# Patient Record
Sex: FEMALE | Race: BLACK OR AFRICAN AMERICAN | Employment: OTHER | RURAL
[De-identification: names, ages, dates, MRNs, and addresses within clinical notes are randomized per-mention and may not be internally consistent; named-entity substitution may affect disease eponyms.]

---

## 2017-02-13 ENCOUNTER — OFFICE VISIT (OUTPATIENT)
Dept: FAMILY MEDICINE CLINIC | Age: 82
End: 2017-02-13

## 2017-02-13 VITALS
DIASTOLIC BLOOD PRESSURE: 60 MMHG | TEMPERATURE: 97.7 F | HEIGHT: 62 IN | WEIGHT: 249.2 LBS | OXYGEN SATURATION: 97 % | HEART RATE: 73 BPM | SYSTOLIC BLOOD PRESSURE: 143 MMHG | BODY MASS INDEX: 45.86 KG/M2 | RESPIRATION RATE: 20 BRPM

## 2017-02-13 DIAGNOSIS — K27.9 PUD (PEPTIC ULCER DISEASE): ICD-10-CM

## 2017-02-13 DIAGNOSIS — M25.551 RIGHT HIP PAIN: ICD-10-CM

## 2017-02-13 DIAGNOSIS — K92.1 BLOOD IN STOOL: Primary | ICD-10-CM

## 2017-02-13 LAB
HEMOCCULT STL QL: POSITIVE
VALID INTERNAL CONTROL?: YES

## 2017-02-13 RX ORDER — ESOMEPRAZOLE MAGNESIUM 40 MG/1
40 CAPSULE, DELAYED RELEASE ORAL DAILY
Qty: 30 CAP | Refills: 1 | Status: SHIPPED | OUTPATIENT
Start: 2017-02-13 | End: 2017-02-24 | Stop reason: SDUPTHER

## 2017-02-13 NOTE — PROGRESS NOTES
Sammy Whatley is a 81 y.o. female who presents to the office today with the following:  Chief Complaint   Patient presents with   Jaqueline Connelly     started friday med day and saturday much more and sunday had some, no blood in stool today       Allergies   Allergen Reactions    Niacin Itching     Felt hot also    Ultram [Tramadol] Nausea Only       Current Outpatient Prescriptions   Medication Sig    UBIDECARENONE (CO Q-10 PO) Take  by mouth.  esomeprazole (NEXIUM) 40 mg capsule Take 1 Cap by mouth daily.  lisinopril (PRINIVIL, ZESTRIL) 10 mg tablet Take 1 Tab by mouth daily.  fexofenadine (ALLEGRA ALLERGY) 180 mg tablet Take 1 Tab by mouth daily.  phenylephrine 0.25 % suppository Insert 1 Suppository into rectum two (2) times a day.  clotrimazole (LOTRIMIN) 1 % topical cream Apply  to affected area two (2) times a day.  atorvastatin (LIPITOR) 20 mg tablet Take 1 Tab by mouth daily.  hydrochlorothiazide (HYDRODIURIL) 25 mg tablet Take 1 Tab by mouth daily.  meloxicam (MOBIC) 15 mg tablet Take 1 Tab by mouth daily.  diltiazem (TIAZAC) 120 mg SR capsule Take 1 Cap by mouth daily.  potassium chloride SR (KLOR-CON 10) 10 mEq tablet Take 2 Tabs by mouth daily.  TROLAMINE SALICYLATE (ASPERCREME EX) by Apply Externally route.  aspirin delayed-release 81 mg tablet Take  by mouth daily.  magnesium oxide 500 mg tab Take  by mouth.  multivitamin (ONE A DAY) tablet Take 1 Tab by mouth daily.  acetaminophen (TYLENOL) 325 mg tablet Take  by mouth every four (4) hours as needed for Pain. No current facility-administered medications for this visit. Past Medical History   Diagnosis Date    Allergic rhinitis     Cervical disc disease     Cervical disc disease     Eczema     Gastritis     GERD (gastroesophageal reflux disease)      mild    GI bleed      hx.  of recurrent    Hernia      hiatal lt side    Hiatal hernia     Hypercholesterolemia     Hypertension     Osteoarthritis (arthritis due to wear and tear of joints)        Past Surgical History   Procedure Laterality Date    Hx gyn       hysterectomy    Hx orthopaedic       rt knee replacement    Hx cyst removal       left breast    Hx colonoscopy  3/05    Hx endoscopy  1/09    Hx endoscopy  2015       History   Smoking Status    Never Smoker   Smokeless Tobacco    Never Used       Family History   Problem Relation Age of Onset    No Known Problems Mother     No Known Problems Father     Arthritis-osteo Sister     Alzheimer Sister     Parkinson's Disease Brother     Cancer Brother     No Known Problems Brother          History of Present Illness:  Patient of Dr. Sheralyn Lanes here for evaluation blood in stool    Patient states on Friday and Saturday she noted some dark blood in her stool. It was not bright red blood. Just a scant amount on Sunday and none today. Patient has had a history of blood in her stool before. She does have a history of hemorrhoids. She states this blood is darker. She does have a history of colon polyps. It appears she had a colonoscopy about 5 years ago. She was also seen in the spring with dark blood in her stool and found to have bleeding ulcer. She had EGD which confirmed this. She was treated with Nexium. Follow-up EGD showed healing gastric ulcer. Since that time she was switched back to Pepcid. She states she stopped that several months ago. She also restarted her Mobic that she takes for arthritis in her hip. She is also on daily aspirin for cardiovascular prophylaxis    Patient states her stomach was gurgling over the weekend but better now. She has had no abdominal pain. No dizziness faintness chest pain or other physical complaints.   She is otherwise feeling well       Review of Systems: Review of systems negative except as noted      Physical Exam:  Visit Vitals    /60 (BP 1 Location: Left arm, BP Patient Position: Sitting)    Pulse 73    Temp 97.7 °F (36.5 °C) (Temporal)    Resp 20    Ht 5' 2\" (1.575 m)    Wt 249 lb 3.2 oz (113 kg)    SpO2 97%    BMI 45.58 kg/m2     Vitals:    02/13/17 0845   BP: 143/60   BP 1 Location: Left arm   BP Patient Position: Sitting   Pulse: 73   Resp: 20   Temp: 97.7 °F (36.5 °C)   TempSrc: Temporal   SpO2: 97%   Weight: 249 lb 3.2 oz (113 kg)   Height: 5' 2\" (1.575 m)     Patient was in no acute distress vital signs stable. She had no orthostatic blood pressure  changes  Chest was clear no wheezes rhonchi or rales  Cor regular rate and rhythm  Abdomen obese no masses soft and was nontender  External rectal exam normal.  No obvious hemorrhoids. Digital exam revealed no masses. Brown stool that was heme positive    Assessment/Plan:  1. Blood in stool  Patient with heme positive stools. No evidence of hemorrhoids. Possibilities include bleeding polyp or developing colon lesion versus recurrent peptic ulcer disease. Patient currently clinically stable with no evidence of orthostasis. At this point I am restarting her Nexium, holding her Mobic and aspirin. Checking a CBC. Getting her back into surgery for further evaluation consideration for repeat scope. She will follow-up here in 1 week for recheck sooner if she has any worsening symptoms  - CBC WITH AUTOMATED DIFF  - REFERRAL TO GENERAL SURGERY  - AMB POC FECAL BLOOD, OCCULT, QL 1 CARD    2. PUD (peptic ulcer disease)    - REFERRAL TO GENERAL SURGERY    3. Right hip pain  Recommended Tylenol as needed for pain          Continue current therapy plan except for indicated above. Verbal and written instructions (see AVS) provided.  Patient expresses understanding of diagnosis and treatment plan. Follow-up Disposition:  Return in about 1 week (around 2/20/2017), or with Dr Emelina Escoto, for GI bleed. Dannie Hall.  Ry Boyd MD

## 2017-02-13 NOTE — PROGRESS NOTES
Chief Complaint   Patient presents with    Melena     started friday med day and saturday much more and sunday had some, no blood in stool today

## 2017-02-13 NOTE — MR AVS SNAPSHOT
Visit Information Date & Time Provider Department Dept. Phone Encounter #  
 2/13/2017  8:30 AM Lynn Mcmillan MD [address] [place] [address] [phone] [id number] Follow-up Instructions Return in about 1 week (around 2/20/2017), or with Dr Mattie Crouch, for GI bleed. Upcoming Health Maintenance Date Due DTaP/Tdap/Td series (1 - Tdap) 2/10/1956 ZOSTER VACCINE AGE 60> 2/10/1996 MEDICARE YEARLY EXAM 3/1/2017 GLAUCOMA SCREENING Q2Y 10/22/2017 Allergies as of 2/13/2017  Review Complete On: 2/13/2017 By: Lynn Mcmillan MD  
  
 Severity Noted Reaction Type Reactions Niacin  10/22/2013    Itching Felt hot also Ultram [Tramadol]  10/22/2013    Nausea Only Current Immunizations  Reviewed on 11/5/2015 Name Date Influenza High Dose Vaccine PF 10/4/2016 Influenza Vaccine 10/22/2014 Influenza Vaccine (Quad) PF 11/5/2015 Pneumococcal Conjugate (PCV-13) 5/1/2015 Not reviewed this visit You Were Diagnosed With   
  
 Codes Comments Blood in stool    -  Primary ICD-10-CM: K92.1 ICD-9-CM: 578.1 PUD (peptic ulcer disease)     ICD-10-CM: K27.9 ICD-9-CM: 533.90 Right hip pain     ICD-10-CM: M25.551 ICD-9-CM: 719.45 Vitals BP Pulse Temp Resp Height(growth percentile) 143/60 (BP 1 Location: Left arm, BP Patient Position: Sitting) 73 97.7 °F (36.5 °C) (Temporal) 20 5' 2\" (1.575 m) Weight(growth percentile) SpO2 BMI OB Status Smoking Status 249 lb 3.2 oz (113 kg) 97% 45.58 kg/m2 Hysterectomy Never Smoker Vitals History BMI and BSA Data Body Mass Index Body Surface Area 45.58 kg/m 2 2.22 m 2 Preferred Pharmacy Pharmacy Name Phone [facility],[address], [address] [place] [address]  [phone] Your Updated Medication List  
  
   
This list is accurate as of: 2/13/17  9:36 AM.  Always use your most recent med list.  
  
  
  
  
 acetaminophen 325 mg tablet Commonly known as:  TYLENOL Take  by mouth every four (4) hours as needed for Pain. ASPERCREME EX  
by Apply Externally route. aspirin delayed-release 81 mg tablet Take  by mouth daily. atorvastatin 20 mg tablet Commonly known as:  LIPITOR Take 1 Tab by mouth daily. clotrimazole 1 % topical cream  
Commonly known as:  Munith Rudder Apply  to affected area two (2) times a day. CO Q-10 PO Take  by mouth. dilTIAZem 120 mg SR capsule Commonly known as:  [facility] Take 1 Cap by mouth daily. esomeprazole 40 mg capsule Commonly known as:  NexIUM Take 1 Cap by mouth daily. fexofenadine 180 mg tablet Commonly known as:  ALLEGRA ALLERGY Take 1 Tab by mouth daily. hydroCHLOROthiazide 25 mg tablet Commonly known as:  HYDRODIURIL Take 1 Tab by mouth daily. lisinopril 10 mg tablet Commonly known as:  [place] Take 1 Tab by mouth daily. magnesium oxide 500 mg Tab Take  by mouth.  
  
 meloxicam 15 mg tablet Commonly known as:  MOBIC Take 1 Tab by mouth daily. multivitamin tablet Commonly known as:  ONE A DAY Take 1 Tab by mouth daily. phenylephrine 0.25 % suppository Insert 1 Suppository into rectum two (2) times a day. potassium chloride SR 10 mEq tablet Commonly known as:  KLOR-CON 10 Take 2 Tabs by mouth daily. Prescriptions Sent to Pharmacy Refills  
 esomeprazole (NEXIUM) 40 mg capsule 1 Sig: Take 1 Cap by mouth daily. Class: Normal  
 Pharmacy: [address] [place] [address],[address], [address] [place] [address]  Ph #: [phone] Route: Oral  
  
We Performed the Following AMB POC FECAL BLOOD, OCCULT, QL 1 CARD [20837 CPT(R)] CBC WITH AUTOMATED DIFF [39853 CPT(R)] REFERRAL TO GENERAL SURGERY [REF27 Custom] Comments:  
 Recurrent gI bleed Follow-up Instructions Return in about 1 week (around 2/20/2017), or with Dr Chance Patel, for GI bleed. Referral Information Referral ID Referred By Referred To  
  
 [id number] Mason Shanks MD   
   [facility] [address] Phone: [phone] Fax: [phone] Visits Status Start Date End Date 1 New Request 2/13/17 2/13/18 If your referral has a status of pending review or denied, additional information will be sent to support the outcome of this decision. Patient Instructions Stop mobic(meloxicam) Hold aspirin Restart nexium Lab work today Follow up Dr Randall Camarena 1 week, sooner if rectal bleeding recurrs Follow up surgery Introducing [facility]! Dear Cy Webster: 
Thank you for requesting a [employer] account. Our records indicate that you already have an active [employer] account. You can access your account anytime at [email or web address] Did you know that you can access your hospital and ER discharge instructions at any time in [employer]? You can also review all of your test results from your hospital stay or ER visit. Additional Information If you have questions, please visit the Frequently Asked Questions section of the [employer] website at [email or web address] Remember, [employer] is NOT to be used for urgent needs. For medical emergencies, dial 911. Now available from your iPhone and Android! Please provide this summary of care documentation to your next provider. Your primary care clinician is listed as Dimple Greco. If you have any questions after today's visit, please call [phone].

## 2017-02-13 NOTE — PATIENT INSTRUCTIONS
Stop mobic(meloxicam)  Hold aspirin  Restart nexium    Lab work today  Follow up Dr mccollum 1 week, sooner if rectal bleeding recurrs  Follow up surgery

## 2017-02-14 LAB
BASOPHILS # BLD AUTO: 0 X10E3/UL (ref 0–0.2)
BASOPHILS NFR BLD AUTO: 0 %
EOSINOPHIL # BLD AUTO: 0.1 X10E3/UL (ref 0–0.4)
EOSINOPHIL NFR BLD AUTO: 2 %
ERYTHROCYTE [DISTWIDTH] IN BLOOD BY AUTOMATED COUNT: 14.1 % (ref 12.3–15.4)
HCT VFR BLD AUTO: 41.5 % (ref 34–46.6)
HGB BLD-MCNC: 13.4 G/DL (ref 11.1–15.9)
IMM GRANULOCYTES # BLD: 0 X10E3/UL (ref 0–0.1)
IMM GRANULOCYTES NFR BLD: 0 %
LYMPHOCYTES # BLD AUTO: 1.6 X10E3/UL (ref 0.7–3.1)
LYMPHOCYTES NFR BLD AUTO: 22 %
MCH RBC QN AUTO: 27 PG (ref 26.6–33)
MCHC RBC AUTO-ENTMCNC: 32.3 G/DL (ref 31.5–35.7)
MCV RBC AUTO: 84 FL (ref 79–97)
MONOCYTES # BLD AUTO: 1 X10E3/UL (ref 0.1–0.9)
MONOCYTES NFR BLD AUTO: 13 %
NEUTROPHILS # BLD AUTO: 4.4 X10E3/UL (ref 1.4–7)
NEUTROPHILS NFR BLD AUTO: 63 %
PLATELET # BLD AUTO: 187 X10E3/UL (ref 150–379)
RBC # BLD AUTO: 4.97 X10E6/UL (ref 3.77–5.28)
WBC # BLD AUTO: 7.2 X10E3/UL (ref 3.4–10.8)

## 2017-02-14 NOTE — PROGRESS NOTES
Blood counts normal  Patient should continue current medications  Keep planned follow-up with Dr. Michelle Latham and Dr. Miller Speaks if bleeding recurs

## 2017-02-20 ENCOUNTER — OFFICE VISIT (OUTPATIENT)
Dept: SURGERY | Age: 82
End: 2017-02-20

## 2017-02-20 VITALS
HEIGHT: 62 IN | HEART RATE: 71 BPM | TEMPERATURE: 98 F | OXYGEN SATURATION: 94 % | RESPIRATION RATE: 18 BRPM | DIASTOLIC BLOOD PRESSURE: 58 MMHG | BODY MASS INDEX: 45.62 KG/M2 | WEIGHT: 247.9 LBS | SYSTOLIC BLOOD PRESSURE: 140 MMHG

## 2017-02-20 DIAGNOSIS — K92.1 FRANK BLOOD IN STOOL: Primary | ICD-10-CM

## 2017-02-20 DIAGNOSIS — Z87.11 PERSONAL HISTORY OF PEPTIC ULCER DISEASE: ICD-10-CM

## 2017-02-20 NOTE — PROGRESS NOTES
HISTORY OF PRESENT ILLNESS  Roya Bailon is a 81 y.o. female. Patient was referred by Mikaela Lopez MD for evaluation for endoscopy to evaluate blood in stool. Mikaela Richter HPI she is known to me from encounter 2016 peptic ulcer disease was identified and treated. She has begun to have some blood in her stools again. She had a colonoscopy about 5 years ago no malignancy was identified at that time. She has not had any change in bowel habit or unexplained abdominal pain or weight loss. Past Medical History   Diagnosis Date    Allergic rhinitis     Cervical disc disease     Cervical disc disease     Eczema     Gastritis     GERD (gastroesophageal reflux disease)      mild    GI bleed      hx. of recurrent    Hernia      hiatal lt side    Hiatal hernia     Hypercholesterolemia     Hypertension     Osteoarthritis (arthritis due to wear and tear of joints)        Past Surgical History   Procedure Laterality Date    Hx gyn       hysterectomy    Hx orthopaedic       rt knee replacement    Hx cyst removal       left breast    Hx colonoscopy  3/05    Hx endoscopy  1/09    Hx endoscopy  2015         Current Outpatient Prescriptions:     UBIDECARENONE (CO Q-10 PO), Take  by mouth., Disp: , Rfl:     esomeprazole (NEXIUM) 40 mg capsule, Take 1 Cap by mouth daily. , Disp: 30 Cap, Rfl: 1    lisinopril (PRINIVIL, ZESTRIL) 10 mg tablet, Take 1 Tab by mouth daily. , Disp: 30 Tab, Rfl: 0    fexofenadine (ALLEGRA ALLERGY) 180 mg tablet, Take 1 Tab by mouth daily. , Disp: 30 Tab, Rfl: 5    clotrimazole (LOTRIMIN) 1 % topical cream, Apply  to affected area two (2) times a day., Disp: 45 g, Rfl: 1    acetaminophen (TYLENOL) 325 mg tablet, Take  by mouth every four (4) hours as needed for Pain., Disp: , Rfl:     atorvastatin (LIPITOR) 20 mg tablet, Take 1 Tab by mouth daily. , Disp: 30 Tab, Rfl: 11    hydrochlorothiazide (HYDRODIURIL) 25 mg tablet, Take 1 Tab by mouth daily. , Disp: 30 Tab, Rfl: 11    diltiazem [facility]) 120 mg SR capsule, Take 1 Cap by mouth daily. , Disp: 30 Cap, Rfl: 11    potassium chloride SR (KLOR-CON 10) 10 mEq tablet, Take 2 Tabs by mouth daily. , Disp: 60 Tab, Rfl: 11    TROLAMINE SALICYLATE (ASPERCREME EX), by Apply Externally route., Disp: , Rfl:     magnesium oxide 500 mg tab, Take  by mouth., Disp: , Rfl:     multivitamin (ONE A DAY) tablet, Take 1 Tab by mouth daily. , Disp: , Rfl:     phenylephrine 0.25 % suppository, Insert 1 Suppository into rectum two (2) times a day., Disp: 20 Suppository, Rfl: 0    meloxicam (MOBIC) 15 mg tablet, Take 1 Tab by mouth daily. , Disp: 30 Tab, Rfl: 11    aspirin delayed-release 81 mg tablet, Take  by mouth daily. , Disp: , Rfl:     Niacin and Ultram [tramadol]    family history includes Alzheimer in her sister; Arthritis-osteo in her sister; Cancer in her brother; No Known Problems in her brother, father, and mother; Parkinson's Disease in her brother. Social History     Social History    Marital status:      Spouse name: N/A    Number of children: N/A    Years of education: N/A     Occupational History    Not on file. Social History Main Topics    Smoking status: Never Smoker    Smokeless tobacco: Never Used    Alcohol use No    Drug use: No    Sexual activity: No     Other Topics Concern    Not on file     Social History Narrative         Review of Systems   Constitutional: Negative. HENT: Negative. Eyes: Negative. Respiratory: Negative. Cardiovascular: Negative. Gastrointestinal: Positive for blood in stool and melena. Negative for abdominal pain, constipation, diarrhea, heartburn, nausea and vomiting. Genitourinary: Negative. Musculoskeletal: Positive for back pain and joint pain. Negative for myalgias and neck pain. Skin: Negative. Neurological: Negative. Endo/Heme/Allergies: Negative. Psychiatric/Behavioral: Negative.         Physical Exam   Constitutional: She is oriented to person, place, and time. She appears well-developed and well-nourished. No distress. HENT:   Head: Normocephalic and atraumatic. Right Ear: External ear normal.   Left Ear: External ear normal.   Nose: Nose normal.   Mouth/Throat: Oropharynx is clear and moist. No oropharyngeal exudate. Eyes: Conjunctivae and EOM are normal. Pupils are equal, round, and reactive to light. Right eye exhibits no discharge. Left eye exhibits no discharge. No scleral icterus. Neck: Normal range of motion. Neck supple. No JVD present. No tracheal deviation present. No thyromegaly present. Cardiovascular: Normal rate, regular rhythm, normal heart sounds and intact distal pulses. Exam reveals no gallop and no friction rub. No murmur heard. Pulmonary/Chest: Effort normal and breath sounds normal. No respiratory distress. She has no wheezes. She has no rales. She exhibits no tenderness. Abdominal: Soft. Bowel sounds are normal. She exhibits no distension and no mass. There is no tenderness. There is no rebound and no guarding. Musculoskeletal: Normal range of motion. She exhibits no edema, tenderness or deformity. Lymphadenopathy:     She has no cervical adenopathy. Neurological: She is alert and oriented to person, place, and time. She displays normal reflexes. No cranial nerve deficit. She exhibits normal muscle tone. Coordination normal.   Skin: Skin is warm and dry. No rash noted. She is not diaphoretic. No erythema. No pallor. Psychiatric: She has a normal mood and affect. Her behavior is normal. Judgment and thought content normal.       ASSESSMENT and PLAN  Melanotic stools and dark blood with bowel movement. Personal history of peptic ulcer disease. We will schedule for EGD and flex sig as an outpatient. Schedule patient for upper GI endoscopy at \[facility]\"". Patient will be scheduled at \[facility]\"" for flexible sigmoidoscopy  MiraLAX prep with fleets enema.    The expected benefits and potential risks and alternatives are discussed with the patient who demonstrates understanding and expresses her wish to have the procedure. The patient arrived in our encounter began half hour before the scheduled time of the appointment. More than half of the time spent with the patient was in face to face counseling. I spent 45 minutes in this encounter with the patient.

## 2017-02-20 NOTE — PATIENT INSTRUCTIONS
Upper GI Endoscopy: Before Your Procedure  What is an upper GI endoscopy? An upper gastrointestinal (or GI) endoscopy is a test that allows your doctor to look at the inside of your esophagus, stomach, and the first part of your small intestine, called the duodenum. The esophagus is the tube that carries food to your stomach. The doctor uses a thin, lighted tube that bends. It is called an endoscope, or scope. The doctor puts the tip of the scope in your mouth and gently moves it down your throat. The scope is a flexible video camera. The doctor looks at a monitor (like a TV set or a computer screen) as he or she moves the scope. A doctor may do this test, which is also called a procedure, to look for ulcers, tumors, infection, or bleeding. It also can be used to look for signs of acid backing up into your esophagus. This is called gastroesophageal reflux disease, or GERD. The doctor can use the scope to take a sample of tissue for study (a biopsy). The doctor also can use the scope to take out growths or stop bleeding. Follow-up care is a key part of your treatment and safety. Be sure to make and go to all appointments, and call your doctor if you are having problems. It's also a good idea to know your test results and keep a list of the medicines you take. What happens before the procedure? Procedures can be stressful. This information will help you understand what you can expect. And it will help you safely prepare for your procedure. Preparing for the procedure  · Understand exactly what procedure is planned, along with the risks, benefits, and other options. · Tell your doctors ALL the medicines, vitamins, supplements, and herbal remedies you take. Some of these can increase the risk of bleeding or interact with anesthesia. · If you take blood thinners, such as warfarin (Coumadin), clopidogrel (Plavix), or aspirin, be sure to talk to your doctor.  He or she will tell you if you should stop taking these medicines before your procedure. Make sure that you understand exactly what your doctor wants you to do. · Your doctor will tell you which medicines to take or stop before your procedure. You may need to stop taking certain medicines a week or more before the procedure. So talk to your doctor as soon as you can. · If you have an advance directive, let your doctor know. It may include a living will and a durable power of  for health care. Bring a copy to the hospital. If you don't have one, you may want to prepare one. It lets your doctor and loved ones know your health care wishes. Doctors advise that everyone prepare these papers before any type of surgery or procedure. What happens on the day of the procedure? · Follow the instructions exactly about when to stop eating and drinking. If you don't, your procedure may be canceled. If your doctor told you to take your medicines on the day of the procedure, take them with only a sip of water. · Take a bath or shower before you come in for your procedure. Do not apply lotions, perfumes, deodorants, or nail polish. · Take off all jewelry and piercings. And take out contact lenses, if you wear them. At the hospital or surgery center  · Bring a picture ID. · The test may take 15 to 30 minutes. · The doctor may spray medicine on the back of your throat to numb it. You also will get medicine to prevent pain and to relax you. · You will lie on your left side. The doctor will put the scope in your mouth and toward the back of your throat. The doctor will tell you when to swallow. This helps the scope move down your throat. You will be able to breathe normally. The doctor will move the scope down your esophagus into your stomach. The doctor also may look at the duodenum. · If your doctor wants to take a sample of tissue for a biopsy, he or she may use small surgical tools, which are put into the scope, to cut off some tissue.  You will not feel a biopsy, if one is taken. The doctor also can use the tools to stop bleeding or to do other treatments, if needed. · You will stay at the hospital or surgery center for 1 to 2 hours until the medicine you were given wears off. Going home  · Be sure you have someone to drive you home. Anesthesia and pain medicine make it unsafe for you to drive. · You will be given more specific instructions about recovering from your procedure. They will cover things like diet, wound care, follow-up care, driving, and getting back to your normal routine. When should you call your doctor? · You have questions or concerns. · You don't understand how to prepare for your procedure. · You become ill before the procedure (such as fever, flu, or a cold). · You need to reschedule or have changed your mind about having the procedure. Where can you learn more? Go to http://suadLivestationbrenda.info/. Enter P790 in the search box to learn more about \"Upper GI Endoscopy: Before Your Procedure. \"  Current as of: August 9, 2016  Content Version: 11.1  © 9020-0328 [employer]. Care instructions adapted under license by [email or web address] (which disclaims liability or warranty for this information). If you have questions about a medical condition or this instruction, always ask your healthcare professional. [address] any warranty or liability for your use of this information. Sigmoidoscopy: Before Your Procedure  What is a sigmoidoscopy? A sigmoidoscopy is a test that lets your doctor look inside the lower part of your colon. The doctor uses a lighted tube called a sigmoidoscope to look for any problems. These include small growths called polyps. They also include cancer, bleeding, and hemorrhoids. During the test, the doctor can take samples of tissue that can be checked for cancer or other problems. This is called a biopsy. The doctor can also take out polyps.   Before the test, you will need to stop eating solid foods. You also will have an enema or take laxatives to empty your lower colon. This helps your doctor to see inside your colon during the test.  Follow-up care is a key part of your treatment and safety. Be sure to make and go to all appointments, and call your doctor if you are having problems. It's also a good idea to know your test results and keep a list of the medicines you take. What happens before the procedure? Procedures can be stressful. This information will help you understand what you can expect. And it will help you safely prepare for your procedure. Preparing for the procedure  · Understand exactly what procedure is planned, along with the risks, benefits, and other options. · Tell your doctors ALL the medicines, vitamins, supplements, and herbal remedies you take. Some of these can increase the risk of bleeding or interact with anesthesia. · If you take blood thinners, such as warfarin (Coumadin), clopidogrel (Plavix), or aspirin, be sure to talk to your doctor. He or she will tell you if you should stop taking these medicines before your procedure. Make sure that you understand exactly what your doctor wants you to do. · Your doctor will tell you which medicines to take or stop before your procedure. You may need to stop taking certain medicines a week or more before the procedure. So talk to your doctor as soon as you can. · If you have an advance directive, let your doctor know. It may include a living will and a durable power of  for health care. Bring a copy to the hospital. If you don't have one, you may want to prepare one. It lets your doctor and loved ones know your health care wishes. Doctors advise that everyone prepare these papers before any type of surgery or procedure. What happens on the day of the procedure? · Follow the instructions exactly about when to stop eating and drinking. If you don't, your procedure may be canceled.  If your doctor told you to take your medicines on the day of the procedure, take them with only a sip of water. · Take a bath or shower before you come in for your procedure. Do not apply lotions, perfumes, deodorants, or nail polish. · Take off all jewelry and piercings. And take out contact lenses, if you wear them. At the [address] [place] [address] or hospital  · Bring a picture ID. · If you feel nervous about the test and want medicine to relax you, talk to your doctor or nurse. Many people don't want this kind of medicine. This is because they want to go back to their usual activities after the test.  · You will lie on your side. The doctor will gently put a gloved finger into your anus. Then the doctor puts the scope in and moves it into your colon. It goes in easily because it is lubricated. · You may have cramps when air is put into the colon to help the doctor see. Try to breathe deeply and slowly through your mouth to relax your belly muscles. You may feel and hear air escape around the scope. There is no need to be embarrassed about it. The passing of air is expected. · The doctor may also use small tools to take tissue samples for a biopsy or to remove polyps. This does not hurt. · The test usually takes 20 minutes or less. Going home  · Be sure you have someone to drive you home. Anesthesia and pain medicine make it unsafe for you to drive. · You will be given more specific instructions about recovering from your procedure. When should you call your doctor? · You have questions or concerns. · You don't understand how to prepare for your procedure. · You become ill before the procedure (such as fever, flu, or a cold). · You need to reschedule or have changed your mind about having the procedure. When should you call for help after the procedure? Call your doctor now or seek immediate medical care if:  · You have severe, ongoing belly pain. · You pass maroon or bloody stools.   Watch closely for any changes in your health, and be sure to contact your doctor if:  · You vomit. · You have a fever. Where can you learn more? Go to http://suad-brenda.info/. Enter [phone] in the search box to learn more about \"Sigmoidoscopy: Before Your Procedure. \"  Current as of: August 9, 2016  Content Version: 11.1  © 3285-6887 [employer]. Care instructions adapted under license by [employer] (which disclaims liability or warranty for this information). If you have questions about a medical condition or this instruction, always ask your healthcare professional. [address] any warranty or liability for your use of this information.

## 2017-02-20 NOTE — MR AVS SNAPSHOT
Visit Information Date & Time Provider Department Dept. Phone Encounter #  
 2/20/2017 11:30 AM Marciano Brennan  [employer] [facility] [phone] [id number] Follow-up Instructions Return for postop follow up. Follow-up and Disposition History Your Appointments 2/21/2017 10:00 AM  
ESTABLISHED PATIENT with Jocelyn Prasad MD  
[address] [facility] ([facility]) Appt Note: 1 wk f/u poss gi bleed,$ 0 cp mrm; 1 wk f/u poss gi bleed,$ 0 cp mrm [address] [phone]  
[address] [address]  
  
    
 3/20/2017 11:00 AM  
POST OP 10 MIN with Marciano Brennan MD  
[facility] [address] [facility] ([facility]) Appt Note: F/U EGD & FLEX SIG  
 [facility],  Med Center  [address] [address] [facility],5[address] Floor, [place] Upcoming Health Maintenance Date Due DTaP/Tdap/Td series (1 - Tdap) 2/10/1957 ZOSTER VACCINE AGE 60> 2/10/1996 MEDICARE YEARLY EXAM 3/1/2017 GLAUCOMA SCREENING Q2Y 10/22/2017 Allergies as of 2/20/2017  Review Complete On: 2/13/2017 By: Dwight Bowen MD  
  
 Severity Noted Reaction Type Reactions Niacin  10/22/2013    Itching Felt hot also Ultram [Tramadol]  10/22/2013    Nausea Only Current Immunizations  Reviewed on 11/5/2015 Name Date Influenza High Dose Vaccine PF 10/4/2016 Influenza Vaccine 10/22/2014 Influenza Vaccine (Quad) PF 11/5/2015 Pneumococcal Conjugate (PCV-13) 5/1/2015 Not reviewed this visit You Were Diagnosed With   
  
 Codes Comments [facility] blood in stool    -  Primary ICD-10-CM: K92.1 ICD-9-CM: 578.1 Personal history of peptic ulcer disease     ICD-10-CM: Z87.11 ICD-9-CM: V12.71 Vitals BP Pulse Temp Resp Height(growth percentile) Weight(growth percentile) 140/58 (BP 1 Location: Left arm, BP Patient Position: Sitting) 71 98 °F (36.7 °C) (Oral) 18 5' 2\" (1.575 m) 247 lb 14.4 oz (112.4 kg) SpO2 BMI OB Status Smoking Status 94% 45.34 kg/m2 Hysterectomy Never Smoker Vitals History BMI and BSA Data Body Mass Index Body Surface Area  
 45.34 kg/m 2 2.22 m 2 Preferred Pharmacy Pharmacy Name Phone [address] [facility],7Th Floor, [address] [place] [address]  [phone] Your Updated Medication List  
  
   
This list is accurate as of: 2/20/17  2:24 PM.  Always use your most recent med list.  
  
  
  
  
 acetaminophen 325 mg tablet Commonly known as:  TYLENOL Take  by mouth every four (4) hours as needed for Pain. ASPERCREME EX  
by Apply Externally route. aspirin delayed-release 81 mg tablet Take  by mouth daily. atorvastatin 20 mg tablet Commonly known as:  LIPITOR Take 1 Tab by mouth daily. clotrimazole 1 % topical cream  
Commonly known as:  Joellen Schumacher Apply  to affected area two (2) times a day. CO Q-10 PO Take  by mouth. dilTIAZem 120 mg SR capsule Commonly known as:  [facility] Take 1 Cap by mouth daily. esomeprazole 40 mg capsule Commonly known as:  NexIUM Take 1 Cap by mouth daily. fexofenadine 180 mg tablet Commonly known as:  ALLEGRA ALLERGY Take 1 Tab by mouth daily. hydroCHLOROthiazide 25 mg tablet Commonly known as:  HYDRODIURIL Take 1 Tab by mouth daily. lisinopril 10 mg tablet Commonly known as:  Jo Needy Take 1 Tab by mouth daily. magnesium oxide 500 mg Tab Take  by mouth.  
  
 meloxicam 15 mg tablet Commonly known as:  MOBIC Take 1 Tab by mouth daily. multivitamin tablet Commonly known as:  ONE A DAY Take 1 Tab by mouth daily. phenylephrine 0.25 % suppository Insert 1 Suppository into rectum two (2) times a day. potassium chloride SR 10 mEq tablet Commonly known as:  KLOR-CON 10  
 Take 2 Tabs by mouth daily. Follow-up Instructions Return for postop follow up. Patient Instructions Upper GI Endoscopy: Before Your Procedure What is an upper GI endoscopy? An upper gastrointestinal (or GI) endoscopy is a test that allows your doctor to look at the inside of your esophagus, stomach, and the first part of your small intestine, called the duodenum. The esophagus is the tube that carries food to your stomach. The doctor uses a thin, lighted tube that bends. It is called an endoscope, or scope. The doctor puts the tip of the scope in your mouth and gently moves it down your throat. The scope is a flexible video camera. The doctor looks at a monitor (like a TV set or a computer screen) as he or she moves the scope. A doctor may do this test, which is also called a procedure, to look for ulcers, tumors, infection, or bleeding. It also can be used to look for signs of acid backing up into your esophagus. This is called gastroesophageal reflux disease, or GERD. The doctor can use the scope to take a sample of tissue for study (a biopsy). The doctor also can use the scope to take out growths or stop bleeding. Follow-up care is a key part of your treatment and safety. Be sure to make and go to all appointments, and call your doctor if you are having problems. It's also a good idea to know your test results and keep a list of the medicines you take. What happens before the procedure? Procedures can be stressful. This information will help you understand what you can expect. And it will help you safely prepare for your procedure. Preparing for the procedure · Understand exactly what procedure is planned, along with the risks, benefits, and other options. · Tell your doctors ALL the medicines, vitamins, supplements, and herbal remedies you take. Some of these can increase the risk of bleeding or interact with anesthesia. · If you take blood thinners, such as warfarin (Coumadin), clopidogrel (Plavix), or aspirin, be sure to talk to your doctor. He or she will tell you if you should stop taking these medicines before your procedure. Make sure that you understand exactly what your doctor wants you to do. · Your doctor will tell you which medicines to take or stop before your procedure. You may need to stop taking certain medicines a week or more before the procedure. So talk to your doctor as soon as you can. · If you have an advance directive, let your doctor know. It may include a living will and a durable power of  for health care. Bring a copy to the hospital. If you don't have one, you may want to prepare one. It lets your doctor and loved ones know your health care wishes. Doctors advise that everyone prepare these papers before any type of surgery or procedure. What happens on the day of the procedure? · Follow the instructions exactly about when to stop eating and drinking. If you don't, your procedure may be canceled. If your doctor told you to take your medicines on the day of the procedure, take them with only a sip of water. · Take a bath or shower before you come in for your procedure. Do not apply lotions, perfumes, deodorants, or nail polish. · Take off all jewelry and piercings. And take out contact lenses, if you wear them. At the hospital or surgery center · Bring a picture ID. · The test may take 15 to 30 minutes. · The doctor may spray medicine on the back of your throat to numb it. You also will get medicine to prevent pain and to relax you. · You will lie on your left side. The doctor will put the scope in your mouth and toward the back of your throat. The doctor will tell you when to swallow. This helps the scope move down your throat. You will be able to breathe normally. The doctor will move the scope down your esophagus into your stomach. The doctor also may look at the duodenum. · If your doctor wants to take a sample of tissue for a biopsy, he or she may use small surgical tools, which are put into the scope, to cut off some tissue. You will not feel a biopsy, if one is taken. The doctor also can use the tools to stop bleeding or to do other treatments, if needed. · You will stay at the hospital or surgery center for 1 to 2 hours until the medicine you were given wears off. Going home · Be sure you have someone to drive you home. Anesthesia and pain medicine make it unsafe for you to drive. · You will be given more specific instructions about recovering from your procedure. They will cover things like diet, wound care, follow-up care, driving, and getting back to your normal routine. When should you call your doctor? · You have questions or concerns. · You don't understand how to prepare for your procedure. · You become ill before the procedure (such as fever, flu, or a cold). · You need to reschedule or have changed your mind about having the procedure. Where can you learn more? Go to http://suad-brenda.info/. Enter P790 in the search box to learn more about \"Upper GI Endoscopy: Before Your Procedure. \" Current as of: August 9, 2016 Content Version: 11.1 © 7426-0934 [employer], [employer]. Care instructions adapted under license by [employer] (which disclaims liability or warranty for this information). If you have questions about a medical condition or this instruction, always ask your healthcare professional. [place] [address] any warranty or liability for your use of this information. Sigmoidoscopy: Before Your Procedure What is a sigmoidoscopy? A sigmoidoscopy is a test that lets your doctor look inside the lower part of your colon. The doctor uses a lighted tube called a sigmoidoscope to look for any problems. These include small growths called polyps. They also include cancer, bleeding, and hemorrhoids. During the test, the doctor can take samples of tissue that can be checked for cancer or other problems. This is called a biopsy. The doctor can also take out polyps. Before the test, you will need to stop eating solid foods. You also will have an enema or take laxatives to empty your lower colon. This helps your doctor to see inside your colon during the test. 
Follow-up care is a key part of your treatment and safety. Be sure to make and go to all appointments, and call your doctor if you are having problems. It's also a good idea to know your test results and keep a list of the medicines you take. What happens before the procedure? Procedures can be stressful. This information will help you understand what you can expect. And it will help you safely prepare for your procedure. Preparing for the procedure · Understand exactly what procedure is planned, along with the risks, benefits, and other options. · Tell your doctors ALL the medicines, vitamins, supplements, and herbal remedies you take. Some of these can increase the risk of bleeding or interact with anesthesia. · If you take blood thinners, such as warfarin (Coumadin), clopidogrel (Plavix), or aspirin, be sure to talk to your doctor. He or she will tell you if you should stop taking these medicines before your procedure. Make sure that you understand exactly what your doctor wants you to do. · Your doctor will tell you which medicines to take or stop before your procedure. You may need to stop taking certain medicines a week or more before the procedure. So talk to your doctor as soon as you can. · If you have an advance directive, let your doctor know. It may include a living will and a durable power of  for health care. Bring a copy to the hospital. If you don't have one, you may want to prepare one. It lets your doctor and loved ones know your health care wishes.  Doctors advise that everyone prepare these papers before any type of surgery or procedure. What happens on the day of the procedure? · Follow the instructions exactly about when to stop eating and drinking. If you don't, your procedure may be canceled. If your doctor told you to take your medicines on the day of the procedure, take them with only a sip of water. · Take a bath or shower before you come in for your procedure. Do not apply lotions, perfumes, deodorants, or nail polish. · Take off all jewelry and piercings. And take out contact lenses, if you wear them. At the [address] [place] [address] or [facility] 
· Bring a picture ID. · If you feel nervous about the test and want medicine to relax you, talk to your doctor or nurse. Many people don't want this kind of medicine. This is because they want to go back to their usual activities after the test. 
· You will lie on your side. The doctor will gently put a gloved finger into your anus. Then the doctor puts the scope in and moves it into your colon. It goes in easily because it is lubricated. · You may have cramps when air is put into the colon to help the doctor see. Try to breathe deeply and slowly through your mouth to relax your belly muscles. You may feel and hear air escape around the scope. There is no need to be embarrassed about it. The passing of air is expected. · The doctor may also use small tools to take tissue samples for a biopsy or to remove polyps. This does not hurt. · The test usually takes 20 minutes or less. Going home · Be sure you have someone to drive you home. Anesthesia and pain medicine make it unsafe for you to drive. · You will be given more specific instructions about recovering from your procedure. When should you call your doctor? · You have questions or concerns. · You don't understand how to prepare for your procedure. · You become ill before the procedure (such as fever, flu, or a cold). · You need to reschedule or have changed your mind about having the procedure. When should you call for help after the procedure? Call your doctor now or seek immediate medical care if: 
· You have severe, ongoing belly pain. · You pass maroon or bloody stools. Watch closely for any changes in your health, and be sure to contact your doctor if: 
· You vomit. · You have a fever. Where can you learn more? Go to http://suad-brenda.info/. Enter [phone] in the search box to learn more about \"Sigmoidoscopy: Before Your Procedure. \" Current as of: August 9, 2016 Content Version: 11.1 © 4297-4182 [employer]. Care instructions adapted under license by [employer] (which disclaims liability or warranty for this information). If you have questions about a medical condition or this instruction, always ask your healthcare professional. [address] any warranty or liability for your use of this information. Patient Instructions History Introducing [facility] & [facility]! Dear Cassidy Barker: 
Thank you for requesting a Theorem account. Our records indicate that you already have an active Theorem account. You can access your account anytime at [email or web address] Did you know that you can access your hospital and ER discharge instructions at any time in Theorem? You can also review all of your test results from your hospital stay or ER visit. Additional Information If you have questions, please visit the Frequently Asked Questions section of the Theorem website at [email or web address] Remember, Theorem is NOT to be used for urgent needs. For medical emergencies, dial 911. Now available from your iPhone and Android! Please provide this summary of care documentation to your next provider. Your primary care clinician is listed as Dimple Greco.  If you have any questions after today's visit, please call [phone].

## 2017-02-21 ENCOUNTER — OFFICE VISIT (OUTPATIENT)
Dept: FAMILY MEDICINE CLINIC | Age: 82
End: 2017-02-21

## 2017-02-21 VITALS
DIASTOLIC BLOOD PRESSURE: 65 MMHG | TEMPERATURE: 96.8 F | WEIGHT: 248 LBS | HEART RATE: 63 BPM | BODY MASS INDEX: 45.64 KG/M2 | OXYGEN SATURATION: 98 % | HEIGHT: 62 IN | SYSTOLIC BLOOD PRESSURE: 132 MMHG | RESPIRATION RATE: 12 BRPM

## 2017-02-21 DIAGNOSIS — I10 ESSENTIAL HYPERTENSION: ICD-10-CM

## 2017-02-21 DIAGNOSIS — K92.2 GASTROINTESTINAL HEMORRHAGE, UNSPECIFIED GASTROINTESTINAL HEMORRHAGE TYPE: Primary | ICD-10-CM

## 2017-02-21 RX ORDER — LISINOPRIL 10 MG/1
10 TABLET ORAL DAILY
Qty: 30 TAB | Refills: 5 | Status: SHIPPED | OUTPATIENT
Start: 2017-02-21 | End: 2017-02-24 | Stop reason: SDUPTHER

## 2017-02-21 NOTE — PROGRESS NOTES
HISTORY OF PRESENT ILLNESS  Ingrid Nielsen is a 81 y.o. female. Chief Complaint   Patient presents with    Follow-up     on possible GI bleed (last week). Colonscopy scheduled for #/(/17       HPI   Has seen my partner a week ago for GI bleed  And referred to surgery  Pt seen yesterday by Dr Zee Thibodeaux up for Endoscopy and Sigmoidoscopy    No more bleeding   Had dark stool and hemoccult was pos    Saw blood about 3x    HTN  Pt stopped Lisinopril after she ran out  No SE  BP has been up last few visit  Was helping  BP checks at home 110's  No dizziness with it    On Co Q 10 200 now  No aches  Last BW good 11/16  Due after 6 mo    Review of Systems   Constitutional: Positive for malaise/fatigue (sometimes). HENT: Positive for congestion (sometimes). Respiratory: Negative for cough. Cardiovascular: Negative for chest pain. Gastrointestinal: Positive for blood in stool. Negative for abdominal pain, constipation, diarrhea, melena, nausea and vomiting. Musculoskeletal: Positive for joint pain (left hip). Neurological: Negative for weakness. Past Medical History   Diagnosis Date    Allergic rhinitis     Cervical disc disease     Cervical disc disease     Eczema     Gastritis     GERD (gastroesophageal reflux disease)      mild    GI bleed      hx. of recurrent    Hernia      hiatal lt side    Hiatal hernia     Hypercholesterolemia     Hypertension     Osteoarthritis (arthritis due to wear and tear of joints)      Current Outpatient Prescriptions   Medication Sig Dispense Refill    lisinopril (PRINIVIL, ZESTRIL) 10 mg tablet Take 1 Tab by mouth daily. 30 Tab 5    UBIDECARENONE (CO Q-10 PO) Take  by mouth.  esomeprazole (NEXIUM) 40 mg capsule Take 1 Cap by mouth daily. 30 Cap 1    fexofenadine (ALLEGRA ALLERGY) 180 mg tablet Take 1 Tab by mouth daily. 30 Tab 5    acetaminophen (TYLENOL) 325 mg tablet Take  by mouth every four (4) hours as needed for Pain.       atorvastatin (LIPITOR) 20 mg tablet Take 1 Tab by mouth daily. 30 Tab 11    hydrochlorothiazide (HYDRODIURIL) 25 mg tablet Take 1 Tab by mouth daily. 30 Tab 11    diltiazem (TIAZAC) 120 mg SR capsule Take 1 Cap by mouth daily. 30 Cap 11    potassium chloride SR (KLOR-CON 10) 10 mEq tablet Take 2 Tabs by mouth daily. 60 Tab 11    TROLAMINE SALICYLATE (ASPERCREME EX) by Apply Externally route.  aspirin delayed-release 81 mg tablet Take  by mouth daily.  magnesium oxide 500 mg tab Take  by mouth.  multivitamin (ONE A DAY) tablet Take 1 Tab by mouth daily.  phenylephrine 0.25 % suppository Insert 1 Suppository into rectum two (2) times a day. 20 Suppository 0    clotrimazole (LOTRIMIN) 1 % topical cream Apply  to affected area two (2) times a day. 45 g 1    meloxicam (MOBIC) 15 mg tablet Take 1 Tab by mouth daily. 30 Tab 11     Allergies   Allergen Reactions    Niacin Itching     Felt hot also    Ultram [Tramadol] Nausea Only     Visit Vitals    /65 (BP 1 Location: Right arm, BP Patient Position: Sitting)    Pulse 63    Temp 96.8 °F (36 °C) (Temporal)    Resp 12    Ht 5' 2\" (1.575 m)    Wt 248 lb (112.5 kg)    SpO2 98%    BMI 45.36 kg/m2       Physical Exam   Constitutional: She is oriented to person, place, and time. She appears well-developed and well-nourished. No distress. HENT:   Head: Normocephalic and atraumatic. Eyes: Conjunctivae and EOM are normal.   Cardiovascular: Normal rate and regular rhythm. Pulmonary/Chest: Effort normal and breath sounds normal.   Musculoskeletal: She exhibits edema (trace bilat). Neurological: She is alert and oriented to person, place, and time. Skin: Skin is warm and dry. Psychiatric: She has a normal mood and affect. Nursing note and vitals reviewed. ASSESSMENT and PLAN    ICD-10-CM ICD-9-CM    1.  Gastrointestinal hemorrhage, unspecified gastrointestinal hemorrhage type K92.2 578.9 CBC WITH AUTOMATED DIFF  Cont Nexium  F/U with  Durga  RTC if seeing more bleeding in the meantime   2.  Essential hypertension I10 401.9 lisinopril (PRINIVIL, ZESTRIL) 10 mg tablet   Recheck BW in 3 mo

## 2017-02-21 NOTE — MR AVS SNAPSHOT
Visit Information Date & Time Provider Department Dept. Phone Encounter #  
 2/21/2017 10:00 AM Shanta Butts MD [address] [facility] [id number] Follow-up Instructions Return in about 3 months (around 5/21/2017). Follow-up and Disposition History Your Appointments 3/20/2017 11:00 AM  
POST OP 10 MIN with Yudi De La Rosa MD  
[facility] [address] [facility] ([address]) Appt Note: F/U EGD & FLEX SIG  
 [facility],  Med Center  [address] [address],5[facility], [place] Upcoming Health Maintenance Date Due DTaP/Tdap/Td series (1 - Tdap) 2/10/1956 ZOSTER VACCINE AGE 60> 2/10/1996 MEDICARE YEARLY EXAM 3/1/2017 GLAUCOMA SCREENING Q2Y 10/22/2017 Allergies as of 2/21/2017  Review Complete On: 2/21/2017 By: Kunal Boss LPN Severity Noted Reaction Type Reactions Niacin  10/22/2013    Itching Felt hot also Ultram [Tramadol]  10/22/2013    Nausea Only Current Immunizations  Reviewed on 11/5/2015 Name Date Influenza High Dose Vaccine PF 10/4/2016 Influenza Vaccine 10/22/2014 Influenza Vaccine (Quad) PF 11/5/2015 Pneumococcal Conjugate (PCV-13) 5/1/2015 Not reviewed this visit You Were Diagnosed With   
  
 Codes Comments Gastrointestinal hemorrhage, unspecified gastrointestinal hemorrhage type    -  Primary ICD-10-CM: K92.2 ICD-9-CM: 578.9 Essential hypertension     ICD-10-CM: I10 
ICD-9-CM: 401.9 Vitals BP Pulse Temp Resp Height(growth percentile) Weight(growth percentile) 132/65 (BP 1 Location: Right arm, BP Patient Position: Sitting) 63 96.8 °F (36 °C) (Temporal) 12 5' 2\" (1.575 m) 248 lb (112.5 kg) SpO2 BMI OB Status Smoking Status 98% 45.36 kg/m2 Hysterectomy Never Smoker BMI and BSA Data  Body Mass Index Body Surface Area  
 45.36 kg/m 2 2.22 m 2  
  
  
 Preferred Pharmacy Pharmacy Name Phone [address] [facility],7Th Floor, [address] [place] [address]  [phone] Your Updated Medication List  
  
   
This list is accurate as of: 2/21/17 10:53 AM.  Always use your most recent med list.  
  
  
  
  
 acetaminophen 325 mg tablet Commonly known as:  TYLENOL Take  by mouth every four (4) hours as needed for Pain. ASPERCREME EX  
by Apply Externally route. aspirin delayed-release 81 mg tablet Take  by mouth daily. atorvastatin 20 mg tablet Commonly known as:  LIPITOR Take 1 Tab by mouth daily. clotrimazole 1 % topical cream  
Commonly known as:  Charleen Mcburney Apply  to affected area two (2) times a day. CO Q-10 PO Take 200 mg by mouth. dilTIAZem 120 mg SR capsule Commonly known as:  [facility] Take 1 Cap by mouth daily. esomeprazole 40 mg capsule Commonly known as:  NexIUM Take 1 Cap by mouth daily. fexofenadine 180 mg tablet Commonly known as:  ALLEGRA ALLERGY Take 1 Tab by mouth daily. hydroCHLOROthiazide 25 mg tablet Commonly known as:  HYDRODIURIL Take 1 Tab by mouth daily. lisinopril 10 mg tablet Commonly known as:  Kevin Shutters Take 1 Tab by mouth daily. magnesium oxide 500 mg Tab Take  by mouth.  
  
 meloxicam 15 mg tablet Commonly known as:  MOBIC Take 1 Tab by mouth daily. multivitamin tablet Commonly known as:  ONE A DAY Take 1 Tab by mouth daily. phenylephrine 0.25 % suppository Insert 1 Suppository into rectum two (2) times a day. potassium chloride SR 10 mEq tablet Commonly known as:  KLOR-CON 10 Take 2 Tabs by mouth daily. Prescriptions Sent to Pharmacy Refills  
 lisinopril (PRINIVIL, ZESTRIL) 10 mg tablet 5 Sig: Take 1 Tab by mouth daily. Class: Normal  
 Pharmacy: [address] [place] [address],[address] [facility], [address] [place] [address]  Ph #: [phone] Route: Oral  
  
We Performed the Following CBC WITH AUTOMATED DIFF [87972 CPT(R)] Follow-up Instructions Return in about 3 months (around 5/21/2017). Introducing [facility] & HEALTH SERVICES! Dear Kelli Acosta: 
Thank you for requesting a TextDigger account. Our records indicate that you already have an active TextDigger account. You can access your account anytime at [email or web address] for Medicine and Science Did you know that you can access your hospital and ER discharge instructions at any time in TextDigger? You can also review all of your test results from your hospital stay or ER visit. Additional Information If you have questions, please visit the Frequently Asked Questions section of the TextDigger website at [email or web address]/. Remember, TextDigger is NOT to be used for urgent needs. For medical emergencies, dial 911. Now available from your iPhone and Android! Please provide this summary of care documentation to your next provider. Your primary care clinician is listed as Dimple Greco. If you have any questions after today's visit, please call [phone].

## 2017-02-22 LAB
BASOPHILS # BLD AUTO: 0 X10E3/UL (ref 0–0.2)
BASOPHILS NFR BLD AUTO: 0 %
EOSINOPHIL # BLD AUTO: 0.1 X10E3/UL (ref 0–0.4)
EOSINOPHIL NFR BLD AUTO: 2 %
ERYTHROCYTE [DISTWIDTH] IN BLOOD BY AUTOMATED COUNT: 13.6 % (ref 12.3–15.4)
HCT VFR BLD AUTO: 41.5 % (ref 34–46.6)
HGB BLD-MCNC: 13.4 G/DL (ref 11.1–15.9)
IMM GRANULOCYTES # BLD: 0 X10E3/UL (ref 0–0.1)
IMM GRANULOCYTES NFR BLD: 0 %
LYMPHOCYTES # BLD AUTO: 1.7 X10E3/UL (ref 0.7–3.1)
LYMPHOCYTES NFR BLD AUTO: 23 %
MCH RBC QN AUTO: 27.6 PG (ref 26.6–33)
MCHC RBC AUTO-ENTMCNC: 32.3 G/DL (ref 31.5–35.7)
MCV RBC AUTO: 86 FL (ref 79–97)
MONOCYTES # BLD AUTO: 1.1 X10E3/UL (ref 0.1–0.9)
MONOCYTES NFR BLD AUTO: 14 %
NEUTROPHILS # BLD AUTO: 4.6 X10E3/UL (ref 1.4–7)
NEUTROPHILS NFR BLD AUTO: 61 %
PLATELET # BLD AUTO: 209 X10E3/UL (ref 150–379)
RBC # BLD AUTO: 4.85 X10E6/UL (ref 3.77–5.28)
WBC # BLD AUTO: 7.5 X10E3/UL (ref 3.4–10.8)

## 2017-02-24 DIAGNOSIS — I10 ESSENTIAL HYPERTENSION WITH GOAL BLOOD PRESSURE LESS THAN 140/90: ICD-10-CM

## 2017-02-24 DIAGNOSIS — M16.0 PRIMARY OSTEOARTHRITIS OF BOTH HIPS: ICD-10-CM

## 2017-02-24 DIAGNOSIS — J30.9 ALLERGIC RHINITIS, UNSPECIFIED ALLERGIC RHINITIS TRIGGER, UNSPECIFIED RHINITIS SEASONALITY: ICD-10-CM

## 2017-02-24 DIAGNOSIS — E78.00 HYPERCHOLESTEROLEMIA: ICD-10-CM

## 2017-02-24 DIAGNOSIS — I10 ESSENTIAL HYPERTENSION: ICD-10-CM

## 2017-02-24 RX ORDER — DILTIAZEM HYDROCHLORIDE 120 MG/1
120 CAPSULE, EXTENDED RELEASE ORAL DAILY
Qty: 90 CAP | Refills: 1 | Status: SHIPPED | OUTPATIENT
Start: 2017-02-24 | End: 2017-08-24 | Stop reason: SDUPTHER

## 2017-02-24 RX ORDER — MINERAL OIL
180 ENEMA (ML) RECTAL DAILY
Qty: 90 TAB | Refills: 1 | Status: SHIPPED | OUTPATIENT
Start: 2017-02-24 | End: 2017-04-06

## 2017-02-24 RX ORDER — ATORVASTATIN CALCIUM 20 MG/1
20 TABLET, FILM COATED ORAL DAILY
Qty: 90 TAB | Refills: 1 | Status: SHIPPED | OUTPATIENT
Start: 2017-02-24 | End: 2017-04-25 | Stop reason: ALTCHOICE

## 2017-02-24 RX ORDER — LISINOPRIL 10 MG/1
10 TABLET ORAL DAILY
Qty: 90 TAB | Refills: 1 | Status: SHIPPED | OUTPATIENT
Start: 2017-02-24 | End: 2017-05-01

## 2017-02-24 RX ORDER — ESOMEPRAZOLE MAGNESIUM 40 MG/1
40 CAPSULE, DELAYED RELEASE ORAL DAILY
Qty: 90 CAP | Refills: 1 | Status: SHIPPED | OUTPATIENT
Start: 2017-02-24 | End: 2017-08-24 | Stop reason: SDUPTHER

## 2017-02-24 RX ORDER — HYDROCHLOROTHIAZIDE 25 MG/1
25 TABLET ORAL DAILY
Qty: 90 TAB | Refills: 1 | Status: SHIPPED | OUTPATIENT
Start: 2017-02-24 | End: 2017-08-24 | Stop reason: SDUPTHER

## 2017-02-24 RX ORDER — POTASSIUM CHLORIDE 750 MG/1
20 TABLET, FILM COATED, EXTENDED RELEASE ORAL 2 TIMES DAILY
Qty: 180 TAB | Refills: 1 | Status: SHIPPED | OUTPATIENT
Start: 2017-02-24 | End: 2017-05-17

## 2017-02-24 RX ORDER — MELOXICAM 15 MG/1
15 TABLET ORAL DAILY
Qty: 90 TAB | Refills: 1 | Status: SHIPPED | OUTPATIENT
Start: 2017-02-24 | End: 2017-04-19

## 2017-03-15 ENCOUNTER — DOCUMENTATION ONLY (OUTPATIENT)
Dept: SURGERY | Age: 82
End: 2017-03-15

## 2017-03-15 ENCOUNTER — OFFICE VISIT (OUTPATIENT)
Dept: SURGERY | Age: 82
End: 2017-03-15

## 2017-03-15 VITALS
DIASTOLIC BLOOD PRESSURE: 48 MMHG | HEIGHT: 62 IN | HEART RATE: 80 BPM | SYSTOLIC BLOOD PRESSURE: 122 MMHG | BODY MASS INDEX: 45.64 KG/M2 | WEIGHT: 248 LBS

## 2017-03-15 DIAGNOSIS — Z98.890 S/P COLONOSCOPIC POLYPECTOMY: Primary | ICD-10-CM

## 2017-03-15 NOTE — PROGRESS NOTES
HISTORY OF PRESENT ILLNESS  Deepti Plata is a 81 y.o. female. Status post EGD and colonoscopy to evaluate for rectal bleeding. Large right colon polyp was identified and partially removed. The area was marked with submucosal infiltration of dye. Pathology from the sections of polyp removed is still pending. No source of bleeding was identified in the upper GI tract. Follow-up CT scan confirms the right colon mass. Does not suggest metastatic involvement. I have consulted with Dr. Caty Lobo who is willing to see her in consultation. HPI she reports having tolerated the preparation and the procedure well. ROS   She is tolerating diet medication and activities well. Rectal bleeding seems to have diminished. Physical Exam her examination is benign. ASSESSMENT and PLAN  Right: Tumor highly suspicious for malignancy. CT without evidence of metastatic spread at this point. Pathology pending. Given her advanced age I think she would benefit from referral to tertiary center for definitive surgical therapy. Dr. Leiva Virgil is willing to evaluate her in that respect. We will remain available here for follow-up as needed. More than half of the time spent with the patient was in face to face counseling. I spent 25 minutes in this encounter with the patient.

## 2017-03-20 ENCOUNTER — OFFICE VISIT (OUTPATIENT)
Dept: SURGERY | Age: 82
End: 2017-03-20

## 2017-03-20 VITALS
DIASTOLIC BLOOD PRESSURE: 78 MMHG | SYSTOLIC BLOOD PRESSURE: 120 MMHG | HEIGHT: 62 IN | BODY MASS INDEX: 45.64 KG/M2 | RESPIRATION RATE: 16 BRPM | WEIGHT: 248 LBS | HEART RATE: 76 BPM | OXYGEN SATURATION: 95 % | TEMPERATURE: 98.3 F

## 2017-03-20 DIAGNOSIS — K63.89 COLONIC MASS: Primary | ICD-10-CM

## 2017-03-20 NOTE — PROGRESS NOTES
1. Have you been to the ER, urgent care clinic since your last visit? Hospitalized since your last visit? No    2. Have you seen or consulted any other health care providers outside of the [address] [place] [address] since your last visit? Include any pap smears or colon screening.  No

## 2017-03-20 NOTE — PROGRESS NOTES
Surgery Consult    Subjective:      Shoshana Byers is a 81 y.o.  female who was referred by Dr. Dianne Rollins for evaluation of colon mass and ventral hernia. She underwent an EGD and colonoscopy to evaluate rectal bleeding during which a large right colon polyp was identified and partially removed. She had an abd/pelvis CT scan on 3/13/17 that revealed a colon mass and ventral hernia, but did not show evidence of metastatic spread. Pathology results are still pending. She states she has been doing well since her EGD and colonoscopy. Chief Complaint   Patient presents with    Colon Mass     see CT dated 3-13-17    Ventral Hernia     see CT dated 3-13-17   . Symptoms were first noted 1 month(s) ago. Improved by: nothing  Worsened by: nothing  Associated signs and symptoms include: n/a    Patient Active Problem List    Diagnosis Date Noted    Colonic mass 03/20/2017    Allergic rhinitis 11/18/2016    Advance directive discussed with patient 02/29/2016    Occult blood in stools 12/04/2015    Prediabetes 11/05/2015    Hypercholesterolemia     Cervical disc disease     Osteoarthritis (arthritis due to wear and tear of joints)     GERD (gastroesophageal reflux disease)     Hypertension 10/22/2013    Hyperlipidemia 10/22/2013     Past Medical History:   Diagnosis Date    Allergic rhinitis     Cervical disc disease     Cervical disc disease     Colonic mass 3/20/2017    Eczema     Gastritis     GERD (gastroesophageal reflux disease)     mild    GI bleed     hx.  of recurrent    Hernia     hiatal lt side    Hiatal hernia     Hypercholesterolemia     Hypertension     Osteoarthritis (arthritis due to wear and tear of joints)       Past Surgical History:   Procedure Laterality Date    HX COLONOSCOPY  3/05    HX COLONOSCOPY  2017    HX CYST REMOVAL      left breast    HX ENDOSCOPY  1/09    HX ENDOSCOPY  2015    HX GYN      hysterectomy    HX ORTHOPAEDIC      rt knee replacement Social History   Substance Use Topics    Smoking status: Never Smoker    Smokeless tobacco: Never Used    Alcohol use No      Family History   Problem Relation Age of Onset    No Known Problems Mother     No Known Problems Father     Arthritis-osteo Sister     Alzheimer Sister     Parkinson's Disease Brother     Cancer Brother     No Known Problems Brother       Current Outpatient Prescriptions   Medication Sig    lisinopril (PRINIVIL, ZESTRIL) 10 mg tablet Take 1 Tab by mouth daily.  esomeprazole (NEXIUM) 40 mg capsule Take 1 Cap by mouth daily.  fexofenadine (ALLEGRA ALLERGY) 180 mg tablet Take 1 Tab by mouth daily.  atorvastatin (LIPITOR) 20 mg tablet Take 1 Tab by mouth daily.  hydroCHLOROthiazide (HYDRODIURIL) 25 mg tablet Take 1 Tab by mouth daily.  dilTIAZem (TIAZAC) 120 mg SR capsule Take 1 Cap by mouth daily.  potassium chloride SR (KLOR-CON 10) 10 mEq tablet Take 2 Tabs by mouth two (2) times a day.  UBIDECARENONE (CO Q-10 PO) Take 200 mg by mouth.  magnesium oxide 500 mg tab Take  by mouth.  multivitamin (ONE A DAY) tablet Take 1 Tab by mouth daily.  meloxicam (MOBIC) 15 mg tablet Take 1 Tab by mouth daily.  phenylephrine 0.25 % suppository Insert 1 Suppository into rectum two (2) times a day.  clotrimazole (LOTRIMIN) 1 % topical cream Apply  to affected area two (2) times a day.  acetaminophen (TYLENOL) 325 mg tablet Take  by mouth every four (4) hours as needed for Pain.  TROLAMINE SALICYLATE (ASPERCREME EX) by Apply Externally route.  aspirin delayed-release 81 mg tablet Take  by mouth daily. No current facility-administered medications for this visit. Allergies   Allergen Reactions    Niacin Itching     Felt hot also    Ultram [Tramadol] Nausea Only        Review of Systems:    A comprehensive review of systems was negative except for that written in the History of Present Illness.     Objective:     Physical Exam:  Visit Vitals  /78 (BP 1 Location: Right arm, BP Patient Position: Sitting)    Pulse 76    Temp 98.3 °F (36.8 °C) (Oral)    Resp 16    Ht 5' 2\" (1.575 m)    Wt 248 lb (112.5 kg)    SpO2 95%    BMI 45.36 kg/m2     General appearance: alert, cooperative, no distress, appears stated age  Abdomen: soft, non-tender. Bowel sounds normal. No masses,  no organomegaly  Neurologic: Grossly normal  Heart: regular rate and rhythm  Lungs: clear to Percussion and auscultation    Labs: No results found for this or any previous visit (from the past 24 hour(s)). Assessment:       ICD-10-CM ICD-9-CM    1. Colonic mass K63.9 569.89          Plan: It is my recommendation to remove the colon polyp laparoscopically. She understands and agrees to this plan. She will schedule her procedure at her earliest convenience. Written by wilmer Alvares, as dictated by Ivonne Dove MD.    Signed By: Ivonne Dove MD     March 20, 2017

## 2017-03-20 NOTE — LETTER
3/20/2017 5:07 PM 
 
Ms. Cedric Menendez [address] [place] [address] Dear Mrs. Que Reed, 
 
Surgery is scheduled as follows: 
 
Surgery date: Tuesday, 4-11-17 Location: [place] [address] 
 
Arrival time: 6:00 a.m. Start time: 8:00 a.m. 
 
DO NOT EAT OR DRINK ANYTHING PAST MIDNIGHT THE NIGHT BEFORE SURGERY Pre-Registration: Date: Tuesday, 4-4-17  Time: 2:00 p.m. Location: [address] [facility]) [address] The nurses will review your medications with you at this time and also obtain the pre-testing that the doctor has ordered. Please allow approximately 1.5 hours for this appointment. Please call for your 1st Post - Operative appointment when discharged [address]: [address] [facility] 
Dr. Romeo Mcdonnell Phone: [phone] Nurse or physician For questions regarding this information please contact [place] at [phone]

## 2017-04-04 ENCOUNTER — HOSPITAL ENCOUNTER (OUTPATIENT)
Dept: GENERAL RADIOLOGY | Age: 82
Discharge: HOME | End: 2017-04-04
Payer: MEDICARE

## 2017-04-04 ENCOUNTER — HOSPITAL ENCOUNTER (OUTPATIENT)
Dept: PREADMISSION TESTING | Age: 82
Discharge: HOME | End: 2017-04-04
Payer: MEDICARE

## 2017-04-04 VITALS
WEIGHT: 233.47 LBS | SYSTOLIC BLOOD PRESSURE: 127 MMHG | DIASTOLIC BLOOD PRESSURE: 75 MMHG | BODY MASS INDEX: 44.08 KG/M2 | TEMPERATURE: 98.2 F | RESPIRATION RATE: 19 BRPM | HEART RATE: 89 BPM | HEIGHT: 61 IN

## 2017-04-04 LAB
ALBUMIN SERPL BCP-MCNC: 3.2 G/DL (ref 3.5–5)
ALBUMIN/GLOB SERPL: 1.1 {RATIO} (ref 1.1–2.2)
ALP SERPL-CCNC: 60 U/L (ref 45–117)
ALT SERPL-CCNC: 29 U/L (ref 12–78)
ANION GAP BLD CALC-SCNC: 8 MMOL/L (ref 5–15)
AST SERPL W P-5'-P-CCNC: 35 U/L (ref 15–37)
BASOPHILS # BLD AUTO: 0 K/UL (ref 0–0.1)
BASOPHILS # BLD: 0 % (ref 0–1)
BILIRUB SERPL-MCNC: 0.6 MG/DL (ref 0.2–1)
BUN SERPL-MCNC: 15 MG/DL (ref 6–20)
BUN/CREAT SERPL: 20 (ref 12–20)
CALCIUM SERPL-MCNC: 9.2 MG/DL (ref 8.5–10.1)
CHLORIDE SERPL-SCNC: 99 MMOL/L (ref 97–108)
CO2 SERPL-SCNC: 32 MMOL/L (ref 21–32)
CREAT SERPL-MCNC: 0.76 MG/DL (ref 0.55–1.02)
EOSINOPHIL # BLD: 0.1 K/UL (ref 0–0.4)
EOSINOPHIL NFR BLD: 1 % (ref 0–7)
ERYTHROCYTE [DISTWIDTH] IN BLOOD BY AUTOMATED COUNT: 14.9 % (ref 11.5–14.5)
GLOBULIN SER CALC-MCNC: 2.9 G/DL (ref 2–4)
GLUCOSE SERPL-MCNC: 90 MG/DL (ref 65–100)
HCT VFR BLD AUTO: 42.2 % (ref 35–47)
HGB BLD-MCNC: 13.1 G/DL (ref 11.5–16)
LYMPHOCYTES # BLD AUTO: 19 % (ref 12–49)
LYMPHOCYTES # BLD: 1.8 K/UL (ref 0.8–3.5)
MCH RBC QN AUTO: 26.7 PG (ref 26–34)
MCHC RBC AUTO-ENTMCNC: 31 G/DL (ref 30–36.5)
MCV RBC AUTO: 86.1 FL (ref 80–99)
MONOCYTES # BLD: 1.2 K/UL (ref 0–1)
MONOCYTES NFR BLD AUTO: 13 % (ref 5–13)
NEUTS SEG # BLD: 6.5 K/UL (ref 1.8–8)
NEUTS SEG NFR BLD AUTO: 67 % (ref 32–75)
PLATELET # BLD AUTO: 169 K/UL (ref 150–400)
POTASSIUM SERPL-SCNC: 3.4 MMOL/L (ref 3.5–5.1)
PROT SERPL-MCNC: 6.1 G/DL (ref 6.4–8.2)
RBC # BLD AUTO: 4.9 M/UL (ref 3.8–5.2)
SODIUM SERPL-SCNC: 139 MMOL/L (ref 136–145)
WBC # BLD AUTO: 9.6 K/UL (ref 3.6–11)

## 2017-04-04 PROCEDURE — 80053 COMPREHEN METABOLIC PANEL: CPT | Performed by: SURGERY

## 2017-04-04 PROCEDURE — 85025 COMPLETE CBC W/AUTO DIFF WBC: CPT | Performed by: SURGERY

## 2017-04-04 PROCEDURE — 93005 ELECTROCARDIOGRAM TRACING: CPT

## 2017-04-04 PROCEDURE — 71020 XR CHEST PA LAT: CPT

## 2017-04-04 PROCEDURE — 36415 COLL VENOUS BLD VENIPUNCTURE: CPT | Performed by: SURGERY

## 2017-04-04 RX ORDER — GLUCOSAMINE SULFATE 1500 MG
1000 POWDER IN PACKET (EA) ORAL DAILY
COMMUNITY
End: 2018-01-01 | Stop reason: SDUPTHER

## 2017-04-04 NOTE — PERIOP NOTES
Pt presented to [facility] with redenned area under both breasts which she is using baby powder for. Erika Angelucci, Dr. Xu Villegas nurse a note via cc notifying her of redenned area and asked her to call pt if Dr. Natalie Allred would like her seen by PCP prior to surgery.

## 2017-04-04 NOTE — PERIOP NOTES
Patient given surgical site infection FAQs handout and hand hygiene tips sheet. Pre-operative instructions reviewed and patient verbalizes understanding of instructions. Patient has been given the opportunity to ask additional questions. 'Patient given six packs of CHG wipes. Instructions reviewed on use of CHG wipes. PATIENT GIVEN WRITTEN INSTRUCTIONS TO GO TO THE IMAGING CENTER/CANCER CENTER [address] [place] [address] [address] TO HAVE CHEST XRAY COMPLETED.

## 2017-04-05 ENCOUNTER — TELEPHONE (OUTPATIENT)
Dept: SURGERY | Age: 82
End: 2017-04-05

## 2017-04-05 LAB
ATRIAL RATE: 72 BPM
CALCULATED P AXIS, ECG09: 54 DEGREES
CALCULATED R AXIS, ECG10: -35 DEGREES
CALCULATED T AXIS, ECG11: -11 DEGREES
DIAGNOSIS, 93000: NORMAL
P-R INTERVAL, ECG05: 172 MS
Q-T INTERVAL, ECG07: 408 MS
QRS DURATION, ECG06: 94 MS
QTC CALCULATION (BEZET), ECG08: 446 MS
VENTRICULAR RATE, ECG03: 72 BPM

## 2017-04-05 NOTE — TELEPHONE ENCOUNTER
I called patient and she said she has an appointment with her PCP tomorrow to see about the rash under her breasts.

## 2017-04-05 NOTE — TELEPHONE ENCOUNTER
----- Message from Selvin Bautista RN sent at 4/4/2017  2:45 PM EDT -----  Bernadette Kristine,    I saw Radha Renner in PAT today and she has a redenned area under both breasts which she  is applying baby powder. I wanted Dr. Bonnie Eastman to be aware since it is close to the area of surgery. Pt has not seen a doctor about it. Pt stated is started last week. Please call pt if Dr. Bonnie Eastman would like her to see PCP about this prior to surgery.

## 2017-04-06 ENCOUNTER — OFFICE VISIT (OUTPATIENT)
Dept: FAMILY MEDICINE CLINIC | Age: 82
End: 2017-04-06

## 2017-04-06 VITALS
RESPIRATION RATE: 20 BRPM | TEMPERATURE: 96.3 F | SYSTOLIC BLOOD PRESSURE: 132 MMHG | OXYGEN SATURATION: 96 % | DIASTOLIC BLOOD PRESSURE: 80 MMHG | HEART RATE: 69 BPM | HEIGHT: 61 IN | BODY MASS INDEX: 45.99 KG/M2 | WEIGHT: 243.6 LBS

## 2017-04-06 DIAGNOSIS — J30.9 ALLERGIC RHINITIS, UNSPECIFIED ALLERGIC RHINITIS TRIGGER, UNSPECIFIED RHINITIS SEASONALITY: ICD-10-CM

## 2017-04-06 DIAGNOSIS — B37.2 MONILIASIS, CUTANEOUS: ICD-10-CM

## 2017-04-06 DIAGNOSIS — Z00.00 MEDICARE ANNUAL WELLNESS VISIT, SUBSEQUENT: Primary | ICD-10-CM

## 2017-04-06 RX ORDER — CLOTRIMAZOLE AND BETAMETHASONE DIPROPIONATE 10; .64 MG/G; MG/G
CREAM TOPICAL
Qty: 30 G | Refills: 0 | Status: SHIPPED | OUTPATIENT
Start: 2017-04-06 | End: 2017-05-15

## 2017-04-06 RX ORDER — CETIRIZINE HCL 10 MG
10 TABLET ORAL
Qty: 30 TAB | Refills: 3 | Status: SHIPPED | OUTPATIENT
Start: 2017-04-06 | End: 2018-01-01

## 2017-04-06 NOTE — PATIENT INSTRUCTIONS
Schedule of Personalized Health Plan  (Provide Copy to Patient)  The best way to stay healthy is to live a healthy lifestyle. A healthy lifestyle includes regular exercise, eating a well-balanced diet, keeping a healthy weight and not smoking. Regular physical exams and screening tests are another important way to take care of yourself. Preventive exams provided by health care providers can find health problems early when treatment works best and can keep you from getting certain diseases or illnesses. Preventive services include exams, lab tests, screenings, shots, monitoring and information to help you take care of your own health. All people over 65 should have a pneumonia shot. Pneumonia shots are usually only needed once in a lifetime unless your doctor decides differently. All people over 65 should have a yearly flu shot. People over 65 are at medium to high risk for Hepatitis B. Three shots are needed for complete protection. In addition to your physical exam, some screening tests are recommended:    Bone mass measurement (dexa scan) is recommended every two years  Diabetes Mellitus screening is recommended every year. Glaucoma is an eye disease caused by high pressure in the eye. An eye exam is recommended every year. Cardiovascular screening tests that check your cholesterol and other blood fat (lipid) levels are recommended every five years. Colorectal Cancer screening tests help to find pre-cancerous polyps (growths in the colon) so they can be removed before they turn into cancer. Tests ordered for screening depend on your personal and family history risk factors.     Screening for Breast Cancer is recommended yearly with a mammogram.    Screening for Cervical Cancer is recommended every two years (annually for certain risk factors, such as previous history of STD or abnormal PAP in past 7 years), with a Pelvic Exam with PAP    Here is a list of your current Health Maintenance items with a due date:  Health Maintenance   Topic Date Due    DTaP/Tdap/Td series (1 - Tdap) 02/10/1957    ZOSTER VACCINE AGE 60>  02/10/1996    MEDICARE YEARLY EXAM  03/01/2017    GLAUCOMA SCREENING Q2Y  10/22/2017    OSTEOPOROSIS SCREENING (DEXA)  Completed    Pneumococcal 65+ Low/Medium Risk  Addressed    INFLUENZA AGE 9 TO ADULT  Completed

## 2017-04-06 NOTE — ACP (ADVANCE CARE PLANNING)
Advance Care Planning (ACP) Provider Conversation Snapshot    Date of ACP Conversation: 04/06/17  Persons included in Conversation:  patient  Length of ACP Conversation in minutes:  <16 minutes (Non-Billable)      Pt has ACP and will bring in a copy

## 2017-04-06 NOTE — PROGRESS NOTES
Chief Complaint   Patient presents with   [facility] Annual Wellness Visit         Rash     under breast, in groin area     Patient took blood pressure medication. Lynn Lucero LPN     Patient has an ACP and will bring it in for us to make a copy of it. Lynn MI Lucero        Adriana Fernandez is a 81 y.o. female and presents for annual Medicare Wellness Visit. Problem List: Reviewed with patient and discussed risk factors.     Patient Active Problem List   Diagnosis Code    Hypertension I10    Hyperlipidemia E78.5    Hypercholesterolemia E78.00    Cervical disc disease M50.90    Osteoarthritis (arthritis due to wear and tear of joints) M19.90    GERD (gastroesophageal reflux disease) K21.9    Prediabetes R73.03    Occult blood in stools R19.5    Advance directive discussed with patient Z71.89    Allergic rhinitis J30.9    Colonic mass K63.9       Current medical providers:  Patient Care Team:  Nelda Koch MD as PCP - General (Family Practice)  Pascual Cogan, MD (General Surgery)  Hang Paniagua PA-C (Physician Assistant)  Harley Orr MD (General Surgery)    [facility]: Reviewed with patient  Past Surgical History:   Procedure Laterality Date    HX COLONOSCOPY  3/05    HX COLONOSCOPY  2017    HX CYST REMOVAL      left side    HX ENDOSCOPY  1/09    HX ENDOSCOPY  2015    HX GYN      hysterectomy    HX ORTHOPAEDIC      rt knee replacement        SH: Reviewed with patient  Social History   Substance Use Topics    Smoking status: Never Smoker    Smokeless tobacco: Never Used    Alcohol use No       FH: Reviewed with patient  Family History   Problem Relation Age of Onset    No Known Problems Mother     No Known Problems Father     Arthritis-osteo Sister     Alzheimer Sister     Parkinson's Disease Brother     Cancer Brother     No Known Problems Brother     Anesth Problems Neg Hx        Medications/Allergies: Reviewed with patient  Current Outpatient Prescriptions on File Prior to Visit   Medication Sig Dispense Refill    cholecalciferol (VITAMIN D3) 1,000 unit cap Take 1,000 Units by mouth daily.  lisinopril (PRINIVIL, ZESTRIL) 10 mg tablet Take 1 Tab by mouth daily. 90 Tab 1    esomeprazole (NEXIUM) 40 mg capsule Take 1 Cap by mouth daily. 90 Cap 1    fexofenadine (ALLEGRA ALLERGY) 180 mg tablet Take 1 Tab by mouth daily. 90 Tab 1    atorvastatin (LIPITOR) 20 mg tablet Take 1 Tab by mouth daily. (Patient taking differently: Take 20 mg by mouth nightly.) 90 Tab 1    hydroCHLOROthiazide (HYDRODIURIL) 25 mg tablet Take 1 Tab by mouth daily. 90 Tab 1    meloxicam (MOBIC) 15 mg tablet Take 1 Tab by mouth daily. 90 Tab 1    dilTIAZem (TIAZAC) 120 mg SR capsule Take 1 Cap by mouth daily. 90 Cap 1    potassium chloride SR (KLOR-CON 10) 10 mEq tablet Take 2 Tabs by mouth two (2) times a day. 180 Tab 1    UBIDECARENONE (CO Q-10 PO) Take 200 mg by mouth daily.  clotrimazole (LOTRIMIN) 1 % topical cream Apply  to affected area two (2) times a day. (Patient taking differently: Apply  to affected area two (2) times daily as needed.) 45 g 1    aspirin delayed-release 81 mg tablet Take  by mouth daily.  magnesium oxide 500 mg tab Take 1 Tab by mouth daily.  multivitamin (ONE A DAY) tablet Take 1 Tab by mouth daily.  acetaminophen (TYLENOL) 325 mg tablet Take  by mouth every four (4) hours as needed for Pain.  TROLAMINE SALICYLATE (ASPERCREME EX) by Apply Externally route. Applies to left knee 3-4 x daily       No current facility-administered medications on file prior to visit. Allergies   Allergen Reactions    Niacin Itching     Felt hot also    Ultram [Tramadol] Nausea Only       Objective:  Visit Vitals    Ht 5' 1\" (1.549 m)    Wt 243 lb 9.6 oz (110.5 kg)    BMI 46.03 kg/m2    Body mass index is 46.03 kg/(m^2).     Assessment of cognitive impairment: Alert and oriented x 3    Depression Screen:   PHQ 2 / 9, over the last two weeks 2/13/2017 Little interest or pleasure in doing things Not at all   Feeling down, depressed or hopeless Several days   Total Score PHQ 2 1       Fall Risk Assessment:    Fall Risk Assessment, last 12 mths 2/13/2017   Able to walk? Yes   Fall in past 12 months? No       Functional Ability:   Does the patient exhibit a steady gait? yes   How long did it take the patient to get up and walk from a sitting position? 2 seconds   Is the patient self reliant?  (ie can do own laundry, meals, household chores)  yes     Does the patient handle his/her own medications? yes     Does the patient handle his/her own money? yes     Is the patients home safe (ie good lighting, handrails on stairs and bath, etc.)? yes     Did you notice or did patient express any hearing difficulties? no     Did you notice or did patient express any vision difficulties?   no     Were distance and reading eye charts used? no       Advance Care Planning:   Patient was offered the opportunity to discuss advance care planning:  yes     Does patient have an Advance Directive:  yes   If no, did you provide information on Caring Connections? yes       Plan:      No orders of the defined types were placed in this encounter. Health Maintenance   Topic Date Due    DTaP/Tdap/Td series (1 - Tdap) 02/10/1957    ZOSTER VACCINE AGE 60>  02/10/1996    MEDICARE YEARLY EXAM  03/01/2017    GLAUCOMA SCREENING Q2Y  10/22/2017    OSTEOPOROSIS SCREENING (DEXA)  Completed    Pneumococcal 65+ Low/Medium Risk  Addressed    INFLUENZA AGE 9 TO ADULT  Completed       *Patient verbalized understanding and agreement with the plan. A copy of the After Visit Summary with personalized health plan was given to the patient today.

## 2017-04-06 NOTE — PROGRESS NOTES
[facility] OF PRESENT ILLNESS  Gina Hernandez is a 81 y.o. female. Chief Complaint   Patient presents with   [facility] Annual Wellness Visit         Rash     under breast, in groin area       HPI  Rash under breast and in the groins  Had it in the past  Started again a week ago  Tried powder but not helping    Hx of allergies  Allegra not helping much  Claritin not helping much in the past  Never tried Zyrtec    Has stomach surgery soon on 4/11 for Colon mass    Hip pain   Seen Dr Ivett Miles and has OA  Had steroid shots but lately not helping    HM reviewed  Review of Systems   Constitutional: Negative for weight loss. HENT: Positive for congestion. Respiratory: Positive for cough (from allergies). Negative for shortness of breath. Cardiovascular: Negative for chest pain. Musculoskeletal: Positive for joint pain. Skin: Positive for rash. Past Medical History:   Diagnosis Date    Allergic rhinitis     Cervical disc disease     Cervical disc disease     Colonic mass 3/20/2017    Eczema     Gastritis     GERD (gastroesophageal reflux disease)     mild    GI bleed     hx. of recurrent    Hernia     hiatal lt side    Hiatal hernia     Hypercholesterolemia     Hypertension     Osteoarthritis (arthritis due to wear and tear of joints)      Current Outpatient Prescriptions   Medication Sig Dispense Refill    clotrimazole-betamethasone (LOTRISONE) topical cream Apply bid for 2 w 30 g 0    cetirizine (ZYRTEC) 10 mg tablet Take 1 Tab by mouth daily as needed for Allergies. 30 Tab 3    cholecalciferol (VITAMIN D3) 1,000 unit cap Take 1,000 Units by mouth daily.  lisinopril (PRINIVIL, ZESTRIL) 10 mg tablet Take 1 Tab by mouth daily. 90 Tab 1    esomeprazole (NEXIUM) 40 mg capsule Take 1 Cap by mouth daily. 90 Cap 1    atorvastatin (LIPITOR) 20 mg tablet Take 1 Tab by mouth daily.  (Patient taking differently: Take 20 mg by mouth nightly.) 90 Tab 1    hydroCHLOROthiazide (HYDRODIURIL) 25 mg tablet Take 1 Tab by mouth daily. 90 Tab 1    meloxicam (MOBIC) 15 mg tablet Take 1 Tab by mouth daily. 90 Tab 1    dilTIAZem (TIAZAC) 120 mg SR capsule Take 1 Cap by mouth daily. 90 Cap 1    potassium chloride SR (KLOR-CON 10) 10 mEq tablet Take 2 Tabs by mouth two (2) times a day. 180 Tab 1    UBIDECARENONE (CO Q-10 PO) Take 200 mg by mouth daily.  clotrimazole (LOTRIMIN) 1 % topical cream Apply  to affected area two (2) times a day. (Patient taking differently: Apply  to affected area two (2) times daily as needed.) 45 g 1    aspirin delayed-release 81 mg tablet Take  by mouth daily.  magnesium oxide 500 mg tab Take 1 Tab by mouth daily.  multivitamin (ONE A DAY) tablet Take 1 Tab by mouth daily.  acetaminophen (TYLENOL) 325 mg tablet Take  by mouth every four (4) hours as needed for Pain.  TROLAMINE SALICYLATE (ASPERCREME EX) by Apply Externally route. Applies to left knee 3-4 x daily       Allergies   Allergen Reactions    Niacin Itching     Felt hot also    Ultram [Tramadol] Nausea Only     Visit Vitals    /80 (BP 1 Location: Left arm, BP Patient Position: Sitting)    Pulse 69    Temp 96.3 °F (35.7 °C) (Oral)    Resp 20    Ht 5' 1\" (1.549 m)    Wt 243 lb 9.6 oz (110.5 kg)    SpO2 96%    BMI 46.03 kg/m2       Physical Exam   Constitutional: She is oriented to person, place, and time. She appears well-developed and well-nourished. No distress. HENT:   Head: Normocephalic and atraumatic. Eyes: Conjunctivae and EOM are normal.   Cardiovascular: Normal rate and regular rhythm. Pulmonary/Chest: Effort normal and breath sounds normal.   Musculoskeletal: She exhibits edema. Neurological: She is alert and oriented to person, place, and time. Skin: Skin is warm. Rash (red rash with satelite lesions under breast and in groins) noted. Psychiatric: She has a normal mood and affect. Nursing note and vitals reviewed.       ASSESSMENT and PLAN    ICD-10-CM ICD-9-CM    1. Medicare annual wellness visit, subsequent Z00.00 V70.0    2. Allergic rhinitis, unspecified allergic rhinitis trigger, unspecified rhinitis seasonality J30.9 477.9 cetirizine (ZYRTEC) 10 mg tablet   3.  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

## 2017-04-06 NOTE — MR AVS SNAPSHOT
Visit Information Date & Time Provider Department Dept. Phone Encounter #  
 4/6/2017  7:20 AM Felicia Porter  [facility] [phone] [id number] Your Appointments 5/23/2017  9:00 AM  
ESTABLISHED PATIENT with Felicia Porter MD  
[facility] ([facility]) Appt Note: 3 mo f/u w/ fbw $ 0 cp [address] [phone]  
[phone]  
  
   
 [address] [address] Upcoming Health Maintenance Date Due DTaP/Tdap/Td series (1 - Tdap) 2/10/1957 ZOSTER VACCINE AGE 60> 2/10/1996 MEDICARE YEARLY EXAM 3/1/2017 GLAUCOMA SCREENING Q2Y 10/22/2017 Allergies as of 4/6/2017  Review Complete On: 4/6/2017 By: Jay Dickey LPN Severity Noted Reaction Type Reactions Niacin  10/22/2013    Itching Felt hot also Ultram [Tramadol]  10/22/2013    Nausea Only Current Immunizations  Reviewed on 11/5/2015 Name Date Influenza High Dose Vaccine PF 10/4/2016 Influenza Vaccine 10/22/2014 Influenza Vaccine (Quad) PF 11/5/2015 Pneumococcal Conjugate (PCV-13) 5/1/2015 Not reviewed this visit You Were Diagnosed With   
  
 Codes Comments Medicare annual wellness visit, subsequent    -  Primary ICD-10-CM: Z00.00 ICD-9-CM: V70.0 Allergic rhinitis, unspecified allergic rhinitis trigger, unspecified rhinitis seasonality     ICD-10-CM: J30.9 ICD-9-CM: 477.9 Moniliasis, cutaneous     ICD-10-CM: B37.2 ICD-9-CM: 112.3 Vitals BP Pulse Temp Resp Height(growth percentile) Weight(growth percentile) 132/80 (BP 1 Location: Left arm, BP Patient Position: Sitting) 69 96.3 °F (35.7 °C) (Oral) 20 5' 1\" (1.549 m) 243 lb 9.6 oz (110.5 kg) SpO2 BMI OB Status Smoking Status 96% 46.03 kg/m2 Hysterectomy Never Smoker Vitals History BMI and BSA Data Body Mass Index Body Surface Area 46.03 kg/m 2 2.18 m 2 Preferred Pharmacy Pharmacy Name Phone [facility],[address], [address] [place] [address]  [phone] Your Updated Medication List  
  
   
This list is accurate as of: 4/6/17  7:58 AM.  Always use your most recent med list.  
  
  
  
  
 acetaminophen 325 mg tablet Commonly known as:  TYLENOL Take  by mouth every four (4) hours as needed for Pain. ASPERCREME EX  
by Apply Externally route. Applies to left knee 3-4 x daily  
  
 aspirin delayed-release 81 mg tablet Take  by mouth daily. atorvastatin 20 mg tablet Commonly known as:  LIPITOR Take 1 Tab by mouth daily. cetirizine 10 mg tablet Commonly known as:  ZyrTEC Take 1 Tab by mouth daily as needed for Allergies. clotrimazole 1 % topical cream  
Commonly known as:  [place] Pica Apply  to affected area two (2) times a day. clotrimazole-betamethasone topical cream  
Commonly known as:  Nova Padmini Apply bid for 2 w  
  
 CO Q-10 PO Take 200 mg by mouth daily. dilTIAZem 120 mg SR capsule Commonly known as:  [facility] Take 1 Cap by mouth daily. esomeprazole 40 mg capsule Commonly known as:  NexIUM Take 1 Cap by mouth daily. hydroCHLOROthiazide 25 mg tablet Commonly known as:  HYDRODIURIL Take 1 Tab by mouth daily. lisinopril 10 mg tablet Commonly known as:  Dorean Peeks Take 1 Tab by mouth daily. magnesium oxide 500 mg Tab Take 1 Tab by mouth daily. meloxicam 15 mg tablet Commonly known as:  MOBIC Take 1 Tab by mouth daily. multivitamin tablet Commonly known as:  ONE A DAY Take 1 Tab by mouth daily. potassium chloride SR 10 mEq tablet Commonly known as:  KLOR-CON 10 Take 2 Tabs by mouth two (2) times a day. VITAMIN D3 1,000 unit Cap Generic drug:  cholecalciferol Take 1,000 Units by mouth daily. Prescriptions Sent to Pharmacy Refills clotrimazole-betamethasone (LOTRISONE) topical cream 0 Sig: Apply bid for 2 w Class: Normal  
 Pharmacy: [address] [place] [address],[address] [place] [address] Dr Ph #: [phone] cetirizine (ZYRTEC) 10 mg tablet 3 Sig: Take 1 Tab by mouth daily as needed for Allergies. Class: Normal  
 Pharmacy: [address] [place] [address],[address] [place] [address] Dr Ph #: [phone] Route: Oral  
  
Patient Instructions Schedule of Personalized Health Plan (Provide Copy to Patient) The best way to stay healthy is to live a healthy lifestyle. A healthy lifestyle includes regular exercise, eating a well-balanced diet, keeping a healthy weight and not smoking. Regular physical exams and screening tests are another important way to take care of yourself. Preventive exams provided by health care providers can find health problems early when treatment works best and can keep you from getting certain diseases or illnesses. Preventive services include exams, lab tests, screenings, shots, monitoring and information to help you take care of your own health. All people over 65 should have a pneumonia shot. Pneumonia shots are usually only needed once in a lifetime unless your doctor decides differently. All people over 65 should have a yearly flu shot. People over 65 are at medium to high risk for Hepatitis B. Three shots are needed for complete protection. In addition to your physical exam, some screening tests are recommended: 
 
Bone mass measurement (dexa scan) is recommended every two years Diabetes Mellitus screening is recommended every year. Glaucoma is an eye disease caused by high pressure in the eye. An eye exam is recommended every year. Cardiovascular screening tests that check your cholesterol and other blood fat (lipid) levels are recommended every five years.   
 
Colorectal Cancer screening tests help to find pre-cancerous polyps (growths in the colon) so they can be removed before they turn into cancer. Tests ordered for screening depend on your personal and family history risk factors. Screening for Breast Cancer is recommended yearly with a mammogram. 
 
Screening for Cervical Cancer is recommended every two years (annually for certain risk factors, such as previous history of STD or abnormal PAP in past 7 years), with a Pelvic Exam with PAP Here is a list of your current Health Maintenance items with a due date: 
Health Maintenance Topic Date Due  
 DTaP/Tdap/Td series (1 - Tdap) 02/10/1957  ZOSTER VACCINE AGE 60>  02/10/1996  MEDICARE YEARLY EXAM  03/01/2017  GLAUCOMA SCREENING Q2Y  10/22/2017  
 OSTEOPOROSIS SCREENING (DEXA)  Completed  Pneumococcal 65+ Low/Medium Risk  Addressed  INFLUENZA AGE 9 TO ADULT  Completed Introducing [facility] SERVICES! Dear Deborah Plummer: 
Thank you for requesting a [employer] account. Our records indicate that you already have an active [employer] account. You can access your account anytime at [email or web address] Did you know that you can access your hospital and ER discharge instructions at any time in [employer]? You can also review all of your test results from your hospital stay or ER visit. Additional Information If you have questions, please visit the Frequently Asked Questions section of the [employer] website at [email or web address] Remember, [employer] is NOT to be used for urgent needs. For medical emergencies, dial 911. Now available from your iPhone and Android! Please provide this summary of care documentation to your next provider. Your primary care clinician is listed as Dimple Greco. If you have any questions after today's visit, please call [phone].

## 2017-04-11 ENCOUNTER — ANESTHESIA EVENT (OUTPATIENT)
Dept: SURGERY | Age: 82
DRG: 330 | End: 2017-04-11
Payer: MEDICARE

## 2017-04-11 ENCOUNTER — ANESTHESIA (OUTPATIENT)
Dept: SURGERY | Age: 82
DRG: 330 | End: 2017-04-11
Payer: MEDICARE

## 2017-04-11 ENCOUNTER — HOSPITAL ENCOUNTER (INPATIENT)
Age: 82
LOS: 8 days | Discharge: HOME HEALTH | DRG: 330 | End: 2017-04-19
Attending: SURGERY | Admitting: SURGERY
Payer: MEDICARE

## 2017-04-11 LAB
ATRIAL RATE: 241 BPM
CALCULATED R AXIS, ECG10: -30 DEGREES
CALCULATED T AXIS, ECG11: 6 DEGREES
DIAGNOSIS, 93000: NORMAL
HCT VFR BLD AUTO: 40.5 % (ref 35–47)
HGB BLD-MCNC: 12.4 G/DL (ref 11.5–16)
Q-T INTERVAL, ECG07: 462 MS
QRS DURATION, ECG06: 100 MS
QTC CALCULATION (BEZET), ECG08: 480 MS
VENTRICULAR RATE, ECG03: 65 BPM

## 2017-04-11 PROCEDURE — 77030026438 HC STYL ET INTUB CARD -A: Performed by: ANESTHESIOLOGY

## 2017-04-11 PROCEDURE — 76060000037 HC ANESTHESIA 3 TO 3.5 HR: Performed by: SURGERY

## 2017-04-11 PROCEDURE — 77030008756 HC TU IRR SUC STRY -B: Performed by: SURGERY

## 2017-04-11 PROCEDURE — 77030009972 HC RELD STPLR INT COVD -B: Performed by: SURGERY

## 2017-04-11 PROCEDURE — 93005 ELECTROCARDIOGRAM TRACING: CPT

## 2017-04-11 PROCEDURE — 74011000250 HC RX REV CODE- 250

## 2017-04-11 PROCEDURE — 77030008684 HC TU ET CUF COVD -B: Performed by: ANESTHESIOLOGY

## 2017-04-11 PROCEDURE — 77030011640 HC PAD GRND REM COVD -A: Performed by: SURGERY

## 2017-04-11 PROCEDURE — 74011250636 HC RX REV CODE- 250/636

## 2017-04-11 PROCEDURE — 77030010507 HC ADH SKN DERMBND J&J -B: Performed by: SURGERY

## 2017-04-11 PROCEDURE — 74011250636 HC RX REV CODE- 250/636: Performed by: ANESTHESIOLOGY

## 2017-04-11 PROCEDURE — 74011250636 HC RX REV CODE- 250/636: Performed by: SURGERY

## 2017-04-11 PROCEDURE — 36415 COLL VENOUS BLD VENIPUNCTURE: CPT | Performed by: SURGERY

## 2017-04-11 PROCEDURE — 77030020061 HC IV BLD WRMR ADMIN SET 3M -B: Performed by: ANESTHESIOLOGY

## 2017-04-11 PROCEDURE — 77030008771 HC TU NG SALEM SUMP -A: Performed by: ANESTHESIOLOGY

## 2017-04-11 PROCEDURE — 77030019702 HC WRP THER MENM -C: Performed by: SURGERY

## 2017-04-11 PROCEDURE — 77030035045 HC TRCR ENDOSC VRSPRT BLDLSS COVD -B: Performed by: SURGERY

## 2017-04-11 PROCEDURE — 65210000002 HC RM PRIVATE GYN

## 2017-04-11 PROCEDURE — 77030032490 HC SLV COMPR SCD KNE COVD -B: Performed by: SURGERY

## 2017-04-11 PROCEDURE — 85018 HEMOGLOBIN: CPT | Performed by: SURGERY

## 2017-04-11 PROCEDURE — 77030035051: Performed by: SURGERY

## 2017-04-11 PROCEDURE — 74011000250 HC RX REV CODE- 250: Performed by: SURGERY

## 2017-04-11 PROCEDURE — 77030035048 HC TRCR ENDOSC OPTCL COVD -B: Performed by: SURGERY

## 2017-04-11 PROCEDURE — 88309 TISSUE EXAM BY PATHOLOGIST: CPT | Performed by: SURGERY

## 2017-04-11 PROCEDURE — 77030020747 HC TU INSUF ENDOSC TELE -A: Performed by: SURGERY

## 2017-04-11 PROCEDURE — 77030002996 HC SUT SLK J&J -A: Performed by: SURGERY

## 2017-04-11 PROCEDURE — 77030015424 HC RELD STPLR TA COVD -B: Performed by: SURGERY

## 2017-04-11 PROCEDURE — 76210000002 HC OR PH I REC 3 TO 3.5 HR: Performed by: SURGERY

## 2017-04-11 PROCEDURE — 77030035029 HC NDL INSUF VERES DISP COVD -B: Performed by: SURGERY

## 2017-04-11 PROCEDURE — P9045 ALBUMIN (HUMAN), 5%, 250 ML: HCPCS

## 2017-04-11 PROCEDURE — 77030020263 HC SOL INJ SOD CL0.9% LFCR 1000ML: Performed by: SURGERY

## 2017-04-11 PROCEDURE — 77030034850: Performed by: SURGERY

## 2017-04-11 PROCEDURE — 0DTF0ZZ RESECTION OF RIGHT LARGE INTESTINE, OPEN APPROACH: ICD-10-PCS | Performed by: SURGERY

## 2017-04-11 PROCEDURE — 77030013079 HC BLNKT BAIR HGGR 3M -A: Performed by: ANESTHESIOLOGY

## 2017-04-11 PROCEDURE — 77030019908 HC STETH ESOPH SIMS -A: Performed by: ANESTHESIOLOGY

## 2017-04-11 PROCEDURE — 74011000258 HC RX REV CODE- 258: Performed by: SURGERY

## 2017-04-11 PROCEDURE — 77030020782 HC GWN BAIR PAWS FLX 3M -B

## 2017-04-11 PROCEDURE — 74011000250 HC RX REV CODE- 250: Performed by: ANESTHESIOLOGY

## 2017-04-11 PROCEDURE — 77030002933 HC SUT MCRYL J&J -A: Performed by: SURGERY

## 2017-04-11 PROCEDURE — 76010000133 HC OR TIME 3 TO 3.5 HR: Performed by: SURGERY

## 2017-04-11 RX ORDER — ALBUMIN HUMAN 50 G/1000ML
SOLUTION INTRAVENOUS AS NEEDED
Status: DISCONTINUED | OUTPATIENT
Start: 2017-04-11 | End: 2017-04-11 | Stop reason: HOSPADM

## 2017-04-11 RX ORDER — SODIUM CHLORIDE 0.9 % (FLUSH) 0.9 %
5-10 SYRINGE (ML) INJECTION AS NEEDED
Status: DISCONTINUED | OUTPATIENT
Start: 2017-04-11 | End: 2017-04-19 | Stop reason: HOSPADM

## 2017-04-11 RX ORDER — SODIUM CHLORIDE 9 MG/ML
25 INJECTION, SOLUTION INTRAVENOUS CONTINUOUS
Status: DISCONTINUED | OUTPATIENT
Start: 2017-04-11 | End: 2017-04-11 | Stop reason: HOSPADM

## 2017-04-11 RX ORDER — HYDROMORPHONE HCL IN 0.9% NACL 15 MG/30ML
PATIENT CONTROLLED ANALGESIA VIAL INTRAVENOUS
Status: DISCONTINUED | OUTPATIENT
Start: 2017-04-11 | End: 2017-04-13

## 2017-04-11 RX ORDER — LIDOCAINE HYDROCHLORIDE 20 MG/ML
INJECTION, SOLUTION EPIDURAL; INFILTRATION; INTRACAUDAL; PERINEURAL AS NEEDED
Status: DISCONTINUED | OUTPATIENT
Start: 2017-04-11 | End: 2017-04-11 | Stop reason: HOSPADM

## 2017-04-11 RX ORDER — ONDANSETRON 2 MG/ML
INJECTION INTRAMUSCULAR; INTRAVENOUS AS NEEDED
Status: DISCONTINUED | OUTPATIENT
Start: 2017-04-11 | End: 2017-04-11 | Stop reason: HOSPADM

## 2017-04-11 RX ORDER — DEXTROSE, SODIUM CHLORIDE, AND POTASSIUM CHLORIDE 5; .45; .15 G/100ML; G/100ML; G/100ML
75 INJECTION INTRAVENOUS CONTINUOUS
Status: DISPENSED | OUTPATIENT
Start: 2017-04-11 | End: 2017-04-12

## 2017-04-11 RX ORDER — DEXTROSE, SODIUM CHLORIDE, AND POTASSIUM CHLORIDE 5; .45; .15 G/100ML; G/100ML; G/100ML
INJECTION INTRAVENOUS
Status: COMPLETED
Start: 2017-04-11 | End: 2017-04-11

## 2017-04-11 RX ORDER — SUCCINYLCHOLINE CHLORIDE 20 MG/ML
INJECTION INTRAMUSCULAR; INTRAVENOUS AS NEEDED
Status: DISCONTINUED | OUTPATIENT
Start: 2017-04-11 | End: 2017-04-11 | Stop reason: HOSPADM

## 2017-04-11 RX ORDER — DIPHENHYDRAMINE HYDROCHLORIDE 50 MG/ML
12.5 INJECTION, SOLUTION INTRAMUSCULAR; INTRAVENOUS
Status: DISCONTINUED | OUTPATIENT
Start: 2017-04-11 | End: 2017-04-15

## 2017-04-11 RX ORDER — DIPHENHYDRAMINE HYDROCHLORIDE 50 MG/ML
12.5 INJECTION, SOLUTION INTRAMUSCULAR; INTRAVENOUS AS NEEDED
Status: DISCONTINUED | OUTPATIENT
Start: 2017-04-11 | End: 2017-04-11 | Stop reason: HOSPADM

## 2017-04-11 RX ORDER — HYDROMORPHONE HYDROCHLORIDE 1 MG/ML
INJECTION, SOLUTION INTRAMUSCULAR; INTRAVENOUS; SUBCUTANEOUS AS NEEDED
Status: DISCONTINUED | OUTPATIENT
Start: 2017-04-11 | End: 2017-04-11 | Stop reason: HOSPADM

## 2017-04-11 RX ORDER — ALBUMIN HUMAN 50 G/1000ML
12.5 SOLUTION INTRAVENOUS ONCE
Status: COMPLETED | OUTPATIENT
Start: 2017-04-11 | End: 2017-04-12

## 2017-04-11 RX ORDER — FENTANYL CITRATE 50 UG/ML
INJECTION, SOLUTION INTRAMUSCULAR; INTRAVENOUS AS NEEDED
Status: DISCONTINUED | OUTPATIENT
Start: 2017-04-11 | End: 2017-04-11 | Stop reason: HOSPADM

## 2017-04-11 RX ORDER — SODIUM CHLORIDE 0.9 % (FLUSH) 0.9 %
5-10 SYRINGE (ML) INJECTION AS NEEDED
Status: DISCONTINUED | OUTPATIENT
Start: 2017-04-11 | End: 2017-04-11 | Stop reason: HOSPADM

## 2017-04-11 RX ORDER — SODIUM CHLORIDE, SODIUM LACTATE, POTASSIUM CHLORIDE, CALCIUM CHLORIDE 600; 310; 30; 20 MG/100ML; MG/100ML; MG/100ML; MG/100ML
INJECTION, SOLUTION INTRAVENOUS
Status: DISCONTINUED | OUTPATIENT
Start: 2017-04-11 | End: 2017-04-11 | Stop reason: HOSPADM

## 2017-04-11 RX ORDER — DEXAMETHASONE SODIUM PHOSPHATE 4 MG/ML
INJECTION, SOLUTION INTRA-ARTICULAR; INTRALESIONAL; INTRAMUSCULAR; INTRAVENOUS; SOFT TISSUE AS NEEDED
Status: DISCONTINUED | OUTPATIENT
Start: 2017-04-11 | End: 2017-04-11 | Stop reason: HOSPADM

## 2017-04-11 RX ORDER — BUPIVACAINE HYDROCHLORIDE AND EPINEPHRINE 5; 5 MG/ML; UG/ML
30 INJECTION, SOLUTION EPIDURAL; INTRACAUDAL; PERINEURAL ONCE
Status: DISCONTINUED | OUTPATIENT
Start: 2017-04-11 | End: 2017-04-11 | Stop reason: HOSPADM

## 2017-04-11 RX ORDER — FENTANYL CITRATE 50 UG/ML
50 INJECTION, SOLUTION INTRAMUSCULAR; INTRAVENOUS AS NEEDED
Status: DISCONTINUED | OUTPATIENT
Start: 2017-04-11 | End: 2017-04-11 | Stop reason: HOSPADM

## 2017-04-11 RX ORDER — PROPOFOL 10 MG/ML
INJECTION, EMULSION INTRAVENOUS AS NEEDED
Status: DISCONTINUED | OUTPATIENT
Start: 2017-04-11 | End: 2017-04-11 | Stop reason: HOSPADM

## 2017-04-11 RX ORDER — LIDOCAINE HYDROCHLORIDE 10 MG/ML
0.1 INJECTION, SOLUTION EPIDURAL; INFILTRATION; INTRACAUDAL; PERINEURAL AS NEEDED
Status: DISCONTINUED | OUTPATIENT
Start: 2017-04-11 | End: 2017-04-11 | Stop reason: HOSPADM

## 2017-04-11 RX ORDER — HYDROCHLOROTHIAZIDE 25 MG/1
25 TABLET ORAL DAILY
Status: DISCONTINUED | OUTPATIENT
Start: 2017-04-12 | End: 2017-04-19 | Stop reason: HOSPADM

## 2017-04-11 RX ORDER — MIDAZOLAM HYDROCHLORIDE 1 MG/ML
0.5 INJECTION, SOLUTION INTRAMUSCULAR; INTRAVENOUS
Status: DISCONTINUED | OUTPATIENT
Start: 2017-04-11 | End: 2017-04-11 | Stop reason: HOSPADM

## 2017-04-11 RX ORDER — LISINOPRIL 10 MG/1
10 TABLET ORAL DAILY
Status: DISCONTINUED | OUTPATIENT
Start: 2017-04-12 | End: 2017-04-19 | Stop reason: HOSPADM

## 2017-04-11 RX ORDER — FENTANYL CITRATE 50 UG/ML
25 INJECTION, SOLUTION INTRAMUSCULAR; INTRAVENOUS
Status: DISCONTINUED | OUTPATIENT
Start: 2017-04-11 | End: 2017-04-11 | Stop reason: HOSPADM

## 2017-04-11 RX ORDER — OXYCODONE HYDROCHLORIDE 5 MG/1
5 TABLET ORAL AS NEEDED
Status: DISCONTINUED | OUTPATIENT
Start: 2017-04-11 | End: 2017-04-11 | Stop reason: HOSPADM

## 2017-04-11 RX ORDER — SODIUM CHLORIDE, SODIUM LACTATE, POTASSIUM CHLORIDE, CALCIUM CHLORIDE 600; 310; 30; 20 MG/100ML; MG/100ML; MG/100ML; MG/100ML
1000 INJECTION, SOLUTION INTRAVENOUS CONTINUOUS
Status: DISCONTINUED | OUTPATIENT
Start: 2017-04-11 | End: 2017-04-11 | Stop reason: HOSPADM

## 2017-04-11 RX ORDER — BUPIVACAINE HYDROCHLORIDE AND EPINEPHRINE 5; 5 MG/ML; UG/ML
INJECTION, SOLUTION EPIDURAL; INTRACAUDAL; PERINEURAL AS NEEDED
Status: DISCONTINUED | OUTPATIENT
Start: 2017-04-11 | End: 2017-04-11 | Stop reason: HOSPADM

## 2017-04-11 RX ORDER — SODIUM CHLORIDE 0.9 % (FLUSH) 0.9 %
5-10 SYRINGE (ML) INJECTION EVERY 8 HOURS
Status: DISCONTINUED | OUTPATIENT
Start: 2017-04-11 | End: 2017-04-11 | Stop reason: HOSPADM

## 2017-04-11 RX ORDER — SODIUM CHLORIDE, SODIUM LACTATE, POTASSIUM CHLORIDE, CALCIUM CHLORIDE 600; 310; 30; 20 MG/100ML; MG/100ML; MG/100ML; MG/100ML
100 INJECTION, SOLUTION INTRAVENOUS CONTINUOUS
Status: DISCONTINUED | OUTPATIENT
Start: 2017-04-11 | End: 2017-04-11 | Stop reason: HOSPADM

## 2017-04-11 RX ORDER — SODIUM CHLORIDE 0.9 % (FLUSH) 0.9 %
5-10 SYRINGE (ML) INJECTION EVERY 8 HOURS
Status: DISCONTINUED | OUTPATIENT
Start: 2017-04-11 | End: 2017-04-19 | Stop reason: HOSPADM

## 2017-04-11 RX ORDER — NEOSTIGMINE METHYLSULFATE 1 MG/ML
INJECTION INTRAVENOUS AS NEEDED
Status: DISCONTINUED | OUTPATIENT
Start: 2017-04-11 | End: 2017-04-11 | Stop reason: HOSPADM

## 2017-04-11 RX ORDER — ALBUMIN HUMAN 50 G/1000ML
SOLUTION INTRAVENOUS
Status: COMPLETED
Start: 2017-04-11 | End: 2017-04-11

## 2017-04-11 RX ORDER — ONDANSETRON 2 MG/ML
4 INJECTION INTRAMUSCULAR; INTRAVENOUS
Status: DISCONTINUED | OUTPATIENT
Start: 2017-04-11 | End: 2017-04-19 | Stop reason: HOSPADM

## 2017-04-11 RX ORDER — MIDAZOLAM HYDROCHLORIDE 1 MG/ML
1 INJECTION, SOLUTION INTRAMUSCULAR; INTRAVENOUS AS NEEDED
Status: DISCONTINUED | OUTPATIENT
Start: 2017-04-11 | End: 2017-04-11 | Stop reason: HOSPADM

## 2017-04-11 RX ORDER — DILTIAZEM HYDROCHLORIDE 120 MG/1
120 CAPSULE, EXTENDED RELEASE ORAL DAILY
Status: DISCONTINUED | OUTPATIENT
Start: 2017-04-12 | End: 2017-04-12 | Stop reason: CLARIF

## 2017-04-11 RX ORDER — GLYCOPYRROLATE 0.2 MG/ML
INJECTION INTRAMUSCULAR; INTRAVENOUS AS NEEDED
Status: DISCONTINUED | OUTPATIENT
Start: 2017-04-11 | End: 2017-04-11 | Stop reason: HOSPADM

## 2017-04-11 RX ORDER — HYDROMORPHONE HYDROCHLORIDE 1 MG/ML
0.2 INJECTION, SOLUTION INTRAMUSCULAR; INTRAVENOUS; SUBCUTANEOUS
Status: ACTIVE | OUTPATIENT
Start: 2017-04-11 | End: 2017-04-11

## 2017-04-11 RX ORDER — ENOXAPARIN SODIUM 100 MG/ML
40 INJECTION SUBCUTANEOUS EVERY 24 HOURS
Status: DISCONTINUED | OUTPATIENT
Start: 2017-04-11 | End: 2017-04-19 | Stop reason: HOSPADM

## 2017-04-11 RX ORDER — ROCURONIUM BROMIDE 10 MG/ML
INJECTION, SOLUTION INTRAVENOUS AS NEEDED
Status: DISCONTINUED | OUTPATIENT
Start: 2017-04-11 | End: 2017-04-11 | Stop reason: HOSPADM

## 2017-04-11 RX ORDER — MORPHINE SULFATE 10 MG/ML
2 INJECTION, SOLUTION INTRAMUSCULAR; INTRAVENOUS
Status: DISCONTINUED | OUTPATIENT
Start: 2017-04-11 | End: 2017-04-11 | Stop reason: HOSPADM

## 2017-04-11 RX ORDER — ROPIVACAINE HYDROCHLORIDE 5 MG/ML
150 INJECTION, SOLUTION EPIDURAL; INFILTRATION; PERINEURAL AS NEEDED
Status: DISCONTINUED | OUTPATIENT
Start: 2017-04-11 | End: 2017-04-11 | Stop reason: HOSPADM

## 2017-04-11 RX ORDER — ONDANSETRON 2 MG/ML
4 INJECTION INTRAMUSCULAR; INTRAVENOUS AS NEEDED
Status: DISCONTINUED | OUTPATIENT
Start: 2017-04-11 | End: 2017-04-11 | Stop reason: HOSPADM

## 2017-04-11 RX ADMIN — SODIUM CHLORIDE, SODIUM LACTATE, POTASSIUM CHLORIDE, CALCIUM CHLORIDE: 600; 310; 30; 20 INJECTION, SOLUTION INTRAVENOUS at 08:20

## 2017-04-11 RX ADMIN — SODIUM CHLORIDE, SODIUM LACTATE, POTASSIUM CHLORIDE, CALCIUM CHLORIDE: 600; 310; 30; 20 INJECTION, SOLUTION INTRAVENOUS at 08:34

## 2017-04-11 RX ADMIN — FENTANYL CITRATE 50 MCG: 50 INJECTION, SOLUTION INTRAMUSCULAR; INTRAVENOUS at 08:48

## 2017-04-11 RX ADMIN — NEOSTIGMINE METHYLSULFATE 3 MG: 1 INJECTION INTRAVENOUS at 10:33

## 2017-04-11 RX ADMIN — SUCCINYLCHOLINE CHLORIDE 140 MG: 20 INJECTION INTRAMUSCULAR; INTRAVENOUS at 08:25

## 2017-04-11 RX ADMIN — SODIUM CHLORIDE, SODIUM LACTATE, POTASSIUM CHLORIDE, AND CALCIUM CHLORIDE 1000 ML: 600; 310; 30; 20 INJECTION, SOLUTION INTRAVENOUS at 07:22

## 2017-04-11 RX ADMIN — ROCURONIUM BROMIDE 20 MG: 10 INJECTION, SOLUTION INTRAVENOUS at 09:20

## 2017-04-11 RX ADMIN — HYDROMORPHONE HYDROCHLORIDE 0.5 MG: 1 INJECTION, SOLUTION INTRAMUSCULAR; INTRAVENOUS; SUBCUTANEOUS at 11:00

## 2017-04-11 RX ADMIN — DEXTROSE MONOHYDRATE, SODIUM CHLORIDE, AND POTASSIUM CHLORIDE 100 ML/HR: 50; 4.5; 1.49 INJECTION, SOLUTION INTRAVENOUS at 11:42

## 2017-04-11 RX ADMIN — ALBUMIN HUMAN 250 ML: 50 SOLUTION INTRAVENOUS at 10:00

## 2017-04-11 RX ADMIN — ALBUMIN HUMAN 250 ML: 50 SOLUTION INTRAVENOUS at 09:52

## 2017-04-11 RX ADMIN — EPHEDRINE SULFATE 5 MG: 50 INJECTION INTRAMUSCULAR; INTRAVENOUS; SUBCUTANEOUS at 13:22

## 2017-04-11 RX ADMIN — GLYCOPYRROLATE 0.4 MG: 0.2 INJECTION INTRAMUSCULAR; INTRAVENOUS at 10:33

## 2017-04-11 RX ADMIN — DEXAMETHASONE SODIUM PHOSPHATE 4 MG: 4 INJECTION, SOLUTION INTRA-ARTICULAR; INTRALESIONAL; INTRAMUSCULAR; INTRAVENOUS; SOFT TISSUE at 09:14

## 2017-04-11 RX ADMIN — LIDOCAINE HYDROCHLORIDE 100 MG: 20 INJECTION, SOLUTION EPIDURAL; INFILTRATION; INTRACAUDAL; PERINEURAL at 08:25

## 2017-04-11 RX ADMIN — ONDANSETRON 4 MG: 2 INJECTION INTRAMUSCULAR; INTRAVENOUS at 08:17

## 2017-04-11 RX ADMIN — FENTANYL CITRATE 50 MCG: 50 INJECTION, SOLUTION INTRAMUSCULAR; INTRAVENOUS at 08:25

## 2017-04-11 RX ADMIN — PROPOFOL 100 MG: 10 INJECTION, EMULSION INTRAVENOUS at 08:25

## 2017-04-11 RX ADMIN — SODIUM CHLORIDE, SODIUM LACTATE, POTASSIUM CHLORIDE, CALCIUM CHLORIDE: 600; 310; 30; 20 INJECTION, SOLUTION INTRAVENOUS at 09:03

## 2017-04-11 RX ADMIN — FENTANYL CITRATE 50 MCG: 50 INJECTION, SOLUTION INTRAMUSCULAR; INTRAVENOUS at 08:17

## 2017-04-11 RX ADMIN — EPHEDRINE SULFATE 5 MG: 50 INJECTION INTRAMUSCULAR; INTRAVENOUS; SUBCUTANEOUS at 13:11

## 2017-04-11 RX ADMIN — ENOXAPARIN SODIUM 40 MG: 100 INJECTION SUBCUTANEOUS at 19:16

## 2017-04-11 RX ADMIN — Medication: at 11:48

## 2017-04-11 RX ADMIN — ROCURONIUM BROMIDE 20 MG: 10 INJECTION, SOLUTION INTRAVENOUS at 09:45

## 2017-04-11 RX ADMIN — ALBUMIN HUMAN 12.5 G: 50 SOLUTION INTRAVENOUS at 13:46

## 2017-04-11 RX ADMIN — ALBUMIN (HUMAN) 12.5 G: 12.5 INJECTION, SOLUTION INTRAVENOUS at 13:46

## 2017-04-11 RX ADMIN — GLYCOPYRROLATE 0.2 MG: 0.2 INJECTION INTRAMUSCULAR; INTRAVENOUS at 08:17

## 2017-04-11 RX ADMIN — ROCURONIUM BROMIDE 5 MG: 10 INJECTION, SOLUTION INTRAVENOUS at 08:25

## 2017-04-11 RX ADMIN — DEXTROSE MONOHYDRATE, SODIUM CHLORIDE, AND POTASSIUM CHLORIDE 100 ML/HR: 50; 4.5; 1.49 INJECTION, SOLUTION INTRAVENOUS at 21:45

## 2017-04-11 RX ADMIN — CEFOTETAN DISODIUM 2 G: 2 INJECTION, POWDER, FOR SOLUTION INTRAMUSCULAR; INTRAVENOUS at 08:41

## 2017-04-11 NOTE — ROUTINE PROCESS
Patient: Polina Luna MRN: [id number]  SSN: [id number]   YOB: 1935  Age: 81 y.o. Sex: female     Patient is status post Procedure(s):  LAPAROSCOPIC HAND ASSISTED RIGHT COLECTOMY. Surgeon(s) and Role:     * Ria Witt MD - Primary    Local/Dose/Irrigation:                    Peripheral IV 04/11/17 Right Wrist (Active)   Dressing Status Clean, dry, & intact; New 4/11/2017  7:00 AM   Dressing Type Tape;Transparent 4/11/2017  7:00 AM   Hub Color/Line Status Green; Infusing 4/11/2017  7:00 AM       Peripheral IV 04/11/17 Right Hand (Active)            Airway - Endotracheal Tube 04/11/17 Oral (Active)                   Dressing/Packing:  Wound Abdomen Anterior-DRESSING TYPE: Topical skin adhesive/glue (04/11/17 1030)  Splint/Cast:  ]    Other:

## 2017-04-11 NOTE — IP AVS SNAPSHOT
[address] 
[phone] Patient: El Allen MRN: [id number] You are allergic to the following Allergen Reactions Niacin Itching Felt hot also Ultram (Tramadol) Nausea Only Recent Documentation Height Weight Breastfeeding? BMI OB Status Smoking Status 1.549 m 105.7 kg No 44.02 kg/m2 Hysterectomy Never Smoker Unresulted Labs Order Current Status SAMPLE TO BLOOD BANK In process Emergency Contacts Name Discharge Info Relation Home Work Mobile Evette Hernadez DISCHARGE CAREGIVER [3] Child [2] [phone] About your hospitalization You were admitted on:  April 11, 2017 You last received care in the:  [address] You were discharged on:  April 19, 2017 Unit phone number:  [phone] Why you were hospitalized Your primary diagnosis was:  Colonic Mass Your diagnoses also included: Morbid Obesity (Hcc) Providers Seen During Your Hospitalizations Provider Role Specialty Primary office phone Alondra Zavala MD Attending Provider General Surgery [phone] Your Primary Care Physician (PCP) Primary Care Physician Office Phone Office Fax [facility] [phone] Follow-up Information Follow up With Details Comments Contact Info Maryellen Serrato MD   [address] [facility] [address] 
[phone] [address]  Nurse and PT [address] [address] [phone] Your Appointments Tuesday May 23, 2017  9:00 AM EDT  
ESTABLISHED PATIENT with Maryellen Serrato MD  
[address] [facility] ([facility]) [address] [place]  
[phone] Current Discharge Medication List  
  
 START taking these medications Dose & Instructions Dispensing Information Comments Morning Noon Evening Bedtime  
 amoxicillin-clavulanate 875-125 mg per tablet Commonly known as:  AUGMENTIN Your last dose was: Your next dose is:    
   
   
 Dose:  1 Tab Take 1 Tab by mouth two (2) times a day for 5 days. Indications: Skin and Skin Structure Infection Quantity:  10 Tab Refills:  0 HYDROcodone-acetaminophen 5-325 mg per tablet Commonly known as:  Lynnda Levo Your last dose was: Your next dose is:    
   
   
 Dose:  1-2 Tab Take 1-2 Tabs by mouth every four (4) hours as needed. Max Daily Amount: 12 Tabs. Quantity:  30 Tab Refills:  0  
     
   
   
   
  
 nystatin powder Commonly known as:  MYCOSTATIN Your last dose was: Your next dose is:    
   
   
 Apply  to affected area two (2) times a day. Indications: CUTANEOUS CANDIDIASIS Quantity:  1 Bottle Refills:  0  
     
   
   
   
  
 polyethylene glycol 17 gram packet Commonly known as:  Hayley Condon Your last dose was: Your next dose is:    
   
   
 Dose:  17 g Take 1 Packet by mouth daily as needed. Quantity:  30 Packet Refills:  0 CONTINUE these medications which have CHANGED Dose & Instructions Dispensing Information Comments Morning Noon Evening Bedtime  
 atorvastatin 20 mg tablet Commonly known as:  LIPITOR What changed:  when to take this Your last dose was: Your next dose is:    
   
   
 Dose:  20 mg Take 1 Tab by mouth daily. Quantity:  90 Tab Refills:  1  
     
   
   
   
  
 clotrimazole 1 % topical cream  
Commonly known as:  Taina Farr What changed:   
- when to take this 
- reasons to take this Your last dose was: Your next dose is:    
   
   
 Apply  to affected area two (2) times a day. Quantity:  45 g Refills:  1 CONTINUE these medications which have NOT CHANGED Dose & Instructions Dispensing Information Comments Morning Noon Evening Bedtime  
 acetaminophen 325 mg tablet Commonly known as:  TYLENOL Your last dose was: Your next dose is: Take  by mouth every four (4) hours as needed for Pain. Refills:  0  
     
   
   
   
  
 ASPERCREME EX Your last dose was: Your next dose is:    
   
   
 by Apply Externally route. Applies to left knee 3-4 x daily Refills:  0  
     
   
   
   
  
 aspirin delayed-release 81 mg tablet Your last dose was: Your next dose is: Take  by mouth daily. Refills:  0  
     
   
   
   
  
 cetirizine 10 mg tablet Commonly known as:  ZyrTEC Your last dose was: Your next dose is:    
   
   
 Dose:  10 mg Take 1 Tab by mouth daily as needed for Allergies. Quantity:  30 Tab Refills:  3  
     
   
   
   
  
 clotrimazole-betamethasone topical cream  
Commonly known as:  Dorothey Sydney Your last dose was: Your next dose is:    
   
   
 Apply bid for 2 w Quantity:  30 g Refills:  0  
     
   
   
   
  
 CO Q-10 PO Your last dose was: Your next dose is:    
   
   
 Dose:  200 mg Take 200 mg by mouth daily. Refills:  0  
     
   
   
   
  
 dilTIAZem 120 mg SR capsule Commonly known as:  [facility] Your last dose was: Your next dose is:    
   
   
 Dose:  120 mg Take 1 Cap by mouth daily. Quantity:  90 Cap Refills:  1  
     
   
   
   
  
 esomeprazole 40 mg capsule Commonly known as:  NexIUM Your last dose was: Your next dose is:    
   
   
 Dose:  40 mg Take 1 Cap by mouth daily. Quantity:  90 Cap Refills:  1  
     
   
   
   
  
 hydroCHLOROthiazide 25 mg tablet Commonly known as:  HYDRODIURIL Your last dose was: Your next dose is:    
   
   
 Dose:  25 mg Take 1 Tab by mouth daily. Quantity:  90 Tab Refills:  1  
     
   
   
   
  
 lisinopril 10 mg tablet Commonly known as:  Nico Olivera Your last dose was: Your next dose is:    
   
   
 Dose:  10 mg Take 1 Tab by mouth daily. Quantity:  90 Tab Refills:  1  
     
   
   
   
  
 magnesium oxide 500 mg Tab Your last dose was: Your next dose is:    
   
   
 Dose:  1 Tab Take 1 Tab by mouth daily. Refills:  0  
     
   
   
   
  
 multivitamin tablet Commonly known as:  ONE A DAY Your last dose was: Your next dose is:    
   
   
 Dose:  1 Tab Take 1 Tab by mouth daily. Refills:  0  
     
   
   
   
  
 potassium chloride SR 10 mEq tablet Commonly known as:  KLOR-CON 10 Your last dose was: Your next dose is:    
   
   
 Dose:  20 mEq Take 2 Tabs by mouth two (2) times a day. Quantity:  180 Tab Refills:  1 VITAMIN D3 1,000 unit Cap Generic drug:  cholecalciferol Your last dose was: Your next dose is:    
   
   
 Dose:  1000 Units Take 1,000 Units by mouth daily. Refills:  0 STOP taking these medications   
 meloxicam 15 mg tablet Commonly known as:  MOBIC Where to Get Your Medications These medications were sent to [address] [place] [address],[address], [address] [place] [address]   [address] Phone:  [phone]  
  clotrimazole 1 % topical cream  
  
  
Information on where to get these meds will be given to you by the nurse or doctor. ! Ask your nurse or doctor about these medications  
  amoxicillin-clavulanate 875-125 mg per tablet HYDROcodone-acetaminophen 5-325 mg per tablet  
 nystatin powder  
 polyethylene glycol 17 gram packet Discharge Instructions FOLLOW-UP with [employer] [facility], Dr. Randal Gomez or Nicola Dozier :  Call office at [phone] to make this appointment. Please arrive 20 minutes early to complete paperwork. We are located at [address] in the [facility], [address] [facility]. Call your physician immediately [phone] if you have any fevers greater than 101.5, drainage from your wound that is not clear or looks infected, persistent bleeding, increasing abdominal pain, problems urinating, or persistent nausea/vomiting. You should be aware that you may have shoulder pain after surgery and that this will progressively go away. This is called 'referred pain' and is from the area of the diaphragm caused by gas that may be trapped under the diaphragm from laparoscopic surgery. This gas will progressively get reabsorbed by your body. WOUND CARE INSTRUCTIONS:   You may shower at home. If clothing rubs against the wound or causes irritation and the wound is not draining you may cover it with a dry dressing during the daytime. Try to keep the wound dry and avoid ointments on the wound unless directed to do so. If the wound becomes bright red and painful or starts to drain infected material that is not clear, please contact your physician immediately. You should also call if you begin to drain fluid that is thin and greenish-brown from the wound and appears to look like bile; or if output is whitish, pus-like in appearance. If the wound though is mildly pink and has a thick firm ridge underneath it, this is normal, and is referred to as a healing ridge. This will resolve over the next 4-6 weeks. Pain around your incision can be reduced by using an ice pack for 20 minute intervals over the incision site during the next 48 hours. After that, you may use a heating pad if you feel muscle tightening or pulling. DIET:  You may eat any foods that you can tolerate. It is a good idea to eat a high fiber diet and take in plenty of fluids to prevent constipation.   If you do become constipated you may want to take a mild laxative or softener (such as colace, senna, dulcolax, or miralax) on a daily basis until your bowel habits are regular. Constipation can be very uncomfortable, along with straining, after recent abdominal surgery. ACTIVITY:  You are encouraged to cough and deep breath or use your incentive spirometer if you were given one, every 15-30 minutes when awake. This will help prevent respiratory complications and low grade fevers post-operatively. You may want to hug a pillow when coughing and sneezing to add additional support to the surgical area(s) which will decrease pain during these times. You are encouraged to walk and engage in light activity for the next two weeks. You should not lift more than 15 pounds during this time frame as it could put you at increased risk for a post-operative hernia. Twenty pounds is roughly equivalent to a plastic bag of groceries. · You may shower 24 hours after surgery. Pat the cut (incision) dry. Do not take a bath for the first week. · Your doctor will tell you when you can have sex again. MEDICATIONS:  Try to take narcotic medications and anti-inflammatory medications, such as tylenol, ibuprofen, naprosyn, etc., with food. This will minimize stomach upset from the medication. Should you develop nausea and vomiting from the pain medication, or develop a rash, please discontinue the medication and contact your physician. You should not drive, make important decisions, or operate machinery when taking narcotic pain medication. · It is important that you take the medication exactly as they are prescribed. · Keep your medication in the bottles provided by the pharmacist and keep a list of the medication names, dosages, and times to be taken in your wallet. · Do not take other medications without consulting your doctor. · Do not take acetaminophen at the same time as Norco. Maximum amount of acetaminophen per day is 4g and Norco has acetaminophen in it. QUESTIONS:  Please feel free to call Dr. Sherita Wilburn office [phone]) if you have any questions, and they will be glad to assist you. Information obtained by : 
 
I understand that if any problems occur once I am at home I am to contact my physician. I understand and acknowledge receipt of the instructions indicated above. Physician's or R.N.'s Signature                                                                  Date/Time Patient or Representative Signature                                                          Date/Time Discharge Instructions Attachments/References BOWEL RESECTION: OPEN: POST-OP (ENGLISH) Discharge Orders None ACO Transitions of Care Introducing [employer] [address] offers a voluntary care coordination program to provide high quality service and care to [facility] fee-for-service beneficiaries. Guillermo Fletcher was designed to help you enhance your health and well-being through the following services: ? Transitions of Care  support for individuals who are transitioning from one care setting to another (example: Hospital to home). ? Chronic and Complex Care Coordination  support for individuals and caregivers of those with serious or chronic illnesses or with more than one chronic (ongoing) condition and those who take a number of different medications. If you meet specific medical criteria, a [address] [place] [address] Rd may call you directly to coordinate your care with your primary care physician and your other care providers.  
 
For questions about the [facility] programs, please, contact your physicians office. For general questions or additional information about Accountable Care Organizations: 
Please visit www.medicare.gov/acos. html or call 1-800-MEDICARE (0-868.276.7573) TTY users should call 6-960.855.8259. Introducing [facility] SERVICES! Dear Deborah Plummer: 
Thank you for requesting a Snipd account. Our records indicate that you already have an active Snipd account. You can access your account anytime at [email or web address] Did you know that you can access your hospital and ER discharge instructions at any time in Snipd? You can also review all of your test results from your hospital stay or ER visit. Additional Information If you have questions, please visit the Frequently Asked Questions section of the Snipd website at [email or web address]-D Display [email or web address] Remember, Snipd is NOT to be used for urgent needs. For medical emergencies, dial 911. Now available from your iPhone and Android! General Information Please provide this summary of care documentation to your next provider. Patient Signature:  ____________________________________________________________ Date:  ____________________________________________________________  
  
Donavan Princess Provider Signature:  ____________________________________________________________ Date:  ____________________________________________________________ More Information Open Bowel Resection: What to Expect at Home Your Recovery You are likely to have pain that comes and goes for the next few days after bowel surgery. You may have bowel cramps, and your cut (incision) may hurt. You may also feel like you have the flu. You may have a low fever and feel tired and nauseated. This is common. You should feel better after a week and will probably be back to normal in 2 to 3 weeks.  
This care sheet gives you a general idea about how long it will take for you to recover. But each person recovers at a different pace. Follow the steps below to get better as quickly as possible. How can you care for yourself at home? Activity · Rest when you feel tired. Getting enough sleep will help you recover. · Try to walk each day. Start by walking a little more than you did the day before. Bit by bit, increase the amount you walk. Walking boosts blood flow and helps prevent pneumonia and constipation. · Avoid strenuous activities, such as biking, jogging, weight lifting, or aerobic exercise, until your doctor says it is okay. · Ask your doctor when you can drive again. · You will probably need to take 3 to 4 weeks off from work. It depends on the type of work you do and how you feel. You may need to take off 4 to 6 weeks if you lift heavy objects in your job. · You may shower 24 to 48 hours after surgery, if your doctor says it is okay. Pat the cut (incision) dry. Do not take a bath for the first 2 weeks, or until your doctor tells you it is okay. · Ask your doctor when it is okay for you to have sex. Diet · You may not have much appetite after the surgery. But try to eat a healthy diet. Your doctor will tell you about any foods you should not eat. · Eat a low-fiber diet for several weeks after surgery. Eat many small meals throughout the day. Add high-fiber foods a little at a time. · Eat yogurt. It puts good bacteria into your colon and helps prevent diarrhea. · Try to avoid nuts, seeds, and corn for a while. They may be hard to digest. 
· You may need to take vitamins that contain sodium and potassium. Ask your doctor. · Drink plenty of fluids to avoid becoming dehydrated. Medicines · Your doctor will tell you if and when you can restart your medicines. He or she will also give you instructions about taking any new medicines.  
· If you take blood thinners, such as warfarin (Coumadin), clopidogrel (Plavix), or aspirin, be sure to talk to your doctor. He or she will tell you if and when to start taking those medicines again. Make sure that you understand exactly what your doctor wants you to do. · Take pain medicines exactly as directed. ¨ If the doctor gave you a prescription medicine for pain, take it as prescribed. ¨ If you are not taking a prescription pain medicine, ask your doctor if you can take an over-the-counter medicine. ¨ Do not take two or more pain medicines at the same time unless the doctor told you to. Many pain medicines have acetaminophen, which is Tylenol. Too much acetaminophen (Tylenol) can be harmful. · If you think your pain medicine is making you sick to your stomach: 
¨ Take your medicine after meals (unless your doctor tells you not to). ¨ Ask your doctor for a different pain medicine. · If your doctor prescribed antibiotics, take them as directed. Do not stop taking them just because you feel better. You need to take the full course of antibiotics. · You may need to take some medicines in a different form. You will be told whether to crush pills or take a liquid form of the medicine. · If your doctor gives you a stool softener, take it as directed. Incision care · If you have strips of tape on the incision, leave the tape on for a week or until it falls off. · Wash the area daily with warm, soapy water, and pat it dry. Follow-up care is a key part of your treatment and safety. Be sure to make and go to all appointments, and call your doctor if you are having problems. It's also a good idea to know your test results and keep a list of the medicines you take. When should you call for help? Call 911 anytime you think you may need emergency care. For example, call if: 
· You passed out (lost consciousness). · You have sudden chest pain and shortness of breath, or you cough up blood. · You have severe pain in your belly. Call your doctor now or seek immediate medical care if: 
· You are sick to your stomach and cannot drink fluids or keep them down. · You have signs of a blood clot, such as: 
¨ Pain in your calf, back of the knee, thigh, or groin. ¨ Redness and swelling in your leg or groin. · You have a lot of diarrhea that smells very bad. · You have trouble passing urine or stool, especially if you have mild pain or swelling in your lower belly. · You have signs of infection, such as: 
¨ Increased pain, swelling, warmth, or redness. ¨ Red streaks leading from the incision. ¨ Pus draining from the incision. ¨ A fever. · You have pain that does not get better after you take pain medicine. · You have loose stitches, or your incision comes open. · You are bleeding or have new drainage from the incision. Watch closely for any changes in your health, and be sure to contact your doctor if: 
· You do not have a bowel movement after taking a laxative. · You do not get better as expected. Where can you learn more? Go to http://suad-brenda.info/. Enter 077 [id number] in the search box to learn more about \"Open Bowel Resection: What to Expect at Home. \" Current as of: August 9, 2016 Content Version: 11.2 © 6623-5959 [employer]. Care instructions adapted under license by [employer] (which disclaims liability or warranty for this information). If you have questions about a medical condition or this instruction, always ask your healthcare professional. [place] [address] any warranty or liability for your use of this information.

## 2017-04-11 NOTE — H&P (VIEW-ONLY)
Surgery Consult    Subjective:      Teja Day is a 81 y.o.  female who was referred by Dr. Angus Perez for evaluation of colon mass and ventral hernia. She underwent an EGD and colonoscopy to evaluate rectal bleeding during which a large right colon polyp was identified and partially removed. She had an abd/pelvis CT scan on 3/13/17 that revealed a colon mass and ventral hernia, but did not show evidence of metastatic spread. Pathology results are still pending. She states she has been doing well since her EGD and colonoscopy. Chief Complaint   Patient presents with    Colon Mass     see CT dated 3-13-17    Ventral Hernia     see CT dated 3-13-17   . Symptoms were first noted 1 month(s) ago. Improved by: nothing  Worsened by: nothing  Associated signs and symptoms include: n/a    Patient Active Problem List    Diagnosis Date Noted    Colonic mass 03/20/2017    Allergic rhinitis 11/18/2016    Advance directive discussed with patient 02/29/2016    Occult blood in stools 12/04/2015    Prediabetes 11/05/2015    Hypercholesterolemia     Cervical disc disease     Osteoarthritis (arthritis due to wear and tear of joints)     GERD (gastroesophageal reflux disease)     Hypertension 10/22/2013    Hyperlipidemia 10/22/2013     Past Medical History:   Diagnosis Date    Allergic rhinitis     Cervical disc disease     Cervical disc disease     Colonic mass 3/20/2017    Eczema     Gastritis     GERD (gastroesophageal reflux disease)     mild    GI bleed     hx.  of recurrent    Hernia     hiatal lt side    Hiatal hernia     Hypercholesterolemia     Hypertension     Osteoarthritis (arthritis due to wear and tear of joints)       Past Surgical History:   Procedure Laterality Date    HX COLONOSCOPY  3/05    HX COLONOSCOPY  2017    HX CYST REMOVAL      left breast    HX ENDOSCOPY  1/09    HX ENDOSCOPY  2015    HX GYN      hysterectomy    HX ORTHOPAEDIC      rt knee replacement Social History   Substance Use Topics    Smoking status: Never Smoker    Smokeless tobacco: Never Used    Alcohol use No      Family History   Problem Relation Age of Onset    No Known Problems Mother     No Known Problems Father     Arthritis-osteo Sister     Alzheimer Sister     Parkinson's Disease Brother     Cancer Brother     No Known Problems Brother       Current Outpatient Prescriptions   Medication Sig    lisinopril (PRINIVIL, ZESTRIL) 10 mg tablet Take 1 Tab by mouth daily.  esomeprazole (NEXIUM) 40 mg capsule Take 1 Cap by mouth daily.  fexofenadine (ALLEGRA ALLERGY) 180 mg tablet Take 1 Tab by mouth daily.  atorvastatin (LIPITOR) 20 mg tablet Take 1 Tab by mouth daily.  hydroCHLOROthiazide (HYDRODIURIL) 25 mg tablet Take 1 Tab by mouth daily.  dilTIAZem (TIAZAC) 120 mg SR capsule Take 1 Cap by mouth daily.  potassium chloride SR (KLOR-CON 10) 10 mEq tablet Take 2 Tabs by mouth two (2) times a day.  UBIDECARENONE (CO Q-10 PO) Take 200 mg by mouth.  magnesium oxide 500 mg tab Take  by mouth.  multivitamin (ONE A DAY) tablet Take 1 Tab by mouth daily.  meloxicam (MOBIC) 15 mg tablet Take 1 Tab by mouth daily.  phenylephrine 0.25 % suppository Insert 1 Suppository into rectum two (2) times a day.  clotrimazole (LOTRIMIN) 1 % topical cream Apply  to affected area two (2) times a day.  acetaminophen (TYLENOL) 325 mg tablet Take  by mouth every four (4) hours as needed for Pain.  TROLAMINE SALICYLATE (ASPERCREME EX) by Apply Externally route.  aspirin delayed-release 81 mg tablet Take  by mouth daily. No current facility-administered medications for this visit. Allergies   Allergen Reactions    Niacin Itching     Felt hot also    Ultram [Tramadol] Nausea Only        Review of Systems:    A comprehensive review of systems was negative except for that written in the History of Present Illness.     Objective:     Physical Exam:  Visit Vitals  /78 (BP 1 Location: Right arm, BP Patient Position: Sitting)    Pulse 76    Temp 98.3 °F (36.8 °C) (Oral)    Resp 16    Ht 5' 2\" (1.575 m)    Wt 248 lb (112.5 kg)    SpO2 95%    BMI 45.36 kg/m2     General appearance: alert, cooperative, no distress, appears stated age  Abdomen: soft, non-tender. Bowel sounds normal. No masses,  no organomegaly  Neurologic: Grossly normal  Heart: regular rate and rhythm  Lungs: clear to Percussion and auscultation    Labs: No results found for this or any previous visit (from the past 24 hour(s)). Assessment:       ICD-10-CM ICD-9-CM    1. Colonic mass K63.9 569.89          Plan: It is my recommendation to remove the colon polyp laparoscopically. She understands and agrees to this plan. She will schedule her procedure at her earliest convenience. Written by wilmer Corado, as dictated by Lorrie Solares MD.    Signed By: Lorrie Solares MD     March 20, 2017

## 2017-04-11 NOTE — ANESTHESIA PREPROCEDURE EVALUATION
Anesthetic History   No history of anesthetic complications            Review of Systems / Medical History  Patient summary reviewed, nursing notes reviewed and pertinent labs reviewed    Pulmonary  Within defined limits                 Neuro/Psych   Within defined limits           Cardiovascular    Hypertension                   GI/Hepatic/Renal     GERD           Endo/Other        Morbid obesity and arthritis     Other Findings            Physical Exam    Airway  Mallampati: II  TM Distance: > 6 cm  Neck ROM: normal range of motion   Mouth opening: Normal     Cardiovascular  Regular rate and rhythm,  S1 and S2 normal,  no murmur, click, rub, or gallop             Dental    Dentition: Full upper dentures and Full lower dentures     Pulmonary  Breath sounds clear to auscultation               Abdominal  GI exam deferred       Other Findings            Anesthetic Plan    ASA: 3  Anesthesia type: general          Induction: Intravenous  Anesthetic plan and risks discussed with: Patient

## 2017-04-11 NOTE — PROGRESS NOTES
TRANSFER - IN REPORT:    Verbal report received from BolaRN(name) on Radha Andrade  being received from [facility]) for routine post - op      Report consisted of patients Situation, Background, Assessment and   Recommendations(SBAR). Information from the following report(s) SBAR, Kardex, Procedure Summary, Intake/Output, MAR, Accordion and Recent Results was reviewed with the receiving nurse. Opportunity for questions and clarification was provided. Assessment completed upon patients arrival to unit and care assumed.

## 2017-04-11 NOTE — PERIOP NOTES
TRANSFER - OUT REPORT:    Verbal report given to [facility] RN on Héctor Weems  being transferred to room 350 for routine post - op       Report consisted of patients Situation, Background, Assessment and   Recommendations(SBAR). Time Pre op antibiotic given:2 gram cefotetan  Anesthesia Stop time: 1756  Souza Present on Transfer to floor:yes  Order for Souza on Chart:yes    Information from the following report(s) SBAR, OR Summary, Intake/Output and MAR was reviewed with the receiving nurse. Opportunity for questions and clarification was provided. Is the patient on 02? YES       L/Min 2       Other     Is the patient on a monitor? NO    Is the nurse transporting with the patient? NO    Surgical Waiting Area notified of patient's transfer from PACU? YES      The following personal items collected during your admission accompanied patient upon transfer:   Dental Appliance: Dental Appliances:  (signed in)  Vision: Visual Aid: Glasses  Hearing Aid:    Jewelry: Jewelry: None  Clothing: Clothing:  (bag of clothing signed in)  Other Valuables:  Other Valuables: Brianne Perez (signed in)  Valuables sent to safe:

## 2017-04-11 NOTE — PERIOP NOTES
Family updated in waiting area. Dr. Breezy Mendoza called and made aware that patient converted to sinus rythym, rate in the 70's. Also made aware of continued low BP after 4 liters of fluid.  Order received for ephedrine, 10 mg. IV

## 2017-04-11 NOTE — PERIOP NOTES
Dr. Femi De La Fuente [facility] (PCP) office called and states that patient has no history of Afib. Patient still now SR. Dr. Jada Castillo notified of patient's H&H of 12.4/40. Patient maintaining BP of 90's/40's after receiving another albumin. Urine output is adequate. Patient otherwise feeling okay and resting well. Dr. Jada Castillo okay to transfer.

## 2017-04-11 NOTE — INTERVAL H&P NOTE
H&P Update:  Marva Haider was seen and examined. History and physical has been reviewed. The patient has been examined.  There have been no significant clinical changes since the completion of the originally dated History and Physical.    Signed By: Brit Mccoy MD     April 11, 2017 6:48 AM

## 2017-04-11 NOTE — ANESTHESIA POSTPROCEDURE EVALUATION
Post-Anesthesia Evaluation and Assessment    Patient: Keisha Watkins MRN: [id number]  SSN: [id number]    YOB: 1935  Age: 81 y.o. Sex: female       Cardiovascular Function/Vital Signs  Visit Vitals    BP 95/44    Pulse 70    Temp 36.7 °C (98 °F)    Resp 20    Ht 5' 1\" (1.549 m)    Wt 105.7 kg (233 lb)    SpO2 96%    BMI 44.02 kg/m2       Patient is status post general anesthesia for Procedure(s):  LAPAROSCOPIC HAND ASSISTED RIGHT COLECTOMY. Nausea/Vomiting: None    Postoperative hydration reviewed and adequate. Pain:  Pain Scale 1: Numeric (0 - 10) (04/11/17 1113)  Pain Intensity 1: 0 (04/11/17 1113)   Managed    Neurological Status:   Neuro (WDL): Exceptions to WDL (drowsy) (04/11/17 1113)  Neuro  LUE Motor Response: Purposeful (04/11/17 1113)  LLE Motor Response: Purposeful (04/11/17 1113)  RUE Motor Response: Purposeful (04/11/17 1113)  RLE Motor Response: Purposeful (04/11/17 1113)   At baseline    Mental Status and Level of Consciousness: Arousable    Pulmonary Status:   O2 Device: Nasal cannula (04/11/17 1113)   Adequate oxygenation and airway patent    Complications related to anesthesia: None    Post-anesthesia assessment completed.  No concerns    Signed By: Miladis Obrien MD     April 11, 2017

## 2017-04-11 NOTE — IP AVS SNAPSHOT
Current Discharge Medication List  
  
START taking these medications Dose & Instructions Dispensing Information Comments Morning Noon Evening Bedtime  
 amoxicillin-clavulanate 875-125 mg per tablet Commonly known as:  AUGMENTIN Your last dose was: Your next dose is:    
   
   
 Dose:  1 Tab Take 1 Tab by mouth two (2) times a day for 5 days. Indications: Skin and Skin Structure Infection Quantity:  10 Tab Refills:  0 HYDROcodone-acetaminophen 5-325 mg per tablet Commonly known as:  Wall Hurt Your last dose was: Your next dose is:    
   
   
 Dose:  1-2 Tab Take 1-2 Tabs by mouth every four (4) hours as needed. Max Daily Amount: 12 Tabs. Quantity:  30 Tab Refills:  0  
     
   
   
   
  
 nystatin powder Commonly known as:  MYCOSTATIN Your last dose was: Your next dose is:    
   
   
 Apply  to affected area two (2) times a day. Indications: CUTANEOUS CANDIDIASIS Quantity:  1 Bottle Refills:  0  
     
   
   
   
  
 polyethylene glycol 17 gram packet Commonly known as:  Dianne Pile Your last dose was: Your next dose is:    
   
   
 Dose:  17 g Take 1 Packet by mouth daily as needed. Quantity:  30 Packet Refills:  0 CONTINUE these medications which have CHANGED Dose & Instructions Dispensing Information Comments Morning Noon Evening Bedtime  
 atorvastatin 20 mg tablet Commonly known as:  LIPITOR What changed:  when to take this Your last dose was: Your next dose is:    
   
   
 Dose:  20 mg Take 1 Tab by mouth daily. Quantity:  90 Tab Refills:  1  
     
   
   
   
  
 clotrimazole 1 % topical cream  
Commonly known as:  [place] What changed:   
- when to take this 
- reasons to take this Your last dose was: Your next dose is:    
   
   
 Apply  to affected area two (2) times a day. Quantity:  45 g Refills:  1 CONTINUE these medications which have NOT CHANGED Dose & Instructions Dispensing Information Comments Morning Noon Evening Bedtime  
 acetaminophen 325 mg tablet Commonly known as:  TYLENOL Your last dose was: Your next dose is: Take  by mouth every four (4) hours as needed for Pain. Refills:  0  
     
   
   
   
  
 ASPERCREME EX Your last dose was: Your next dose is:    
   
   
 by Apply Externally route. Applies to left knee 3-4 x daily Refills:  0  
     
   
   
   
  
 aspirin delayed-release 81 mg tablet Your last dose was: Your next dose is: Take  by mouth daily. Refills:  0  
     
   
   
   
  
 cetirizine 10 mg tablet Commonly known as:  ZyrTEC Your last dose was: Your next dose is:    
   
   
 Dose:  10 mg Take 1 Tab by mouth daily as needed for Allergies. Quantity:  30 Tab Refills:  3  
     
   
   
   
  
 clotrimazole-betamethasone topical cream  
Commonly known as:  Elveria Cristian Your last dose was: Your next dose is:    
   
   
 Apply bid for 2 w Quantity:  30 g Refills:  0  
     
   
   
   
  
 CO Q-10 PO Your last dose was: Your next dose is:    
   
   
 Dose:  200 mg Take 200 mg by mouth daily. Refills:  0  
     
   
   
   
  
 dilTIAZem 120 mg SR capsule Commonly known as:  [facility] Your last dose was: Your next dose is:    
   
   
 Dose:  120 mg Take 1 Cap by mouth daily. Quantity:  90 Cap Refills:  1  
     
   
   
   
  
 esomeprazole 40 mg capsule Commonly known as:  NexIUM Your last dose was: Your next dose is:    
   
   
 Dose:  40 mg Take 1 Cap by mouth daily. Quantity:  90 Cap Refills:  1  
     
   
   
   
  
 hydroCHLOROthiazide 25 mg tablet Commonly known as:  HYDRODIURIL Your last dose was: Your next dose is: Dose:  25 mg Take 1 Tab by mouth daily. Quantity:  90 Tab Refills:  1  
     
   
   
   
  
 lisinopril 10 mg tablet Commonly known as:  Areli Barrier Your last dose was: Your next dose is:    
   
   
 Dose:  10 mg Take 1 Tab by mouth daily. Quantity:  90 Tab Refills:  1  
     
   
   
   
  
 magnesium oxide 500 mg Tab Your last dose was: Your next dose is:    
   
   
 Dose:  1 Tab Take 1 Tab by mouth daily. Refills:  0  
     
   
   
   
  
 multivitamin tablet Commonly known as:  ONE A DAY Your last dose was: Your next dose is:    
   
   
 Dose:  1 Tab Take 1 Tab by mouth daily. Refills:  0  
     
   
   
   
  
 potassium chloride SR 10 mEq tablet Commonly known as:  KLOR-CON 10 Your last dose was: Your next dose is:    
   
   
 Dose:  20 mEq Take 2 Tabs by mouth two (2) times a day. Quantity:  180 Tab Refills:  1 VITAMIN D3 1,000 unit Cap Generic drug:  cholecalciferol Your last dose was: Your next dose is:    
   
   
 Dose:  1000 Units Take 1,000 Units by mouth daily. Refills:  0 STOP taking these medications   
 meloxicam 15 mg tablet Commonly known as:  MOBIC Where to Get Your Medications These medications were sent to [address] [place] [address],[address], [address] [place] [address]   [address] Phone:  [phone]  
  clotrimazole 1 % topical cream  
  
  
Information on where to get these meds will be given to you by the nurse or doctor. ! Ask your nurse or doctor about these medications  
  amoxicillin-clavulanate 875-125 mg per tablet HYDROcodone-acetaminophen 5-325 mg per tablet  
 nystatin powder  
 polyethylene glycol 17 gram packet

## 2017-04-11 NOTE — OP NOTES
[address] [facility], [address]   OP NOTE       Name:  Yumiko Tamez   MR#:  [id number]   :  1935   Account #:  [de-identified]    Surgery Date:  2017   Date of Adm:  2017       PREOPERATIVE DIAGNOSIS: Right colon cancer. POSTOPERATIVE DIAGNOSIS: Right colon cancer. PROCEDURES PERFORMED: Laparoscopic-assisted right   colectomy. SURGEON: Tammie Hicks. Chan Solares MD    ANESTHESIA: General.    FINDINGS: A mass in the ascending colon. No evidence of metastatic   disease. SPECIMENS REMOVED: The right colon. ESTIMATED BLOOD LOSS: Minimal.    COMPLICATIONS: None. DRAINS: None. CONDITION: Good to the PACU. DESCRIPTION OF PROCEDURE: With the patient supine and   suitably anesthetized, the abdomen was prepared with Betadine and   draped as a field. Marcaine 0.5% with epinephrine was infiltrated in the   left upper abdomen, and a 5 mm trocar was then placed through a   small incision there under direct vision, and pneumoperitoneum was   established. A camera was inserted, and there was a huge   nonincarcerated ventral incisional hernia, beginning at the level of the   umbilicus and continuing inferiorly. It was elected to leave this alone at   this time. Another 5 mm was placed in the left lower abdomen, and a   second was placed between the 2, and the patient was placed in the   reverse Trendelenburg position and turned to the left. The area of   marking with the katina ink could be seen. The colon was mobilized, as   was the small bowel, using the Harmonic scalpel. Once the colon was   almost fully mobilized, I elected to proceed with a small transverse   incision in the upper abdomen on the right side, which was made, and   then a wound protector was inserted. The colon was brought up into   the wound, and the adhesions between the appendix and the lateral   abdominal wall were taken down bluntly and with the Harmonic   scalpel.  Then, the entire right colon mobilized up into the wound. The   mesentery was divided with the Harmonic scalpel, except for the right   colic vessels, which were clamped, divided, ligated, and suture ligated   with silk. The distal ileum was brought up to the side of the mid   transverse colon and held in place with several stay stitches. A   colotomy and enterotomy were made and the BRADEN stapler was placed   and fired between the 2. The enterotomy and colotomy in the entire   colon and bowel were then stapled with a TL90 stapler. The specimen   was then amputated off that stapler. Several little bleeders in the staple   line were coagulated, and 1 in the mesentery of the transverse colon   was suture ligated with 3-0 silk stitch. The anastomosis was then   placed back into the abdominal cavity and covered with the omentum. The entire abdomen was inspected carefully, hemostasis was   excellent. At this point, then a separate table was utilized for closure,   and all the instruments used in the original procedure were taken off   the field. The gloves and gowns of the participating staff were   changed. The incision area of the abdomen was re-draped with towels,   and a large 3/4 sheet was then placed inferiorly just to provide a good   operating field. At this point, the wound retractor was then removed. The fascia was closed with a running #1 Vicryl suture in just 1 layer. The muscular contact was minimal in the abdominal wall. The   subcutaneous tissues were irrigated with about 1/2 a L of saline, then   the subcutaneous tissues were reapproximated with Vicryl, and the   skin was closed with subcuticular Monocryl. The 3 trocar sites were   closed with subcuticular Monocryl as well. All incisions were covered   with Dermabond. At the termination of the procedure, sponge, needle   and instrument counts were correct. The patient tolerated this well, and   was brought to the PACU in satisfactory condition. DENISE MENDEZ MD Steve Lugo / Machelle Rob   D:  04/11/2017   10:50   T:  04/11/2017   11:17   Job #:  [id number]

## 2017-04-11 NOTE — PERIOP NOTES
Dr. Jack Section called and made aware of patient being in atrial fibrillation, rate in the 70's. Patient had no history of Afib. EKG ordered.

## 2017-04-11 NOTE — BRIEF OP NOTE
BRIEF OPERATIVE NOTE    Date of Procedure: 4/11/2017   Preoperative Diagnosis: RIGHT COLON MASS  Postoperative Diagnosis: RIGHT COLON MASS    Procedure(s):  LAPAROSCOPIC  ASSISTED RIGHT COLECTOMY  Surgeon(s) and Role:     * Marlon Moore MD - Primary            Surgical Staff:  Circ-1: Dk Keys  Circ-Relief: Mayela Benson RN  Scrub Tech-1: Shen Brown  Surg Asst-1: Carlos Rodarte  Event Time In   Incision Start 0964   Incision Close      Anesthesia: General   Estimated Blood Loss: 50 ml  Specimens:   ID Type Source Tests Collected by Time Destination   1 : Right colon Fresh Colon  Marlon Moore MD 4/11/2017 1026 Pathology      Findings: mass in ascending colon   Complications: none  Implants: * No implants in log *  [id number]

## 2017-04-12 LAB
ANION GAP BLD CALC-SCNC: 9 MMOL/L (ref 5–15)
BASOPHILS # BLD AUTO: 0 K/UL (ref 0–0.1)
BASOPHILS # BLD: 0 % (ref 0–1)
BUN SERPL-MCNC: 12 MG/DL (ref 6–20)
BUN/CREAT SERPL: 14 (ref 12–20)
CALCIUM SERPL-MCNC: 8.6 MG/DL (ref 8.5–10.1)
CHLORIDE SERPL-SCNC: 99 MMOL/L (ref 97–108)
CO2 SERPL-SCNC: 29 MMOL/L (ref 21–32)
CREAT SERPL-MCNC: 0.85 MG/DL (ref 0.55–1.02)
DIFFERENTIAL METHOD BLD: ABNORMAL
EOSINOPHIL # BLD: 0 K/UL (ref 0–0.4)
EOSINOPHIL NFR BLD: 0 % (ref 0–7)
ERYTHROCYTE [DISTWIDTH] IN BLOOD BY AUTOMATED COUNT: 15.2 % (ref 11.5–14.5)
GLUCOSE SERPL-MCNC: 176 MG/DL (ref 65–100)
HCT VFR BLD AUTO: 36.2 % (ref 35–47)
HGB BLD-MCNC: 11.1 G/DL (ref 11.5–16)
LYMPHOCYTES # BLD AUTO: 4 % (ref 12–49)
LYMPHOCYTES # BLD: 0.6 K/UL (ref 0.8–3.5)
MCH RBC QN AUTO: 26.9 PG (ref 26–34)
MCHC RBC AUTO-ENTMCNC: 30.7 G/DL (ref 30–36.5)
MCV RBC AUTO: 87.7 FL (ref 80–99)
MONOCYTES # BLD: 1.1 K/UL (ref 0–1)
MONOCYTES NFR BLD AUTO: 7 % (ref 5–13)
NEUTS BAND NFR BLD MANUAL: 15 % (ref 0–6)
NEUTS SEG # BLD: 14.5 K/UL (ref 1.8–8)
NEUTS SEG NFR BLD AUTO: 74 % (ref 32–75)
PLATELET # BLD AUTO: 177 K/UL (ref 150–400)
POTASSIUM SERPL-SCNC: 3.3 MMOL/L (ref 3.5–5.1)
RBC # BLD AUTO: 4.13 M/UL (ref 3.8–5.2)
RBC MORPH BLD: ABNORMAL
RBC MORPH BLD: ABNORMAL
SODIUM SERPL-SCNC: 137 MMOL/L (ref 136–145)
WBC # BLD AUTO: 16.2 K/UL (ref 3.6–11)

## 2017-04-12 PROCEDURE — 74011250637 HC RX REV CODE- 250/637: Performed by: SURGERY

## 2017-04-12 PROCEDURE — 74011250636 HC RX REV CODE- 250/636: Performed by: SURGERY

## 2017-04-12 PROCEDURE — 65210000002 HC RM PRIVATE GYN

## 2017-04-12 PROCEDURE — 80048 BASIC METABOLIC PNL TOTAL CA: CPT | Performed by: SURGERY

## 2017-04-12 PROCEDURE — 85025 COMPLETE CBC W/AUTO DIFF WBC: CPT | Performed by: SURGERY

## 2017-04-12 PROCEDURE — 74011250637 HC RX REV CODE- 250/637: Performed by: NURSE PRACTITIONER

## 2017-04-12 PROCEDURE — 36415 COLL VENOUS BLD VENIPUNCTURE: CPT | Performed by: SURGERY

## 2017-04-12 PROCEDURE — 74011250636 HC RX REV CODE- 250/636: Performed by: NURSE PRACTITIONER

## 2017-04-12 RX ORDER — ACETAMINOPHEN 325 MG/1
650 TABLET ORAL
Status: DISCONTINUED | OUTPATIENT
Start: 2017-04-13 | End: 2017-04-13

## 2017-04-12 RX ORDER — ACETAMINOPHEN 10 MG/ML
1000 INJECTION, SOLUTION INTRAVENOUS EVERY 12 HOURS
Status: COMPLETED | OUTPATIENT
Start: 2017-04-12 | End: 2017-04-12

## 2017-04-12 RX ORDER — DILTIAZEM HYDROCHLORIDE 120 MG/1
120 CAPSULE, COATED, EXTENDED RELEASE ORAL DAILY
Status: DISCONTINUED | OUTPATIENT
Start: 2017-04-12 | End: 2017-04-19 | Stop reason: HOSPADM

## 2017-04-12 RX ORDER — POTASSIUM CHLORIDE 750 MG/1
40 TABLET, FILM COATED, EXTENDED RELEASE ORAL
Status: COMPLETED | OUTPATIENT
Start: 2017-04-12 | End: 2017-04-12

## 2017-04-12 RX ADMIN — ACETAMINOPHEN 1000 MG: 10 INJECTION, SOLUTION INTRAVENOUS at 18:53

## 2017-04-12 RX ADMIN — ENOXAPARIN SODIUM 40 MG: 100 INJECTION SUBCUTANEOUS at 18:14

## 2017-04-12 RX ADMIN — HYDROCHLOROTHIAZIDE 25 MG: 25 TABLET ORAL at 08:20

## 2017-04-12 RX ADMIN — ACETAMINOPHEN 1000 MG: 10 INJECTION, SOLUTION INTRAVENOUS at 08:26

## 2017-04-12 RX ADMIN — DILTIAZEM HYDROCHLORIDE 120 MG: 120 CAPSULE, COATED, EXTENDED RELEASE ORAL at 08:20

## 2017-04-12 RX ADMIN — LISINOPRIL 10 MG: 10 TABLET ORAL at 08:19

## 2017-04-12 RX ADMIN — POTASSIUM CHLORIDE 40 MEQ: 750 TABLET, FILM COATED, EXTENDED RELEASE ORAL at 08:19

## 2017-04-12 NOTE — CDMP QUERY
Please clarify if this patient is being treated/managed for:    =>Atrial fibrillation in the setting of s/p right colectomy treated with EKG and monitoring  =>Other Explanation of clinical findings  =>Unable to Determine (no explanation of clinical findings)    The medical record reflects the following clinical findings, treatment, and risk factors:    Risk Factors: 83yo s/p surg    Clinical Indicators: 4/11 RN PN:   Dr. Dylan Sanchez called and made aware of patient being in atrial fibrillation, rate in the 70's. Patient had no history of Afib. EKG ordered    Treatment: EKG, monitoring     Please clarify and document your clinical opinion in the progress notes and discharge summary including the definitive and/or presumptive diagnosis, (suspected or probable), related to the above clinical findings. Please include clinical findings supporting your diagnosis.     Thank Fanny Meraz [facility]  [phone]

## 2017-04-12 NOTE — PROGRESS NOTES
Problem: Falls - Risk of  Goal: *Absence of falls  Outcome: Progressing Towards Goal  Patient remains free of falls at this time. Fall precautions in place. Call bell within reach and bed in lowest position. Will continue to monitor.

## 2017-04-12 NOTE — ROUTINE PROCESS
Bedside shift change report given to SALMA Villar (oncoming nurse) by Leopold Furbish (offgoing nurse). Report included the following information SBAR.

## 2017-04-12 NOTE — PROGRESS NOTES
Physical Therapy Screening:  Services are not indicated at this time. An InBasket screening referral was triggered for physical therapy based on results obtained during the nursing admission assessment. The patients chart was reviewed and the patient is not appropriate for a skilled therapy evaluation at this time. Please consult physical therapy if any therapy needs arise. Thank you.     Valeriy Sun, PT

## 2017-04-12 NOTE — CDMP QUERY
Patient is noted to have a BMI of 44.02.   Please clarify if this patient is:     =>Morbidly obese  =>Obese   =>Overweight  =>Other explanation of clinical findings  =>Unable to determine (no explanation for clinical findings)    Presentation: 5'1\" 231  lbs = BMI 44.02    REFERENCE:  The [address] [place] [address] has issued a statement indicating that, \"Individuals who are overweight, obese, or morbidly obese are at an increased risk for certain medical conditions when compared to persons of normal weight. Therefore, these conditions are always clinically significant and reportable when documented by the provider. Please clarify and document your clinical opinion in the progress notes and discharge summary, including the definitive and or presumptive diagnosis, (suspected or probable), related to the above clinical findings.   Please include clinical findings supporting your diagnosis    Thank Candy Guido [facility]  [phone]

## 2017-04-13 PROBLEM — E66.01 MORBID OBESITY (HCC): Status: ACTIVE | Noted: 2017-04-13

## 2017-04-13 LAB
ANION GAP BLD CALC-SCNC: 7 MMOL/L (ref 5–15)
BUN SERPL-MCNC: 10 MG/DL (ref 6–20)
BUN/CREAT SERPL: 14 (ref 12–20)
CALCIUM SERPL-MCNC: 8.9 MG/DL (ref 8.5–10.1)
CHLORIDE SERPL-SCNC: 100 MMOL/L (ref 97–108)
CO2 SERPL-SCNC: 30 MMOL/L (ref 21–32)
CREAT SERPL-MCNC: 0.7 MG/DL (ref 0.55–1.02)
ERYTHROCYTE [DISTWIDTH] IN BLOOD BY AUTOMATED COUNT: 15.3 % (ref 11.5–14.5)
GLUCOSE SERPL-MCNC: 119 MG/DL (ref 65–100)
HCT VFR BLD AUTO: 36.2 % (ref 35–47)
HGB BLD-MCNC: 11.1 G/DL (ref 11.5–16)
MAGNESIUM SERPL-MCNC: 1.7 MG/DL (ref 1.6–2.4)
MCH RBC QN AUTO: 26.8 PG (ref 26–34)
MCHC RBC AUTO-ENTMCNC: 30.7 G/DL (ref 30–36.5)
MCV RBC AUTO: 87.4 FL (ref 80–99)
PHOSPHATE SERPL-MCNC: 2.4 MG/DL (ref 2.6–4.7)
PLATELET # BLD AUTO: 174 K/UL (ref 150–400)
POTASSIUM SERPL-SCNC: 3.5 MMOL/L (ref 3.5–5.1)
RBC # BLD AUTO: 4.14 M/UL (ref 3.8–5.2)
SODIUM SERPL-SCNC: 137 MMOL/L (ref 136–145)
WBC # BLD AUTO: 14.5 K/UL (ref 3.6–11)

## 2017-04-13 PROCEDURE — 36415 COLL VENOUS BLD VENIPUNCTURE: CPT | Performed by: NURSE PRACTITIONER

## 2017-04-13 PROCEDURE — 80048 BASIC METABOLIC PNL TOTAL CA: CPT | Performed by: NURSE PRACTITIONER

## 2017-04-13 PROCEDURE — 65210000002 HC RM PRIVATE GYN

## 2017-04-13 PROCEDURE — 74011250637 HC RX REV CODE- 250/637: Performed by: NURSE PRACTITIONER

## 2017-04-13 PROCEDURE — 74011250636 HC RX REV CODE- 250/636: Performed by: NURSE PRACTITIONER

## 2017-04-13 PROCEDURE — 74011250636 HC RX REV CODE- 250/636: Performed by: SURGERY

## 2017-04-13 PROCEDURE — G8978 MOBILITY CURRENT STATUS: HCPCS

## 2017-04-13 PROCEDURE — G8979 MOBILITY GOAL STATUS: HCPCS

## 2017-04-13 PROCEDURE — 77030027138 HC INCENT SPIROMETER -A

## 2017-04-13 PROCEDURE — 97161 PT EVAL LOW COMPLEX 20 MIN: CPT

## 2017-04-13 PROCEDURE — 85027 COMPLETE CBC AUTOMATED: CPT | Performed by: NURSE PRACTITIONER

## 2017-04-13 PROCEDURE — 84100 ASSAY OF PHOSPHORUS: CPT | Performed by: NURSE PRACTITIONER

## 2017-04-13 PROCEDURE — 97116 GAIT TRAINING THERAPY: CPT

## 2017-04-13 PROCEDURE — 83735 ASSAY OF MAGNESIUM: CPT | Performed by: NURSE PRACTITIONER

## 2017-04-13 PROCEDURE — 74011250637 HC RX REV CODE- 250/637: Performed by: SURGERY

## 2017-04-13 RX ORDER — ATORVASTATIN CALCIUM 20 MG/1
20 TABLET, FILM COATED ORAL
Status: DISCONTINUED | OUTPATIENT
Start: 2017-04-13 | End: 2017-04-14

## 2017-04-13 RX ORDER — LANOLIN ALCOHOL/MO/W.PET/CERES
400 CREAM (GRAM) TOPICAL DAILY
Status: DISCONTINUED | OUTPATIENT
Start: 2017-04-13 | End: 2017-04-14

## 2017-04-13 RX ORDER — POTASSIUM CHLORIDE 750 MG/1
20 TABLET, FILM COATED, EXTENDED RELEASE ORAL 2 TIMES DAILY
Status: DISCONTINUED | OUTPATIENT
Start: 2017-04-13 | End: 2017-04-19 | Stop reason: HOSPADM

## 2017-04-13 RX ORDER — ASPIRIN 81 MG/1
81 TABLET ORAL DAILY
Status: DISCONTINUED | OUTPATIENT
Start: 2017-04-13 | End: 2017-04-19 | Stop reason: HOSPADM

## 2017-04-13 RX ORDER — PANTOPRAZOLE SODIUM 40 MG/1
40 TABLET, DELAYED RELEASE ORAL
Status: DISCONTINUED | OUTPATIENT
Start: 2017-04-13 | End: 2017-04-14

## 2017-04-13 RX ORDER — SODIUM,POTASSIUM PHOSPHATES 280-250MG
1 POWDER IN PACKET (EA) ORAL 2 TIMES DAILY
Status: COMPLETED | OUTPATIENT
Start: 2017-04-13 | End: 2017-04-13

## 2017-04-13 RX ORDER — DEXTROSE, SODIUM CHLORIDE, AND POTASSIUM CHLORIDE 5; .45; .15 G/100ML; G/100ML; G/100ML
25 INJECTION INTRAVENOUS CONTINUOUS
Status: DISCONTINUED | OUTPATIENT
Start: 2017-04-13 | End: 2017-04-13

## 2017-04-13 RX ORDER — ESOMEPRAZOLE MAGNESIUM 40 MG/1
40 CAPSULE, DELAYED RELEASE ORAL DAILY
Status: DISCONTINUED | OUTPATIENT
Start: 2017-04-13 | End: 2017-04-13 | Stop reason: CLARIF

## 2017-04-13 RX ORDER — KETOROLAC TROMETHAMINE 30 MG/ML
15 INJECTION, SOLUTION INTRAMUSCULAR; INTRAVENOUS
Status: DISCONTINUED | OUTPATIENT
Start: 2017-04-13 | End: 2017-04-15

## 2017-04-13 RX ORDER — KETOROLAC TROMETHAMINE 30 MG/ML
15 INJECTION, SOLUTION INTRAMUSCULAR; INTRAVENOUS
Status: COMPLETED | OUTPATIENT
Start: 2017-04-13 | End: 2017-04-13

## 2017-04-13 RX ORDER — HYDROCODONE BITARTRATE AND ACETAMINOPHEN 5; 325 MG/1; MG/1
1-2 TABLET ORAL
Status: DISCONTINUED | OUTPATIENT
Start: 2017-04-13 | End: 2017-04-19 | Stop reason: HOSPADM

## 2017-04-13 RX ADMIN — Medication 81 MG: at 13:48

## 2017-04-13 RX ADMIN — HYDROCHLOROTHIAZIDE 25 MG: 25 TABLET ORAL at 09:39

## 2017-04-13 RX ADMIN — POTASSIUM & SODIUM PHOSPHATES POWDER PACK 280-160-250 MG 1 PACKET: 280-160-250 PACK at 21:03

## 2017-04-13 RX ADMIN — KETOROLAC TROMETHAMINE 15 MG: 30 INJECTION, SOLUTION INTRAMUSCULAR at 10:04

## 2017-04-13 RX ADMIN — Medication 10 ML: at 21:03

## 2017-04-13 RX ADMIN — POTASSIUM CHLORIDE 20 MEQ: 750 TABLET, FILM COATED, EXTENDED RELEASE ORAL at 13:48

## 2017-04-13 RX ADMIN — LISINOPRIL 10 MG: 10 TABLET ORAL at 09:39

## 2017-04-13 RX ADMIN — HYDROCODONE BITARTRATE AND ACETAMINOPHEN 1 TABLET: 5; 325 TABLET ORAL at 18:36

## 2017-04-13 RX ADMIN — ENOXAPARIN SODIUM 40 MG: 100 INJECTION SUBCUTANEOUS at 18:03

## 2017-04-13 RX ADMIN — HYDROCODONE BITARTRATE AND ACETAMINOPHEN 1 TABLET: 5; 325 TABLET ORAL at 18:01

## 2017-04-13 RX ADMIN — POTASSIUM CHLORIDE 20 MEQ: 750 TABLET, FILM COATED, EXTENDED RELEASE ORAL at 18:02

## 2017-04-13 RX ADMIN — POTASSIUM & SODIUM PHOSPHATES POWDER PACK 280-160-250 MG 1 PACKET: 280-160-250 PACK at 13:50

## 2017-04-13 RX ADMIN — ATORVASTATIN CALCIUM 20 MG: 20 TABLET, FILM COATED ORAL at 21:03

## 2017-04-13 RX ADMIN — DILTIAZEM HYDROCHLORIDE 120 MG: 120 CAPSULE, COATED, EXTENDED RELEASE ORAL at 09:39

## 2017-04-13 RX ADMIN — PANTOPRAZOLE SODIUM 40 MG: 40 TABLET, DELAYED RELEASE ORAL at 13:49

## 2017-04-13 RX ADMIN — Medication 400 MG: at 13:49

## 2017-04-13 NOTE — PROGRESS NOTES
Problem: Mobility Impaired (Adult and Pediatric)  Goal: *Acute Goals and Plan of Care (Insert Text)  Physical Therapy Goals  Initiated 4/13/2017  1. Patient will move from supine to sit and sit to supine in bed with modified independence within 7 day(s). 2. Patient will transfer from bed to chair and chair to bed with modified independence using the least restrictive device within 7 day(s). 3. Patient will perform sit to stand with modified independence within 7 day(s). 4. Patient will ambulate with modified independence for 300 feet with the least restrictive device within 7 day(s). 5. Patient will ascend/descend 1 stairs with 0 handrail(s) with modified independence within 7 day(s). PHYSICAL THERAPY EVALUATION  Patient: Radha Andrade (82 y.o. female)  Date: 4/13/2017  Primary Diagnosis: COLON MASS  Colonic mass  Procedure(s) (LRB):  LAPAROSCOPIC HAND ASSISTED RIGHT COLECTOMY (Right) 2 Days Post-Op   Precautions: FALL         ASSESSMENT :  Based on the objective data described below, the patient presents with decreased mobility post R hemicolectomy POD2. She has attempted walking with nursing with her cane (which is what she used prior to admission) but without much success due to pain in the R hip. Patient has a history of R hip pain and arthritis and R TKA. Although she ordinarily needs a cane for ambulation, she needs a RW at this time for safe ambulation. She was able to walk with the cane but needed mod assist with hand held support. With the walker, she was able to walk with supervision only. Her gait is very slow and still antalgic and she tends to lean forward over the walker. Recommend follow up session to ensure she is able to walk safely with the walker without any assistance. In addition, her bed mobility required a fair amount of assistance at this time.   She does have a walker at home already and lives in a one level home and do not anticipate she will have any home therapy needs at this time. Patient will benefit from skilled intervention to address the above impairments. Patients rehabilitation potential is considered to be Good  Factors which may influence rehabilitation potential include:   [ ]         None noted  [ ]         Mental ability/status  [ ]         Medical condition  [ ]         Home/family situation and support systems  [ ]         Safety awareness  [X]         Pain tolerance/management  [ ]         Other:        PLAN :  Recommendations and Planned Interventions:  [X]           Bed Mobility Training             [ ]    Neuromuscular Re-Education  [X]           Transfer Training                   [ ]    Orthotic/Prosthetic Training  [X]           Gait Training                         [ ]    Modalities  [X]           Therapeutic Exercises           [ ]    Edema Management/Control  [X]           Therapeutic Activities            [X]    Patient and Family Training/Education  [ ]           Other (comment):     Frequency/Duration: Patient will be followed by physical therapy  5 times a week to address goals. Discharge Recommendations: None, possibly HHPT  Further Equipment Recommendations for Discharge: none, patient owns       SUBJECTIVE:   Patient stated I just feel still.       OBJECTIVE DATA SUMMARY:   HISTORY:    Past Medical History:   Diagnosis Date    Allergic rhinitis      Cervical disc disease      Cervical disc disease      Colonic mass 3/20/2017    Eczema      Gastritis      GERD (gastroesophageal reflux disease)       mild    GI bleed       hx.  of recurrent    Hernia       hiatal lt side    Hiatal hernia      Hypercholesterolemia      Hypertension      Morbid obesity ([address] 4/13/2017    Osteoarthritis (arthritis due to wear and tear of joints)       Past Surgical History:   Procedure Laterality Date    HX COLONOSCOPY   3/05    HX COLONOSCOPY   2017    HX CYST REMOVAL         left side    HX ENDOSCOPY   1/09    HX ENDOSCOPY   2015    HX GYN hysterectomy    HX ORTHOPAEDIC         rt knee replacement     Prior Level of Function/Home Situation: independent with cane, lives alone with family support in one level home. She does have a walker from previous surgery  Personal factors and/or comorbidities impacting plan of care: morbid obesity, colectomy, R hip pain     Home Situation  Home Environment: Trailer/mobile home  # Steps to Enter: 0  One/Two Story Residence: One story  Living Alone: No  Support Systems: Family member(s)  Patient Expects to be Discharged to[de-identified] Private residence  Current DME Used/Available at Home: Cane, straight     EXAMINATION/PRESENTATION/DECISION MAKING:   Critical Behavior:              Hearing: Auditory  Auditory Impairment: None  Skin:  Intact grossly  Edema: none  Range Of Motion:  AROM: Within functional limits (some limitation R shoulder compared with L, longstanding)                       Strength:    Strength: Within functional limits                    Tone & Sensation:   Tone: Normal              Sensation: Intact               Coordination:  Coordination: Within functional limits  Vision:      Functional Mobility:  Bed Mobility:     Supine to Sit: Moderate assistance (due to softness of the mattress, difficulty moving hips)        Transfers:  Sit to Stand: Contact guard assistance; Adaptive equipment; Additional time  Stand to Sit: Contact guard assistance; Adaptive equipment; Additional time        Bed to Chair: Contact guard assistance; Adaptive equipment; Additional time              Balance:   Sitting: Intact; Without support  Standing: Impaired; Without support  Standing - Static: Good  Standing - Dynamic : Fair  Ambulation/Gait Training:  Distance (ft): 200 Feet (ft)  Assistive Device: Gait belt;Walker, rolling;Cane, straight  Ambulation - Level of Assistance: Supervision; Adaptive equipment; Additional time     Gait Description (WDL): Exceptions to WDL  Gait Abnormalities: Antalgic;Decreased step clearance Stance: Right decreased  Speed/Betty: Slow;Pace decreased (<100 feet/min)  Step Length: Right shortened;Left shortened  Swing Pattern: Right symmetrical;Left symmetrical     Interventions: Safety awareness training; Tactile cues; Verbal cues; Visual/Demos                    Gait is very slow and she tends to lean forward over the walker excessively              Stairs:                             Functional Measure:  Tinetti test:      Sitting Balance: 1  Arises: 1  Attempts to Rise: 1  Immediate Standing Balance: 1  Standing Balance: 1  Nudged: 2  Eyes Closed: 1  Turn 360 Degrees - Continuous/Discontinuous: 0  Turn 360 Degrees - Steady/Unsteady: 1  Sitting Down: 1  Balance Score: 10  Indication of Gait: 0  R Step Length/Height: 1  L Step Length/Height: 1  R Foot Clearance: 1  L Foot Clearance: 1  Step Symmetry: 1  Step Continuity: 0  Path: 1  Trunk: 0  Walking Time: 0  Gait Score: 6  Total Score: 16         Tinetti Test and G-code impairment scale:  Percentage of Impairment CH     0%    CI     1-19% CJ     20-39% CK     40-59% CL     60-79% CM     80-99% CN      100%   Tinetti  Score 0-28 28 23-27 17-22 12-16 6-11 1-5 0          Tinetti Tool Score Risk of Falls  <19 = High Fall Risk  19-24 = Moderate Fall Risk  25-28 = Low Fall Risk  Tinetti ME. Performance-Oriented Assessment of Mobility Problems in Elderly Patients. [address]; [id number]. (Scoring Description: PT Bulletin Feb. 10, 1993)     Older adults: Sterling Hawk et al, 2009; n = 1000 [facility] elderly evaluated with ABC, KRISTA, ADL, and IADL)  · Mean KRISTA score for males aged 69-68 years = 26.21(3.40)  · Mean KRISTA score for females age 69-68 years = 25.16(4.30)  · Mean KRISTA score for males over 80 years = 23.29(6.02)  · Mean KRISTA score for females over 80 years = 17.20(8.32)         G codes: In compliance with CMSs Claims Based Outcome Reporting, the following G-code set was chosen for this patient based on their primary functional limitation being treated:      The outcome measure chosen to determine the severity of the functional limitation was the Tinetti with a score of 16/28 which was correlated with the impairment scale. · Mobility - Walking and Moving Around:               - CURRENT STATUS:    CK - 40%-59% impaired, limited or restricted               - GOAL STATUS:           CI - 1%-19% impaired, limited or restricted               - D/C STATUS:                       ---------------To be determined---------------      Physical Therapy Evaluation Charge Determination   History Examination Presentation Decision-Making   HIGH Complexity :3+ comorbidities / personal factors will impact the outcome/ POC  LOW Complexity : 1-2 Standardized tests and measures addressing body structure, function, activity limitation and / or participation in recreation  LOW Complexity : Stable, uncomplicated  LOW Complexity : FOTO score of       Based on the above components, the patient evaluation is determined to be of the following complexity level: LOW      Pain:  Pain Scale 1: Numeric (0 - 10)  Pain Intensity 1: 0              Activity Tolerance:   Good overall, but she does fatigue fairly quickly. No WERNER or loss of balance with ambulation  Please refer to the flowsheet for vital signs taken during this treatment. After treatment:   [X]         Patient left in no apparent distress sitting up in chair  [ ]         Patient left in no apparent distress in bed  [X]         Call bell left within reach  [X]         Nursing notified  [ ]         Caregiver present  [ ]         Bed alarm activated      COMMUNICATION/EDUCATION:   The patients plan of care was discussed with: Registered Nurse.  [X]         Fall prevention education was provided and the patient/caregiver indicated understanding. [X]         Patient/family have participated as able in goal setting and plan of care. [X]         Patient/family agree to work toward stated goals and plan of care.   [ ] Patient understands intent and goals of therapy, but is neutral about his/her participation. [ ]         Patient is unable to participate in goal setting and plan of care.      Thank you for this referral.  Shayan Tovar, PT   Time Calculation: 28 mins

## 2017-04-13 NOTE — PROGRESS NOTES
Daily Progress Note  [facility] at [address] Date: 2017  Post-Operative Day: 2 from Procedure(s):  LAPAROSCOPIC HAND ASSISTED RIGHT COLECTOMY     Subjective:     Last 24 hrs: c/o pain in hip, abdomen is OK. Getting up to chair and bedside commode, has not ambulated hallway yet. Tolerating clears. Had some flatus last night, none yet today. Objective:     Blood pressure 119/70, pulse 81, temperature 98.7 °F (37.1 °C), resp. rate 17, height 5' 1\" (1.549 m), weight 105.7 kg (233 lb), SpO2 95 %, not currently breastfeeding. Temp (24hrs), Av.3 °F (36.8 °C), Min:97.7 °F (36.5 °C), Max:98.7 °F (37.1 °C)      _____________________  Physical Exam:     Alert and Oriented, sitting up in bed, no acute distress. Cardiovascular: RRR, no peripheral edema  Lungs:CTAB   Abdomen: + BS, soft, distended with tympany, NT. Umbilical hernia which is soft and NT. Incisions healing well. Assessment:   Principal Problem:    Colonic mass (3/20/2017)    Active Problems: Morbid obesity ([address] (2017)    s/p right colectomy    Post-op ileus - improving    hypophosphatemia      Plan:     Full liquids  D/c PCA, switch to toradol and PO norco  PT consult for ambulation  Replete phos  DVT prophylaxis        Karson Gould, ACNP - [address] [facility] [place] [address],  [facility], [address], [place]  [phone]    Data Review:    Recent Labs      17   0501  17   0436  17   1351   WBC  14.5*  16.2*   --    HGB  11.1*  11.1*  12.4   HCT  36.2  36.2  40.5   PLT  174  177   --      Recent Labs      17   0501  17   0436   NA  137  137   K  3.5  3.3*   CL  100  99   CO2  30  29   GLU  119*  176*   BUN  10  12   CREA  0.70  0.85   CA  8.9  8.6   MG  1.7   --    PHOS  2.4*   --      No results for input(s): AML, LPSE in the last 72 hours.         ______________________  Medications:    Current Facility-Administered Medications   Medication Dose Route Frequency    dextrose 5% - 0.45% NaCl with KCl 20 mEq/L infusion  25 mL/hr IntraVENous CONTINUOUS    ketorolac (TORADOL) injection 15 mg  15 mg IntraVENous NOW    ketorolac (TORADOL) injection 15 mg  15 mg IntraVENous Q6H PRN    HYDROcodone-acetaminophen (NORCO) 5-325 mg per tablet 1-2 Tab  1-2 Tab Oral Q4H PRN    dilTIAZem CD (CARDIZEM CD) capsule 120 mg  120 mg Oral DAILY    hydroCHLOROthiazide (HYDRODIURIL) tablet 25 mg  25 mg Oral DAILY    lisinopril (PRINIVIL, ZESTRIL) tablet 10 mg  10 mg Oral DAILY    sodium chloride (NS) flush 5-10 mL  5-10 mL IntraVENous Q8H    sodium chloride (NS) flush 5-10 mL  5-10 mL IntraVENous PRN    ondansetron (ZOFRAN) injection 4 mg  4 mg IntraVENous Q4H PRN    diphenhydrAMINE (BENADRYL) injection 12.5 mg  12.5 mg IntraVENous Q4H PRN    enoxaparin (LOVENOX) injection 40 mg  40 mg SubCUTAneous Q24H    HYDROmorphone (PF) 15 mg/30 ml (DILAUDID) PCA   IntraVENous TITRATE                                                                                               ATTENDING ADDENDUM  I supervised the APC and reviewed the note. We discussed the plan of care  Passing flatus. Incisions look OK. Path showed T2N0M0 - Stage 1.

## 2017-04-14 LAB
ALBUMIN SERPL BCP-MCNC: 2.6 G/DL (ref 3.5–5)
ALBUMIN/GLOB SERPL: 0.7 {RATIO} (ref 1.1–2.2)
ALP SERPL-CCNC: 68 U/L (ref 45–117)
ALT SERPL-CCNC: 40 U/L (ref 12–78)
ANION GAP BLD CALC-SCNC: 6 MMOL/L (ref 5–15)
AST SERPL W P-5'-P-CCNC: 70 U/L (ref 15–37)
BILIRUB SERPL-MCNC: 0.9 MG/DL (ref 0.2–1)
BUN SERPL-MCNC: 17 MG/DL (ref 6–20)
BUN/CREAT SERPL: 22 (ref 12–20)
CALCIUM SERPL-MCNC: 9.5 MG/DL (ref 8.5–10.1)
CHLORIDE SERPL-SCNC: 95 MMOL/L (ref 97–108)
CO2 SERPL-SCNC: 32 MMOL/L (ref 21–32)
CREAT SERPL-MCNC: 0.79 MG/DL (ref 0.55–1.02)
ERYTHROCYTE [DISTWIDTH] IN BLOOD BY AUTOMATED COUNT: 14.9 % (ref 11.5–14.5)
GLOBULIN SER CALC-MCNC: 3.6 G/DL (ref 2–4)
GLUCOSE SERPL-MCNC: 163 MG/DL (ref 65–100)
HCT VFR BLD AUTO: 39.8 % (ref 35–47)
HGB BLD-MCNC: 12.7 G/DL (ref 11.5–16)
MAGNESIUM SERPL-MCNC: 2 MG/DL (ref 1.6–2.4)
MCH RBC QN AUTO: 27.5 PG (ref 26–34)
MCHC RBC AUTO-ENTMCNC: 31.9 G/DL (ref 30–36.5)
MCV RBC AUTO: 86.1 FL (ref 80–99)
PHOSPHATE SERPL-MCNC: 3.9 MG/DL (ref 2.6–4.7)
PLATELET # BLD AUTO: 211 K/UL (ref 150–400)
POTASSIUM SERPL-SCNC: 4.1 MMOL/L (ref 3.5–5.1)
PROT SERPL-MCNC: 6.2 G/DL (ref 6.4–8.2)
RBC # BLD AUTO: 4.62 M/UL (ref 3.8–5.2)
SODIUM SERPL-SCNC: 133 MMOL/L (ref 136–145)
WBC # BLD AUTO: 14 K/UL (ref 3.6–11)

## 2017-04-14 PROCEDURE — 84100 ASSAY OF PHOSPHORUS: CPT | Performed by: NURSE PRACTITIONER

## 2017-04-14 PROCEDURE — 36415 COLL VENOUS BLD VENIPUNCTURE: CPT | Performed by: NURSE PRACTITIONER

## 2017-04-14 PROCEDURE — 74011250637 HC RX REV CODE- 250/637: Performed by: SURGERY

## 2017-04-14 PROCEDURE — 65210000002 HC RM PRIVATE GYN

## 2017-04-14 PROCEDURE — 97530 THERAPEUTIC ACTIVITIES: CPT

## 2017-04-14 PROCEDURE — 83735 ASSAY OF MAGNESIUM: CPT | Performed by: NURSE PRACTITIONER

## 2017-04-14 PROCEDURE — 85027 COMPLETE CBC AUTOMATED: CPT | Performed by: NURSE PRACTITIONER

## 2017-04-14 PROCEDURE — 97116 GAIT TRAINING THERAPY: CPT

## 2017-04-14 PROCEDURE — 74011250636 HC RX REV CODE- 250/636: Performed by: SURGERY

## 2017-04-14 PROCEDURE — 74011250637 HC RX REV CODE- 250/637: Performed by: NURSE PRACTITIONER

## 2017-04-14 PROCEDURE — 74011250636 HC RX REV CODE- 250/636: Performed by: NURSE PRACTITIONER

## 2017-04-14 PROCEDURE — 80053 COMPREHEN METABOLIC PANEL: CPT | Performed by: NURSE PRACTITIONER

## 2017-04-14 RX ORDER — DEXTROSE, SODIUM CHLORIDE, AND POTASSIUM CHLORIDE 5; .45; .15 G/100ML; G/100ML; G/100ML
100 INJECTION INTRAVENOUS CONTINUOUS
Status: DISCONTINUED | OUTPATIENT
Start: 2017-04-14 | End: 2017-04-17

## 2017-04-14 RX ADMIN — LISINOPRIL 10 MG: 10 TABLET ORAL at 10:53

## 2017-04-14 RX ADMIN — ONDANSETRON 4 MG: 2 INJECTION INTRAMUSCULAR; INTRAVENOUS at 02:10

## 2017-04-14 RX ADMIN — HYDROCHLOROTHIAZIDE 25 MG: 25 TABLET ORAL at 10:53

## 2017-04-14 RX ADMIN — DEXTROSE MONOHYDRATE, SODIUM CHLORIDE, AND POTASSIUM CHLORIDE 100 ML/HR: 50; 4.5; 1.49 INJECTION, SOLUTION INTRAVENOUS at 20:50

## 2017-04-14 RX ADMIN — ONDANSETRON 4 MG: 2 INJECTION INTRAMUSCULAR; INTRAVENOUS at 21:00

## 2017-04-14 RX ADMIN — POTASSIUM CHLORIDE 20 MEQ: 750 TABLET, FILM COATED, EXTENDED RELEASE ORAL at 17:05

## 2017-04-14 RX ADMIN — DILTIAZEM HYDROCHLORIDE 120 MG: 120 CAPSULE, COATED, EXTENDED RELEASE ORAL at 10:52

## 2017-04-14 RX ADMIN — ONDANSETRON 4 MG: 2 INJECTION INTRAMUSCULAR; INTRAVENOUS at 10:51

## 2017-04-14 RX ADMIN — POTASSIUM CHLORIDE 20 MEQ: 750 TABLET, FILM COATED, EXTENDED RELEASE ORAL at 10:53

## 2017-04-14 RX ADMIN — ENOXAPARIN SODIUM 40 MG: 100 INJECTION SUBCUTANEOUS at 17:05

## 2017-04-14 RX ADMIN — ONDANSETRON 4 MG: 2 INJECTION INTRAMUSCULAR; INTRAVENOUS at 06:47

## 2017-04-14 RX ADMIN — Medication 81 MG: at 10:53

## 2017-04-14 RX ADMIN — PANTOPRAZOLE SODIUM 40 MG: 40 TABLET, DELAYED RELEASE ORAL at 06:44

## 2017-04-14 RX ADMIN — DEXTROSE MONOHYDRATE, SODIUM CHLORIDE, AND POTASSIUM CHLORIDE 100 ML/HR: 50; 4.5; 1.49 INJECTION, SOLUTION INTRAVENOUS at 11:36

## 2017-04-14 RX ADMIN — Medication 10 ML: at 06:44

## 2017-04-14 NOTE — PROGRESS NOTES
Primary Nurse Vikash Frances and Chantal Coy RN performed a dual skin assessment on this patient No impairment noted  Michael score is 20. Patient complained of a sore spot on right buttocks, assessed the skin and saw no signs of impairment.  Skin tear noted on left buttocks, has been there a few days-applied skin barrier cream.

## 2017-04-14 NOTE — PROGRESS NOTES
Daily Progress Note  [facility] at [address] Date: 2017  Post-Operative Day: 3 from Procedure(s):  LAPAROSCOPIC HAND ASSISTED RIGHT COLECTOMY     Subjective:     Last 24 hrs: Vomited early this morning, approximately 2 cups of green/brown fluid. Didn't eat breakfast due to nausea, asking for zofran.  + flatus x 2 this morning. Hip pain is improved. Getting OOB with PT.         Objective:     Blood pressure 113/71, pulse 68, temperature 98.5 °F (36.9 °C), resp. rate 17, height 5' 1\" (1.549 m), weight 105.7 kg (233 lb), SpO2 98 %, not currently breastfeeding. Temp (24hrs), Av.6 °F (37 °C), Min:98.3 °F (36.8 °C), Max:98.9 °F (37.2 °C)      _____________________  Physical Exam:     Alert and Oriented, sitting up in bed, no acute distress. Cardiovascular: RRR, no peripheral edema  Lungs:CTAB  Abdomen: + BS, moderate distention with tympany, LUQ hernia soft with mild tenderness. Incisions healing well. Assessment:   Principal Problem:    Colonic mass (3/20/2017)    Active Problems: Morbid obesity ([address] (2017)    post-op ileus    S/p right colectomy for ascending colonic mass    Ventral hernia, PTA        Plan:     NPO for now  Re-start IVF  Labs  OOB as tolerated  DVT prophylaxis  NG if continued vomiting      Vic Lal, [address] [facility],   [facility], [address] [place] [address]  [phone]    Data Review:    Recent Labs      17   0501  17   0436  17   1351   WBC  14.5*  16.2*   --    HGB  11.1*  11.1*  12.4   HCT  36.2  36.2  40.5   PLT  174  177   --      Recent Labs      17   0501  17   0436   NA  137  137   K  3.5  3.3*   CL  100  99   CO2  30  29   GLU  119*  176*   BUN  10  12   CREA  0.70  0.85   CA  8.9  8.6   MG  1.7   --    PHOS  2.4*   --      No results for input(s): AML, LPSE in the last 72 hours.         ______________________  Medications:    Current Facility-Administered Medications   Medication Dose Route Frequency    potassium phosphate 15 mmol in 0.9% sodium chloride 250 mL infusion   IntraVENous ONCE    dextrose 5% - 0.45% NaCl with KCl 20 mEq/L infusion  100 mL/hr IntraVENous CONTINUOUS    ketorolac (TORADOL) injection 15 mg  15 mg IntraVENous Q6H PRN    HYDROcodone-acetaminophen (NORCO) 5-325 mg per tablet 1-2 Tab  1-2 Tab Oral Q4H PRN    potassium chloride SR (KLOR-CON 10) tablet 20 mEq  20 mEq Oral BID    aspirin delayed-release tablet 81 mg  81 mg Oral DAILY    pantoprazole (PROTONIX) tablet 40 mg  40 mg Oral ACB    dilTIAZem CD (CARDIZEM CD) capsule 120 mg  120 mg Oral DAILY    hydroCHLOROthiazide (HYDRODIURIL) tablet 25 mg  25 mg Oral DAILY    lisinopril (PRINIVIL, ZESTRIL) tablet 10 mg  10 mg Oral DAILY    sodium chloride (NS) flush 5-10 mL  5-10 mL IntraVENous Q8H    sodium chloride (NS) flush 5-10 mL  5-10 mL IntraVENous PRN    ondansetron (ZOFRAN) injection 4 mg  4 mg IntraVENous Q4H PRN    diphenhydrAMINE (BENADRYL) injection 12.5 mg  12.5 mg IntraVENous Q4H PRN    enoxaparin (LOVENOX) injection 40 mg  40 mg SubCUTAneous Q24H                                                                                               ATTENDING ADDENDUM  I supervised the APC and reviewed the note. We discussed the plan of care  No diego vopmiting, and passed a litle gas.   Will hold on diet til sure she is improving

## 2017-04-14 NOTE — PROGRESS NOTES
Problem: Mobility Impaired (Adult and Pediatric)  Goal: *Acute Goals and Plan of Care (Insert Text)  Physical Therapy Goals  Initiated 4/13/2017  1. Patient will move from supine to sit and sit to supine in bed with modified independence within 7 day(s). 2. Patient will transfer from bed to chair and chair to bed with modified independence using the least restrictive device within 7 day(s). 3. Patient will perform sit to stand with modified independence within 7 day(s). 4. Patient will ambulate with modified independence for 300 feet with the least restrictive device within 7 day(s). 5. Patient will ascend/descend 1 stairs with 0 handrail(s) with modified independence within 7 day(s). PHYSICAL THERAPY TREATMENT  Patient: Baudilio Rowan (89 y.o. female)  Date: 4/14/2017  Diagnosis: COLON MASS  Colonic mass Colonic mass  Procedure(s) (LRB):  LAPAROSCOPIC HAND ASSISTED RIGHT COLECTOMY (Right) 3 Days Post-Op  Precautions:        ASSESSMENT:  Pt cleared by nsg. Pt reluctant to get OOB and was complaining of being left up too long. Pt moves very slowly, needing min to mod assist for bed mobility due to abd distention/body habitus. Pt with belching once moving. Pt needed min/mod assist of 1 sit to stand off bed and two attempts but only min assist off toilet. Once up moving, she does very well with RW requ only CGA. She has RW at home. Pt encouraged to remain sitting up in chair an hour, nurse informed so she can assist pt back to bed. Pt encouraged to sit up several times a day, at least for meals, in order to make a quicker recovery. Pt statin her son lives with her and can assist her at home if needed, but unclear if he is home all day and nsg states she has not seen him as yet.     Progression toward goals:  [ ]    Improving appropriately and progressing toward goals  [X]    Improving slowly and progressing toward goals  [ ]    Not making progress toward goals and plan of care will be adjusted PLAN:  Patient continues to benefit from skilled intervention to address the above impairments. Continue treatment per established plan of care. Discharge Recommendations:  Home Health and None  Further Equipment Recommendations for Discharge:  Has cane and JIGNA       SUBJECTIVE:   Patient stated I just can't sit up three or four hours in a row.   nsg states pt not up for this amount of time and pt needs encourageemt to remain up, citing \"im uncomfortable\"      OBJECTIVE DATA SUMMARY:   Critical Behavior:   pt tends to self limit           Functional Mobility Training:  Bed Mobility:  Rolling: Additional time;Minimum assistance  Supine to Sit: Assist x1;Additional time; Moderate assistance (pt moves very slowly, difficulty even w HOB elevated)     Scooting: Additional time;Minimum assistance;Assist x1        Transfers:  Sit to Stand: Assist x1;Minimum assistance (two attempts)         Balance:  Sitting: Intact  Standing: Intact; With support  Standing - Static: Good  Standing - Dynamic : Good  Ambulation/Gait Training:  Distance (ft): 175 Feet (ft)  Assistive Device: Gait belt;Walker, rolling  Ambulation - Level of Assistance: Contact guard assistance        Gait Abnormalities: Antalgic;Decreased step clearance (lateral trunk flexion)              Speed/Betty: Pace decreased (<100 feet/min)  Step Length: Right shortened;Left shortened      Pain:      pt complaining of pain in right post hip, not her typical spot she states. Palpated mod swelling in area. Activity Tolerance:   Fair, pt self limits  Please refer to the flowsheet for vital signs taken during this treatment.   After treatment:   [X]    Patient left in no apparent distress sitting up in chair  [ ]    Patient left in no apparent distress in bed  [X]    Call bell left within reach  [X]    Nursing notified  [ ]    Caregiver present  [ ]    Bed alarm activated      COMMUNICATION/COLLABORATION:   The patients plan of care was discussed with: Registered Nurse     Nirali Li, PT   Time Calculation: 30 mins

## 2017-04-14 NOTE — ROUTINE PROCESS
Bedside shift change report given to [address] [place] [address] (oncoming nurse) by Robby Call (offgoing nurse). Report included the following information SBAR.

## 2017-04-15 LAB
ANION GAP BLD CALC-SCNC: 10 MMOL/L (ref 5–15)
BUN SERPL-MCNC: 13 MG/DL (ref 6–20)
BUN/CREAT SERPL: 19 (ref 12–20)
CALCIUM SERPL-MCNC: 8.9 MG/DL (ref 8.5–10.1)
CHLORIDE SERPL-SCNC: 95 MMOL/L (ref 97–108)
CO2 SERPL-SCNC: 30 MMOL/L (ref 21–32)
CREAT SERPL-MCNC: 0.67 MG/DL (ref 0.55–1.02)
ERYTHROCYTE [DISTWIDTH] IN BLOOD BY AUTOMATED COUNT: 14.9 % (ref 11.5–14.5)
GLUCOSE SERPL-MCNC: 178 MG/DL (ref 65–100)
HCT VFR BLD AUTO: 39.2 % (ref 35–47)
HGB BLD-MCNC: 12.2 G/DL (ref 11.5–16)
MCH RBC QN AUTO: 26.6 PG (ref 26–34)
MCHC RBC AUTO-ENTMCNC: 31.1 G/DL (ref 30–36.5)
MCV RBC AUTO: 85.6 FL (ref 80–99)
PLATELET # BLD AUTO: 210 K/UL (ref 150–400)
POTASSIUM SERPL-SCNC: 4.2 MMOL/L (ref 3.5–5.1)
RBC # BLD AUTO: 4.58 M/UL (ref 3.8–5.2)
SODIUM SERPL-SCNC: 135 MMOL/L (ref 136–145)
WBC # BLD AUTO: 10.8 K/UL (ref 3.6–11)

## 2017-04-15 PROCEDURE — 36415 COLL VENOUS BLD VENIPUNCTURE: CPT | Performed by: NURSE PRACTITIONER

## 2017-04-15 PROCEDURE — 85027 COMPLETE CBC AUTOMATED: CPT | Performed by: NURSE PRACTITIONER

## 2017-04-15 PROCEDURE — 80048 BASIC METABOLIC PNL TOTAL CA: CPT | Performed by: NURSE PRACTITIONER

## 2017-04-15 PROCEDURE — 74011000250 HC RX REV CODE- 250: Performed by: NURSE PRACTITIONER

## 2017-04-15 PROCEDURE — 74011250636 HC RX REV CODE- 250/636: Performed by: NURSE PRACTITIONER

## 2017-04-15 PROCEDURE — C9113 INJ PANTOPRAZOLE SODIUM, VIA: HCPCS | Performed by: NURSE PRACTITIONER

## 2017-04-15 PROCEDURE — 74011250636 HC RX REV CODE- 250/636: Performed by: SURGERY

## 2017-04-15 PROCEDURE — 74011250637 HC RX REV CODE- 250/637: Performed by: SURGERY

## 2017-04-15 PROCEDURE — 65210000002 HC RM PRIVATE GYN

## 2017-04-15 PROCEDURE — 74011250637 HC RX REV CODE- 250/637: Performed by: NURSE PRACTITIONER

## 2017-04-15 RX ORDER — DIPHENHYDRAMINE HCL 25 MG
25 CAPSULE ORAL
Status: DISCONTINUED | OUTPATIENT
Start: 2017-04-15 | End: 2017-04-19 | Stop reason: HOSPADM

## 2017-04-15 RX ORDER — PANTOPRAZOLE SODIUM 40 MG/1
40 TABLET, DELAYED RELEASE ORAL
Status: DISCONTINUED | OUTPATIENT
Start: 2017-04-16 | End: 2017-04-19 | Stop reason: HOSPADM

## 2017-04-15 RX ORDER — MORPHINE SULFATE 2 MG/ML
2 INJECTION, SOLUTION INTRAMUSCULAR; INTRAVENOUS
Status: DISCONTINUED | OUTPATIENT
Start: 2017-04-15 | End: 2017-04-19 | Stop reason: HOSPADM

## 2017-04-15 RX ADMIN — POTASSIUM CHLORIDE 20 MEQ: 750 TABLET, FILM COATED, EXTENDED RELEASE ORAL at 18:31

## 2017-04-15 RX ADMIN — HYDROCODONE BITARTRATE AND ACETAMINOPHEN 1 TABLET: 5; 325 TABLET ORAL at 12:32

## 2017-04-15 RX ADMIN — ENOXAPARIN SODIUM 40 MG: 100 INJECTION SUBCUTANEOUS at 18:30

## 2017-04-15 RX ADMIN — HYDROCHLOROTHIAZIDE 25 MG: 25 TABLET ORAL at 09:24

## 2017-04-15 RX ADMIN — DILTIAZEM HYDROCHLORIDE 120 MG: 120 CAPSULE, COATED, EXTENDED RELEASE ORAL at 09:25

## 2017-04-15 RX ADMIN — POTASSIUM CHLORIDE 20 MEQ: 750 TABLET, FILM COATED, EXTENDED RELEASE ORAL at 09:24

## 2017-04-15 RX ADMIN — LISINOPRIL 10 MG: 10 TABLET ORAL at 09:24

## 2017-04-15 RX ADMIN — SODIUM CHLORIDE 40 MG: 9 INJECTION INTRAMUSCULAR; INTRAVENOUS; SUBCUTANEOUS at 09:25

## 2017-04-15 RX ADMIN — DEXTROSE MONOHYDRATE, SODIUM CHLORIDE, AND POTASSIUM CHLORIDE 100 ML/HR: 50; 4.5; 1.49 INJECTION, SOLUTION INTRAVENOUS at 06:46

## 2017-04-15 RX ADMIN — Medication 81 MG: at 09:25

## 2017-04-15 RX ADMIN — HYDROCODONE BITARTRATE AND ACETAMINOPHEN 1 TABLET: 5; 325 TABLET ORAL at 18:30

## 2017-04-15 NOTE — PROGRESS NOTES
As per PA note. Patient passing a little flatus. Associated burping. Visit Vitals    /55 (BP 1 Location: Left arm, BP Patient Position: At rest)    Pulse 77    Temp 98.3 °F (36.8 °C)    Resp 16    Ht 5' 1\" (1.549 m)    Wt 233 lb (105.7 kg)    SpO2 94%    Breastfeeding No    BMI 44.02 kg/m2   Exam: Cor - RRR. Lungs - Bilat BS. CTA. Abdomen - Soft. Distended. Non tender. Incisions are clean. Labs - Reviewed. Sips of clear liquids as tolerated. IV hydration. OOB, Ambulate. Pain meds/anti-emetics as needed. DVT Prophylaxis. Awaiting resolution of ileus.

## 2017-04-15 NOTE — PROGRESS NOTES
Patient walks in lowry with KAUSHAL Flowers, with walker. Patient tolerated th ambulation well. She continues to sit in the chair throughtout the day and with assistx1 to bathroom/walker.

## 2017-04-15 NOTE — PROGRESS NOTES
Surgery Progress Note    Admit Date: 4/11/2017      Subjective:      Pt complains of nausea and burping, hiccups at times, bilious emesis yesterday and Terrell Jensen made her NPO,  Pts pain present - adequately treated and pain is mainly she is oob and painful to move around  . No SOB. No CP. Sidney Barthel Patient 's current diet is sips . Sidney Barthel Pt reports  nausea. Pt reports no fever or chills    Bowel Movements: no bowel movement, passed a small amount of flatus         Objective:     Patient Vitals for the past 8 hrs:   BP Temp Pulse Resp SpO2   04/15/17 0607 107/68 98.8 °F (37.1 °C) 79 16 91 %     04/15 0701 - 04/15 1900  In: 2005 [I.V.:2005]  Out: -   04/13 1901 - 04/15 0700  In: -   Out: 400 [Urine:400]ip  Physical Exam:    General: alert, cooperative, no distress  Cardiac: normal S1 and S2  Lungs: Normal chest wall and respirations. Clear to auscultation.   Abdomen: soft, nondistended, hypoactive bowel sounds, tenderness mild - in the RUQ and in the upper abdomen, without guarding, without rebound, UH noted, soft   Wounds:clean, dry, no drainage  Neuro: alert, oriented x 3, no defects noted in general exam.  Extremities: no edema      CBC: Lab Results   Component Value Date/Time    WBC 10.8 04/15/2017 03:39 AM    RBC 4.58 04/15/2017 03:39 AM    HGB 12.2 04/15/2017 03:39 AM    HCT 39.2 04/15/2017 03:39 AM    PLATELET 396 84/02/9207 03:39 AM     BMP: Lab Results   Component Value Date/Time    Glucose 178 04/15/2017 03:39 AM    Sodium 135 04/15/2017 03:39 AM    Potassium 4.2 04/15/2017 03:39 AM    Chloride 95 04/15/2017 03:39 AM    CO2 30 04/15/2017 03:39 AM    BUN 13 04/15/2017 03:39 AM    Creatinine 0.67 04/15/2017 03:39 AM    Calcium 8.9 04/15/2017 03:39 AM     CMP:  Lab Results   Component Value Date/Time    Glucose 178 04/15/2017 03:39 AM    Sodium 135 04/15/2017 03:39 AM    Potassium 4.2 04/15/2017 03:39 AM    Chloride 95 04/15/2017 03:39 AM    CO2 30 04/15/2017 03:39 AM    BUN 13 04/15/2017 03:39 AM    Creatinine 0.67 04/15/2017 03:39 AM    Calcium 8.9 04/15/2017 03:39 AM    Anion gap 10 04/15/2017 03:39 AM    BUN/Creatinine ratio 19 04/15/2017 03:39 AM    Alk.  phosphatase 68 04/14/2017 01:01 PM    Protein, total 6.2 04/14/2017 01:01 PM    Albumin 2.6 04/14/2017 01:01 PM    Globulin 3.6 04/14/2017 01:01 PM    A-G Ratio 0.7 04/14/2017 01:01 PM       Radiology review: na        Assessment:   Pt is POD #4 s/p Procedure(s):  LAPAROSCOPIC HAND ASSISTED RIGHT COLECTOMY    Post op ileus     Plan:   Diet: pt is NPO< sips only, await return of bowel function   Activity: walk in [place]  Pain management  GI and DVT prophylaxis  Meds: continue anti emetics prn   Labs: labs are reviewed, up to date and normal  Voiding well   Antibiotics: na  Further plan per Dr. Aubrey Lopez PA-C

## 2017-04-15 NOTE — PROGRESS NOTES
Bedside and Verbal shift change report given to Octaviano Blackburn RN  (oncoming nurse) by Livan Brown RN (offgoing nurse). Report included the following information SBAR, Kardex, OR Summary, Procedure Summary, Intake/Output, MAR, Accordion and Recent Results.

## 2017-04-15 NOTE — PROGRESS NOTES
Bedside and Verbal shift change report given to [facility] (oncoming nurse) by Danielle Hill (offgoing nurse). Report included the following information SBAR, Kardex, Intake/Output, MAR and Accordion.

## 2017-04-16 PROCEDURE — 74011250636 HC RX REV CODE- 250/636: Performed by: SURGERY

## 2017-04-16 PROCEDURE — 65210000002 HC RM PRIVATE GYN

## 2017-04-16 PROCEDURE — 74011250637 HC RX REV CODE- 250/637: Performed by: SURGERY

## 2017-04-16 PROCEDURE — 74011250636 HC RX REV CODE- 250/636: Performed by: NURSE PRACTITIONER

## 2017-04-16 PROCEDURE — 74011250637 HC RX REV CODE- 250/637: Performed by: NURSE PRACTITIONER

## 2017-04-16 RX ADMIN — POTASSIUM CHLORIDE 20 MEQ: 750 TABLET, FILM COATED, EXTENDED RELEASE ORAL at 10:17

## 2017-04-16 RX ADMIN — DEXTROSE MONOHYDRATE, SODIUM CHLORIDE, AND POTASSIUM CHLORIDE 100 ML/HR: 50; 4.5; 1.49 INJECTION, SOLUTION INTRAVENOUS at 02:35

## 2017-04-16 RX ADMIN — HYDROCHLOROTHIAZIDE 25 MG: 25 TABLET ORAL at 10:16

## 2017-04-16 RX ADMIN — DEXTROSE MONOHYDRATE, SODIUM CHLORIDE, AND POTASSIUM CHLORIDE 100 ML/HR: 50; 4.5; 1.49 INJECTION, SOLUTION INTRAVENOUS at 12:32

## 2017-04-16 RX ADMIN — POTASSIUM CHLORIDE 20 MEQ: 750 TABLET, FILM COATED, EXTENDED RELEASE ORAL at 17:19

## 2017-04-16 RX ADMIN — HYDROCODONE BITARTRATE AND ACETAMINOPHEN 1 TABLET: 5; 325 TABLET ORAL at 17:19

## 2017-04-16 RX ADMIN — Medication 81 MG: at 10:16

## 2017-04-16 RX ADMIN — DILTIAZEM HYDROCHLORIDE 120 MG: 120 CAPSULE, COATED, EXTENDED RELEASE ORAL at 10:16

## 2017-04-16 RX ADMIN — PANTOPRAZOLE SODIUM 40 MG: 40 TABLET, DELAYED RELEASE ORAL at 07:05

## 2017-04-16 RX ADMIN — ENOXAPARIN SODIUM 40 MG: 100 INJECTION SUBCUTANEOUS at 17:21

## 2017-04-16 RX ADMIN — LISINOPRIL 10 MG: 10 TABLET ORAL at 10:16

## 2017-04-16 NOTE — PROGRESS NOTES
Patient still concerned about abdominal distension. Passing a little flatus. Visit Vitals    /61 (BP 1 Location: Left arm, BP Patient Position: Sitting)    Pulse 79    Temp 98 °F (36.7 °C)    Resp 18    Ht 5' 1\" (1.549 m)    Wt 233 lb (105.7 kg)    SpO2 94%    Breastfeeding No    BMI 44.02 kg/m2       Intake/Output Summary (Last 24 hours) at 04/16/17 1157  Last data filed at 04/16/17 0655   Gross per 24 hour   Intake          2386.66 ml   Output             1250 ml   Net          1136.66 ml   Exam: Cor: RRR. Lungs: Bilat BS, CTA. Abd: Soft, No significant change in distension. Non tender. No rebound or guarding. Few BS. Incisions are clean. Labs: No results found for this or any previous visit (from the past 12 hour(s)). NPO except ice chips for now. Continue IVF. OOB, Ambulate. Anti-emetics as needed. Try to minimize narcotics. DVT/GI prophylaxis - Lovenox and Protonix. Awaiting resolution of ileus.

## 2017-04-16 NOTE — PROGRESS NOTES
Bedside and Verbal shift change report given to Shahrzad Stephens RN (oncoming nurse) by Kathia Lan RN (offgoing nurse). Report included the following information SBAR, Kardex, OR Summary, Procedure Summary, Intake/Output, MAR, Accordion and Recent Results.

## 2017-04-17 ENCOUNTER — APPOINTMENT (OUTPATIENT)
Dept: CT IMAGING | Age: 82
DRG: 330 | End: 2017-04-17
Attending: NURSE PRACTITIONER
Payer: MEDICARE

## 2017-04-17 ENCOUNTER — APPOINTMENT (OUTPATIENT)
Dept: GENERAL RADIOLOGY | Age: 82
DRG: 330 | End: 2017-04-17
Attending: NURSE PRACTITIONER
Payer: MEDICARE

## 2017-04-17 LAB
ANION GAP BLD CALC-SCNC: 7 MMOL/L (ref 5–15)
BUN SERPL-MCNC: 5 MG/DL (ref 6–20)
BUN/CREAT SERPL: 7 (ref 12–20)
CALCIUM SERPL-MCNC: 8.5 MG/DL (ref 8.5–10.1)
CHLORIDE SERPL-SCNC: 93 MMOL/L (ref 97–108)
CO2 SERPL-SCNC: 30 MMOL/L (ref 21–32)
CREAT SERPL-MCNC: 0.67 MG/DL (ref 0.55–1.02)
ERYTHROCYTE [DISTWIDTH] IN BLOOD BY AUTOMATED COUNT: 14.7 % (ref 11.5–14.5)
GLUCOSE SERPL-MCNC: 133 MG/DL (ref 65–100)
HCT VFR BLD AUTO: 33.5 % (ref 35–47)
HGB BLD-MCNC: 10.7 G/DL (ref 11.5–16)
MCH RBC QN AUTO: 26.8 PG (ref 26–34)
MCHC RBC AUTO-ENTMCNC: 31.9 G/DL (ref 30–36.5)
MCV RBC AUTO: 83.8 FL (ref 80–99)
PLATELET # BLD AUTO: 214 K/UL (ref 150–400)
POTASSIUM SERPL-SCNC: 3.9 MMOL/L (ref 3.5–5.1)
RBC # BLD AUTO: 4 M/UL (ref 3.8–5.2)
SODIUM SERPL-SCNC: 130 MMOL/L (ref 136–145)
WBC # BLD AUTO: 8.6 K/UL (ref 3.6–11)

## 2017-04-17 PROCEDURE — 97116 GAIT TRAINING THERAPY: CPT

## 2017-04-17 PROCEDURE — 74011636320 HC RX REV CODE- 636/320: Performed by: SURGERY

## 2017-04-17 PROCEDURE — 74011250636 HC RX REV CODE- 250/636: Performed by: NURSE PRACTITIONER

## 2017-04-17 PROCEDURE — 71020 XR CHEST PA LAT: CPT

## 2017-04-17 PROCEDURE — 85027 COMPLETE CBC AUTOMATED: CPT | Performed by: NURSE PRACTITIONER

## 2017-04-17 PROCEDURE — 74177 CT ABD & PELVIS W/CONTRAST: CPT

## 2017-04-17 PROCEDURE — 74011250637 HC RX REV CODE- 250/637: Performed by: NURSE PRACTITIONER

## 2017-04-17 PROCEDURE — 74011250636 HC RX REV CODE- 250/636: Performed by: SURGERY

## 2017-04-17 PROCEDURE — 65210000002 HC RM PRIVATE GYN

## 2017-04-17 PROCEDURE — 74011250637 HC RX REV CODE- 250/637: Performed by: SURGERY

## 2017-04-17 PROCEDURE — 74011000258 HC RX REV CODE- 258: Performed by: NURSE PRACTITIONER

## 2017-04-17 PROCEDURE — 36415 COLL VENOUS BLD VENIPUNCTURE: CPT | Performed by: NURSE PRACTITIONER

## 2017-04-17 PROCEDURE — 80048 BASIC METABOLIC PNL TOTAL CA: CPT | Performed by: NURSE PRACTITIONER

## 2017-04-17 PROCEDURE — 74011000258 HC RX REV CODE- 258: Performed by: SURGERY

## 2017-04-17 RX ORDER — SODIUM CHLORIDE 9 MG/ML
50 INJECTION, SOLUTION INTRAVENOUS CONTINUOUS
Status: DISCONTINUED | OUTPATIENT
Start: 2017-04-17 | End: 2017-04-19 | Stop reason: HOSPADM

## 2017-04-17 RX ORDER — NYSTATIN 100000 [USP'U]/G
POWDER TOPICAL 2 TIMES DAILY
Status: DISCONTINUED | OUTPATIENT
Start: 2017-04-17 | End: 2017-04-19 | Stop reason: HOSPADM

## 2017-04-17 RX ORDER — SODIUM CHLORIDE 0.9 % (FLUSH) 0.9 %
10 SYRINGE (ML) INJECTION
Status: COMPLETED | OUTPATIENT
Start: 2017-04-17 | End: 2017-04-17

## 2017-04-17 RX ADMIN — PANTOPRAZOLE SODIUM 40 MG: 40 TABLET, DELAYED RELEASE ORAL at 13:04

## 2017-04-17 RX ADMIN — IOPAMIDOL 100 ML: 755 INJECTION, SOLUTION INTRAVENOUS at 12:24

## 2017-04-17 RX ADMIN — NYSTATIN: 100000 POWDER TOPICAL at 23:29

## 2017-04-17 RX ADMIN — IOHEXOL 50 ML: 240 INJECTION, SOLUTION INTRATHECAL; INTRAVASCULAR; INTRAVENOUS; ORAL at 09:00

## 2017-04-17 RX ADMIN — HYDROCODONE BITARTRATE AND ACETAMINOPHEN 1 TABLET: 5; 325 TABLET ORAL at 17:30

## 2017-04-17 RX ADMIN — SODIUM CHLORIDE 100 ML: 900 INJECTION, SOLUTION INTRAVENOUS at 12:24

## 2017-04-17 RX ADMIN — PIPERACILLIN SODIUM,TAZOBACTAM SODIUM 3.38 G: 3; .375 INJECTION, POWDER, FOR SOLUTION INTRAVENOUS at 23:30

## 2017-04-17 RX ADMIN — Medication 10 ML: at 12:24

## 2017-04-17 RX ADMIN — SODIUM CHLORIDE 100 ML/HR: 900 INJECTION, SOLUTION INTRAVENOUS at 13:05

## 2017-04-17 RX ADMIN — Medication 10 ML: at 13:04

## 2017-04-17 RX ADMIN — Medication 81 MG: at 13:04

## 2017-04-17 RX ADMIN — HYDROCODONE BITARTRATE AND ACETAMINOPHEN 2 TABLET: 5; 325 TABLET ORAL at 22:25

## 2017-04-17 RX ADMIN — POTASSIUM CHLORIDE 20 MEQ: 750 TABLET, FILM COATED, EXTENDED RELEASE ORAL at 17:30

## 2017-04-17 RX ADMIN — POTASSIUM CHLORIDE 20 MEQ: 750 TABLET, FILM COATED, EXTENDED RELEASE ORAL at 13:04

## 2017-04-17 RX ADMIN — HYDROCODONE BITARTRATE AND ACETAMINOPHEN 1 TABLET: 5; 325 TABLET ORAL at 13:16

## 2017-04-17 RX ADMIN — ENOXAPARIN SODIUM 40 MG: 100 INJECTION SUBCUTANEOUS at 17:30

## 2017-04-17 RX ADMIN — DEXTROSE MONOHYDRATE, SODIUM CHLORIDE, AND POTASSIUM CHLORIDE 100 ML/HR: 50; 4.5; 1.49 INJECTION, SOLUTION INTRAVENOUS at 08:53

## 2017-04-17 RX ADMIN — PIPERACILLIN SODIUM,TAZOBACTAM SODIUM 3.38 G: 3; .375 INJECTION, POWDER, FOR SOLUTION INTRAVENOUS at 17:29

## 2017-04-17 NOTE — PROGRESS NOTES
NUTRITION       Day 6 NPO/CL diet order noted. Chart reviewed. Pt is POD6 lap hand assisted right colectomy secondary to colon mass. MST negative on admission, though noted weight loss per EHR trends of 6% x 1 month, which is significant. No new weight since to assess this admission since standing weight entered on 4/11 (unsure if this was re-entered from PAT on 4/4 or if this was an actual new weight on 4/11). Will continue monitor trends. If unable to advance diet to solids within the next 24 hours, may need to consider nutrition support to aid in post-op healing and prevent nutrition decline. RD to follow.     [address]

## 2017-04-17 NOTE — PROGRESS NOTES
Physical Therapy  4.17.17    Chart reviewed. Attempted to see patient for PT session, however patient stated \"I'm tired right now,\" and reluctant to work with therapy. Declined mobility despite encouragement stating \"Maybe later. \" F/u later as able/appropriate for PT session. Thank you.     Naya Mann, PT, DPT

## 2017-04-17 NOTE — PROGRESS NOTES
General Surgery Daily Progress Note    Admit Date: 2017  Post-Operative Day: 6 Days Post-Op from Procedure(s):  LAPAROSCOPIC HAND ASSISTED RIGHT COLECTOMY     Subjective:     Last 24 hrs: Pt reports that she is not nauseous today, +\"some flatus\", no BM. Was nauseous \"some\" and burping during the day yesterday. On sips of clears. Reports that her stomach is not more distended than yesterday, about the same. Pt unaware if umbilical hernia has been pink. OOB with PT and nurse. Tmax 100F, came down after walking and using IS. BP 94/52      Objective:     Blood pressure 94/52, pulse 79, temperature 98.9 °F (37.2 °C), resp. rate 20, height 5' 1\" (1.549 m), weight 233 lb (105.7 kg), SpO2 92 %, not currently breastfeeding. Temp (24hrs), Av.1 °F (37.3 °C), Min:98 °F (36.7 °C), Max:100 °F (37.8 °C)      _____________________  Physical Exam:     Alert and Oriented, in no acute distress. Cardiovascular: RRR, +trace LE peripheral edema, NC O2 2L  Lungs:CTAB clear, good effort  Abdomen: distended, but not tight, bowel sounds WNL, +swelling under RUQ incision, appropriate TTP, umbilical hernia dark pink possible warmness      Assessment:   Principal Problem:    Colonic mass (3/20/2017)    Active Problems: Morbid obesity ([address] (2017)            Plan:     Check CBC and CMP cont  CT scan this morning  If BP low, hold cardiac meds if <140.  Recheck prior to administering  NPO until after CT scan, then may have clear liquids - start slowly  OOB and ambulate hallways  Use IS hourly  Labs in the morning  Cont DVT and GI proph  Further plans as per Dr. Breanna Lopez      Data Review:    Recent Labs      04/15/17   0339  17   1301   WBC  10.8  14.0*   HGB  12.2  12.7   HCT  39.2  39.8   PLT  210  211     Recent Labs      04/15/17   0339  17   1301   NA  135*  133*   K  4.2  4.1   CL  95*  95*   CO2  30  32   GLU  178*  163*   BUN  13  17   CREA  0.67  0.79   CA  8.9  9.5   MG   --   2.0   PHOS   --   3.9   ALB   -- 2.6*   SGOT   --   70*   ALT   --   40     No results for input(s): AML, LPSE in the last 72 hours.         ______________________  Medications:    Current Facility-Administered Medications   Medication Dose Route Frequency    morphine injection 2 mg  2 mg IntraVENous Q3H PRN    pantoprazole (PROTONIX) tablet 40 mg  40 mg Oral ACB    diphenhydrAMINE (BENADRYL) capsule 25 mg  25 mg Oral Q6H PRN    dextrose 5% - 0.45% NaCl with KCl 20 mEq/L infusion  100 mL/hr IntraVENous CONTINUOUS    HYDROcodone-acetaminophen (NORCO) 5-325 mg per tablet 1-2 Tab  1-2 Tab Oral Q4H PRN    potassium chloride SR (KLOR-CON 10) tablet 20 mEq  20 mEq Oral BID    aspirin delayed-release tablet 81 mg  81 mg Oral DAILY    dilTIAZem CD (CARDIZEM CD) capsule 120 mg  120 mg Oral DAILY    hydroCHLOROthiazide (HYDRODIURIL) tablet 25 mg  25 mg Oral DAILY    lisinopril (PRINIVIL, ZESTRIL) tablet 10 mg  10 mg Oral DAILY    sodium chloride (NS) flush 5-10 mL  5-10 mL IntraVENous Q8H    sodium chloride (NS) flush 5-10 mL  5-10 mL IntraVENous PRN    ondansetron (ZOFRAN) injection 4 mg  4 mg IntraVENous Q4H PRN    enoxaparin (LOVENOX) injection 40 mg  40 mg SubCUTAneous Q24H       Arlen July, NP  4/17/2017

## 2017-04-17 NOTE — PROGRESS NOTES
During a brief period in the PACU the day of surgery, while slightly hypotensive, she had a brief run of atrial fibrillation, which cleared with volume replacement.

## 2017-04-17 NOTE — PROGRESS NOTES
Bedside shift change report given to Clarita De La Cruz (oncoming nurse) by Taisha Pineda RN (offgoing nurse). Report included the following information SBAR, Kardex, Intake/Output, MAR and Recent Results.

## 2017-04-17 NOTE — PROGRESS NOTES
Problem: Mobility Impaired (Adult and Pediatric)  Goal: *Acute Goals and Plan of Care (Insert Text)  Physical Therapy Goals  Initiated 4/13/2017  1. Patient will move from supine to sit and sit to supine in bed with modified independence within 7 day(s). 2. Patient will transfer from bed to chair and chair to bed with modified independence using the least restrictive device within 7 day(s). 3. Patient will perform sit to stand with modified independence within 7 day(s). 4. Patient will ambulate with modified independence for 300 feet with the least restrictive device within 7 day(s). 5. Patient will ascend/descend 1 stairs with 0 handrail(s) with modified independence within 7 day(s). PHYSICAL THERAPY TREATMENT  Patient: Davion Pandey (50 y.o. female)  Date: 4/17/2017  Diagnosis: COLON MASS  Colonic mass Colonic mass  Procedure(s) (LRB):  LAPAROSCOPIC HAND ASSISTED RIGHT COLECTOMY (Right) 6 Days Post-Op  Precautions: Fall      ASSESSMENT:  Patient received sitting up in chair, grimacing in pain, and rubbing L upper quadrant of abdomen. Patient holding emesis bag but stating she wasn't nauseas at this time. Patient eventually agreeable to therapy session with encouragement. Belching throughout mobility. Ambulated 100 ft in hallway with SBA and RW. Slow, steady pace, with up to 3 standing rest breaks. O2 assessed throughout gait 88-90%. Cues for pursed lip breathing throughout gait to maintain sats >90%. Patient requested to return to room due to pain and needing to use the bathroom. Returned to room and patient left seated on commode, instructed to call for assist when finished. Session cut short due to need for toileting and ambulation due to pain. No c/o SOB or dizziness. Recommend d/c home with HHPT and support from son vs. Tristin  pending progress with therapy. Will continue to assess.   Progression toward goals:  [ ]    Improving appropriately and progressing toward goals  [X]    Improving slowly and progressing toward goals  [ ]    Not making progress toward goals and plan of care will be adjusted       PLAN:  Patient continues to benefit from skilled intervention to address the above impairments. Continue treatment per established plan of care. Discharge Recommendations:  Rehab vs Home Health  Further Equipment Recommendations for Discharge:  TBD       SUBJECTIVE:   Patient stated May getting up will loosen up my stomach.       OBJECTIVE DATA SUMMARY:   Functional Mobility Training:  Transfers:  Sit to Stand: Contact guard assistance; Additional time  Stand to Sit: Contact guard assistance; Additional time  Balance:  Sitting: Intact  Standing: Intact; With support  Ambulation/Gait Training:  Distance (ft): 100 Feet (ft)  Assistive Device: Walker, rolling;Gait belt  Ambulation - Level of Assistance: Stand-by asssistance  Gait Abnormalities: Decreased step clearance;Trunk sway increased  Speed/Betty: Slow  Step Length: Right shortened;Left shortened  Pain:  Pain Scale 1: Numeric (0 - 10)  Pain Intensity 1: 5  Pain Location 1: Abdomen  Pain Orientation 1: Left  Pain Description 1: Aching  Pain Intervention(s) 1: Medication (see MAR)  Activity Tolerance:   Fair- desat to 88% with gait on room air  Please refer to the flowsheet for vital signs taken during this treatment.   After treatment:   [X]    Patient left in no apparent distress sitting up on toilet  [ ]    Patient left in no apparent distress in bed  [X]    Call bell left within reach  [X]    Nursing notified  [ ]    Caregiver present  [ ]    Bed alarm activated      COMMUNICATION/COLLABORATION:   The patients plan of care was discussed with: Registered Nurse     Alaina Mitchell, PT, DPT   Time Calculation: 11 mins

## 2017-04-17 NOTE — PROGRESS NOTES
Bedside and Verbal shift change report given to Shannen (oncoming nurse) by Micah Woodward (offgoing nurse). Report included the following information SBAR, Kardex, Intake/Output and MAR.

## 2017-04-18 ENCOUNTER — TELEPHONE (OUTPATIENT)
Dept: FAMILY MEDICINE CLINIC | Age: 82
End: 2017-04-18

## 2017-04-18 LAB
ANION GAP BLD CALC-SCNC: 9 MMOL/L (ref 5–15)
BUN SERPL-MCNC: 7 MG/DL (ref 6–20)
BUN/CREAT SERPL: 11 (ref 12–20)
CALCIUM SERPL-MCNC: 8.1 MG/DL (ref 8.5–10.1)
CHLORIDE SERPL-SCNC: 93 MMOL/L (ref 97–108)
CO2 SERPL-SCNC: 28 MMOL/L (ref 21–32)
CREAT SERPL-MCNC: 0.63 MG/DL (ref 0.55–1.02)
ERYTHROCYTE [DISTWIDTH] IN BLOOD BY AUTOMATED COUNT: 14.7 % (ref 11.5–14.5)
GLUCOSE SERPL-MCNC: 110 MG/DL (ref 65–100)
HCT VFR BLD AUTO: 32.6 % (ref 35–47)
HGB BLD-MCNC: 10.3 G/DL (ref 11.5–16)
MCH RBC QN AUTO: 26.3 PG (ref 26–34)
MCHC RBC AUTO-ENTMCNC: 31.6 G/DL (ref 30–36.5)
MCV RBC AUTO: 83.2 FL (ref 80–99)
PLATELET # BLD AUTO: 215 K/UL (ref 150–400)
POTASSIUM SERPL-SCNC: 4.1 MMOL/L (ref 3.5–5.1)
RBC # BLD AUTO: 3.92 M/UL (ref 3.8–5.2)
SODIUM SERPL-SCNC: 130 MMOL/L (ref 136–145)
WBC # BLD AUTO: 8.1 K/UL (ref 3.6–11)

## 2017-04-18 PROCEDURE — 85027 COMPLETE CBC AUTOMATED: CPT | Performed by: NURSE PRACTITIONER

## 2017-04-18 PROCEDURE — 65210000002 HC RM PRIVATE GYN

## 2017-04-18 PROCEDURE — 74011250637 HC RX REV CODE- 250/637: Performed by: SURGERY

## 2017-04-18 PROCEDURE — 74011250637 HC RX REV CODE- 250/637: Performed by: NURSE PRACTITIONER

## 2017-04-18 PROCEDURE — 74011250636 HC RX REV CODE- 250/636: Performed by: SURGERY

## 2017-04-18 PROCEDURE — 74011000258 HC RX REV CODE- 258: Performed by: SURGERY

## 2017-04-18 PROCEDURE — 80048 BASIC METABOLIC PNL TOTAL CA: CPT | Performed by: NURSE PRACTITIONER

## 2017-04-18 PROCEDURE — 74011250636 HC RX REV CODE- 250/636: Performed by: NURSE PRACTITIONER

## 2017-04-18 PROCEDURE — 36415 COLL VENOUS BLD VENIPUNCTURE: CPT | Performed by: NURSE PRACTITIONER

## 2017-04-18 RX ORDER — CHLORPHENIRAMINE MALEATE 4 MG
TABLET ORAL 2 TIMES DAILY
Qty: 45 G | Refills: 1 | Status: SHIPPED | OUTPATIENT
Start: 2017-04-18 | End: 2017-05-15

## 2017-04-18 RX ORDER — AMOXICILLIN 250 MG
1 CAPSULE ORAL DAILY
Status: DISCONTINUED | OUTPATIENT
Start: 2017-04-18 | End: 2017-04-19 | Stop reason: HOSPADM

## 2017-04-18 RX ORDER — POLYETHYLENE GLYCOL 3350 17 G/17G
17 POWDER, FOR SOLUTION ORAL DAILY
Status: DISCONTINUED | OUTPATIENT
Start: 2017-04-18 | End: 2017-04-19 | Stop reason: HOSPADM

## 2017-04-18 RX ADMIN — HYDROCODONE BITARTRATE AND ACETAMINOPHEN 1 TABLET: 5; 325 TABLET ORAL at 19:52

## 2017-04-18 RX ADMIN — PANTOPRAZOLE SODIUM 40 MG: 40 TABLET, DELAYED RELEASE ORAL at 07:16

## 2017-04-18 RX ADMIN — PIPERACILLIN SODIUM,TAZOBACTAM SODIUM 3.38 G: 3; .375 INJECTION, POWDER, FOR SOLUTION INTRAVENOUS at 22:50

## 2017-04-18 RX ADMIN — DOCUSATE SODIUM AND SENNOSIDES 1 TABLET: 8.6; 5 TABLET, FILM COATED ORAL at 10:58

## 2017-04-18 RX ADMIN — SODIUM CHLORIDE 50 ML/HR: 900 INJECTION, SOLUTION INTRAVENOUS at 18:03

## 2017-04-18 RX ADMIN — PIPERACILLIN SODIUM,TAZOBACTAM SODIUM 3.38 G: 3; .375 INJECTION, POWDER, FOR SOLUTION INTRAVENOUS at 07:17

## 2017-04-18 RX ADMIN — POLYETHYLENE GLYCOL 3350 17 G: 17 POWDER, FOR SOLUTION ORAL at 10:58

## 2017-04-18 RX ADMIN — ENOXAPARIN SODIUM 40 MG: 100 INJECTION SUBCUTANEOUS at 17:15

## 2017-04-18 RX ADMIN — POTASSIUM CHLORIDE 20 MEQ: 750 TABLET, FILM COATED, EXTENDED RELEASE ORAL at 10:27

## 2017-04-18 RX ADMIN — DILTIAZEM HYDROCHLORIDE 120 MG: 120 CAPSULE, COATED, EXTENDED RELEASE ORAL at 10:27

## 2017-04-18 RX ADMIN — POTASSIUM CHLORIDE 20 MEQ: 750 TABLET, FILM COATED, EXTENDED RELEASE ORAL at 17:15

## 2017-04-18 RX ADMIN — PIPERACILLIN SODIUM,TAZOBACTAM SODIUM 3.38 G: 3; .375 INJECTION, POWDER, FOR SOLUTION INTRAVENOUS at 15:23

## 2017-04-18 RX ADMIN — Medication 10 ML: at 15:23

## 2017-04-18 RX ADMIN — NYSTATIN: 100000 POWDER TOPICAL at 10:27

## 2017-04-18 RX ADMIN — Medication 81 MG: at 10:27

## 2017-04-18 RX ADMIN — NYSTATIN: 100000 POWDER TOPICAL at 22:53

## 2017-04-18 NOTE — PROGRESS NOTES
General Surgery Daily Progress Note    Admit Date: 2017  Post-Operative Day: 7 Days Post-Op from Procedure(s):  LAPAROSCOPIC HAND ASSISTED RIGHT COLECTOMY     Subjective:     Last 24 hrs: Pt is lying in the bed. No c/o pain. Tolerating clears and having some flatus. CXR from yesterday shows atx and asdz bilat. CT w/ expected surgical findings. Objective:     Blood pressure 100/64, pulse 76, temperature 98.2 °F (36.8 °C), resp. rate 16, height 5' 1\" (1.549 m), weight 233 lb (105.7 kg), SpO2 98 %, not currently breastfeeding. Temp (24hrs), Av °F (36.7 °C), Min:97.7 °F (36.5 °C), Max:98.2 °F (36.8 °C)      _____________________  Physical Exam:     Alert and Oriented, x3, in no acute distress. Cardiovascular: RRR, no peripheral edema  Lungs:CTAB   Abdomen: distended but soft, BS in lower quadrants, more hypoactive in upper quads, umbilicus pinkish-purple    Assessment:   Principal Problem:    Colonic mass (3/20/2017)    Active Problems: Morbid obesity ([address] (2017)            Plan:     Adv diet to fulls  OOB and IS - encouraged this to pt  Cont gi/dvt ppx  Cont abx  Add bowel regimen    Data Review:    Recent Labs      17   0556  17   1000   WBC  8.1  8.6   HGB  10.3*  10.7*   HCT  32.6*  33.5*   PLT  215  214     Recent Labs      17   0556  17   1000   NA  130*  130*   K  4.1  3.9   CL  93*  93*   CO2  28  30   GLU  110*  133*   BUN  7  5*   CREA  0.63  0.67   CA  8.1*  8.5     No results for input(s): AML, LPSE in the last 72 hours.         ______________________  Medications:    Current Facility-Administered Medications   Medication Dose Route Frequency    senna-docusate (PERICOLACE) 8.6-50 mg per tablet 1 Tab  1 Tab Oral DAILY    polyethylene glycol (MIRALAX) packet 17 g  17 g Oral DAILY    nystatin (MYCOSTATIN) 100,000 unit/gram powder   Topical BID    0.9% sodium chloride infusion  50 mL/hr IntraVENous CONTINUOUS    piperacillin-tazobactam (ZOSYN) 3.375 g in 0.9% sodium chloride (MBP/ADV) 100 mL  3.375 g IntraVENous Q8H    morphine injection 2 mg  2 mg IntraVENous Q3H PRN    pantoprazole (PROTONIX) tablet 40 mg  40 mg Oral ACB    diphenhydrAMINE (BENADRYL) capsule 25 mg  25 mg Oral Q6H PRN    HYDROcodone-acetaminophen (NORCO) 5-325 mg per tablet 1-2 Tab  1-2 Tab Oral Q4H PRN    potassium chloride SR (KLOR-CON 10) tablet 20 mEq  20 mEq Oral BID    aspirin delayed-release tablet 81 mg  81 mg Oral DAILY    dilTIAZem CD (CARDIZEM CD) capsule 120 mg  120 mg Oral DAILY    hydroCHLOROthiazide (HYDRODIURIL) tablet 25 mg  25 mg Oral DAILY    lisinopril (PRINIVIL, ZESTRIL) tablet 10 mg  10 mg Oral DAILY    sodium chloride (NS) flush 5-10 mL  5-10 mL IntraVENous Q8H    sodium chloride (NS) flush 5-10 mL  5-10 mL IntraVENous PRN    ondansetron (ZOFRAN) injection 4 mg  4 mg IntraVENous Q4H PRN    enoxaparin (LOVENOX) injection 40 mg  40 mg SubCUTAneous Q24H       Melissa Garcia NP  4/18/2017

## 2017-04-18 NOTE — PROGRESS NOTES
Bedside shift change report given to Johnathan Rudolph (oncoming nurse) by Jc Piña (offgoing nurse). Report included the following information SBAR and Kardex.

## 2017-04-18 NOTE — PROGRESS NOTES
Physical Therapy  4.18.17    Chart reviewed. Attempted to see patient for PT treatment session, however patient reporting she is fatigued from ambulating within the last hour with PCT. Recommend second ambulation trial this PM with RN/PCT stand by assist. F/u tomorrow. Thank you.     Marko Munoz, PT, DPT

## 2017-04-19 ENCOUNTER — HOME HEALTH ADMISSION (OUTPATIENT)
Dept: HOME HEALTH SERVICES | Facility: HOME HEALTH | Age: 82
End: 2017-04-19
Payer: MEDICARE

## 2017-04-19 VITALS
SYSTOLIC BLOOD PRESSURE: 108 MMHG | DIASTOLIC BLOOD PRESSURE: 69 MMHG | BODY MASS INDEX: 43.99 KG/M2 | HEART RATE: 78 BPM | OXYGEN SATURATION: 97 % | WEIGHT: 233 LBS | HEIGHT: 61 IN | TEMPERATURE: 98.2 F | RESPIRATION RATE: 17 BRPM

## 2017-04-19 PROCEDURE — 74011250637 HC RX REV CODE- 250/637: Performed by: SURGERY

## 2017-04-19 PROCEDURE — 74011250636 HC RX REV CODE- 250/636: Performed by: SURGERY

## 2017-04-19 PROCEDURE — 74011250637 HC RX REV CODE- 250/637: Performed by: NURSE PRACTITIONER

## 2017-04-19 PROCEDURE — 74011000258 HC RX REV CODE- 258: Performed by: SURGERY

## 2017-04-19 RX ORDER — AMOXICILLIN AND CLAVULANATE POTASSIUM 875; 125 MG/1; MG/1
1 TABLET, FILM COATED ORAL 2 TIMES DAILY
Qty: 10 TAB | Refills: 0 | Status: SHIPPED | OUTPATIENT
Start: 2017-04-19 | End: 2017-04-24

## 2017-04-19 RX ORDER — POLYETHYLENE GLYCOL 3350 17 G/17G
17 POWDER, FOR SOLUTION ORAL
Qty: 30 PACKET | Refills: 0 | Status: ON HOLD | OUTPATIENT
Start: 2017-04-19 | End: 2018-01-01

## 2017-04-19 RX ORDER — NYSTATIN 100000 [USP'U]/G
POWDER TOPICAL 2 TIMES DAILY
Qty: 1 BOTTLE | Refills: 0 | Status: SHIPPED | OUTPATIENT
Start: 2017-04-19 | End: 2017-05-15 | Stop reason: SDUPTHER

## 2017-04-19 RX ORDER — HYDROCODONE BITARTRATE AND ACETAMINOPHEN 5; 325 MG/1; MG/1
1-2 TABLET ORAL
Qty: 30 TAB | Refills: 0 | Status: SHIPPED | OUTPATIENT
Start: 2017-04-19 | End: 2017-05-02 | Stop reason: SDUPTHER

## 2017-04-19 RX ADMIN — LISINOPRIL 10 MG: 10 TABLET ORAL at 08:52

## 2017-04-19 RX ADMIN — Medication 10 ML: at 14:17

## 2017-04-19 RX ADMIN — POLYETHYLENE GLYCOL 3350 17 G: 17 POWDER, FOR SOLUTION ORAL at 08:52

## 2017-04-19 RX ADMIN — POTASSIUM CHLORIDE 20 MEQ: 750 TABLET, FILM COATED, EXTENDED RELEASE ORAL at 08:52

## 2017-04-19 RX ADMIN — HYDROCHLOROTHIAZIDE 25 MG: 25 TABLET ORAL at 08:52

## 2017-04-19 RX ADMIN — Medication 81 MG: at 08:52

## 2017-04-19 RX ADMIN — PANTOPRAZOLE SODIUM 40 MG: 40 TABLET, DELAYED RELEASE ORAL at 07:10

## 2017-04-19 RX ADMIN — NYSTATIN: 100000 POWDER TOPICAL at 08:52

## 2017-04-19 RX ADMIN — HYDROCODONE BITARTRATE AND ACETAMINOPHEN 2 TABLET: 5; 325 TABLET ORAL at 14:39

## 2017-04-19 RX ADMIN — DILTIAZEM HYDROCHLORIDE 120 MG: 120 CAPSULE, COATED, EXTENDED RELEASE ORAL at 08:52

## 2017-04-19 RX ADMIN — DOCUSATE SODIUM AND SENNOSIDES 1 TABLET: 8.6; 5 TABLET, FILM COATED ORAL at 08:52

## 2017-04-19 RX ADMIN — PIPERACILLIN SODIUM,TAZOBACTAM SODIUM 3.38 G: 3; .375 INJECTION, POWDER, FOR SOLUTION INTRAVENOUS at 06:09

## 2017-04-19 NOTE — PROGRESS NOTES
Bedside and Verbal shift change report given to Thierry Quintana RN (oncoming nurse) by Karan Chahal RN (offgoing nurse). Report included the following information SBAR, Kardex, OR Summary, Procedure Summary, Intake/Output, MAR, Accordion and Recent Results.

## 2017-04-19 NOTE — DISCHARGE INSTRUCTIONS
FOLLOW-UP with [id number] [facility] at [facility], Dr. Maximilian Li or Phani Abad :  Call office at [phone] to make this appointment. Please arrive 20 minutes early to complete paperwork. We are located at [facility] in the [facility], [address] [facility]. Call your physician immediately [phone] if you have any fevers greater than 101.5, drainage from your wound that is not clear or looks infected, persistent bleeding, increasing abdominal pain, problems urinating, or persistent nausea/vomiting. You should be aware that you may have shoulder pain after surgery and that this will progressively go away. This is called 'referred pain' and is from the area of the diaphragm caused by gas that may be trapped under the diaphragm from laparoscopic surgery. This gas will progressively get reabsorbed by your body. WOUND CARE INSTRUCTIONS:   You may shower at home. If clothing rubs against the wound or causes irritation and the wound is not draining you may cover it with a dry dressing during the daytime. Try to keep the wound dry and avoid ointments on the wound unless directed to do so. If the wound becomes bright red and painful or starts to drain infected material that is not clear, please contact your physician immediately. You should also call if you begin to drain fluid that is thin and greenish-brown from the wound and appears to look like bile; or if output is whitish, pus-like in appearance. If the wound though is mildly pink and has a thick firm ridge underneath it, this is normal, and is referred to as a healing ridge. This will resolve over the next 4-6 weeks. Pain around your incision can be reduced by using an ice pack for 20 minute intervals over the incision site during the next 48 hours. After that, you may use a heating pad if you feel muscle tightening or pulling. DIET:  You may eat any foods that you can tolerate.   It is a good idea to eat a high fiber diet and take in plenty of fluids to prevent constipation. If you do become constipated you may want to take a mild laxative or softener (such as colace, senna, dulcolax, or miralax) on a daily basis until your bowel habits are regular. Constipation can be very uncomfortable, along with straining, after recent abdominal surgery. ACTIVITY:  You are encouraged to cough and deep breath or use your incentive spirometer if you were given one, every 15-30 minutes when awake. This will help prevent respiratory complications and low grade fevers post-operatively. You may want to hug a pillow when coughing and sneezing to add additional support to the surgical area(s) which will decrease pain during these times. You are encouraged to walk and engage in light activity for the next two weeks. You should not lift more than 15 pounds during this time frame as it could put you at increased risk for a post-operative hernia. Twenty pounds is roughly equivalent to a plastic bag of groceries. · You may shower 24 hours after surgery. Pat the cut (incision) dry. Do not take a bath for the first week. · Your doctor will tell you when you can have sex again. MEDICATIONS:  Try to take narcotic medications and anti-inflammatory medications, such as tylenol, ibuprofen, naprosyn, etc., with food. This will minimize stomach upset from the medication. Should you develop nausea and vomiting from the pain medication, or develop a rash, please discontinue the medication and contact your physician. You should not drive, make important decisions, or operate machinery when taking narcotic pain medication. · It is important that you take the medication exactly as they are prescribed. · Keep your medication in the bottles provided by the pharmacist and keep a list of the medication names, dosages, and times to be taken in your wallet. · Do not take other medications without consulting your doctor.      · Do not take acetaminophen at the same time as Norco. Maximum amount of acetaminophen per day is 4g and Norco has acetaminophen in it. QUESTIONS:  Please feel free to call Dr. Oran Kayser office ([phone]) if you have any questions, and they will be glad to assist you. Information obtained by :    I understand that if any problems occur once I am at home I am to contact my physician. I understand and acknowledge receipt of the instructions indicated above.                                                                                                                                            Physician's or R.N.'s Signature                                                                  Date/Time                                                                                                                                              Patient or Representative Signature                                                          Date/Time

## 2017-04-19 NOTE — PROGRESS NOTES
Bedside shift change report given to [facility] S Raza Buckner (oncoming nurse) by Ayaan Perez (offgoing nurse). Report included the following information SBAR, MAR, Recent Results and Med Rec Status. 1442: Patient is being discharge home and will be accompanied by the son. IV taken out and prescriptions given to her. No distress noted.

## 2017-04-19 NOTE — PROGRESS NOTES
Care Management Interventions  PCP Verified by CM: No  Mode of Transport at Discharge: Other (see comment) (private vehicle)  Transition of Care Consult (CM Consult): [address]: Yes  Discharge Durable Medical Equipment: No (cane at home)  Physical Therapy Consult: No  Occupational Therapy Consult: No  Speech Therapy Consult: No  Current Support Network:  (Independent with ADLs)  Confirm Follow Up Transport: Family  Plan discussed with Pt/Family/Caregiver: No  Freedom of Choice Offered: Yes  Discharge Location  Discharge Placement: Home with home health    CM reviewed chart and noted home health order. Pt had no preference in home health agency. CM sent referral through [facility] to [address] Pt's family should provide transportation home.    Cynthia Michael, SARAHIW, ACM

## 2017-04-19 NOTE — HOME CARE
Per patient's son Olayinka Davis, patients physical address is: [address] [address].   Missael Farnsworth RN Liaison

## 2017-04-19 NOTE — PROGRESS NOTES
General Surgery Daily Progress Note    Admit Date: 2017  Post-Operative Day: 8 Days Post-Op from Procedure(s):  LAPAROSCOPIC HAND ASSISTED RIGHT COLECTOMY     Subjective:     Last 24 hrs: sitting up in bedside chair, reports feeling \"better. \" No nausea, +flatus. Had 1st BM since surgery yesterday; reports it soft and \"a good amount. \" On pericolace and miralax. On full liquid diet. Declining PT but reports getting up \"some;\" walked 2 laps yesterday. Uses walker to ambulate. Her son lives with her at home; will be home during the day to help. Has 2nd son who also helps but is working. Using IS. Afebrile, VSS. Objective:     Blood pressure 104/50, pulse 71, temperature 98.5 °F (36.9 °C), resp. rate 16, height 5' 1\" (1.549 m), weight 233 lb (105.7 kg), SpO2 92 %, not currently breastfeeding. Temp (24hrs), Av.4 °F (36.9 °C), Min:98.1 °F (36.7 °C), Max:98.7 °F (37.1 °C)      _____________________  Physical Exam:     Alert and Oriented, sitting at bedside chair with walker, in no acute distress. Cardiovascular: RRR, +2 LE peripheral edema  Lungs:CTAB   Abdomen: soft, NTTP, +BM, incisions without erythema or drainage. Appropriate incisional swelling. Assessment:   Principal Problem:    Colonic mass (3/20/2017)    Active Problems: Morbid obesity ([address] (2017)            Plan:      Adv to [employer]  Home later today if able to line up discharge  Home health ordered for in home assessment  Encouraged pt to ambulate  Cont IS  Cont DVT And GI proph  Rx for home: Miralax, augmentin, norco, nystatin  No NSAIDs  Follow up in 10-14 days with Kenzie Leal, SEVERIANO or Dr. Netta Perez  Reviewed discharge instructions  Further plan as per Dr. Netta Perez    Data Review:    Recent Labs      17   0556  17   1000   WBC  8.1  8.6   HGB  10.3*  10.7*   HCT  32.6*  33.5*   PLT  215  214     Recent Labs      17   0556  17   1000   NA  130*  130*   K  4.1  3.9   CL  93*  93*   CO2  28  30   GLU 110*  133*   BUN  7  5*   CREA  0.63  0.67   CA  8.1*  8.5     No results for input(s): AML, LPSE in the last 72 hours.         ______________________  Medications:    Current Facility-Administered Medications   Medication Dose Route Frequency    senna-docusate (PERICOLACE) 8.6-50 mg per tablet 1 Tab  1 Tab Oral DAILY    polyethylene glycol (MIRALAX) packet 17 g  17 g Oral DAILY    nystatin (MYCOSTATIN) 100,000 unit/gram powder   Topical BID    0.9% sodium chloride infusion  50 mL/hr IntraVENous CONTINUOUS    piperacillin-tazobactam (ZOSYN) 3.375 g in 0.9% sodium chloride (MBP/ADV) 100 mL  3.375 g IntraVENous Q8H    morphine injection 2 mg  2 mg IntraVENous Q3H PRN    pantoprazole (PROTONIX) tablet 40 mg  40 mg Oral ACB    diphenhydrAMINE (BENADRYL) capsule 25 mg  25 mg Oral Q6H PRN    HYDROcodone-acetaminophen (NORCO) 5-325 mg per tablet 1-2 Tab  1-2 Tab Oral Q4H PRN    potassium chloride SR (KLOR-CON 10) tablet 20 mEq  20 mEq Oral BID    aspirin delayed-release tablet 81 mg  81 mg Oral DAILY    dilTIAZem CD (CARDIZEM CD) capsule 120 mg  120 mg Oral DAILY    hydroCHLOROthiazide (HYDRODIURIL) tablet 25 mg  25 mg Oral DAILY    lisinopril (PRINIVIL, ZESTRIL) tablet 10 mg  10 mg Oral DAILY    sodium chloride (NS) flush 5-10 mL  5-10 mL IntraVENous Q8H    sodium chloride (NS) flush 5-10 mL  5-10 mL IntraVENous PRN    ondansetron (ZOFRAN) injection 4 mg  4 mg IntraVENous Q4H PRN    enoxaparin (LOVENOX) injection 40 mg  40 mg SubCUTAneous Q24H       Gabriela Canela NP  4/19/2017

## 2017-04-20 ENCOUNTER — TELEPHONE (OUTPATIENT)
Dept: FAMILY MEDICINE CLINIC | Age: 82
End: 2017-04-20

## 2017-04-20 ENCOUNTER — TELEPHONE (OUTPATIENT)
Dept: SURGERY | Age: 82
End: 2017-04-20

## 2017-04-20 NOTE — TELEPHONE ENCOUNTER
Jaswinder Pearson's phone call from home health letting her know Dr. Therese Bishop will sign orders on patient.

## 2017-04-21 ENCOUNTER — HOME CARE VISIT (OUTPATIENT)
Dept: SCHEDULING | Facility: HOME HEALTH | Age: 82
End: 2017-04-21
Payer: MEDICARE

## 2017-04-21 PROCEDURE — G0299 HHS/HOSPICE OF RN EA 15 MIN: HCPCS

## 2017-04-21 PROCEDURE — 3331090002 HH PPS REVENUE DEBIT

## 2017-04-21 PROCEDURE — 3331090001 HH PPS REVENUE CREDIT

## 2017-04-21 PROCEDURE — 400013 HH SOC

## 2017-04-21 NOTE — DISCHARGE SUMMARY
Physician Discharge Summary     Patient ID:  Marva Haider  [id number]  81 y.o.  1935    Admit Date: 4/11/2017    Discharge Date: 4/19/2017  * Admission Diagnoses: COLON MASS;Colonic mass    * Discharge Diagnoses:    Hospital Problems as of 4/19/2017  Date Reviewed: 4/11/2017          Codes Class Noted - Resolved POA    Morbid obesity ([address] ICD-10-CM: E66.01  ICD-9-CM: 278.01  4/13/2017 - Present Unknown        * (Principal)Colonic mass ICD-10-CM: K63.9  ICD-9-CM: 569.89  3/20/2017 - Present Yes               Admission Condition: Good    * Discharge Condition: fair    * Procedures: Procedure(s):  LAPAROSCOPIC HAND ASSISTED RIGHT COLECTOMY    * Hospital Course:   Slow recuperation of GI function kept her in the hospital for several extra days. Final pathology was T2N0M0-Stage 1    Consults: None        * Disposition: Home    Discharge Medications:   Discharge Medication List as of 4/19/2017  2:31 PM      START taking these medications    Details   HYDROcodone-acetaminophen (NORCO) 5-325 mg per tablet Take 1-2 Tabs by mouth every four (4) hours as needed. Max Daily Amount: 12 Tabs., Print, Disp-30 Tab, R-0      nystatin (MYCOSTATIN) powder Apply  to affected area two (2) times a day. Indications: CUTANEOUS CANDIDIASIS, Print, Disp-1 Bottle, R-0      polyethylene glycol (MIRALAX) 17 gram packet Take 1 Packet by mouth daily as needed. , Print, Disp-30 Packet, R-0      amoxicillin-clavulanate (AUGMENTIN) 875-125 mg per tablet Take 1 Tab by mouth two (2) times a day for 5 days. Indications: Skin and Skin Structure Infection, Print, Disp-10 Tab, R-0         CONTINUE these medications which have NOT CHANGED    Details   clotrimazole (LOTRIMIN) 1 % topical cream Apply  to affected area two (2) times a day., Normal, Disp-45 g, R-1      clotrimazole-betamethasone (LOTRISONE) topical cream Apply bid for 2 w, Normal, Disp-30 g, R-0      cetirizine (ZYRTEC) 10 mg tablet Take 1 Tab by mouth daily as needed for Allergies. , Normal, Disp-30 Tab, R-3      cholecalciferol (VITAMIN D3) 1,000 unit cap Take 1,000 Units by mouth daily. , Historical Med      lisinopril (PRINIVIL, ZESTRIL) 10 mg tablet Take 1 Tab by mouth daily. , Normal, Disp-90 Tab, R-1      esomeprazole (NEXIUM) 40 mg capsule Take 1 Cap by mouth daily. , Normal, Disp-90 Cap, R-1      atorvastatin (LIPITOR) 20 mg tablet Take 1 Tab by mouth daily. , Normal, Disp-90 Tab, R-1      hydroCHLOROthiazide (HYDRODIURIL) 25 mg tablet Take 1 Tab by mouth daily. , Normal, Disp-90 Tab, R-1      dilTIAZem (TIAZAC) 120 mg SR capsule Take 1 Cap by mouth daily. , Normal, Disp-90 Cap, R-1      potassium chloride SR (KLOR-CON 10) 10 mEq tablet Take 2 Tabs by mouth two (2) times a day., Normal, Disp-180 Tab, R-1      UBIDECARENONE (CO Q-10 PO) Take 200 mg by mouth daily. , Historical Med      acetaminophen (TYLENOL) 325 mg tablet Take  by mouth every four (4) hours as needed for Pain., Historical Med      TROLAMINE SALICYLATE (ASPERCREME EX) by Apply Externally route. Applies to left knee 3-4 x daily, Historical Med      aspirin delayed-release 81 mg tablet Take  by mouth daily. , Historical Med      magnesium oxide 500 mg tab Take 1 Tab by mouth daily. , Historical Med      multivitamin (ONE A DAY) tablet Take 1 Tab by mouth daily. , Historical Med         STOP taking these medications       meloxicam (MOBIC) 15 mg tablet Comments:   Reason for Stopping:               * Follow-up Care/Patient Instructions:   Activity: Activity as tolerated  Diet: Regular Diet  Wound Care: Keep wound clean and dry    Follow-up Information     Follow up With Details Comments Contact Info    Yadira Chase MD   [address] [address]  [phone]      [address]  [facility] [address]  [address]  [phone]            Signed:  Yashira Mark MD  4/21/2017  9:25 AM

## 2017-04-22 PROCEDURE — 3331090002 HH PPS REVENUE DEBIT

## 2017-04-22 PROCEDURE — 3331090001 HH PPS REVENUE CREDIT

## 2017-04-23 VITALS
SYSTOLIC BLOOD PRESSURE: 120 MMHG | TEMPERATURE: 97.8 F | DIASTOLIC BLOOD PRESSURE: 60 MMHG | HEART RATE: 74 BPM | RESPIRATION RATE: 20 BRPM | OXYGEN SATURATION: 94 %

## 2017-04-23 PROCEDURE — 3331090002 HH PPS REVENUE DEBIT

## 2017-04-23 PROCEDURE — 3331090001 HH PPS REVENUE CREDIT

## 2017-04-24 ENCOUNTER — HOME CARE VISIT (OUTPATIENT)
Dept: HOME HEALTH SERVICES | Facility: HOME HEALTH | Age: 82
End: 2017-04-24
Payer: MEDICARE

## 2017-04-24 ENCOUNTER — HOME CARE VISIT (OUTPATIENT)
Dept: SCHEDULING | Facility: HOME HEALTH | Age: 82
End: 2017-04-24
Payer: MEDICARE

## 2017-04-24 VITALS
OXYGEN SATURATION: 98 % | RESPIRATION RATE: 18 BRPM | HEART RATE: 80 BPM | DIASTOLIC BLOOD PRESSURE: 64 MMHG | TEMPERATURE: 96.9 F | SYSTOLIC BLOOD PRESSURE: 110 MMHG

## 2017-04-24 PROCEDURE — G0299 HHS/HOSPICE OF RN EA 15 MIN: HCPCS

## 2017-04-24 PROCEDURE — 3331090002 HH PPS REVENUE DEBIT

## 2017-04-24 PROCEDURE — 3331090001 HH PPS REVENUE CREDIT

## 2017-04-24 PROCEDURE — G0151 HHCP-SERV OF PT,EA 15 MIN: HCPCS

## 2017-04-25 ENCOUNTER — OFFICE VISIT (OUTPATIENT)
Dept: FAMILY MEDICINE CLINIC | Age: 82
End: 2017-04-25

## 2017-04-25 ENCOUNTER — TELEPHONE (OUTPATIENT)
Dept: FAMILY MEDICINE CLINIC | Age: 82
End: 2017-04-25

## 2017-04-25 VITALS
HEIGHT: 61 IN | WEIGHT: 259 LBS | TEMPERATURE: 97.2 F | HEART RATE: 69 BPM | OXYGEN SATURATION: 95 % | DIASTOLIC BLOOD PRESSURE: 60 MMHG | BODY MASS INDEX: 48.9 KG/M2 | RESPIRATION RATE: 20 BRPM | SYSTOLIC BLOOD PRESSURE: 119 MMHG

## 2017-04-25 DIAGNOSIS — R60.0 LOCALIZED EDEMA: Primary | ICD-10-CM

## 2017-04-25 DIAGNOSIS — R01.1 HEART MURMUR: ICD-10-CM

## 2017-04-25 DIAGNOSIS — T14.8XXA WOUND DISCHARGE: ICD-10-CM

## 2017-04-25 PROCEDURE — 3331090002 HH PPS REVENUE DEBIT

## 2017-04-25 PROCEDURE — 3331090001 HH PPS REVENUE CREDIT

## 2017-04-25 RX ORDER — AMOXICILLIN AND CLAVULANATE POTASSIUM 875; 125 MG/1; MG/1
1 TABLET, FILM COATED ORAL 2 TIMES DAILY
Qty: 14 TAB | Refills: 0 | Status: SHIPPED | OUTPATIENT
Start: 2017-04-25 | End: 2017-05-15

## 2017-04-25 RX ORDER — FUROSEMIDE 40 MG/1
TABLET ORAL
Qty: 33 TAB | Refills: 1 | Status: SHIPPED | OUTPATIENT
Start: 2017-04-25 | End: 2017-08-24 | Stop reason: SDUPTHER

## 2017-04-25 RX ORDER — ATORVASTATIN CALCIUM 20 MG/1
TABLET, FILM COATED ORAL DAILY
COMMUNITY
End: 2017-08-24 | Stop reason: SDUPTHER

## 2017-04-25 NOTE — TELEPHONE ENCOUNTER
Called and spoke to patient, she wants to know if she needs to take the Lasix with her HCTZ she already takes. I have consulted with Dr Sakina Macedo, and she states yes, take both. I have advised patient to take both meds, she verbalizes understanding.   Dwight Heart RN

## 2017-04-25 NOTE — PROGRESS NOTES
HISTORY OF PRESENT ILLNESS  Corbett Lennox is a 81 y.o. female. Chief Complaint   Patient presents with    Leg Swelling     both legs swollen 2 to 3 days       HPI  Had Colon surgery on 4/11  Sent home 5 d ago  Has F/U tomorrow  Has a wound that is draining  Has HH changing dressing  Doing PT to help her to walk again    Legs swelling started 3 d ago  Not staying in bed  On feet as much as she can    Finished ABX yesterday  And using powder for rash under the breast and   On pain meds  Stopped Mobic    In hospital was on Lovenox  Stopped before she came home      Review of Systems   Constitutional: Negative for fever. Respiratory: Negative for cough. Cardiovascular: Positive for leg swelling. Negative for chest pain. Gastrointestinal: Negative for abdominal pain, constipation, diarrhea, nausea and vomiting. Skin:        Wound draining     Past Medical History:   Diagnosis Date    Allergic rhinitis     Cancer ([facility]     Stage 1 cancer intestines    Cervical disc disease     Cervical disc disease     Colonic mass 3/20/2017    Eczema     Gastritis     GERD (gastroesophageal reflux disease)     mild    GI bleed     hx. of recurrent    Hernia     hiatal lt side    Hiatal hernia     Hypercholesterolemia     Hypertension     Morbid obesity ([facility] 4/13/2017    Osteoarthritis (arthritis due to wear and tear of joints)      Current Outpatient Prescriptions   Medication Sig Dispense Refill    atorvastatin (LIPITOR) 20 mg tablet Take  by mouth daily.  furosemide (LASIX) 40 mg tablet Take one bid for 3 d then qd 33 Tab 1    amoxicillin-clavulanate (AUGMENTIN) 875-125 mg per tablet Take 1 Tab by mouth two (2) times a day. 14 Tab 0    HYDROcodone-acetaminophen (NORCO) 5-325 mg per tablet Take 1-2 Tabs by mouth every four (4) hours as needed. Max Daily Amount: 12 Tabs. 30 Tab 0    cetirizine (ZYRTEC) 10 mg tablet Take 1 Tab by mouth daily as needed for Allergies.  30 Tab 3    cholecalciferol (VITAMIN D3) 1,000 unit cap Take 1,000 Units by mouth daily.  lisinopril (PRINIVIL, ZESTRIL) 10 mg tablet Take 1 Tab by mouth daily. 90 Tab 1    esomeprazole (NEXIUM) 40 mg capsule Take 1 Cap by mouth daily. 90 Cap 1    hydroCHLOROthiazide (HYDRODIURIL) 25 mg tablet Take 1 Tab by mouth daily. 90 Tab 1    dilTIAZem (TIAZAC) 120 mg SR capsule Take 1 Cap by mouth daily. 90 Cap 1    potassium chloride SR (KLOR-CON 10) 10 mEq tablet Take 2 Tabs by mouth two (2) times a day. 180 Tab 1    UBIDECARENONE (CO Q-10 PO) Take 200 mg by mouth daily.  acetaminophen (TYLENOL) 325 mg tablet Take  by mouth every four (4) hours as needed for Pain.  aspirin delayed-release 81 mg tablet Take  by mouth daily.  magnesium oxide 500 mg tab Take 1 Tab by mouth daily.  multivitamin (ONE A DAY) tablet Take 1 Tab by mouth daily.  nystatin (MYCOSTATIN) powder Apply  to affected area two (2) times a day. Indications: CUTANEOUS CANDIDIASIS 1 Bottle 0    polyethylene glycol (MIRALAX) 17 gram packet Take 1 Packet by mouth daily as needed. 30 Packet 0    clotrimazole (LOTRIMIN) 1 % topical cream Apply  to affected area two (2) times a day. 45 g 1    clotrimazole-betamethasone (LOTRISONE) topical cream Apply bid for 2 w 30 g 0    TROLAMINE SALICYLATE (ASPERCREME EX) by Apply Externally route. Applies to left knee 3-4 x daily       Allergies   Allergen Reactions    Niacin Itching     Felt hot also    Ultram [Tramadol] Nausea Only     Visit Vitals    /60 (BP 1 Location: Right arm, BP Patient Position: Sitting)    Pulse 69    Temp 97.2 °F (36.2 °C) (Temporal)    Resp 20    Ht 5' 1\" (1.549 m)    Wt 259 lb (117.5 kg)    SpO2 95%    BMI 48.94 kg/m2       Physical Exam   Constitutional: She is oriented to person, place, and time. She appears well-developed and well-nourished. No distress. HENT:   Head: Normocephalic and atraumatic.    Eyes: Conjunctivae and EOM are normal.   Cardiovascular: Normal rate and regular rhythm. Murmur heard. Pulmonary/Chest: Effort normal.   Musculoskeletal: She exhibits edema (severe edema up to knees bilat, no calf tenderness and neg Nu's). She exhibits no tenderness. Neurological: She is alert and oriented to person, place, and time. Skin: Skin is dry. Erythema: right abdominal wound with redness and drainage. Psychiatric: She has a normal mood and affect. Nursing note and vitals reviewed. ASSESSMENT and PLAN    ICD-10-CM ICD-9-CM    1. Localized edema R60.0 782.3 furosemide (LASIX) 40 mg tablet      DUPLEX LOWER EXT VENOUS BILAT      METABOLIC PANEL, COMPREHENSIVE   2. Heart murmur R01.1 785.2 ECHO COMPLETE STUDY   3.  Wound discharge T14.8 879.8 AEROBIC BACTERIAL CULTURE      CBC WITH AUTOMATED DIFF      amoxicillin-clavulanate (AUGMENTIN) 875-125 mg per tablet   recheck in 2 d

## 2017-04-25 NOTE — PROGRESS NOTES
Chief Complaint   Patient presents with    Leg Swelling     both legs swollen 2 to 3 days     Morning meds taken this Carol Sue LPN

## 2017-04-26 ENCOUNTER — OFFICE VISIT (OUTPATIENT)
Dept: SURGERY | Age: 82
End: 2017-04-26

## 2017-04-26 VITALS
WEIGHT: 260 LBS | HEART RATE: 98 BPM | DIASTOLIC BLOOD PRESSURE: 62 MMHG | SYSTOLIC BLOOD PRESSURE: 110 MMHG | OXYGEN SATURATION: 96 % | TEMPERATURE: 98 F | RESPIRATION RATE: 18 BRPM | HEIGHT: 61 IN | BODY MASS INDEX: 49.09 KG/M2

## 2017-04-26 DIAGNOSIS — Z09 POSTOPERATIVE EXAMINATION: Primary | ICD-10-CM

## 2017-04-26 LAB
ALBUMIN SERPL-MCNC: 3 G/DL (ref 3.5–4.7)
ALBUMIN/GLOB SERPL: 1.2 {RATIO} (ref 1.2–2.2)
ALP SERPL-CCNC: 59 IU/L (ref 39–117)
ALT SERPL-CCNC: 23 IU/L (ref 0–32)
AST SERPL-CCNC: 25 IU/L (ref 0–40)
BASOPHILS # BLD AUTO: 0 X10E3/UL (ref 0–0.2)
BASOPHILS NFR BLD AUTO: 0 %
BILIRUB SERPL-MCNC: 0.4 MG/DL (ref 0–1.2)
BUN SERPL-MCNC: 5 MG/DL (ref 8–27)
BUN/CREAT SERPL: 7 (ref 12–28)
CALCIUM SERPL-MCNC: 8.4 MG/DL (ref 8.7–10.3)
CHLORIDE SERPL-SCNC: 94 MMOL/L (ref 96–106)
CO2 SERPL-SCNC: 31 MMOL/L (ref 18–29)
CREAT SERPL-MCNC: 0.69 MG/DL (ref 0.57–1)
EOSINOPHIL # BLD AUTO: 0.1 X10E3/UL (ref 0–0.4)
EOSINOPHIL NFR BLD AUTO: 1 %
ERYTHROCYTE [DISTWIDTH] IN BLOOD BY AUTOMATED COUNT: 14.7 % (ref 12.3–15.4)
GLOBULIN SER CALC-MCNC: 2.6 G/DL (ref 1.5–4.5)
GLUCOSE SERPL-MCNC: 106 MG/DL (ref 65–99)
HCT VFR BLD AUTO: 36.2 % (ref 34–46.6)
HGB BLD-MCNC: 11.2 G/DL (ref 11.1–15.9)
IMM GRANULOCYTES # BLD: 0.2 X10E3/UL (ref 0–0.1)
IMM GRANULOCYTES NFR BLD: 2 %
LYMPHOCYTES # BLD AUTO: 1.4 X10E3/UL (ref 0.7–3.1)
LYMPHOCYTES NFR BLD AUTO: 17 %
MCH RBC QN AUTO: 26.5 PG (ref 26.6–33)
MCHC RBC AUTO-ENTMCNC: 30.9 G/DL (ref 31.5–35.7)
MCV RBC AUTO: 86 FL (ref 79–97)
MONOCYTES # BLD AUTO: 1.6 X10E3/UL (ref 0.1–0.9)
MONOCYTES NFR BLD AUTO: 18 %
NEUTROPHILS # BLD AUTO: 5.3 X10E3/UL (ref 1.4–7)
NEUTROPHILS NFR BLD AUTO: 62 %
PLATELET # BLD AUTO: 393 X10E3/UL (ref 150–379)
POTASSIUM SERPL-SCNC: 4 MMOL/L (ref 3.5–5.2)
PROT SERPL-MCNC: 5.6 G/DL (ref 6–8.5)
RBC # BLD AUTO: 4.22 X10E6/UL (ref 3.77–5.28)
SODIUM SERPL-SCNC: 139 MMOL/L (ref 134–144)
WBC # BLD AUTO: 8.5 X10E3/UL (ref 3.4–10.8)

## 2017-04-26 PROCEDURE — 3331090001 HH PPS REVENUE CREDIT

## 2017-04-26 PROCEDURE — 3331090002 HH PPS REVENUE DEBIT

## 2017-04-26 NOTE — PROGRESS NOTES
Call pt, the CBC does not show anemia now  The sugar is a little up  The kidney tests are normal  The liver tests are normal  The electrolytes are a little low, we will monitor

## 2017-04-26 NOTE — PROGRESS NOTES
Subjective:   Sajan Menendez is a 81 y.o. female presents for postop care 15 days following laparoscopic-assisted right  colectomy by Dr. Genie Garcia. Appetite is good. Eating a regular diet without difficulty. No nausea. Bowel movements are loose, having 5 or so loose BMs/day. Pain is controlled with current analgesics. Medication(s) being used: Norco for arthritic knees and hips. No abd pain. .  Denies fever, nausea, shortness of breath, chest pain, redness at incision site and vomiting    Home health  - still coming out. Now doing wound care on abd wound. LE swelling : saw PCP Shi Doll MD for PEGGY. Bilateral dopplers negative for DVTs, started on lasix. Will f/u with her tomorrow  Augmentin : Shi Doll MD started her on it for abd wound, pt had just finished course of abx/Augmentin that we sent her home on  ABD wound : pt reports incision opened up 3-4 days after she got home, a lot of yellow drainage. Was a \"pink\" around incision, but now that is gone. No pain. Keeping it covered, changing bandage daily or more often if needed. No fevers.     Patient has an advanced directive: YES  Education material provided about advance directives: NO    Objective:     Visit Vitals    /62 (BP 1 Location: Left arm, BP Patient Position: Sitting)    Pulse 98    Temp 98 °F (36.7 °C) (Oral)    Resp 18    Ht 5' 1\" (1.549 m)    Wt 260 lb (117.9 kg)    SpO2 96%    BMI 49.13 kg/m2       General:  alert, no distress, accompanied by son   Abdomen: soft, bowel sounds active, non-tender, obese   Incision:   no erythema, incision opened 1 cm at left lateral edge, q-tip could not fit through, +cloudy yellow drainage with occasional thicker purulent output   Heart: regular rate and rhythm, S1, S2 normal, ?faint systolic murmur, click, rub or gallop, +2 PEGGY   Lungs: clear to auscultation bilaterally     Patholology:      Right colon, terminal ileum and appendix, extended right hemicolectomy: Colonic adenocarcinoma   Unremarkable appendix   Diverticulosis   Inflammatory polyp with superficial erosion, 0.4 cm     T2N0 M (n/a)    Assessment:     S/P right colectomy. Doing well postoperatively. Plan:     1. Pt is to increase activities as tolerated. .  2. Follow-up in 2 weeks. 3. Wound care discussed - Abd wound: continue  Augmentin, drainage is therapeutic. Keep covered with 4x4 and secure with tape. May shower and wash with soap and water. No baths. Pt to call us if redness or swelling around incision, drainage becomes thicker or changes color. 4. Pathology: resection was therapeutic, per Dr. Maximilian Li. No need for further therapy  5. PEGGY: as per Jesus Christensen MD  6. Loose stools : encourage high fiber diet, high fluid intake, metamucil as fiber helps to bulk up the stool    Patient verbalized understanding and agreement. Ms. Radha Enriquez has a reminder for a \"due or due soon\" health maintenance. I have asked that she contact her primary care provider for follow-up on this health maintenance.

## 2017-04-26 NOTE — PROGRESS NOTES
1. Have you been to the ER, urgent care clinic since your last visit? Hospitalized since your last visit? No       2. Have you seen or consulted any other health care providers outside of the [address] [place] [address] since your last visit? Include any pap smears or colon screening.   No

## 2017-04-26 NOTE — MR AVS SNAPSHOT
Visit Information Date & Time Provider Department Dept. Phone Encounter #  
 4/26/2017  9:40 AM Idris Haro NP [phone] [id number] Your Appointments 4/27/2017  7:40 AM  
Any with Betty Lowery MD  
[facility] ([facility]) Appt Note: 2 day f/u, leg swelling,CP$0,km/4.25.2017  
 [address]  
[phone]  
  
   
 [address] [id number]  
  
    
 5/23/2017  9:00 AM  
ESTABLISHED PATIENT with Betty Lowery MD  
[facility] ([facility]) Appt Note: 3 mo f/u w/ fbw $ 0 cp mrm [phone]  
[phone]  
  
   
 [address] [id number] Upcoming Health Maintenance Date Due DTaP/Tdap/Td series (1 - Tdap) 2/10/1956 ZOSTER VACCINE AGE 60> 2/10/1996 GLAUCOMA SCREENING Q2Y 10/22/2017 MEDICARE YEARLY EXAM 4/7/2018 Allergies as of 4/26/2017  Review Complete On: 4/26/2017 By: Idris Haro NP Severity Noted Reaction Type Reactions Niacin  10/22/2013    Itching Felt hot also Ultram [Tramadol]  10/22/2013    Nausea Only Current Immunizations  Reviewed on 11/5/2015 Name Date Influenza High Dose Vaccine PF 10/4/2016 Influenza Vaccine 10/22/2014 Influenza Vaccine (Quad) PF 11/5/2015 Pneumococcal Conjugate (PCV-13) 5/1/2015 Not reviewed this visit You Were Diagnosed With   
  
 Codes Comments Postoperative examination    -  Primary ICD-10-CM: K32 ICD-9-CM: V67.00 Vitals BP Pulse Temp Resp Height(growth percentile) Weight(growth percentile) 110/62 (BP 1 Location: Left arm, BP Patient Position: Sitting) 98 98 °F (36.7 °C) (Oral) 18 5' 1\" (1.549 m) 260 lb (117.9 kg) SpO2 BMI OB Status Smoking Status 96% 49.13 kg/m2 Hysterectomy Never Smoker Vitals History BMI and BSA Data Body Mass Index Body Surface Area  
 49.13 kg/m 2 2.25 m 2 Preferred Pharmacy Pharmacy Name Phone [address], [address] [place] [address]  [phone] Your Updated Medication List  
  
   
This list is accurate as of: 4/26/17 10:23 AM.  Always use your most recent med list.  
  
  
  
  
 acetaminophen 325 mg tablet Commonly known as:  TYLENOL Take  by mouth every four (4) hours as needed for Pain.  
  
 amoxicillin-clavulanate 875-125 mg per tablet Commonly known as:  AUGMENTIN Take 1 Tab by mouth two (2) times a day. ASPERCREME EX  
by Apply Externally route. Applies to left knee 3-4 x daily  
  
 aspirin delayed-release 81 mg tablet Take  by mouth daily. cetirizine 10 mg tablet Commonly known as:  ZyrTEC Take 1 Tab by mouth daily as needed for Allergies. clotrimazole 1 % topical cream  
Commonly known as:  María Elena Schmidt Apply  to affected area two (2) times a day. clotrimazole-betamethasone topical cream  
Commonly known as:  Naz Jones Apply bid for 2 w  
  
 CO Q-10 PO Take 200 mg by mouth daily. dilTIAZem 120 mg SR capsule Commonly known as:  [facility] Take 1 Cap by mouth daily. esomeprazole 40 mg capsule Commonly known as:  NexIUM Take 1 Cap by mouth daily. furosemide 40 mg tablet Commonly known as:  LASIX Take one bid for 3 d then qd  
  
 hydroCHLOROthiazide 25 mg tablet Commonly known as:  HYDRODIURIL Take 1 Tab by mouth daily. HYDROcodone-acetaminophen 5-325 mg per tablet Commonly known as:  Gillie Breed Take 1-2 Tabs by mouth every four (4) hours as needed. Max Daily Amount: 12 Tabs. LIPITOR 20 mg tablet Generic drug:  atorvastatin Take  by mouth daily. lisinopril 10 mg tablet Commonly known as:  Serjio Leaver Take 1 Tab by mouth daily. magnesium oxide 500 mg Tab Take 1 Tab by mouth daily. multivitamin tablet Commonly known as:  ONE A DAY Take 1 Tab by mouth daily. nystatin powder Commonly known as:  MYCOSTATIN Apply  to affected area two (2) times a day. Indications: CUTANEOUS CANDIDIASIS  
  
 polyethylene glycol 17 gram packet Commonly known as:  Marina Gary Take 1 Packet by mouth daily as needed. potassium chloride SR 10 mEq tablet Commonly known as:  KLOR-CON 10 Take 2 Tabs by mouth two (2) times a day. VITAMIN D3 1,000 unit Cap Generic drug:  cholecalciferol Take 1,000 Units by mouth daily. To-Do List   
 04/27/2017 12:00 PM  
  Appointment with Deon Gill RN at [place] [address]  
  
 04/28/2017 To Be Determined Appointment with Jens Martinez PTA at [place] [address]  
  
 04/28/2017 8:45 AM  
  Appointment with Deon Gill RN at [place] [address]  
  
 05/01/2017 To Be Determined Appointment with Deon Gill RN at [place] [address]  
  
 05/02/2017 To Be Determined Appointment with Jens Martinez PTA at [place] [address]  
  
 05/05/2017 To Be Determined Appointment with Deon Gill RN at [place] [address]  
  
 05/05/2017 To Be Determined Appointment with Jens Martinez PTA at [place] [address]  
  
 05/08/2017 To Be Determined Appointment with Deon Gill RN at [place] [address]  
  
 05/11/2017 To Be Determined Appointment with Deon Gill RN at [place] [address]  
  
 05/11/2017 To Be Determined Appointment with Nakul Chakraborty PT at [place] [address] Patient Instructions Open Bowel Resection: What to Expect at Home Your Recovery You are likely to have pain that comes and goes for the next few days after bowel surgery.  You may have bowel cramps, and your cut (incision) may hurt. You may also feel like you have the flu. You may have a low fever and feel tired and nauseated. This is common. You should feel better after a week and will probably be back to normal in 2 to 3 weeks. This care sheet gives you a general idea about how long it will take for you to recover. But each person recovers at a different pace. Follow the steps below to get better as quickly as possible. How can you care for yourself at home? Activity · Rest when you feel tired. Getting enough sleep will help you recover. · Try to walk each day. Start by walking a little more than you did the day before. Bit by bit, increase the amount you walk. Walking boosts blood flow and helps prevent pneumonia and constipation. · Avoid strenuous activities, such as biking, jogging, weight lifting, or aerobic exercise, until your doctor says it is okay. · Ask your doctor when you can drive again. · You will probably need to take 3 to 4 weeks off from work. It depends on the type of work you do and how you feel. You may need to take off 4 to 6 weeks if you lift heavy objects in your job. · You may shower 24 to 48 hours after surgery, if your doctor says it is okay. Pat the cut (incision) dry. Do not take a bath for the first 2 weeks, or until your doctor tells you it is okay. · Ask your doctor when it is okay for you to have sex. Diet · You may not have much appetite after the surgery. But try to eat a healthy diet. Your doctor will tell you about any foods you should not eat. · Eat a low-fiber diet for several weeks after surgery. Eat many small meals throughout the day. Add high-fiber foods a little at a time. · Eat yogurt. It puts good bacteria into your colon and helps prevent diarrhea. · Try to avoid nuts, seeds, and corn for a while. They may be hard to digest. 
· You may need to take vitamins that contain sodium and potassium. Ask your doctor. · Drink plenty of fluids to avoid becoming dehydrated. Medicines · Your doctor will tell you if and when you can restart your medicines. He or she will also give you instructions about taking any new medicines. · If you take blood thinners, such as warfarin (Coumadin), clopidogrel (Plavix), or aspirin, be sure to talk to your doctor. He or she will tell you if and when to start taking those medicines again. Make sure that you understand exactly what your doctor wants you to do. · Take pain medicines exactly as directed. ¨ If the doctor gave you a prescription medicine for pain, take it as prescribed. ¨ If you are not taking a prescription pain medicine, ask your doctor if you can take an over-the-counter medicine. ¨ Do not take two or more pain medicines at the same time unless the doctor told you to. Many pain medicines have acetaminophen, which is Tylenol. Too much acetaminophen (Tylenol) can be harmful. · If you think your pain medicine is making you sick to your stomach: 
¨ Take your medicine after meals (unless your doctor tells you not to). ¨ Ask your doctor for a different pain medicine. · If your doctor prescribed antibiotics, take them as directed. Do not stop taking them just because you feel better. You need to take the full course of antibiotics. · You may need to take some medicines in a different form. You will be told whether to crush pills or take a liquid form of the medicine. · If your doctor gives you a stool softener, take it as directed. Incision care · If you have strips of tape on the incision, leave the tape on for a week or until it falls off. · Wash the area daily with warm, soapy water, and pat it dry. Follow-up care is a key part of your treatment and safety. Be sure to make and go to all appointments, and call your doctor if you are having problems. It's also a good idea to know your test results and keep a list of the medicines you take. When should you call for help? Call 911 anytime you think you may need emergency care. For example, call if: 
· You passed out (lost consciousness). · You have sudden chest pain and shortness of breath, or you cough up blood. · You have severe pain in your belly. Call your doctor now or seek immediate medical care if: 
· You are sick to your stomach and cannot drink fluids or keep them down. · You have signs of a blood clot, such as: 
¨ Pain in your calf, back of the knee, thigh, or groin. ¨ Redness and swelling in your leg or groin. · You have a lot of diarrhea that smells very bad. · You have trouble passing urine or stool, especially if you have mild pain or swelling in your lower belly. · You have signs of infection, such as: 
¨ Increased pain, swelling, warmth, or redness. ¨ Red streaks leading from the incision. ¨ Pus draining from the incision. ¨ A fever. · You have pain that does not get better after you take pain medicine. · You have loose stitches, or your incision comes open. · You are bleeding or have new drainage from the incision. Watch closely for any changes in your health, and be sure to contact your doctor if: 
· You do not have a bowel movement after taking a laxative. · You do not get better as expected. Where can you learn more? Go to http://suad-brenda.info/. Enter 074 [id number] in the search box to learn more about \"Open Bowel Resection: What to Expect at Home. \" Current as of: August 9, 2016 Content Version: 11.2 © 3774-6616 [employer]. Care instructions adapted under license by [employer] (which disclaims liability or warranty for this information). If you have questions about a medical condition or this instruction, always ask your healthcare professional. [place] [address] any warranty or liability for your use of this information. Introducing [facility]!    
 Dear Fernando Bustos: 
 Thank you for requesting a ARKeX account. Our records indicate that you already have an active ARKeX account. You can access your account anytime at [email or web address] Did you know that you can access your hospital and ER discharge instructions at any time in ARKe[employer]? You can also review all of your test results from your hospital stay or ER visit. Additional Information If you have questions, please visit the Frequently Asked Questions section of the ARKeX website at [email or web address] Remember, ARKeX is NOT to be used for urgent needs. For medical emergencies, dial 911. Now available from your iPhone and Android! Please provide this summary of care documentation to your next provider. Your primary care clinician is listed as Dimple Greco. If you have any questions after today's visit, please call [phone].

## 2017-04-26 NOTE — PATIENT INSTRUCTIONS
Open Bowel Resection: What to Expect at [address] [place] [address] are likely to have pain that comes and goes for the next few days after bowel surgery. You may have bowel cramps, and your cut (incision) may hurt. You may also feel like you have the flu. You may have a low fever and feel tired and nauseated. This is common. You should feel better after a week and will probably be back to normal in 2 to 3 weeks. This care sheet gives you a general idea about how long it will take for you to recover. But each person recovers at a different pace. Follow the steps below to get better as quickly as possible. How can you care for yourself at home? Activity  · Rest when you feel tired. Getting enough sleep will help you recover. · Try to walk each day. Start by walking a little more than you did the day before. Bit by bit, increase the amount you walk. Walking boosts blood flow and helps prevent pneumonia and constipation. · Avoid strenuous activities, such as biking, jogging, weight lifting, or aerobic exercise, until your doctor says it is okay. · Ask your doctor when you can drive again. · You will probably need to take 3 to 4 weeks off from work. It depends on the type of work you do and how you feel. You may need to take off 4 to 6 weeks if you lift heavy objects in your job. · You may shower 24 to 48 hours after surgery, if your doctor says it is okay. Pat the cut (incision) dry. Do not take a bath for the first 2 weeks, or until your doctor tells you it is okay. · Ask your doctor when it is okay for you to have sex. Diet  · You may not have much appetite after the surgery. But try to eat a healthy diet. Your doctor will tell you about any foods you should not eat. · Eat a low-fiber diet for several weeks after surgery. Eat many small meals throughout the day. Add high-fiber foods a little at a time. · Eat yogurt. It puts good bacteria into your colon and helps prevent diarrhea.   · Try to avoid nuts, seeds, and corn for a while. They may be hard to digest.  · You may need to take vitamins that contain sodium and potassium. Ask your doctor. · Drink plenty of fluids to avoid becoming dehydrated. Medicines  · Your doctor will tell you if and when you can restart your medicines. He or she will also give you instructions about taking any new medicines. · If you take blood thinners, such as warfarin (Coumadin), clopidogrel (Plavix), or aspirin, be sure to talk to your doctor. He or she will tell you if and when to start taking those medicines again. Make sure that you understand exactly what your doctor wants you to do. · Take pain medicines exactly as directed. ¨ If the doctor gave you a prescription medicine for pain, take it as prescribed. ¨ If you are not taking a prescription pain medicine, ask your doctor if you can take an over-the-counter medicine. ¨ Do not take two or more pain medicines at the same time unless the doctor told you to. Many pain medicines have acetaminophen, which is Tylenol. Too much acetaminophen (Tylenol) can be harmful. · If you think your pain medicine is making you sick to your stomach:  ¨ Take your medicine after meals (unless your doctor tells you not to). ¨ Ask your doctor for a different pain medicine. · If your doctor prescribed antibiotics, take them as directed. Do not stop taking them just because you feel better. You need to take the full course of antibiotics. · You may need to take some medicines in a different form. You will be told whether to crush pills or take a liquid form of the medicine. · If your doctor gives you a stool softener, take it as directed. Incision care  · If you have strips of tape on the incision, leave the tape on for a week or until it falls off. · Wash the area daily with warm, soapy water, and pat it dry. Follow-up care is a key part of your treatment and safety.  Be sure to make and go to all appointments, and call your doctor if you are having problems. It's also a good idea to know your test results and keep a list of the medicines you take. When should you call for help? Call 911 anytime you think you may need emergency care. For example, call if:  · You passed out (lost consciousness). · You have sudden chest pain and shortness of breath, or you cough up blood. · You have severe pain in your belly. Call your doctor now or seek immediate medical care if:  · You are sick to your stomach and cannot drink fluids or keep them down. · You have signs of a blood clot, such as:  ¨ Pain in your calf, back of the knee, thigh, or groin. ¨ Redness and swelling in your leg or groin. · You have a lot of diarrhea that smells very bad. · You have trouble passing urine or stool, especially if you have mild pain or swelling in your lower belly. · You have signs of infection, such as:  ¨ Increased pain, swelling, warmth, or redness. ¨ Red streaks leading from the incision. ¨ Pus draining from the incision. ¨ A fever. · You have pain that does not get better after you take pain medicine. · You have loose stitches, or your incision comes open. · You are bleeding or have new drainage from the incision. Watch closely for any changes in your health, and be sure to contact your doctor if:  · You do not have a bowel movement after taking a laxative. · You do not get better as expected. Where can you learn more? Go to http://suad-brenda.info/. Enter 213 [id number] in the search box to learn more about \"Open Bowel Resection: What to Expect at Home. \"  Current as of: August 9, 2016  Content Version: 11.2  © 0912-2342 [employer]. Care instructions adapted under license by [employer] (which disclaims liability or warranty for this information).  If you have questions about a medical condition or this instruction, always ask your healthcare professional. Dank Waterman disclaims any warranty or liability for your use of this information.

## 2017-04-27 ENCOUNTER — HOME CARE VISIT (OUTPATIENT)
Dept: HOME HEALTH SERVICES | Facility: HOME HEALTH | Age: 82
End: 2017-04-27
Payer: MEDICARE

## 2017-04-27 ENCOUNTER — HOME CARE VISIT (OUTPATIENT)
Dept: SCHEDULING | Facility: HOME HEALTH | Age: 82
End: 2017-04-27
Payer: MEDICARE

## 2017-04-27 ENCOUNTER — OFFICE VISIT (OUTPATIENT)
Dept: FAMILY MEDICINE CLINIC | Age: 82
End: 2017-04-27

## 2017-04-27 VITALS
HEIGHT: 61 IN | OXYGEN SATURATION: 94 % | WEIGHT: 255.4 LBS | BODY MASS INDEX: 48.22 KG/M2 | HEART RATE: 72 BPM | DIASTOLIC BLOOD PRESSURE: 50 MMHG | SYSTOLIC BLOOD PRESSURE: 100 MMHG | RESPIRATION RATE: 16 BRPM | TEMPERATURE: 96.2 F

## 2017-04-27 VITALS
RESPIRATION RATE: 18 BRPM | DIASTOLIC BLOOD PRESSURE: 58 MMHG | HEART RATE: 75 BPM | OXYGEN SATURATION: 96 % | SYSTOLIC BLOOD PRESSURE: 100 MMHG | TEMPERATURE: 97.8 F

## 2017-04-27 DIAGNOSIS — R01.1 HEART MURMUR: ICD-10-CM

## 2017-04-27 DIAGNOSIS — T14.8XXA WOUND DISCHARGE: ICD-10-CM

## 2017-04-27 DIAGNOSIS — R60.0 LOCALIZED EDEMA: Primary | ICD-10-CM

## 2017-04-27 PROCEDURE — G0299 HHS/HOSPICE OF RN EA 15 MIN: HCPCS

## 2017-04-27 PROCEDURE — 3331090001 HH PPS REVENUE CREDIT

## 2017-04-27 PROCEDURE — 3331090002 HH PPS REVENUE DEBIT

## 2017-04-27 NOTE — PROGRESS NOTES
HISTORY OF PRESENT ILLNESS  Mike Hargrove is a 81 y.o. female. Chief Complaint   Patient presents with    Leg Swelling     follow up       HPI  Abdominal wound a little less red  Still draining  Pt on Augmentin  No SE  Cx still pending    Legs are terrible  On Lasix 40 bid for 3 d now  Lost 4 lbs  Doppler was neg    Has not had heart murmur in the past    Review of Systems   Respiratory: Positive for shortness of breath (2 d ago for a short time). Negative for cough. Cardiovascular: Positive for leg swelling. Negative for orthopnea. Gastrointestinal: Positive for heartburn. Skin:        Wound still draining     Past Medical History:   Diagnosis Date    Allergic rhinitis     Cancer ([facility] [address]     Stage 1 cancer intestines    Cervical disc disease     Cervical disc disease     Colonic mass 3/20/2017    Eczema     Gastritis     GERD (gastroesophageal reflux disease)     mild    GI bleed     hx. of recurrent    Hernia     hiatal lt side    Hiatal hernia     Hypercholesterolemia     Hypertension     Morbid obesity ([facility] [address] 4/13/2017    Osteoarthritis (arthritis due to wear and tear of joints)      Current Outpatient Prescriptions   Medication Sig Dispense Refill    atorvastatin (LIPITOR) 20 mg tablet Take  by mouth daily.  furosemide (LASIX) 40 mg tablet Take one bid for 3 d then qd 33 Tab 1    amoxicillin-clavulanate (AUGMENTIN) 875-125 mg per tablet Take 1 Tab by mouth two (2) times a day. 14 Tab 0    HYDROcodone-acetaminophen (NORCO) 5-325 mg per tablet Take 1-2 Tabs by mouth every four (4) hours as needed. Max Daily Amount: 12 Tabs. 30 Tab 0    nystatin (MYCOSTATIN) powder Apply  to affected area two (2) times a day. Indications: CUTANEOUS CANDIDIASIS 1 Bottle 0    polyethylene glycol (MIRALAX) 17 gram packet Take 1 Packet by mouth daily as needed. 30 Packet 0    clotrimazole (LOTRIMIN) 1 % topical cream Apply  to affected area two (2) times a day.  45 g 1    clotrimazole-betamethasone (LOTRISONE) topical cream Apply bid for 2 w 30 g 0    cetirizine (ZYRTEC) 10 mg tablet Take 1 Tab by mouth daily as needed for Allergies. 30 Tab 3    cholecalciferol (VITAMIN D3) 1,000 unit cap Take 1,000 Units by mouth daily.  lisinopril (PRINIVIL, ZESTRIL) 10 mg tablet Take 1 Tab by mouth daily. 90 Tab 1    esomeprazole (NEXIUM) 40 mg capsule Take 1 Cap by mouth daily. 90 Cap 1    hydroCHLOROthiazide (HYDRODIURIL) 25 mg tablet Take 1 Tab by mouth daily. 90 Tab 1    dilTIAZem (TIAZAC) 120 mg SR capsule Take 1 Cap by mouth daily. 90 Cap 1    potassium chloride SR (KLOR-CON 10) 10 mEq tablet Take 2 Tabs by mouth two (2) times a day. 180 Tab 1    UBIDECARENONE (CO Q-10 PO) Take 200 mg by mouth daily.  acetaminophen (TYLENOL) 325 mg tablet Take  by mouth every four (4) hours as needed for Pain.  TROLAMINE SALICYLATE (ASPERCREME EX) by Apply Externally route. Applies to left knee 3-4 x daily      aspirin delayed-release 81 mg tablet Take  by mouth daily.  magnesium oxide 500 mg tab Take 1 Tab by mouth daily.  multivitamin (ONE A DAY) tablet Take 1 Tab by mouth daily. Allergies   Allergen Reactions    Niacin Itching     Felt hot also    Ultram [Tramadol] Nausea Only     Visit Vitals    /50 (BP 1 Location: Right arm, BP Patient Position: Sitting)    Pulse 72    Temp 96.2 °F (35.7 °C) (Oral)    Resp 16    Ht 5' 1\" (1.549 m)    Wt 255 lb 6.4 oz (115.8 kg)    BMI 48.26 kg/m2       Physical Exam   Constitutional: She is oriented to person, place, and time. She appears well-developed and well-nourished. No distress. HENT:   Head: Normocephalic and atraumatic. Eyes: Conjunctivae and EOM are normal.   Cardiovascular: Normal rate and regular rhythm. Murmur heard. Pulmonary/Chest: Effort normal and breath sounds normal.   Musculoskeletal: She exhibits edema (bilat still up to high calf). Neurological: She is alert and oriented to person, place, and time.    Skin: Right abdominal surgical wound less red than 2 d ago but still draining serous fluid a little cloudy   Psychiatric: She has a normal mood and affect. Nursing note and vitals reviewed. New dressing applied    ASSESSMENT and PLAN    ICD-10-CM ICD-9-CM    1. Localized edema R60.0 782.3 ECHO COMPLETE STUDY   2. Heart murmur R01.1 785.2 ECHO COMPLETE STUDY   3.  Wound discharge T14.8 879.8      Cont Lasix 40 bid for a total of another 3 days then qd  cont Augmentin  F/U in 4d d

## 2017-04-27 NOTE — MR AVS SNAPSHOT
Visit Information Date & Time Provider Department Dept. Phone Encounter #  
 4/27/2017  7:40 AM Daniel Champion  [facility] [phone] [id number] Follow-up Instructions Return in about 4 days (around 5/1/2017). Follow-up and Disposition History Your Appointments 5/23/2017  9:00 AM  
ESTABLISHED PATIENT with Daniel Champion MD  
[facility] ([address]) Appt Note: 3 mo f/u w/ fbw $ 0 cp mrm [address] [phone]  
[phone]  
  
   
 [address] [address] Upcoming Health Maintenance Date Due DTaP/Tdap/Td series (1 - Tdap) 2/10/1957 ZOSTER VACCINE AGE 60> 2/10/1996 GLAUCOMA SCREENING Q2Y 10/22/2017 MEDICARE YEARLY EXAM 4/7/2018 Allergies as of 4/27/2017  Review Complete On: 4/27/2017 By: [place] Monique LPN Severity Noted Reaction Type Reactions Niacin  10/22/2013    Itching Felt hot also Ultram [Tramadol]  10/22/2013    Nausea Only Current Immunizations  Reviewed on 11/5/2015 Name Date Influenza High Dose Vaccine PF 10/4/2016 Influenza Vaccine 10/22/2014 Influenza Vaccine (Quad) PF 11/5/2015 Pneumococcal Conjugate (PCV-13) 5/1/2015 Not reviewed this visit You Were Diagnosed With   
  
 Codes Comments Localized edema    -  Primary ICD-10-CM: R60.0 ICD-9-CM: 782.3 Heart murmur     ICD-10-CM: R01.1 ICD-9-CM: 224. 2 Wound discharge     ICD-10-CM: T14.8 ICD-9-CM: 879.8 Vitals BP Pulse Temp Resp Height(growth percentile) Weight(growth percentile) 100/50 (BP 1 Location: Right arm, BP Patient Position: Sitting) 72 96.2 °F (35.7 °C) (Oral) 16 5' 1\" (1.549 m) 255 lb 6.4 oz (115.8 kg) BMI OB Status Smoking Status 48.26 kg/m2 Hysterectomy Never Smoker Vitals History BMI and BSA Data Body Mass Index Body Surface Area 48.26 kg/m 2 2.23 m 2 Preferred Pharmacy Pharmacy Name Phone [facility],[address] Floor, [address] [place] [address]  [phone] Your Updated Medication List  
  
   
This list is accurate as of: 4/27/17  8:08 AM.  Always use your most recent med list.  
  
  
  
  
 acetaminophen 325 mg tablet Commonly known as:  TYLENOL Take  by mouth every four (4) hours as needed for Pain.  
  
 amoxicillin-clavulanate 875-125 mg per tablet Commonly known as:  AUGMENTIN Take 1 Tab by mouth two (2) times a day. ASPERCREME EX  
by Apply Externally route. Applies to left knee 3-4 x daily  
  
 aspirin delayed-release 81 mg tablet Take  by mouth daily. cetirizine 10 mg tablet Commonly known as:  ZyrTEC Take 1 Tab by mouth daily as needed for Allergies. clotrimazole 1 % topical cream  
Commonly known as:  Rhetta Plate Apply  to affected area two (2) times a day. clotrimazole-betamethasone topical cream  
Commonly known as:  Pixie Money Apply bid for 2 w  
  
 CO Q-10 PO Take 200 mg by mouth daily. dilTIAZem 120 mg SR capsule Commonly known as:  [facility] Take 1 Cap by mouth daily. esomeprazole 40 mg capsule Commonly known as:  NexIUM Take 1 Cap by mouth daily. furosemide 40 mg tablet Commonly known as:  LASIX Take one bid for 3 d then qd  
  
 hydroCHLOROthiazide 25 mg tablet Commonly known as:  HYDRODIURIL Take 1 Tab by mouth daily. HYDROcodone-acetaminophen 5-325 mg per tablet Commonly known as:  Karole Dakins Take 1-2 Tabs by mouth every four (4) hours as needed. Max Daily Amount: 12 Tabs. LIPITOR 20 mg tablet Generic drug:  atorvastatin Take  by mouth daily. lisinopril 10 mg tablet Commonly known as:  Luna Feil Take 1 Tab by mouth daily. magnesium oxide 500 mg Tab Take 1 Tab by mouth daily. multivitamin tablet Commonly known as:  ONE A DAY Take 1 Tab by mouth daily. nystatin powder Commonly known as:  MYCOSTATIN Apply  to affected area two (2) times a day. Indications: CUTANEOUS CANDIDIASIS  
  
 polyethylene glycol 17 gram packet Commonly known as:  Hayley Coleyi Take 1 Packet by mouth daily as needed. potassium chloride SR 10 mEq tablet Commonly known as:  KLOR-CON 10 Take 2 Tabs by mouth two (2) times a day. VITAMIN D3 1,000 unit Cap Generic drug:  cholecalciferol Take 1,000 Units by mouth daily. Follow-up Instructions Return in about 4 days (around 5/1/2017). To-Do List   
 04/27/2017 Cardiac Services:  ECHO COMPLETE STUDY   
  
 04/27/2017 12:00 PM  
  Appointment with Angel Reed RN at [place] [address]  
  
 04/28/2017 To Be Determined Appointment with Megan Decker PTA at [place] [address]  
  
 04/28/2017 8:45 AM  
  Appointment with Angel Reed RN at [place] [address]  
  
 05/01/2017 To Be Determined Appointment with Angel Reed RN at [place] [address]  
  
 05/02/2017 To Be Determined Appointment with Megan Decker PTA at [place] [address]  
  
 05/05/2017 To Be Determined Appointment with Angel Reed RN at [place] [address]  
  
 05/05/2017 To Be Determined Appointment with Megan Decker PTA at [place] [address]  
  
 05/08/2017 To Be Determined Appointment with Angel Reed RN at [place] [address]  
  
 05/11/2017 To Be Determined Appointment with Angel Reed RN at [place] [address]  
  
 05/11/2017 To Be Determined Appointment with Eufemia Parson PT at [address] [facility]! Dear Remy Camera: 
Thank you for requesting a MedRunnert account. Our records indicate that you already have an active MyChart account.   You can access your account anytime at [email or web address] Did you know that you can access your hospital and ER discharge instructions at any time in [email or web address]? You can also review all of your test results from your hospital stay or ER visit. Additional Information If you have questions, please visit the Frequently Asked Questions section of the [email or web address] website at [email or web address] Remember, [email or web address] is NOT to be used for urgent needs. For medical emergencies, dial 911. Now available from your iPhone and Android! Please provide this summary of care documentation to your next provider. Your primary care clinician is listed as Dimple Greco. If you have any questions after today's visit, please call [phone].

## 2017-04-28 ENCOUNTER — HOME CARE VISIT (OUTPATIENT)
Dept: SCHEDULING | Facility: HOME HEALTH | Age: 82
End: 2017-04-28
Payer: MEDICARE

## 2017-04-28 VITALS
OXYGEN SATURATION: 97 % | HEART RATE: 76 BPM | TEMPERATURE: 97.6 F | DIASTOLIC BLOOD PRESSURE: 70 MMHG | SYSTOLIC BLOOD PRESSURE: 124 MMHG | RESPIRATION RATE: 20 BRPM

## 2017-04-28 LAB — BACTERIA SPEC AEROBE CULT: ABNORMAL

## 2017-04-28 PROCEDURE — 3331090001 HH PPS REVENUE CREDIT

## 2017-04-28 PROCEDURE — G0157 HHC PT ASSISTANT EA 15: HCPCS

## 2017-04-28 PROCEDURE — 3331090002 HH PPS REVENUE DEBIT

## 2017-04-28 PROCEDURE — G0299 HHS/HOSPICE OF RN EA 15 MIN: HCPCS

## 2017-04-29 VITALS — HEART RATE: 80 BPM | RESPIRATION RATE: 17 BRPM | OXYGEN SATURATION: 96 %

## 2017-04-29 PROCEDURE — 3331090002 HH PPS REVENUE DEBIT

## 2017-04-29 PROCEDURE — 3331090001 HH PPS REVENUE CREDIT

## 2017-04-30 PROCEDURE — 3331090002 HH PPS REVENUE DEBIT

## 2017-04-30 PROCEDURE — 3331090001 HH PPS REVENUE CREDIT

## 2017-05-01 ENCOUNTER — HOME CARE VISIT (OUTPATIENT)
Dept: HOME HEALTH SERVICES | Facility: HOME HEALTH | Age: 82
End: 2017-05-01
Payer: MEDICARE

## 2017-05-01 PROCEDURE — 3331090001 HH PPS REVENUE CREDIT

## 2017-05-01 PROCEDURE — 3331090002 HH PPS REVENUE DEBIT

## 2017-05-01 NOTE — PROGRESS NOTES
Notify pt urine culture positive, but is susceptible to PCNs and pt was already on Augmentin so should cover. If continues to have sxs or concerns should rtc for further eval or per Dr. Mary Mg instructions.

## 2017-05-02 ENCOUNTER — HOME CARE VISIT (OUTPATIENT)
Dept: SCHEDULING | Facility: HOME HEALTH | Age: 82
End: 2017-05-02
Payer: MEDICARE

## 2017-05-02 ENCOUNTER — OFFICE VISIT (OUTPATIENT)
Dept: FAMILY MEDICINE CLINIC | Age: 82
End: 2017-05-02

## 2017-05-02 ENCOUNTER — TELEPHONE (OUTPATIENT)
Dept: FAMILY MEDICINE CLINIC | Age: 82
End: 2017-05-02

## 2017-05-02 VITALS
DIASTOLIC BLOOD PRESSURE: 49 MMHG | SYSTOLIC BLOOD PRESSURE: 108 MMHG | OXYGEN SATURATION: 96 % | WEIGHT: 242 LBS | TEMPERATURE: 97.3 F | BODY MASS INDEX: 44.53 KG/M2 | HEART RATE: 68 BPM | RESPIRATION RATE: 14 BRPM | HEIGHT: 62 IN

## 2017-05-02 DIAGNOSIS — T14.8XXA WOUND DISCHARGE: ICD-10-CM

## 2017-05-02 DIAGNOSIS — I10 ESSENTIAL HYPERTENSION: ICD-10-CM

## 2017-05-02 DIAGNOSIS — R60.0 LOCALIZED EDEMA: Primary | ICD-10-CM

## 2017-05-02 PROCEDURE — G0299 HHS/HOSPICE OF RN EA 15 MIN: HCPCS

## 2017-05-02 PROCEDURE — 3331090002 HH PPS REVENUE DEBIT

## 2017-05-02 PROCEDURE — 3331090001 HH PPS REVENUE CREDIT

## 2017-05-02 RX ORDER — HYDROCODONE BITARTRATE AND ACETAMINOPHEN 5; 325 MG/1; MG/1
1 TABLET ORAL
Qty: 12 TAB | Refills: 0 | Status: SHIPPED | OUTPATIENT
Start: 2017-05-02 | End: 2017-08-24

## 2017-05-02 NOTE — MR AVS SNAPSHOT
Visit Information Date & Time Provider Department Dept. Phone Encounter #  
 5/2/2017  2:00 PM Yesica Lewis  [facility] [phone] [id number] Follow-up Instructions Return in about 1 week (around 5/9/2017), or with Dr Ara Restrepo. Your Appointments 5/23/2017  9:00 AM  
ESTABLISHED PATIENT with Norberto Cummings MD  
[facility] ([facility] [place] [facility]) Appt Note: 3 mo f/u w/ fbw $ 0 cp mrm [address] [phone]  
[phone]  
  
   
 [address] [address] Upcoming Health Maintenance Date Due DTaP/Tdap/Td series (1 - Tdap) 2/10/1956 ZOSTER VACCINE AGE 60> 2/10/1996 INFLUENZA AGE 9 TO ADULT 8/1/2017 GLAUCOMA SCREENING Q2Y 10/22/2017 MEDICARE YEARLY EXAM 4/7/2018 Allergies as of 5/2/2017  Review Complete On: 5/2/2017 By: Yesica Lewis MD  
  
 Severity Noted Reaction Type Reactions Niacin  10/22/2013    Itching Felt hot also Ultram [Tramadol]  10/22/2013    Nausea Only Current Immunizations  Reviewed on 11/5/2015 Name Date Influenza High Dose Vaccine PF 10/4/2016 Influenza Vaccine 10/22/2014 Influenza Vaccine (Quad) PF 11/5/2015 Pneumococcal Conjugate (PCV-13) 5/1/2015 Not reviewed this visit You Were Diagnosed With   
  
 Codes Comments Localized edema    -  Primary ICD-10-CM: R60.0 ICD-9-CM: 327. 3 Wound discharge     ICD-10-CM: T14.8 ICD-9-CM: 879.8 Essential hypertension     ICD-10-CM: I10 
ICD-9-CM: 401.9 Vitals BP Pulse Temp Resp Height(growth percentile) 108/49 (BP 1 Location: Right arm, BP Patient Position: Sitting) 68 97.3 °F (36.3 °C) (Temporal) 14 5' 2\" (1.575 m) Weight(growth percentile) SpO2 BMI OB Status Smoking Status 242 lb (109.8 kg) 96% 44.26 kg/m2 Hysterectomy Never Smoker BMI and BSA Data Body Mass Index Body Surface Area 44.26 kg/m 2 2.19 m 2 Preferred Pharmacy Pharmacy Name Phone [facility],[address], [address] [place] [address]  [phone] Your Updated Medication List  
  
   
This list is accurate as of: 5/2/17  2:42 PM.  Always use your most recent med list.  
  
  
  
  
 acetaminophen 325 mg tablet Commonly known as:  TYLENOL Take  by mouth every four (4) hours as needed for Pain.  
  
 amoxicillin-clavulanate 875-125 mg per tablet Commonly known as:  AUGMENTIN Take 1 Tab by mouth two (2) times a day. ASPERCREME EX  
by Apply Externally route. Applies to left knee 3-4 x daily  
  
 aspirin delayed-release 81 mg tablet Take  by mouth daily. cetirizine 10 mg tablet Commonly known as:  ZyrTEC Take 1 Tab by mouth daily as needed for Allergies. clotrimazole 1 % topical cream  
Commonly known as:  Bonnetta Hugger Apply  to affected area two (2) times a day. clotrimazole-betamethasone topical cream  
Commonly known as:  Darra Benoit Apply bid for 2 w  
  
 CO Q-10 PO Take 200 mg by mouth daily. dilTIAZem 120 mg SR capsule Commonly known as:  [facility] Take 1 Cap by mouth daily. esomeprazole 40 mg capsule Commonly known as:  NexIUM Take 1 Cap by mouth daily. furosemide 40 mg tablet Commonly known as:  LASIX Take one bid for 3 d then qd  
  
 hydroCHLOROthiazide 25 mg tablet Commonly known as:  HYDRODIURIL Take 1 Tab by mouth daily. HYDROcodone-acetaminophen 5-325 mg per tablet Commonly known as:  Brenda Holiday Take 1 Tab by mouth every eight (8) hours as needed. Max Daily Amount: 3 Tabs. LIPITOR 20 mg tablet Generic drug:  atorvastatin Take  by mouth daily. magnesium oxide 500 mg Tab Take 1 Tab by mouth daily. multivitamin tablet Commonly known as:  ONE A DAY Take 1 Tab by mouth daily. nystatin powder Commonly known as:  MYCOSTATIN Apply  to affected area two (2) times a day.  Indications: CUTANEOUS CANDIDIASIS  
  
 polyethylene glycol 17 gram packet Commonly known as:  Ulysses Omayra Take 1 Packet by mouth daily as needed. potassium chloride SR 10 mEq tablet Commonly known as:  KLOR-CON 10 Take 2 Tabs by mouth two (2) times a day. VITAMIN D3 1,000 unit Cap Generic drug:  cholecalciferol Take 1,000 Units by mouth daily. Prescriptions Printed Refills HYDROcodone-acetaminophen (NORCO) 5-325 mg per tablet 0 Sig: Take 1 Tab by mouth every eight (8) hours as needed. Max Daily Amount: 3 Tabs. Class: Print Route: Oral  
  
Follow-up Instructions Return in about 1 week (around 5/9/2017), or with Dr Elizabeth Joshi. To-Do List   
 05/03/2017 To Be Determined Appointment with Allen Gutierrez PTA at [place] [address]  
  
 05/05/2017 To Be Determined Appointment with Caitlin Cisneros RN at [place] [address]  
  
 05/08/2017 To Be Determined Appointment with Caitlin Cisneros RN at [place] [address]  
  
 05/11/2017 To Be Determined Appointment with Caitlin Cisneros RN at [place] [address]  
  
 05/11/2017 2:00 PM  
  Appointment with Hamilton Ceballos PT at [place] [address] Patient Instructions Same meds Take furosimide one daily Continue to hold lisinopril Use support hose during day Introducing [facility]! Dear Roberts Spatz: 
Thank you for requesting a Helixis account. Our records indicate that you already have an active Helixis account. You can access your account anytime at [email or web address] Did you know that you can access your hospital and ER discharge instructions at any time in Helixis? You can also review all of your test results from your hospital stay or ER visit. Additional Information If you have questions, please visit the Frequently Asked Questions section of the #waywire website at [email or web address] Remember, #waywire is NOT to be used for urgent needs. For medical emergencies, dial 911. Now available from your iPhone and Android! Please provide this summary of care documentation to your next provider. Your primary care clinician is listed as Dimple Greco. If you have any questions after today's visit, please call [phone].

## 2017-05-02 NOTE — TELEPHONE ENCOUNTER
Home Health nurse calling,says patient is scheduled to see you today she was seen in ER yest., with Lt.face swelling,Lisinopril DC'd. She is still having some swelling in legs and if you agree to support stockings,please fax order to #[phone].

## 2017-05-02 NOTE — PROGRESS NOTES
Philippe Morgan is a 81 y.o. female who presents to the office today with the following:  Chief Complaint   Patient presents with    Follow-up     [facility] ED visit 5/1/17, facial edema, instructed to d/c lisinopril       Allergies   Allergen Reactions    Niacin Itching     Felt hot also    Ultram [Tramadol] Nausea Only       Current Outpatient Prescriptions   Medication Sig    HYDROcodone-acetaminophen (NORCO) 5-325 mg per tablet Take 1 Tab by mouth every eight (8) hours as needed. Max Daily Amount: 3 Tabs.  atorvastatin (LIPITOR) 20 mg tablet Take  by mouth daily.  furosemide (LASIX) 40 mg tablet Take one bid for 3 d then qd    amoxicillin-clavulanate (AUGMENTIN) 875-125 mg per tablet Take 1 Tab by mouth two (2) times a day.  nystatin (MYCOSTATIN) powder Apply  to affected area two (2) times a day. Indications: CUTANEOUS CANDIDIASIS    polyethylene glycol (MIRALAX) 17 gram packet Take 1 Packet by mouth daily as needed.  clotrimazole (LOTRIMIN) 1 % topical cream Apply  to affected area two (2) times a day.  clotrimazole-betamethasone (LOTRISONE) topical cream Apply bid for 2 w    cetirizine (ZYRTEC) 10 mg tablet Take 1 Tab by mouth daily as needed for Allergies.  cholecalciferol (VITAMIN D3) 1,000 unit cap Take 1,000 Units by mouth daily.  esomeprazole (NEXIUM) 40 mg capsule Take 1 Cap by mouth daily.  dilTIAZem (TIAZAC) 120 mg SR capsule Take 1 Cap by mouth daily.  potassium chloride SR (KLOR-CON 10) 10 mEq tablet Take 2 Tabs by mouth two (2) times a day.  UBIDECARENONE (CO Q-10 PO) Take 200 mg by mouth daily.  acetaminophen (TYLENOL) 325 mg tablet Take  by mouth every four (4) hours as needed for Pain.  TROLAMINE SALICYLATE (ASPERCREME EX) by Apply Externally route. Applies to left knee 3-4 x daily    aspirin delayed-release 81 mg tablet Take  by mouth daily.  magnesium oxide 500 mg tab Take 1 Tab by mouth daily.     multivitamin (ONE A DAY) tablet Take 1 Tab by mouth daily.    hydroCHLOROthiazide (HYDRODIURIL) 25 mg tablet Take 1 Tab by mouth daily. No current facility-administered medications for this visit. Past Medical History:   Diagnosis Date    Allergic rhinitis     Cancer ([facility] [address]     Stage 1 cancer intestines    Cervical disc disease     Cervical disc disease     Colonic mass 3/20/2017    Eczema     Gastritis     GERD (gastroesophageal reflux disease)     mild    GI bleed     hx. of recurrent    Hernia     hiatal lt side    Hiatal hernia     Hypercholesterolemia     Hypertension     Morbid obesity ([facility] [address] 4/13/2017    Osteoarthritis (arthritis due to wear and tear of joints)        Past Surgical History:   Procedure Laterality Date    ABDOMEN SURGERY PROC UNLISTED      Colon Resection (Cancer)    HX COLONOSCOPY  3/05    HX COLONOSCOPY  2017    HX CYST REMOVAL      left side    HX ENDOSCOPY  1/09    HX ENDOSCOPY  2015    HX GYN      hysterectomy    HX ORTHOPAEDIC      rt knee replacement       History   Smoking Status    Never Smoker   Smokeless Tobacco    Never Used       Family History   Problem Relation Age of Onset    No Known Problems Mother     No Known Problems Father     Arthritis-osteo Sister     Alzheimer Sister     Parkinson's Disease Brother     Cancer Brother     No Known Problems Brother     Anesth Problems Neg Hx          History of Present Illness:    Patient here for follow-up edema and from ER visit    Patient underwent colon resection last month. She did develop a superficial wound infection. Culture did go out enterococcus. She is currently on Augmentin. Those symptoms have improved. She was seen by Dr. Tracy Spear her primary last week with increased weight and bilateral lower extremity edema. Her hydrochlorothiazide was placed on hold and she was started on furosemide 40 mg twice daily for 3 days and then daily afterwards. Symptoms have improved. Weight is down over 10 pounds.   Legs remain swollen but much less so. They do not hurt at this point. She has no PND or orthopnea. Yesterday she developed some left-sided facial and lip swelling. She did go to the emergency room. In emergency room she had chest x-ray which just showed cardiomegaly no congestive changes. Lab work including CBC and chemistry panel were essentially normal except for slightly low albumin. Urinalysis was negative. She was diagnosed with angioedema. She was asked to hold her lisinopril which she has done. She states her lisinopril is a relatively new medication for her. Other than the antibiotic and the Lasix there have not been any other new medications. Lip swelling began to go down in the emergency room. It is much better today. She complains today of a fullness in the skin under her chin but no true swelling. No difficulty swallowing or breathing. She does have hypertension. Blood pressure remains in good control off the lisinopril and on the Lasix    ER did recommend support hose for her. I got a call from her visiting nurses earlier today who are willing to get the support hose for her if we send in a fax    Review of Systems:    Review of systems negative except as noted above      Physical Exam:  Visit Vitals    /49 (BP 1 Location: Right arm, BP Patient Position: Sitting)    Pulse 68    Temp 97.3 °F (36.3 °C) (Temporal)    Resp 14    Ht 5' 2\" (1.575 m)    Wt 242 lb (109.8 kg)  Comment: wheelchair, weak, unsteady, fall risk    SpO2 96%    BMI 44.26 kg/m2     Vitals:    05/02/17 1407   BP: 108/49   BP 1 Location: Right arm   BP Patient Position: Sitting   Pulse: 68   Resp: 14   Temp: 97.3 °F (36.3 °C)   TempSrc: Temporal   SpO2: 96%   Weight: 242 lb (109.8 kg)  Comment: wheelchair, weak, unsteady, fall risk   Height: 5' 2\" (1.575 m)    patient was in no acute distress vital signs stable. Blood pressure as noted above  Face had no localized swelling today.   OP was within normal limits  Neck today had no nodes no masses no obvious swelling. Skin was not indurated. No stridor  Chest was clear no wheezes rhonchi or rales  Cor regular rate and rhythm. No S3 no S4  Abdomen was obese. Wound site was looked at today. No erythema. Healing well  Extremities did have bilateral pitting edema to just below the knee. Patient states however this is significantly better than last week. No significant erythema. Nontender. Previous Doppler ultrasounds during this time are negative      Assessment/Plan:  1. Localized edema  Improving. Suspect largely due to postop status and poor nutrition during this time especially given the low albumin. Patient is can continue with her current regimen including Lasix daily and holding the lisinopril. I encouraged her to try and eat well especially protein rich foods. We are getting her support hose. Visiting nurses will continue to follow. She will be back next week for recheck sooner if needed    2. Wound discharge  Improved. Patient will continue her current medications. She did ask for refill of the pain medication she had been given at the time of surgery. She is on Norco 5/325. I did give her 12 more tablets. She is aware of precautions    3. Essential hypertension  Controlled      Patient Instructions   Same meds  Take furosimide one daily  Continue to hold lisinopril  Use support hose during day        Continue current therapy plan except for indicated above. Verbal and written instructions (see AVS) provided.  Patient expresses understanding of diagnosis and treatment plan. Follow-up Disposition:  Return in about 1 week (around 5/9/2017), or with Dr Pat Orozco. Merline Hastings.  Gabriel Brooks MD

## 2017-05-03 ENCOUNTER — HOME CARE VISIT (OUTPATIENT)
Dept: SCHEDULING | Facility: HOME HEALTH | Age: 82
End: 2017-05-03
Payer: MEDICARE

## 2017-05-03 VITALS
SYSTOLIC BLOOD PRESSURE: 120 MMHG | HEART RATE: 74 BPM | OXYGEN SATURATION: 98 % | TEMPERATURE: 98 F | DIASTOLIC BLOOD PRESSURE: 62 MMHG | RESPIRATION RATE: 18 BRPM

## 2017-05-03 PROCEDURE — 3331090002 HH PPS REVENUE DEBIT

## 2017-05-03 PROCEDURE — 3331090001 HH PPS REVENUE CREDIT

## 2017-05-03 PROCEDURE — G0157 HHC PT ASSISTANT EA 15: HCPCS

## 2017-05-04 PROCEDURE — 3331090001 HH PPS REVENUE CREDIT

## 2017-05-04 PROCEDURE — 3331090002 HH PPS REVENUE DEBIT

## 2017-05-05 ENCOUNTER — HOME CARE VISIT (OUTPATIENT)
Dept: SCHEDULING | Facility: HOME HEALTH | Age: 82
End: 2017-05-05
Payer: MEDICARE

## 2017-05-05 VITALS
TEMPERATURE: 98.1 F | SYSTOLIC BLOOD PRESSURE: 100 MMHG | HEART RATE: 82 BPM | DIASTOLIC BLOOD PRESSURE: 64 MMHG | OXYGEN SATURATION: 98 % | RESPIRATION RATE: 20 BRPM

## 2017-05-05 PROCEDURE — 3331090002 HH PPS REVENUE DEBIT

## 2017-05-05 PROCEDURE — G0299 HHS/HOSPICE OF RN EA 15 MIN: HCPCS

## 2017-05-05 PROCEDURE — 3331090001 HH PPS REVENUE CREDIT

## 2017-05-05 PROCEDURE — G0157 HHC PT ASSISTANT EA 15: HCPCS

## 2017-05-06 PROCEDURE — 3331090002 HH PPS REVENUE DEBIT

## 2017-05-06 PROCEDURE — 3331090001 HH PPS REVENUE CREDIT

## 2017-05-07 PROCEDURE — 3331090002 HH PPS REVENUE DEBIT

## 2017-05-07 PROCEDURE — 3331090001 HH PPS REVENUE CREDIT

## 2017-05-08 ENCOUNTER — HOME CARE VISIT (OUTPATIENT)
Dept: SCHEDULING | Facility: HOME HEALTH | Age: 82
End: 2017-05-08
Payer: MEDICARE

## 2017-05-08 VITALS
HEART RATE: 72 BPM | TEMPERATURE: 97 F | SYSTOLIC BLOOD PRESSURE: 142 MMHG | RESPIRATION RATE: 18 BRPM | OXYGEN SATURATION: 96 % | DIASTOLIC BLOOD PRESSURE: 78 MMHG

## 2017-05-08 PROCEDURE — 3331090002 HH PPS REVENUE DEBIT

## 2017-05-08 PROCEDURE — G0299 HHS/HOSPICE OF RN EA 15 MIN: HCPCS

## 2017-05-08 PROCEDURE — 3331090001 HH PPS REVENUE CREDIT

## 2017-05-09 ENCOUNTER — HOME CARE VISIT (OUTPATIENT)
Dept: SCHEDULING | Facility: HOME HEALTH | Age: 82
End: 2017-05-09
Payer: MEDICARE

## 2017-05-09 VITALS — OXYGEN SATURATION: 97 % | RESPIRATION RATE: 17 BRPM | HEART RATE: 78 BPM

## 2017-05-09 PROCEDURE — G0151 HHCP-SERV OF PT,EA 15 MIN: HCPCS

## 2017-05-09 PROCEDURE — 3331090002 HH PPS REVENUE DEBIT

## 2017-05-09 PROCEDURE — 3331090001 HH PPS REVENUE CREDIT

## 2017-05-10 PROCEDURE — 3331090001 HH PPS REVENUE CREDIT

## 2017-05-10 PROCEDURE — 3331090002 HH PPS REVENUE DEBIT

## 2017-05-11 PROCEDURE — 3331090002 HH PPS REVENUE DEBIT

## 2017-05-11 PROCEDURE — 3331090001 HH PPS REVENUE CREDIT

## 2017-05-12 ENCOUNTER — HOME CARE VISIT (OUTPATIENT)
Dept: SCHEDULING | Facility: HOME HEALTH | Age: 82
End: 2017-05-12
Payer: MEDICARE

## 2017-05-12 VITALS
DIASTOLIC BLOOD PRESSURE: 62 MMHG | RESPIRATION RATE: 18 BRPM | TEMPERATURE: 98 F | SYSTOLIC BLOOD PRESSURE: 118 MMHG | OXYGEN SATURATION: 94 % | HEART RATE: 76 BPM

## 2017-05-12 PROCEDURE — 3331090001 HH PPS REVENUE CREDIT

## 2017-05-12 PROCEDURE — 3331090002 HH PPS REVENUE DEBIT

## 2017-05-12 PROCEDURE — G0299 HHS/HOSPICE OF RN EA 15 MIN: HCPCS

## 2017-05-13 PROCEDURE — 3331090001 HH PPS REVENUE CREDIT

## 2017-05-13 PROCEDURE — 3331090002 HH PPS REVENUE DEBIT

## 2017-05-14 PROCEDURE — 3331090002 HH PPS REVENUE DEBIT

## 2017-05-14 PROCEDURE — 3331090001 HH PPS REVENUE CREDIT

## 2017-05-15 ENCOUNTER — OFFICE VISIT (OUTPATIENT)
Dept: FAMILY MEDICINE CLINIC | Age: 82
End: 2017-05-15

## 2017-05-15 ENCOUNTER — HOME CARE VISIT (OUTPATIENT)
Dept: SCHEDULING | Facility: HOME HEALTH | Age: 82
End: 2017-05-15
Payer: MEDICARE

## 2017-05-15 VITALS
HEART RATE: 65 BPM | SYSTOLIC BLOOD PRESSURE: 112 MMHG | RESPIRATION RATE: 20 BRPM | OXYGEN SATURATION: 96 % | DIASTOLIC BLOOD PRESSURE: 60 MMHG | TEMPERATURE: 98.2 F

## 2017-05-15 VITALS
BODY MASS INDEX: 43.06 KG/M2 | WEIGHT: 234 LBS | HEIGHT: 62 IN | HEART RATE: 64 BPM | SYSTOLIC BLOOD PRESSURE: 100 MMHG | DIASTOLIC BLOOD PRESSURE: 60 MMHG | TEMPERATURE: 96 F

## 2017-05-15 DIAGNOSIS — E88.81 METABOLIC SYNDROME: ICD-10-CM

## 2017-05-15 DIAGNOSIS — E78.5 HYPERLIPIDEMIA, UNSPECIFIED HYPERLIPIDEMIA TYPE: ICD-10-CM

## 2017-05-15 DIAGNOSIS — R53.83 FATIGUE, UNSPECIFIED TYPE: ICD-10-CM

## 2017-05-15 DIAGNOSIS — R73.9 ELEVATED BLOOD SUGAR: ICD-10-CM

## 2017-05-15 DIAGNOSIS — R60.0 LOCALIZED EDEMA: Primary | ICD-10-CM

## 2017-05-15 DIAGNOSIS — E87.6 HYPOKALEMIA: ICD-10-CM

## 2017-05-15 DIAGNOSIS — I10 ESSENTIAL HYPERTENSION: ICD-10-CM

## 2017-05-15 DIAGNOSIS — B37.2 MONILIASIS, INTERDIGITAL: ICD-10-CM

## 2017-05-15 PROCEDURE — G0299 HHS/HOSPICE OF RN EA 15 MIN: HCPCS

## 2017-05-15 PROCEDURE — 3331090001 HH PPS REVENUE CREDIT

## 2017-05-15 PROCEDURE — 3331090002 HH PPS REVENUE DEBIT

## 2017-05-15 RX ORDER — NYSTATIN 100000 [USP'U]/G
POWDER TOPICAL 2 TIMES DAILY
Qty: 1 BOTTLE | Refills: 0 | Status: SHIPPED | OUTPATIENT
Start: 2017-05-15 | End: 2018-02-20 | Stop reason: ALTCHOICE

## 2017-05-15 NOTE — MR AVS SNAPSHOT
Visit Information Date & Time Provider Department Dept. Phone Encounter #  
 5/15/2017  7:20 AM eNlda Koch  [address] [phone] [id number] Follow-up Instructions Return in about 6 months (around 11/15/2017). Your Appointments 5/16/2017 11:40 AM  
POST OP 10 MIN with SRINI Rehman [phone] ([facility]) Appt Note: PO/ 4 week f/u GP: 4-11-17 LAPAROSCOPIC RT COLECTOMY/$0.00cp/$0.00pb [address] [facility] [id number]  
[facility] [phone]  
  
    
 5/23/2017  9:00 AM  
ESTABLISHED PATIENT with Nelda Koch MD  
[facility] Appt Note: 3 mo f/u w/ fbw $ 0 cp [phone] [phone]  
[phone]  
  
   
 [address] [facility] [address] Upcoming Health Maintenance Date Due DTaP/Tdap/Td series (1 - Tdap) 2/10/1957 ZOSTER VACCINE AGE 60> 2/10/1996 INFLUENZA AGE 9 TO ADULT 8/1/2017 GLAUCOMA SCREENING Q2Y 10/22/2017 MEDICARE YEARLY EXAM 4/7/2018 Allergies as of 5/15/2017  Review Complete On: 5/15/2017 By: Eda Jackson LPN Severity Noted Reaction Type Reactions Lisinopril Medium 05/02/2017   Side Effect Angioedema Niacin  10/22/2013    Itching Felt hot also Ultram [Tramadol]  10/22/2013    Nausea Only Current Immunizations  Reviewed on 11/5/2015 Name Date Influenza High Dose Vaccine PF 10/4/2016 Influenza Vaccine 10/22/2014 Influenza Vaccine (Quad) PF 11/5/2015 Pneumococcal Conjugate (PCV-13) 5/1/2015 Not reviewed this visit You Were Diagnosed With   
  
 Codes Comments Localized edema    -  Primary ICD-10-CM: R60.0 ICD-9-CM: 782.3 Essential hypertension     ICD-10-CM: I10 
ICD-9-CM: 401.9 Hypokalemia     ICD-10-CM: E87.6 ICD-9-CM: 276.8 Fatigue, unspecified type     ICD-10-CM: R53.83 ICD-9-CM: 780.79 Hyperlipidemia, unspecified hyperlipidemia type     ICD-10-CM: E78.5 ICD-9-CM: 272.4 Metabolic syndrome     UNB-32-XA: Q29.63 ICD-9-CM: 277.7 Moniliasis, interdigital     ICD-10-CM: B37.2 ICD-9-CM: 112.3 Vitals BP Pulse Temp Height(growth percentile) Weight(growth percentile) BMI  
 100/60 (BP 1 Location: Right arm, BP Patient Position: Sitting) 64 96 °F (35.6 °C) (Oral) 5' 2\" (1.575 m) 234 lb (106.1 kg) 42.8 kg/m2 OB Status Smoking Status Hysterectomy Never Smoker Vitals History BMI and BSA Data Body Mass Index Body Surface Area  
 42.8 kg/m 2 2.15 m 2 Preferred Pharmacy Pharmacy Name Phone [facility],[address], [address] [place] [address]  [phone] Your Updated Medication List  
  
   
This list is accurate as of: 5/15/17  8:12 AM.  Always use your most recent med list.  
  
  
  
  
 acetaminophen 325 mg tablet Commonly known as:  TYLENOL Take  by mouth every four (4) hours as needed for Pain. ASPERCREME EX  
by Apply Externally route. Applies to left knee 3-4 x daily  
  
 aspirin delayed-release 81 mg tablet Take  by mouth daily. cetirizine 10 mg tablet Commonly known as:  ZyrTEC Take 1 Tab by mouth daily as needed for Allergies. CO Q-10 PO Take 200 mg by mouth daily. dilTIAZem 120 mg SR capsule Commonly known as:  [facility] Take 1 Cap by mouth daily. esomeprazole 40 mg capsule Commonly known as:  NexIUM Take 1 Cap by mouth daily. furosemide 40 mg tablet Commonly known as:  LASIX Take one bid for 3 d then qd  
  
 hydroCHLOROthiazide 25 mg tablet Commonly known as:  HYDRODIURIL Take 1 Tab by mouth daily. HYDROcodone-acetaminophen 5-325 mg per tablet Commonly known as:  Nelda Dus Take 1 Tab by mouth every eight (8) hours as needed. Max Daily Amount: 3 Tabs. LIPITOR 20 mg tablet Generic drug:  atorvastatin Take  by mouth daily. magnesium oxide 500 mg Tab Take 1 Tab by mouth daily. multivitamin tablet Commonly known as:  ONE A DAY Take 1 Tab by mouth daily. nystatin powder Commonly known as:  MYCOSTATIN Apply  to affected area two (2) times a day. Indications: CUTANEOUS CANDIDIASIS  
  
 polyethylene glycol 17 gram packet Commonly known as:  Elbridge Taylor Take 1 Packet by mouth daily as needed. potassium chloride SR 10 mEq tablet Commonly known as:  KLOR-CON 10 Take 2 Tabs by mouth two (2) times a day. VITAMIN D3 1,000 unit Cap Generic drug:  cholecalciferol Take 1,000 Units by mouth daily. Prescriptions Sent to Pharmacy Refills  
 nystatin (MYCOSTATIN) powder 0 Sig: Apply  to affected area two (2) times a day. Indications: CUTANEOUS CANDIDIASIS Class: Normal  
 Pharmacy: [address] [place] [address],[address], [address] [place] [address] Dr Aranda #: [phone] Route: Topical  
  
We Performed the Following CBC WITH AUTOMATED DIFF [02404 CPT(R)] HEMOGLOBIN A1C W/O EAG [01043 CPT(R)] LIPID PANEL WITH LDL/HDL RATIO [26073 CPT(R)] METABOLIC PANEL, COMPREHENSIVE [94500 CPT(R)] SC COLLECTION VENOUS BLOOD,VENIPUNCTURE B7856015 CPT(R)] SC HANDLG&/OR CONVEY OF SPEC FOR TR OFFICE TO LAB [73734 CPT(R)] TSH 3RD GENERATION [81762 CPT(R)] Follow-up Instructions Return in about 6 months (around 11/15/2017). Introducing [facility]! Dear [place]: 
Thank you for requesting a [employer] account. Our records indicate that you already have an active [employer] account. You can access your account anytime at [email or web address] Did you know that you can access your hospital and ER discharge instructions at any time in [employer]? You can also review all of your test results from your hospital stay or ER visit. Additional Information If you have questions, please visit the Frequently Asked Questions section of the [email or web address] website at [email or web address] Remember, [employer] is NOT to be used for urgent needs. For medical emergencies, dial 911. Now available from your iPhone and Android! Please provide this summary of care documentation to your next provider. Your primary care clinician is listed as Dimple Greco. If you have any questions after today's visit, please call [phone].

## 2017-05-15 NOTE — PROGRESS NOTES
Health Maintenance Due   Topic Date Due    DTaP/Tdap/Td series (1 - Tdap) 02/10/1957 Advised    ZOSTER VACCINE AGE 60>  02/10/1996 Advised to get this

## 2017-05-15 NOTE — PROGRESS NOTES
HISTORY OF PRESENT ILLNESS  El Allen is a 81 y.o. female. Chief Complaint   Patient presents with    Follow-up     edema legs and face       HPI  Had angioedema with Lisinopril  Better now  On Lasix 40 a day now for edema  Weight back down to baseline  Was high d/t fluids from surgery  On HCTZ and Lasix  And Cardizem  BP low now    On potasium 20 bid now    Hyperlipidemia  On Chol meds  Due for recheck  Fasting today    Prediabetes    Had Colon Ca  S/p surgery  No other treatment needed    Needs refill of Nystatin powder for rash under the breast  It helped and would like refill if rash returns    Review of Systems   Constitutional: Positive for weight loss. Negative for fever. Respiratory: Negative for cough and shortness of breath. Cardiovascular: Positive for leg swelling (little occ in afternoon). Negative for chest pain. Gastrointestinal: Positive for constipation. Negative for abdominal pain, blood in stool, diarrhea, melena, nausea and vomiting. Past Medical History:   Diagnosis Date    Allergic rhinitis     Cancer ([facility] [address]     Stage 1 cancer intestines    Cervical disc disease     Cervical disc disease     Colonic mass 3/20/2017    Eczema     Gastritis     GERD (gastroesophageal reflux disease)     mild    GI bleed     hx. of recurrent    Hernia     hiatal lt side    Hiatal hernia     Hypercholesterolemia     Hypertension     Morbid obesity ([facility] [address] 4/13/2017    Osteoarthritis (arthritis due to wear and tear of joints)      Current Outpatient Prescriptions   Medication Sig Dispense Refill    nystatin (MYCOSTATIN) powder Apply  to affected area two (2) times a day. Indications: CUTANEOUS CANDIDIASIS 1 Bottle 0    HYDROcodone-acetaminophen (NORCO) 5-325 mg per tablet Take 1 Tab by mouth every eight (8) hours as needed. Max Daily Amount: 3 Tabs. 12 Tab 0    atorvastatin (LIPITOR) 20 mg tablet Take  by mouth daily.       furosemide (LASIX) 40 mg tablet Take one bid for 3 d then qd 33 Tab 1    polyethylene glycol (MIRALAX) 17 gram packet Take 1 Packet by mouth daily as needed. 30 Packet 0    cetirizine (ZYRTEC) 10 mg tablet Take 1 Tab by mouth daily as needed for Allergies. 30 Tab 3    cholecalciferol (VITAMIN D3) 1,000 unit cap Take 1,000 Units by mouth daily.  esomeprazole (NEXIUM) 40 mg capsule Take 1 Cap by mouth daily. 90 Cap 1    hydroCHLOROthiazide (HYDRODIURIL) 25 mg tablet Take 1 Tab by mouth daily. 90 Tab 1    dilTIAZem (TIAZAC) 120 mg SR capsule Take 1 Cap by mouth daily. 90 Cap 1    potassium chloride SR (KLOR-CON 10) 10 mEq tablet Take 2 Tabs by mouth two (2) times a day. (Patient taking differently: Take 20 mEq by mouth two (2) times a day. Only taking 2 per day.) 180 Tab 1    UBIDECARENONE (CO Q-10 PO) Take 200 mg by mouth daily.  aspirin delayed-release 81 mg tablet Take  by mouth daily.  magnesium oxide 500 mg tab Take 1 Tab by mouth daily.  multivitamin (ONE A DAY) tablet Take 1 Tab by mouth daily.  acetaminophen (TYLENOL) 325 mg tablet Take  by mouth every four (4) hours as needed for Pain.  TROLAMINE SALICYLATE (ASPERCREME EX) by Apply Externally route. Applies to left knee 3-4 x daily       Allergies   Allergen Reactions    Lisinopril Angioedema    Niacin Itching     Felt hot also    Ultram [Tramadol] Nausea Only     Visit Vitals    /60 (BP 1 Location: Right arm, BP Patient Position: Sitting)    Pulse 64  Comment: irreg    Temp 96 °F (35.6 °C) (Oral)    Ht 5' 2\" (1.575 m)    Wt 234 lb (106.1 kg)    BMI 42.8 kg/m2       Physical Exam   Constitutional: She is oriented to person, place, and time. She appears well-developed and well-nourished. No distress. HENT:   Head: Normocephalic and atraumatic. Eyes: Conjunctivae and EOM are normal.   Cardiovascular: Normal rate and regular rhythm. Pulmonary/Chest: Effort normal and breath sounds normal.   Abdominal: Soft. She exhibits no distension. There is no tenderness. Musculoskeletal: She exhibits edema (trace bilat, no calf tenderness). Neurological: She is alert and oriented to person, place, and time. Skin: Skin is warm and dry. Psychiatric: She has a normal mood and affect. Nursing note and vitals reviewed. ASSESSMENT and PLAN    ICD-10-CM ICD-9-CM    1. Localized edema L98.2 783.7 METABOLIC PANEL, COMPREHENSIVE   2. Essential hypertension T55 171.2 METABOLIC PANEL, COMPREHENSIVE   3. Hypokalemia Q10.3 034.4 METABOLIC PANEL, COMPREHENSIVE   4. Fatigue, unspecified type R53.83 780.79 CBC WITH AUTOMATED DIFF      TSH 3RD GENERATION   5. Hyperlipidemia, unspecified hyperlipidemia type E78.5 272.4 LIPID PANEL WITH LDL/HDL RATIO   6. Metabolic syndrome A85.42 399.0 HEMOGLOBIN A1C W/O EAG   7.  Moniliasis, interdigital B37.2 112.3 nystatin (MYCOSTATIN) powder   decrease Lasix to prn and change Potasium to 2 every day and 2 extra if taking Lasix  F/U with surgery tomorrow

## 2017-05-16 ENCOUNTER — OFFICE VISIT (OUTPATIENT)
Dept: SURGERY | Age: 82
End: 2017-05-16

## 2017-05-16 VITALS
BODY MASS INDEX: 43.06 KG/M2 | WEIGHT: 234 LBS | SYSTOLIC BLOOD PRESSURE: 130 MMHG | HEART RATE: 82 BPM | RESPIRATION RATE: 18 BRPM | DIASTOLIC BLOOD PRESSURE: 68 MMHG | HEIGHT: 62 IN | TEMPERATURE: 98.2 F | OXYGEN SATURATION: 95 %

## 2017-05-16 DIAGNOSIS — Z09 POSTOPERATIVE EXAMINATION: Primary | ICD-10-CM

## 2017-05-16 LAB
ALBUMIN SERPL-MCNC: 3.9 G/DL (ref 3.5–4.7)
ALBUMIN/GLOB SERPL: 1.6 {RATIO} (ref 1.2–2.2)
ALP SERPL-CCNC: 82 IU/L (ref 39–117)
ALT SERPL-CCNC: 10 IU/L (ref 0–32)
AST SERPL-CCNC: 17 IU/L (ref 0–40)
BASOPHILS # BLD AUTO: 0 X10E3/UL (ref 0–0.2)
BASOPHILS NFR BLD AUTO: 0 %
BILIRUB SERPL-MCNC: 0.7 MG/DL (ref 0–1.2)
BUN SERPL-MCNC: 13 MG/DL (ref 8–27)
BUN/CREAT SERPL: 17 (ref 12–28)
CALCIUM SERPL-MCNC: 9.3 MG/DL (ref 8.7–10.3)
CHLORIDE SERPL-SCNC: 90 MMOL/L (ref 96–106)
CHOLEST SERPL-MCNC: 116 MG/DL (ref 100–199)
CO2 SERPL-SCNC: 29 MMOL/L (ref 18–29)
CREAT SERPL-MCNC: 0.75 MG/DL (ref 0.57–1)
EOSINOPHIL # BLD AUTO: 0.1 X10E3/UL (ref 0–0.4)
EOSINOPHIL NFR BLD AUTO: 1 %
ERYTHROCYTE [DISTWIDTH] IN BLOOD BY AUTOMATED COUNT: 15.3 % (ref 12.3–15.4)
GLOBULIN SER CALC-MCNC: 2.4 G/DL (ref 1.5–4.5)
GLUCOSE SERPL-MCNC: 123 MG/DL (ref 65–99)
HBA1C MFR BLD: 6.4 % (ref 4.8–5.6)
HCT VFR BLD AUTO: 38 % (ref 34–46.6)
HDLC SERPL-MCNC: 58 MG/DL
HGB BLD-MCNC: 11.7 G/DL (ref 11.1–15.9)
IMM GRANULOCYTES # BLD: 0 X10E3/UL (ref 0–0.1)
IMM GRANULOCYTES NFR BLD: 0 %
INTERPRETATION, 910389: NORMAL
LDLC SERPL CALC-MCNC: 49 MG/DL (ref 0–99)
LDLC/HDLC SERPL: 0.8 RATIO UNITS (ref 0–3.2)
LYMPHOCYTES # BLD AUTO: 1.3 X10E3/UL (ref 0.7–3.1)
LYMPHOCYTES NFR BLD AUTO: 18 %
MCH RBC QN AUTO: 26.8 PG (ref 26.6–33)
MCHC RBC AUTO-ENTMCNC: 30.8 G/DL (ref 31.5–35.7)
MCV RBC AUTO: 87 FL (ref 79–97)
MONOCYTES # BLD AUTO: 0.9 X10E3/UL (ref 0.1–0.9)
MONOCYTES NFR BLD AUTO: 13 %
NEUTROPHILS # BLD AUTO: 4.8 X10E3/UL (ref 1.4–7)
NEUTROPHILS NFR BLD AUTO: 68 %
PLATELET # BLD AUTO: 225 X10E3/UL (ref 150–379)
POTASSIUM SERPL-SCNC: 3.1 MMOL/L (ref 3.5–5.2)
PROT SERPL-MCNC: 6.3 G/DL (ref 6–8.5)
RBC # BLD AUTO: 4.37 X10E6/UL (ref 3.77–5.28)
SODIUM SERPL-SCNC: 140 MMOL/L (ref 134–144)
TRIGL SERPL-MCNC: 45 MG/DL (ref 0–149)
TSH SERPL DL<=0.005 MIU/L-ACNC: 1.83 UIU/ML (ref 0.45–4.5)
VLDLC SERPL CALC-MCNC: 9 MG/DL (ref 5–40)
WBC # BLD AUTO: 7.1 X10E3/UL (ref 3.4–10.8)

## 2017-05-16 PROCEDURE — 3331090002 HH PPS REVENUE DEBIT

## 2017-05-16 PROCEDURE — 3331090001 HH PPS REVENUE CREDIT

## 2017-05-16 NOTE — PROGRESS NOTES
Subjective:      Ana Kwong is a 81 y.o. female presents for postop care Following 4wk s/p laparoscopic-assisted right  colectomy by Dr. Ava Diana. Appetite is fair. Eating a regular diet without difficulty. Home health is providing wound care on Mondays and Thursdays. Currently the wound is covered with 4x4 and tape. Denies fevers or chills. She is having some difficulty with BM's and Takes miralax if needed. Patient has an advanced directive: NO      Ms. Marcos Arshad has a reminder for a \"due or due soon\" health maintenance. I have asked that she contact her primary care provider for follow-up on this health maintenance. Objective:     Visit Vitals    /68 (BP 1 Location: Left arm, BP Patient Position: Sitting)    Pulse 82    Temp 98.2 °F (36.8 °C) (Oral)    Resp 18    Ht 5' 2\" (1.575 m)    Wt 234 lb (106.1 kg)    SpO2 95%    BMI 42.8 kg/m2       General:  alert, cooperative, no distress   Abdomen: soft, bowel sounds active, non-tender   Incision:   small area with hypergranulation tissue, draining yellow. Silver nitrate applied and covered. Remainder of wound healed well. Assessment:     Doing well postoperatively. Plan:     1. Continue home health for 1 week. 2. Follow up if wound does not heal after silver nitrate. 3. Follow up with Dr. Alfie Alvarado for routine care. Pt verbalized understanding and questions were answered to the best of my knowledge and ability.

## 2017-05-17 DIAGNOSIS — E87.6 HYPOKALEMIA: Primary | ICD-10-CM

## 2017-05-17 PROCEDURE — 3331090001 HH PPS REVENUE CREDIT

## 2017-05-17 PROCEDURE — 3331090002 HH PPS REVENUE DEBIT

## 2017-05-17 RX ORDER — POTASSIUM CHLORIDE 750 MG/1
20 TABLET, FILM COATED, EXTENDED RELEASE ORAL 2 TIMES DAILY
Qty: 360 TAB | Refills: 1 | Status: SHIPPED | OUTPATIENT
Start: 2017-05-17 | End: 2017-08-24 | Stop reason: SDUPTHER

## 2017-05-17 NOTE — PROGRESS NOTES
Call pt, the CBC is normal, no anemia now  The kidney and liver tests are normal  The Potassium is still low, increase Potassium to 2 tablets twice a day  I am calling in a new Rx  The thyroid test is normal  The HbA1C is 6.4, in the diabetes range, avoid conc sweets  The Chol is great  Recheck in 3 mo

## 2017-05-18 ENCOUNTER — HOME CARE VISIT (OUTPATIENT)
Dept: SCHEDULING | Facility: HOME HEALTH | Age: 82
End: 2017-05-18
Payer: MEDICARE

## 2017-05-18 VITALS
DIASTOLIC BLOOD PRESSURE: 62 MMHG | HEART RATE: 73 BPM | RESPIRATION RATE: 20 BRPM | OXYGEN SATURATION: 96 % | SYSTOLIC BLOOD PRESSURE: 100 MMHG | TEMPERATURE: 98.2 F

## 2017-05-18 PROCEDURE — 3331090001 HH PPS REVENUE CREDIT

## 2017-05-18 PROCEDURE — 3331090002 HH PPS REVENUE DEBIT

## 2017-05-18 PROCEDURE — G0299 HHS/HOSPICE OF RN EA 15 MIN: HCPCS

## 2017-05-19 PROCEDURE — 3331090001 HH PPS REVENUE CREDIT

## 2017-05-19 PROCEDURE — 3331090002 HH PPS REVENUE DEBIT

## 2017-05-20 PROCEDURE — 3331090002 HH PPS REVENUE DEBIT

## 2017-05-20 PROCEDURE — 3331090001 HH PPS REVENUE CREDIT

## 2017-05-21 PROCEDURE — 3331090002 HH PPS REVENUE DEBIT

## 2017-05-21 PROCEDURE — 3331090001 HH PPS REVENUE CREDIT

## 2017-05-22 ENCOUNTER — HOME CARE VISIT (OUTPATIENT)
Dept: SCHEDULING | Facility: HOME HEALTH | Age: 82
End: 2017-05-22
Payer: MEDICARE

## 2017-05-22 VITALS
TEMPERATURE: 97.7 F | OXYGEN SATURATION: 97 % | HEART RATE: 81 BPM | RESPIRATION RATE: 18 BRPM | DIASTOLIC BLOOD PRESSURE: 72 MMHG | SYSTOLIC BLOOD PRESSURE: 140 MMHG

## 2017-05-22 PROCEDURE — G0299 HHS/HOSPICE OF RN EA 15 MIN: HCPCS

## 2017-05-22 PROCEDURE — 3331090001 HH PPS REVENUE CREDIT

## 2017-05-22 PROCEDURE — 3331090002 HH PPS REVENUE DEBIT

## 2017-05-23 PROCEDURE — 3331090001 HH PPS REVENUE CREDIT

## 2017-05-23 PROCEDURE — 3331090002 HH PPS REVENUE DEBIT

## 2017-05-24 PROCEDURE — 3331090002 HH PPS REVENUE DEBIT

## 2017-05-24 PROCEDURE — 3331090001 HH PPS REVENUE CREDIT

## 2017-05-25 PROCEDURE — 3331090001 HH PPS REVENUE CREDIT

## 2017-05-25 PROCEDURE — 3331090002 HH PPS REVENUE DEBIT

## 2017-05-26 ENCOUNTER — HOME CARE VISIT (OUTPATIENT)
Dept: SCHEDULING | Facility: HOME HEALTH | Age: 82
End: 2017-05-26
Payer: MEDICARE

## 2017-05-26 PROCEDURE — 3331090002 HH PPS REVENUE DEBIT

## 2017-05-26 PROCEDURE — 3331090001 HH PPS REVENUE CREDIT

## 2017-05-26 PROCEDURE — G0299 HHS/HOSPICE OF RN EA 15 MIN: HCPCS

## 2017-05-27 VITALS
DIASTOLIC BLOOD PRESSURE: 70 MMHG | RESPIRATION RATE: 18 BRPM | HEART RATE: 71 BPM | TEMPERATURE: 97.9 F | OXYGEN SATURATION: 97 % | SYSTOLIC BLOOD PRESSURE: 124 MMHG

## 2017-05-27 PROCEDURE — 3331090002 HH PPS REVENUE DEBIT

## 2017-05-27 PROCEDURE — 3331090001 HH PPS REVENUE CREDIT

## 2017-05-28 PROCEDURE — 3331090002 HH PPS REVENUE DEBIT

## 2017-05-28 PROCEDURE — 3331090001 HH PPS REVENUE CREDIT

## 2017-05-29 PROCEDURE — 3331090001 HH PPS REVENUE CREDIT

## 2017-05-29 PROCEDURE — 3331090002 HH PPS REVENUE DEBIT

## 2017-05-30 PROCEDURE — 3331090002 HH PPS REVENUE DEBIT

## 2017-05-30 PROCEDURE — 3331090001 HH PPS REVENUE CREDIT

## 2017-05-31 PROCEDURE — 3331090002 HH PPS REVENUE DEBIT

## 2017-05-31 PROCEDURE — 3331090001 HH PPS REVENUE CREDIT

## 2017-06-01 PROCEDURE — 3331090002 HH PPS REVENUE DEBIT

## 2017-06-01 PROCEDURE — 3331090001 HH PPS REVENUE CREDIT

## 2017-06-15 ENCOUNTER — OFFICE VISIT (OUTPATIENT)
Dept: FAMILY MEDICINE CLINIC | Age: 82
End: 2017-06-15

## 2017-06-15 VITALS
WEIGHT: 237 LBS | SYSTOLIC BLOOD PRESSURE: 147 MMHG | HEART RATE: 78 BPM | OXYGEN SATURATION: 96 % | DIASTOLIC BLOOD PRESSURE: 81 MMHG | HEIGHT: 62 IN | BODY MASS INDEX: 43.61 KG/M2 | TEMPERATURE: 96.7 F | RESPIRATION RATE: 16 BRPM

## 2017-06-15 DIAGNOSIS — M43.6 STIFF NECK: Primary | ICD-10-CM

## 2017-06-15 RX ORDER — CYCLOBENZAPRINE HCL 5 MG
5 TABLET ORAL
Qty: 30 TAB | Refills: 0 | Status: SHIPPED | OUTPATIENT
Start: 2017-06-15 | End: 2017-06-29 | Stop reason: SDUPTHER

## 2017-06-15 NOTE — PROGRESS NOTES
Polina Luna is a 81 y.o. female who presents to the office today with the following:  Chief Complaint   Patient presents with    Neck Pain     x 2 days, no injury, cannot turn       Allergies   Allergen Reactions    Lisinopril Angioedema    Niacin Itching     Felt hot also    Ultram [Tramadol] Nausea Only       Current Outpatient Prescriptions   Medication Sig    cyclobenzaprine (FLEXERIL) 5 mg tablet Take 1 Tab by mouth three (3) times daily as needed for Muscle Spasm(s).  potassium chloride SR (KLOR-CON 10) 10 mEq tablet Take 2 Tabs by mouth two (2) times a day.  nystatin (MYCOSTATIN) powder Apply  to affected area two (2) times a day. Indications: CUTANEOUS CANDIDIASIS    furosemide (LASIX) 20 mg tablet Take 20 mg by mouth as needed (swelling).  atorvastatin (LIPITOR) 20 mg tablet Take  by mouth daily.  furosemide (LASIX) 40 mg tablet Take one bid for 3 d then qd    polyethylene glycol (MIRALAX) 17 gram packet Take 1 Packet by mouth daily as needed.  cetirizine (ZYRTEC) 10 mg tablet Take 1 Tab by mouth daily as needed for Allergies.  cholecalciferol (VITAMIN D3) 1,000 unit cap Take 1,000 Units by mouth daily.  esomeprazole (NEXIUM) 40 mg capsule Take 1 Cap by mouth daily.  hydroCHLOROthiazide (HYDRODIURIL) 25 mg tablet Take 1 Tab by mouth daily.  dilTIAZem (TIAZAC) 120 mg SR capsule Take 1 Cap by mouth daily.  UBIDECARENONE (CO Q-10 PO) Take 200 mg by mouth daily.  acetaminophen (TYLENOL) 325 mg tablet Take  by mouth every four (4) hours as needed for Pain.  TROLAMINE SALICYLATE (ASPERCREME EX) by Apply Externally route. Applies to left knee 3-4 x daily    aspirin delayed-release 81 mg tablet Take  by mouth daily.  magnesium oxide 500 mg tab Take 1 Tab by mouth daily.  multivitamin (ONE A DAY) tablet Take 1 Tab by mouth daily.  HYDROcodone-acetaminophen (NORCO) 5-325 mg per tablet Take 1 Tab by mouth every eight (8) hours as needed.  Max Daily Amount: 3 Tabs.     No current facility-administered medications for this visit. Past Medical History:   Diagnosis Date    Allergic rhinitis     Cancer ([facility] [address]     Stage 1 cancer intestines    Cervical disc disease     Cervical disc disease     Colonic mass 3/20/2017    Eczema     Gastritis     GERD (gastroesophageal reflux disease)     mild    GI bleed     hx. of recurrent    Hernia     hiatal lt side    Hiatal hernia     Hypercholesterolemia     Hypertension     Morbid obesity ([facility] [address] 4/13/2017    Osteoarthritis (arthritis due to wear and tear of joints)        Past Surgical History:   Procedure Laterality Date    ABDOMEN SURGERY PROC UNLISTED      Colon Resection (Cancer)    HX COLONOSCOPY  3/05    HX COLONOSCOPY  2017    HX CYST REMOVAL      left side    HX ENDOSCOPY  1/09    HX ENDOSCOPY  2015    HX GYN      hysterectomy    HX ORTHOPAEDIC      rt knee replacement       History   Smoking Status    Never Smoker   Smokeless Tobacco    Never Used       Family History   Problem Relation Age of Onset    No Known Problems Mother     No Known Problems Father     Arthritis-osteo Sister     Alzheimer Sister     Parkinson's Disease Brother     Cancer Brother     No Known Problems Brother     Anesth Problems Neg Hx          History of Present Illness:  Patient here for evaluation of a stiff neck    Patient states she woke up yesterday morning and her neck was stiff. Hurts to turn it from side to side. There was no injury. No unusual activity the day before. The muscles in her neck feel tight. There is no radiation down her arms. No weakness or numbness. She does not feel sick. No fever no URI complaints. No oral complaints. No difficulty swallowing no cough respiratory complaints or difficulty breathing. She did have that problem once before but it resolved on its own in a day. She is taking Tylenol and Aspercreme.   She cannot take nonsteroidals because she had a history of a GI bleed    Patient already scheduled back with Dr. Neomia Lefort her primary later on the summer for routine medical follow-up      Review of Systems:    Review of systems negative except as noted above      Physical Exam:  Visit Vitals    /81 (BP 1 Location: Left arm, BP Patient Position: Sitting)    Pulse 78    Temp 96.7 °F (35.9 °C) (Oral)    Resp 16    Ht 5' 2\" (1.575 m)    Wt 237 lb (107.5 kg)    SpO2 96%    BMI 43.35 kg/m2     Vitals:    06/15/17 0853   BP: 147/81   BP 1 Location: Left arm   BP Patient Position: Sitting   Pulse: 78   Resp: 16   Temp: 96.7 °F (35.9 °C)   TempSrc: Oral   SpO2: 96%   Weight: 237 lb (107.5 kg)   Height: 5' 2\" (1.575 m)       Patient no acute distress vitals as above. Afebrile  Head was normocephalic  External ears were normal.  Ear canals normal.  TMs were clear  Nose external nose normal.  No lesions. congestion      OP Mucosa normal.  Pharynx normal.  No erythema or exudate. Structures midline  Neck no nodes no masses. The paraspinous muscles in the posterior cervical area were quite tight today. Mildly tender. She had discomfort on moving her neck in any direction. No stridor. No difficulty swallowing  Chest is clear no wheezes rhonchi or rales. Good air exchange  Cor regular rate and rhythm no murmurs    Assessment/Plan:  1. Stiff neck  Patient with muscular stiff neck. Will treat symptomatically with moist heat gentle range of motion- Aspercreme Tylenol. I am giving her a muscle relaxant. Risks of sedation were discussed. She will follow-up if symptoms not improving cyclobenzaprine (FLEXERIL) 5 mg tablet; Take 1 Tab by mouth three (3) times daily as needed for Muscle Spasm(s). Dispense: 30 Tab;  Refill: 0    Patient Instructions   Rest  Moist heat  Gentle range of motion  Flexeril as needed for muscle spasm  Tylenol as needed for pain  Avoid heavy lifting  Follow up if symptoms persist, sooner if worse            Continue current therapy plan except for indicated above. Verbal and written instructions (see AVS) provided.  Patient expresses understanding of diagnosis and treatment plan. Follow-up Disposition:  Return if symptoms worsen or fail to improve. Latrice Saez.  Maribel Hawthorne MD

## 2017-06-15 NOTE — PATIENT INSTRUCTIONS
Rest  Moist heat  Gentle range of motion  Flexeril as needed for muscle spasm  Tylenol as needed for pain  Avoid heavy lifting  Follow up if symptoms persist, sooner if worse

## 2017-06-15 NOTE — MR AVS SNAPSHOT
Visit Information Date & Time Provider Department Dept. Phone Encounter #  
 6/15/2017  9:00 AM Vanna Goodman  [facility] [phone] [id number] Follow-up Instructions Return if symptoms worsen or fail to improve. Your Appointments 8/17/2017  8:00 AM  
ESTABLISHED PATIENT with Giovanna Haywood MD  
[facility] ([address]) Appt Note: 3 month f.up cp$0 5/22/17 [facility]  
 [facility]. [phone]  
[phone]  
  
   
 [address] [place] [address] Upcoming Health Maintenance Date Due DTaP/Tdap/Td series (1 - Tdap) 2/10/1957 ZOSTER VACCINE AGE 60> 2/10/1996 INFLUENZA AGE 9 TO ADULT 8/1/2017 GLAUCOMA SCREENING Q2Y 10/22/2017 MEDICARE YEARLY EXAM 4/7/2018 Allergies as of 6/15/2017  Review Complete On: 6/15/2017 By: Vanna Goodman MD  
  
 Severity Noted Reaction Type Reactions Lisinopril Medium 05/02/2017   Side Effect Angioedema Niacin  10/22/2013    Itching Felt hot also Ultram [Tramadol]  10/22/2013    Nausea Only Current Immunizations  Reviewed on 11/5/2015 Name Date Influenza High Dose Vaccine PF 10/4/2016 Influenza Vaccine 10/22/2014 Influenza Vaccine (Quad) PF 11/5/2015 Pneumococcal Conjugate (PCV-13) 5/1/2015 Not reviewed this visit You Were Diagnosed With   
  
 Codes Comments Stiff neck    -  Primary ICD-10-CM: M43.6 ICD-9-CM: 723.5 Vitals BP Pulse Temp Resp Height(growth percentile) Weight(growth percentile) 147/81 (BP 1 Location: Left arm, BP Patient Position: Sitting) 78 96.7 °F (35.9 °C) (Oral) 16 5' 2\" (1.575 m) 237 lb (107.5 kg) SpO2 BMI OB Status Smoking Status 96% 43.35 kg/m2 Hysterectomy Never Smoker BMI and BSA Data Body Mass Index Body Surface Area  
 43.35 kg/m 2 2.17 m 2 Preferred Pharmacy Pharmacy Name Phone [facility],7Th Floor, [address] [place] [address]  [phone] Your Updated Medication List  
  
   
This list is accurate as of: 6/15/17  9:16 AM.  Always use your most recent med list.  
  
  
  
  
 acetaminophen 325 mg tablet Commonly known as:  TYLENOL Take  by mouth every four (4) hours as needed for Pain. ASPERCREME EX  
by Apply Externally route. Applies to left knee 3-4 x daily  
  
 aspirin delayed-release 81 mg tablet Take  by mouth daily. cetirizine 10 mg tablet Commonly known as:  ZyrTEC Take 1 Tab by mouth daily as needed for Allergies. CO Q-10 PO Take 200 mg by mouth daily. cyclobenzaprine 5 mg tablet Commonly known as:  FLEXERIL Take 1 Tab by mouth three (3) times daily as needed for Muscle Spasm(s). dilTIAZem 120 mg SR capsule Commonly known as:  [facility] Take 1 Cap by mouth daily. esomeprazole 40 mg capsule Commonly known as:  NexIUM Take 1 Cap by mouth daily. * furosemide 40 mg tablet Commonly known as:  LASIX Take one bid for 3 d then qd * furosemide 20 mg tablet Commonly known as:  LASIX Take 20 mg by mouth as needed (swelling). hydroCHLOROthiazide 25 mg tablet Commonly known as:  HYDRODIURIL Take 1 Tab by mouth daily. HYDROcodone-acetaminophen 5-325 mg per tablet Commonly known as:  Joyice Breeze Take 1 Tab by mouth every eight (8) hours as needed. Max Daily Amount: 3 Tabs. LIPITOR 20 mg tablet Generic drug:  atorvastatin Take  by mouth daily. magnesium oxide 500 mg Tab Take 1 Tab by mouth daily. multivitamin tablet Commonly known as:  ONE A DAY Take 1 Tab by mouth daily. nystatin powder Commonly known as:  MYCOSTATIN Apply  to affected area two (2) times a day. Indications: CUTANEOUS CANDIDIASIS  
  
 polyethylene glycol 17 gram packet Commonly known as:  Suzy Juancho Take 1 Packet by mouth daily as needed. potassium chloride SR 10 mEq tablet Commonly known as:  KLOR-CON 10 Take 2 Tabs by mouth two (2) times a day. VITAMIN D3 1,000 unit Cap Generic drug:  cholecalciferol Take 1,000 Units by mouth daily. * Notice: This list has 2 medication(s) that are the same as other medications prescribed for you. Read the directions carefully, and ask your doctor or other care provider to review them with you. Prescriptions Sent to Pharmacy Refills  
 cyclobenzaprine (FLEXERIL) 5 mg tablet 0 Sig: Take 1 Tab by mouth three (3) times daily as needed for Muscle Spasm(s). Class: Normal  
 Pharmacy: [address] [place] [address],[address], [address] [place] [address] Dr Aranda #: [phone] Route: Oral  
  
Follow-up Instructions Return if symptoms worsen or fail to improve. Patient Instructions Rest 
Moist heat Gentle range of motion Flexeril as needed for muscle spasm Tylenol as needed for pain Avoid heavy lifting Follow up if symptoms persist, sooner if worse Introducing [facility] SERVICES! Dear Alyce Alaniz: 
Thank you for requesting a [employer] account. Our records indicate that you already have an active [employer] account. You can access your account anytime at [email or web address] Did you know that you can access your hospital and ER discharge instructions at any time in [employer]? You can also review all of your test results from your hospital stay or ER visit. Additional Information If you have questions, please visit the Frequently Asked Questions section of the [employer] website at [email or web address] Remember, [employer] is NOT to be used for urgent needs. For medical emergencies, dial 911. Now available from your iPhone and Android! Please provide this summary of care documentation to your next provider. Your primary care clinician is listed as Dimple Greco. If you have any questions after today's visit, please call [phone].

## 2017-06-29 ENCOUNTER — OFFICE VISIT (OUTPATIENT)
Dept: FAMILY MEDICINE CLINIC | Age: 82
End: 2017-06-29

## 2017-06-29 VITALS
HEIGHT: 62 IN | DIASTOLIC BLOOD PRESSURE: 79 MMHG | WEIGHT: 244 LBS | OXYGEN SATURATION: 96 % | BODY MASS INDEX: 44.9 KG/M2 | RESPIRATION RATE: 20 BRPM | TEMPERATURE: 97.6 F | SYSTOLIC BLOOD PRESSURE: 119 MMHG | HEART RATE: 78 BPM

## 2017-06-29 DIAGNOSIS — M43.6 STIFF NECK: Primary | ICD-10-CM

## 2017-06-29 RX ORDER — CYCLOBENZAPRINE HCL 5 MG
5 TABLET ORAL
Qty: 30 TAB | Refills: 0 | Status: SHIPPED | OUTPATIENT
Start: 2017-06-29 | End: 2017-08-24 | Stop reason: SDUPTHER

## 2017-06-29 NOTE — MR AVS SNAPSHOT
Visit Information Date & Time Provider Department Dept. Phone Encounter #  
 6/29/2017  9:50 AM Justine Dodd  [facility] [phone] [id number] Your Appointments 8/17/2017  8:00 AM  
ESTABLISHED PATIENT with Trinity Pantoja MD  
175 [facility] ([facility]) Appt Note: 3 month f.up cp$0 5/22/17 e  
 [address] [facility] [address]  [address] [address]  
[phone]  
  
   
 [address] [facility] [address] Upcoming Health Maintenance Date Due DTaP/Tdap/Td series (1 - Tdap) 2/10/1956 ZOSTER VACCINE AGE 60> 2/10/1996 INFLUENZA AGE 9 TO ADULT 8/1/2017 GLAUCOMA SCREENING Q2Y 10/22/2017 MEDICARE YEARLY EXAM 4/7/2018 Allergies as of 6/29/2017  Review Complete On: 6/29/2017 By: Justine Dodd MD  
  
 Severity Noted Reaction Type Reactions Lisinopril Medium 05/02/2017   Side Effect Angioedema Niacin  10/22/2013    Itching Felt hot also Ultram [Tramadol]  10/22/2013    Nausea Only Current Immunizations  Reviewed on 11/5/2015 Name Date Influenza High Dose Vaccine PF 10/4/2016 Influenza Vaccine 10/22/2014 Influenza Vaccine (Quad) PF 11/5/2015 Pneumococcal Conjugate (PCV-13) 5/1/2015 Not reviewed this visit You Were Diagnosed With   
  
 Codes Comments Stiff neck    -  Primary ICD-10-CM: M43.6 ICD-9-CM: 723.5 Vitals BP Pulse Temp Resp Height(growth percentile) 119/79 (BP 1 Location: Left arm, BP Patient Position: Sitting) 78 97.6 °F (36.4 °C) (Temporal) 20 5' 2\" (1.575 m) Weight(growth percentile) SpO2 BMI OB Status Smoking Status 244 lb (110.7 kg) 96% 44.63 kg/m2 Hysterectomy Never Smoker Vitals History BMI and BSA Data Body Mass Index Body Surface Area  
 44.63 kg/m 2 2.2 m 2 Preferred Pharmacy Pharmacy Name Phone 575 [facility],7[address] Floor, [address] [place] [address]  [phone] Your Updated Medication List  
  
   
This list is accurate as of: 6/29/17 10:28 AM.  Always use your most recent med list.  
  
  
  
  
 acetaminophen 325 mg tablet Commonly known as:  TYLENOL Take  by mouth every four (4) hours as needed for Pain. ASPERCREME EX  
by Apply Externally route. Applies to left knee 3-4 x daily  
  
 aspirin delayed-release 81 mg tablet Take  by mouth daily. cetirizine 10 mg tablet Commonly known as:  ZyrTEC Take 1 Tab by mouth daily as needed for Allergies. CO Q-10 PO Take 200 mg by mouth daily. cyclobenzaprine 5 mg tablet Commonly known as:  FLEXERIL Take 1 Tab by mouth three (3) times daily as needed for Muscle Spasm(s). dilTIAZem 120 mg SR capsule Commonly known as:  [facility] Take 1 Cap by mouth daily. esomeprazole 40 mg capsule Commonly known as:  NexIUM Take 1 Cap by mouth daily. * furosemide 40 mg tablet Commonly known as:  LASIX Take one bid for 3 d then qd * furosemide 20 mg tablet Commonly known as:  LASIX Take 20 mg by mouth as needed (swelling). hydroCHLOROthiazide 25 mg tablet Commonly known as:  HYDRODIURIL Take 1 Tab by mouth daily. HYDROcodone-acetaminophen 5-325 mg per tablet Commonly known as:  Eleno Kilts Take 1 Tab by mouth every eight (8) hours as needed. Max Daily Amount: 3 Tabs. LIPITOR 20 mg tablet Generic drug:  atorvastatin Take  by mouth daily. magnesium oxide 500 mg Tab Take 1 Tab by mouth daily. multivitamin tablet Commonly known as:  ONE A DAY Take 1 Tab by mouth daily. nystatin powder Commonly known as:  MYCOSTATIN Apply  to affected area two (2) times a day. Indications: CUTANEOUS CANDIDIASIS  
  
 polyethylene glycol 17 gram packet Commonly known as:  [facility] Take 1 Packet by mouth daily as needed. potassium chloride SR 10 mEq tablet Commonly known as:  KLOR-CON 10 Take 2 Tabs by mouth two (2) times a day. VITAMIN D3 1,000 unit Cap Generic drug:  cholecalciferol Take 1,000 Units by mouth daily. * Notice: This list has 2 medication(s) that are the same as other medications prescribed for you. Read the directions carefully, and ask your doctor or other care provider to review them with you. Prescriptions Sent to Pharmacy Refills  
 cyclobenzaprine (FLEXERIL) 5 mg tablet 0 Sig: Take 1 Tab by mouth three (3) times daily as needed for Muscle Spasm(s). Class: Normal  
 Pharmacy: [address] [place] [address],[address], [address] [place] [address] Dr Aranda #: [phone] Route: Oral  
  
We Performed the Following REFERRAL TO PHYSICAL THERAPY [CXF57 Custom] Comments:  
 Please evaluate patient for  Neck pain  DJD c spine To-Do List   
 07/06/2017 Imaging:  XR SPINE CERV PA LAT ODONT 3 V MAX Referral Information Referral ID Referred By Referred To  
  
 [id number] Stephany Plata Not Available Visits Status Start Date End Date 1 New Request 6/29/17 6/29/18 If your referral has a status of pending review or denied, additional information will be sent to support the outcome of this decision. Patient Instructions Rest 
Moist heat Gentle range of motion Tylenol or flexeril as needed for pain and stiffness Xray Physical therapy Follow up if symptoms persist 
 
Keep planned follow up Dr Ara Restrepo Introducing [facility] & HEALTH SERVICES! Dear Aleah Sun: 
Thank you for requesting a [employer] account. Our records indicate that you already have an active [employer] account. You can access your account anytime at [email or web address] Did you know that you can access your hospital and ER discharge instructions at any time in [employer]? You can also review all of your test results from your hospital stay or ER visit. Additional Information If you have questions, please visit the Frequently Asked Questions section of the [employer] website at [email or web address] Remember, [employer] is NOT to be used for urgent needs. For medical emergencies, dial 911. Now available from your iPhone and Android! Please provide this summary of care documentation to your next provider. Your primary care clinician is listed as Dimple Greco. If you have any questions after today's visit, please call [phone].

## 2017-06-29 NOTE — PROGRESS NOTES
Reilly Murillo is a 81 y.o. female who presents to the office today with the following:  Chief Complaint   Patient presents with    Neck Pain     follow up on neck pain       Allergies   Allergen Reactions    Lisinopril Angioedema    Niacin Itching     Felt hot also    Ultram [Tramadol] Nausea Only       Current Outpatient Prescriptions   Medication Sig    cyclobenzaprine (FLEXERIL) 5 mg tablet Take 1 Tab by mouth three (3) times daily as needed for Muscle Spasm(s).  potassium chloride SR (KLOR-CON 10) 10 mEq tablet Take 2 Tabs by mouth two (2) times a day.  atorvastatin (LIPITOR) 20 mg tablet Take  by mouth daily.  cholecalciferol (VITAMIN D3) 1,000 unit cap Take 1,000 Units by mouth daily.  esomeprazole (NEXIUM) 40 mg capsule Take 1 Cap by mouth daily.  hydroCHLOROthiazide (HYDRODIURIL) 25 mg tablet Take 1 Tab by mouth daily.  dilTIAZem (TIAZAC) 120 mg SR capsule Take 1 Cap by mouth daily.  UBIDECARENONE (CO Q-10 PO) Take 200 mg by mouth daily.  aspirin delayed-release 81 mg tablet Take  by mouth daily.  magnesium oxide 500 mg tab Take 1 Tab by mouth daily.  multivitamin (ONE A DAY) tablet Take 1 Tab by mouth daily.  nystatin (MYCOSTATIN) powder Apply  to affected area two (2) times a day. Indications: CUTANEOUS CANDIDIASIS    furosemide (LASIX) 20 mg tablet Take 20 mg by mouth as needed (swelling).  HYDROcodone-acetaminophen (NORCO) 5-325 mg per tablet Take 1 Tab by mouth every eight (8) hours as needed. Max Daily Amount: 3 Tabs.  furosemide (LASIX) 40 mg tablet Take one bid for 3 d then qd    polyethylene glycol (MIRALAX) 17 gram packet Take 1 Packet by mouth daily as needed.  cetirizine (ZYRTEC) 10 mg tablet Take 1 Tab by mouth daily as needed for Allergies.  acetaminophen (TYLENOL) 325 mg tablet Take  by mouth every four (4) hours as needed for Pain.  TROLAMINE SALICYLATE (ASPERCREME EX) by Apply Externally route.  Applies to left knee 3-4 x daily     No current facility-administered medications for this visit. Past Medical History:   Diagnosis Date    Allergic rhinitis     Cancer ([facility]     Stage 1 cancer intestines    Cervical disc disease     Cervical disc disease     Colonic mass 3/20/2017    Eczema     Gastritis     GERD (gastroesophageal reflux disease)     mild    GI bleed     hx. of recurrent    Hernia     hiatal lt side    Hiatal hernia     Hypercholesterolemia     Hypertension     Morbid obesity ([facility] 4/13/2017    Osteoarthritis (arthritis due to wear and tear of joints)        Past Surgical History:   Procedure Laterality Date    ABDOMEN SURGERY PROC UNLISTED      Colon Resection (Cancer)    HX COLONOSCOPY  3/05    HX COLONOSCOPY  2017    HX CYST REMOVAL      left side    HX ENDOSCOPY  1/09    HX ENDOSCOPY  2015    HX GYN      hysterectomy    HX ORTHOPAEDIC      rt knee replacement       History   Smoking Status    Never Smoker   Smokeless Tobacco    Never Used       Family History   Problem Relation Age of Onset    No Known Problems Mother     No Known Problems Father     Arthritis-osteo Sister     Alzheimer Sister     Parkinson's Disease Brother     Cancer Brother     No Known Problems Brother     Anesth Problems Neg Hx          History of Present Illness:  Patient here for follow-up neck pain    Patient was seen a couple weeks ago with the onset of neck pain and stiffness. No injury. No radiation down her arms. Her exam revealed tight musculature in the cervical spine. She was treated with instructions in general neck care moist heat stretches etc.  Also given a prescription for Flexeril. She cannot take nonsteroidals secondary to a history of a GI bleed. Since that time her symptoms have improved. No pain at rest.  She states her neck still hurts when she tries to turn her head from side to side or flex or extend it. Again no radiation down her arms. No weakness or numbness.   No headache or ENT complaints. No difficulty swallowing. Patient does have a history of DJD. She follows with orthopedist.  She just got an injection in her knee yesterday    Patient follows with Dr. Perez Lopez for routine medical care    Review of Systems:      Review of systems negative except as noted above    Physical Exam:  Visit Vitals    /79 (BP 1 Location: Left arm, BP Patient Position: Sitting)    Pulse 78    Temp 97.6 °F (36.4 °C) (Temporal)    Resp 20    Ht 5' 2\" (1.575 m)    Wt 244 lb (110.7 kg)    SpO2 96%    BMI 44.63 kg/m2     Vitals:    06/29/17 0951   BP: 119/79   BP 1 Location: Left arm   BP Patient Position: Sitting   Pulse: 78   Resp: 20   Temp: 97.6 °F (36.4 °C)   TempSrc: Temporal   SpO2: 96%   Weight: 244 lb (110.7 kg)   Height: 5' 2\" (1.575 m)    patient was in no acute distress vital signs stable. She was afebrile. Head was normocephalic  Neck today no nodes no masses. Exam was improved from last visit. She was able to flex and extend her neck about alf each direction. Lateral movement about 45° bilaterally. Limited with discomfort. Motor sensory reflexes intact in the upper extremity  Chest was clear  Cor regular rate and rhythm with rare ectopic beat      Assessment/Plan:  1. Stiff neck  Patient almost certainly has DJD of her neck. I suspect there is some underlying degenerative disc disease as well. Symptoms have improved but persists. I have refilled her Flexeril. I again reviewed general neck care guidelines. I am checking an x-ray of her C-spine and referring her to physical therapy  - cyclobenzaprine (FLEXERIL) 5 mg tablet; Take 1 Tab by mouth three (3) times daily as needed for Muscle Spasm(s). Dispense: 30 Tab;  Refill: 0  - XR SPINE CERV PA LAT ODONT 3 V MAX; Future  - REFERRAL TO PHYSICAL THERAPY    Patient Instructions   Rest  Moist heat  Gentle range of motion  Tylenol or flexeril as needed for pain and stiffness      Xray    Physical therapy    Follow up if symptoms persist    Keep planned follow up Dr Penelope Boogie current therapy plan except for indicated above. Verbal and written instructions (see AVS) provided.  Patient expresses understanding of diagnosis and treatment plan. Follow-up Disposition: Not on [address] [place] [address].  Briana Gagnon MD

## 2017-06-29 NOTE — PATIENT INSTRUCTIONS
Rest  Moist heat  Gentle range of motion  Tylenol or flexeril as needed for pain and stiffness      Xray    Physical therapy    Follow up if symptoms persist    Keep planned follow up Dr Neal Lawson

## 2017-08-24 ENCOUNTER — OFFICE VISIT (OUTPATIENT)
Dept: FAMILY MEDICINE CLINIC | Age: 82
End: 2017-08-24

## 2017-08-24 VITALS
SYSTOLIC BLOOD PRESSURE: 131 MMHG | WEIGHT: 241.4 LBS | TEMPERATURE: 97.3 F | OXYGEN SATURATION: 97 % | BODY MASS INDEX: 44.15 KG/M2 | DIASTOLIC BLOOD PRESSURE: 65 MMHG | RESPIRATION RATE: 18 BRPM | HEART RATE: 65 BPM

## 2017-08-24 DIAGNOSIS — I10 ESSENTIAL HYPERTENSION WITH GOAL BLOOD PRESSURE LESS THAN 140/90: ICD-10-CM

## 2017-08-24 DIAGNOSIS — G56.01 MILD CARPAL TUNNEL SYNDROME, RIGHT: ICD-10-CM

## 2017-08-24 DIAGNOSIS — M62.838 MUSCLE SPASMS OF NECK: ICD-10-CM

## 2017-08-24 DIAGNOSIS — E87.6 HYPOKALEMIA: Primary | ICD-10-CM

## 2017-08-24 DIAGNOSIS — E11.9 TYPE 2 DIABETES MELLITUS WITHOUT COMPLICATION, WITHOUT LONG-TERM CURRENT USE OF INSULIN (HCC): ICD-10-CM

## 2017-08-24 RX ORDER — DILTIAZEM HYDROCHLORIDE 120 MG/1
120 CAPSULE, EXTENDED RELEASE ORAL DAILY
Qty: 90 CAP | Refills: 1 | Status: SHIPPED | OUTPATIENT
Start: 2017-08-24 | End: 2018-02-20 | Stop reason: SDUPTHER

## 2017-08-24 RX ORDER — CYCLOBENZAPRINE HCL 5 MG
5 TABLET ORAL
Qty: 45 TAB | Refills: 0 | Status: SHIPPED | OUTPATIENT
Start: 2017-08-24 | End: 2018-01-01

## 2017-08-24 RX ORDER — HYDROCHLOROTHIAZIDE 25 MG/1
25 TABLET ORAL DAILY
Qty: 90 TAB | Refills: 1 | Status: SHIPPED | OUTPATIENT
Start: 2017-08-24 | End: 2018-02-20 | Stop reason: SDUPTHER

## 2017-08-24 RX ORDER — ATORVASTATIN CALCIUM 20 MG/1
20 TABLET, FILM COATED ORAL DAILY
Qty: 90 TAB | Refills: 1 | Status: SHIPPED | OUTPATIENT
Start: 2017-08-24 | End: 2018-02-20 | Stop reason: SDUPTHER

## 2017-08-24 RX ORDER — POTASSIUM CHLORIDE 750 MG/1
20 TABLET, FILM COATED, EXTENDED RELEASE ORAL 2 TIMES DAILY
Qty: 360 TAB | Refills: 1 | Status: SHIPPED | OUTPATIENT
Start: 2017-08-24 | End: 2018-02-20 | Stop reason: SDUPTHER

## 2017-08-24 RX ORDER — ESOMEPRAZOLE MAGNESIUM 40 MG/1
40 CAPSULE, DELAYED RELEASE ORAL DAILY
Qty: 90 CAP | Refills: 1 | Status: SHIPPED | OUTPATIENT
Start: 2017-08-24 | End: 2018-02-20 | Stop reason: SDUPTHER

## 2017-08-24 NOTE — PROGRESS NOTES
Chief Complaint   Patient presents with    Follow-up     Potassium level/med increase 3 mos ago     Visit Vitals    /65 (BP 1 Location: Left arm, BP Patient Position: Sitting)    Pulse 65    Temp 97.3 °F (36.3 °C) (Oral)    Resp 18    Wt 241 lb 6.4 oz (109.5 kg)    SpO2 97%    BMI 44.15 kg/m2     Araceli Germain LPN

## 2017-08-24 NOTE — PROGRESS NOTES
HISTORY OF PRESENT ILLNESS  Alejandro Serra is a 81 y.o. female. Chief Complaint   Patient presents with    Follow-up     Potassium level/med increase 3 mos ago       HPI  Last Potassium low  Here for recheck  On 20 mg bid now    DM  Not on meds  Here for recheck  Watching diet a little    Needs refills of BP and Chol meds  Last BW of Chol good    Would like refills of Flexeril for neck spasm  Helping  Taking 5 mg tid  No SE  PT helped too    Had Echo in 5/17 and showed Aortic Sclerosis    Review of Systems   HENT: Positive for congestion (in am with allergies). Eyes: Negative for blurred vision. Respiratory: Negative for cough and shortness of breath. Cardiovascular: Negative for chest pain. Musculoskeletal: Positive for neck pain. Neurological: Positive for sensory change (occ right hand numb and has to shake it). Negative for dizziness. Past Medical History:   Diagnosis Date    Allergic rhinitis     Cancer ([address]     Stage 1 cancer intestines    Cervical disc disease     Cervical disc disease     Colonic mass 3/20/2017    Eczema     Gastritis     GERD (gastroesophageal reflux disease)     mild    GI bleed     hx. of recurrent    Hernia     hiatal lt side    Hiatal hernia     Hypercholesterolemia     Hypertension     Morbid obesity ([address] 4/13/2017    Osteoarthritis (arthritis due to wear and tear of joints)      Current Outpatient Prescriptions   Medication Sig Dispense Refill    esomeprazole (NEXIUM) 40 mg capsule Take 1 Cap by mouth daily. 90 Cap 1    hydroCHLOROthiazide (HYDRODIURIL) 25 mg tablet Take 1 Tab by mouth daily. 90 Tab 1    dilTIAZem (TIAZAC) 120 mg SR capsule Take 1 Cap by mouth daily. 90 Cap 1    atorvastatin (LIPITOR) 20 mg tablet Take 1 Tab by mouth daily. 90 Tab 1    potassium chloride SR (KLOR-CON 10) 10 mEq tablet Take 2 Tabs by mouth two (2) times a day.  360 Tab 1    cyclobenzaprine (FLEXERIL) 5 mg tablet Take 1 Tab by mouth three (3) times daily as needed for Muscle Spasm(s). 45 Tab 0    nystatin (MYCOSTATIN) powder Apply  to affected area two (2) times a day. Indications: CUTANEOUS CANDIDIASIS 1 Bottle 0    furosemide (LASIX) 20 mg tablet Take 20 mg by mouth as needed (swelling).  polyethylene glycol (MIRALAX) 17 gram packet Take 1 Packet by mouth daily as needed. 30 Packet 0    cetirizine (ZYRTEC) 10 mg tablet Take 1 Tab by mouth daily as needed for Allergies. 30 Tab 3    cholecalciferol (VITAMIN D3) 1,000 unit cap Take 1,000 Units by mouth daily.  UBIDECARENONE (CO Q-10 PO) Take 200 mg by mouth daily.  acetaminophen (TYLENOL) 325 mg tablet Take  by mouth every four (4) hours as needed for Pain.  TROLAMINE SALICYLATE (ASPERCREME EX) by Apply Externally route. Applies to left knee 3-4 x daily      aspirin delayed-release 81 mg tablet Take  by mouth daily.  magnesium oxide 500 mg tab Take 1 Tab by mouth daily.  multivitamin (ONE A DAY) tablet Take 1 Tab by mouth daily. Allergies   Allergen Reactions    Lisinopril Angioedema    Niacin Itching     Felt hot also    Ultram [Tramadol] Nausea Only     Visit Vitals    /65 (BP 1 Location: Left arm, BP Patient Position: Sitting)    Pulse 65    Temp 97.3 °F (36.3 °C) (Oral)    Resp 18    Wt 241 lb 6.4 oz (109.5 kg)    SpO2 97%    BMI 44.15 kg/m2     Physical Exam   Constitutional: She is oriented to person, place, and time. She appears well-developed and well-nourished. HENT:   Head: Normocephalic and atraumatic. Eyes: Conjunctivae and EOM are normal.   Cardiovascular: Normal rate and regular rhythm. Murmur heard. Pulmonary/Chest: Effort normal and breath sounds normal.   Musculoskeletal: She exhibits no edema. Neurological: She is alert and oriented to person, place, and time. Skin: Skin is warm and dry. Psychiatric: She has a normal mood and affect. Nursing note and vitals reviewed. ASSESSMENT and PLAN    ICD-10-CM ICD-9-CM    1.  Hypokalemia W20.2 473.7 METABOLIC PANEL, COMPREHENSIVE      potassium chloride SR (KLOR-CON 10) 10 mEq tablet   2. Type 2 diabetes mellitus without complication, without long-term current use of insulin (HCC) E11.9 250.00 HEMOGLOBIN A1C W/O EAG   3. Essential hypertension with goal blood pressure less than 140/90 I10 401.9 hydroCHLOROthiazide (HYDRODIURIL) 25 mg tablet      dilTIAZem (TIAZAC) 120 mg SR capsule   4. Muscle spasms of neck M62.838 728.85 cyclobenzaprine (FLEXERIL) 5 mg tablet   5.  Mild carpal tunnel syndrome, right G56.01 354.0 Offered wrist splint, pt wants to wait   F/U in 6 mo sooner it needed or if abnormal BW

## 2017-08-25 LAB
ALBUMIN SERPL-MCNC: 4 G/DL (ref 3.5–4.7)
ALBUMIN/GLOB SERPL: 1.6 {RATIO} (ref 1.2–2.2)
ALP SERPL-CCNC: 84 IU/L (ref 39–117)
ALT SERPL-CCNC: 10 IU/L (ref 0–32)
AST SERPL-CCNC: 18 IU/L (ref 0–40)
BILIRUB SERPL-MCNC: 0.6 MG/DL (ref 0–1.2)
BUN SERPL-MCNC: 12 MG/DL (ref 8–27)
BUN/CREAT SERPL: 17 (ref 12–28)
CALCIUM SERPL-MCNC: 9.5 MG/DL (ref 8.7–10.3)
CHLORIDE SERPL-SCNC: 98 MMOL/L (ref 96–106)
CO2 SERPL-SCNC: 30 MMOL/L (ref 18–29)
CREAT SERPL-MCNC: 0.72 MG/DL (ref 0.57–1)
GLOBULIN SER CALC-MCNC: 2.5 G/DL (ref 1.5–4.5)
GLUCOSE SERPL-MCNC: 91 MG/DL (ref 65–99)
HBA1C MFR BLD: 5.7 % (ref 4.8–5.6)
POTASSIUM SERPL-SCNC: 3.7 MMOL/L (ref 3.5–5.2)
PROT SERPL-MCNC: 6.5 G/DL (ref 6–8.5)
SODIUM SERPL-SCNC: 143 MMOL/L (ref 134–144)

## 2017-08-25 NOTE — PROGRESS NOTES
Call pt, the HbA1C is 5.7, much better, almost in the normal but still in the prediabetes range, cont to avoid conc sweets and recheck in 6 mo  The kidney and liver tests are normal

## 2017-10-10 ENCOUNTER — CLINICAL SUPPORT (OUTPATIENT)
Dept: FAMILY MEDICINE CLINIC | Age: 82
End: 2017-10-10

## 2017-10-10 VITALS — TEMPERATURE: 97.3 F

## 2017-10-10 DIAGNOSIS — Z23 ENCOUNTER FOR IMMUNIZATION: Primary | ICD-10-CM

## 2018-01-01 ENCOUNTER — APPOINTMENT (OUTPATIENT)
Dept: GENERAL RADIOLOGY | Age: 83
DRG: 439 | End: 2018-01-01
Attending: INTERNAL MEDICINE
Payer: MEDICARE

## 2018-01-01 ENCOUNTER — HOME CARE VISIT (OUTPATIENT)
Dept: SCHEDULING | Facility: HOME HEALTH | Age: 83
End: 2018-01-01
Payer: MEDICARE

## 2018-01-01 ENCOUNTER — OFFICE VISIT (OUTPATIENT)
Dept: ONCOLOGY | Age: 83
End: 2018-01-01

## 2018-01-01 ENCOUNTER — TELEPHONE (OUTPATIENT)
Dept: FAMILY MEDICINE CLINIC | Age: 83
End: 2018-01-01

## 2018-01-01 ENCOUNTER — HOME CARE VISIT (OUTPATIENT)
Dept: HOME HEALTH SERVICES | Facility: HOME HEALTH | Age: 83
End: 2018-01-01
Payer: MEDICARE

## 2018-01-01 ENCOUNTER — HOSPITAL ENCOUNTER (OUTPATIENT)
Dept: INTERVENTIONAL RADIOLOGY/VASCULAR | Age: 83
Discharge: HOME | End: 2018-10-15
Payer: MEDICARE

## 2018-01-01 ENCOUNTER — TELEPHONE (OUTPATIENT)
Dept: SURGERY | Age: 83
End: 2018-01-01

## 2018-01-01 ENCOUNTER — TELEPHONE (OUTPATIENT)
Dept: ONCOLOGY | Age: 83
End: 2018-01-01

## 2018-01-01 ENCOUNTER — OFFICE VISIT (OUTPATIENT)
Dept: FAMILY MEDICINE CLINIC | Age: 83
End: 2018-01-01

## 2018-01-01 ENCOUNTER — APPOINTMENT (OUTPATIENT)
Dept: CT IMAGING | Age: 83
DRG: 439 | End: 2018-01-01
Attending: SURGERY
Payer: MEDICARE

## 2018-01-01 ENCOUNTER — DOCUMENTATION ONLY (OUTPATIENT)
Dept: ONCOLOGY | Age: 83
End: 2018-01-01

## 2018-01-01 ENCOUNTER — DOCUMENTATION ONLY (OUTPATIENT)
Dept: FAMILY MEDICINE CLINIC | Age: 83
End: 2018-01-01

## 2018-01-01 ENCOUNTER — HOSPITAL ENCOUNTER (INPATIENT)
Age: 83
LOS: 9 days | Discharge: NURSING, REHAB OR LONG-TERM CARE | DRG: 439 | End: 2018-08-08
Attending: INTERNAL MEDICINE | Admitting: INTERNAL MEDICINE
Payer: MEDICARE

## 2018-01-01 ENCOUNTER — APPOINTMENT (OUTPATIENT)
Dept: MRI IMAGING | Age: 83
DRG: 439 | End: 2018-01-01
Attending: PHYSICIAN ASSISTANT
Payer: MEDICARE

## 2018-01-01 ENCOUNTER — HOME CARE VISIT (OUTPATIENT)
Dept: HOME HEALTH SERVICES | Facility: HOME HEALTH | Age: 83
End: 2018-01-01

## 2018-01-01 ENCOUNTER — HOME HEALTH ADMISSION (OUTPATIENT)
Dept: HOME HEALTH SERVICES | Facility: HOME HEALTH | Age: 83
End: 2018-01-01
Payer: MEDICARE

## 2018-01-01 ENCOUNTER — OFFICE VISIT (OUTPATIENT)
Dept: SURGERY | Age: 83
End: 2018-01-01

## 2018-01-01 VITALS
HEART RATE: 78 BPM | SYSTOLIC BLOOD PRESSURE: 113 MMHG | WEIGHT: 219.14 LBS | RESPIRATION RATE: 18 BRPM | OXYGEN SATURATION: 96 % | BODY MASS INDEX: 40.33 KG/M2 | HEIGHT: 62 IN | TEMPERATURE: 97.6 F | DIASTOLIC BLOOD PRESSURE: 55 MMHG

## 2018-01-01 VITALS — HEART RATE: 58 BPM | DIASTOLIC BLOOD PRESSURE: 82 MMHG | OXYGEN SATURATION: 98 % | SYSTOLIC BLOOD PRESSURE: 124 MMHG

## 2018-01-01 VITALS
DIASTOLIC BLOOD PRESSURE: 70 MMHG | OXYGEN SATURATION: 98 % | SYSTOLIC BLOOD PRESSURE: 118 MMHG | HEIGHT: 62 IN | TEMPERATURE: 98 F | BODY MASS INDEX: 39.56 KG/M2 | WEIGHT: 215 LBS | HEART RATE: 79 BPM | RESPIRATION RATE: 18 BRPM

## 2018-01-01 VITALS
TEMPERATURE: 98.4 F | DIASTOLIC BLOOD PRESSURE: 88 MMHG | SYSTOLIC BLOOD PRESSURE: 124 MMHG | HEART RATE: 85 BPM | OXYGEN SATURATION: 96 % | RESPIRATION RATE: 18 BRPM

## 2018-01-01 VITALS
HEIGHT: 62 IN | BODY MASS INDEX: 40.48 KG/M2 | DIASTOLIC BLOOD PRESSURE: 75 MMHG | SYSTOLIC BLOOD PRESSURE: 123 MMHG | RESPIRATION RATE: 16 BRPM | HEART RATE: 76 BPM | TEMPERATURE: 98.5 F | WEIGHT: 220 LBS | OXYGEN SATURATION: 93 %

## 2018-01-01 VITALS
SYSTOLIC BLOOD PRESSURE: 137 MMHG | HEART RATE: 65 BPM | BODY MASS INDEX: 43.24 KG/M2 | TEMPERATURE: 97.9 F | WEIGHT: 235 LBS | RESPIRATION RATE: 18 BRPM | DIASTOLIC BLOOD PRESSURE: 84 MMHG | OXYGEN SATURATION: 95 % | HEIGHT: 62 IN

## 2018-01-01 VITALS
DIASTOLIC BLOOD PRESSURE: 65 MMHG | RESPIRATION RATE: 19 BRPM | SYSTOLIC BLOOD PRESSURE: 138 MMHG | BODY MASS INDEX: 40.82 KG/M2 | HEIGHT: 62 IN | WEIGHT: 221.8 LBS | HEART RATE: 81 BPM | TEMPERATURE: 97.6 F | OXYGEN SATURATION: 97 %

## 2018-01-01 VITALS
RESPIRATION RATE: 16 BRPM | SYSTOLIC BLOOD PRESSURE: 107 MMHG | HEART RATE: 80 BPM | WEIGHT: 215 LBS | TEMPERATURE: 96.8 F | DIASTOLIC BLOOD PRESSURE: 49 MMHG | BODY MASS INDEX: 39.56 KG/M2 | OXYGEN SATURATION: 95 % | HEIGHT: 62 IN

## 2018-01-01 VITALS
HEIGHT: 62 IN | WEIGHT: 222 LBS | BODY MASS INDEX: 40.85 KG/M2 | OXYGEN SATURATION: 97 % | DIASTOLIC BLOOD PRESSURE: 78 MMHG | TEMPERATURE: 98.3 F | HEART RATE: 81 BPM | SYSTOLIC BLOOD PRESSURE: 115 MMHG | RESPIRATION RATE: 20 BRPM

## 2018-01-01 VITALS — HEART RATE: 78 BPM | DIASTOLIC BLOOD PRESSURE: 78 MMHG | SYSTOLIC BLOOD PRESSURE: 128 MMHG | OXYGEN SATURATION: 98 %

## 2018-01-01 VITALS
RESPIRATION RATE: 18 BRPM | SYSTOLIC BLOOD PRESSURE: 112 MMHG | TEMPERATURE: 98.9 F | DIASTOLIC BLOOD PRESSURE: 68 MMHG | OXYGEN SATURATION: 97 % | HEART RATE: 90 BPM

## 2018-01-01 VITALS
SYSTOLIC BLOOD PRESSURE: 119 MMHG | DIASTOLIC BLOOD PRESSURE: 71 MMHG | TEMPERATURE: 99.1 F | HEART RATE: 79 BPM | OXYGEN SATURATION: 96 %

## 2018-01-01 VITALS
DIASTOLIC BLOOD PRESSURE: 70 MMHG | HEIGHT: 62 IN | TEMPERATURE: 98.3 F | HEART RATE: 72 BPM | OXYGEN SATURATION: 96 % | SYSTOLIC BLOOD PRESSURE: 114 MMHG | RESPIRATION RATE: 20 BRPM | BODY MASS INDEX: 40.34 KG/M2 | WEIGHT: 219.2 LBS

## 2018-01-01 VITALS
SYSTOLIC BLOOD PRESSURE: 122 MMHG | HEART RATE: 77 BPM | OXYGEN SATURATION: 97 % | DIASTOLIC BLOOD PRESSURE: 76 MMHG | TEMPERATURE: 96.2 F

## 2018-01-01 VITALS
HEART RATE: 86 BPM | SYSTOLIC BLOOD PRESSURE: 122 MMHG | TEMPERATURE: 99.6 F | DIASTOLIC BLOOD PRESSURE: 72 MMHG | RESPIRATION RATE: 18 BRPM | OXYGEN SATURATION: 98 %

## 2018-01-01 VITALS — DIASTOLIC BLOOD PRESSURE: 80 MMHG | OXYGEN SATURATION: 96 % | SYSTOLIC BLOOD PRESSURE: 128 MMHG | HEART RATE: 88 BPM

## 2018-01-01 VITALS
TEMPERATURE: 97.9 F | BODY MASS INDEX: 40.85 KG/M2 | RESPIRATION RATE: 16 BRPM | SYSTOLIC BLOOD PRESSURE: 115 MMHG | OXYGEN SATURATION: 94 % | WEIGHT: 222 LBS | HEART RATE: 76 BPM | HEIGHT: 62 IN | DIASTOLIC BLOOD PRESSURE: 69 MMHG

## 2018-01-01 VITALS
WEIGHT: 199 LBS | RESPIRATION RATE: 16 BRPM | DIASTOLIC BLOOD PRESSURE: 63 MMHG | TEMPERATURE: 97.2 F | BODY MASS INDEX: 36.62 KG/M2 | HEIGHT: 62 IN | SYSTOLIC BLOOD PRESSURE: 131 MMHG | HEART RATE: 70 BPM

## 2018-01-01 VITALS — OXYGEN SATURATION: 94 % | SYSTOLIC BLOOD PRESSURE: 138 MMHG | DIASTOLIC BLOOD PRESSURE: 78 MMHG | HEART RATE: 70 BPM

## 2018-01-01 VITALS
HEIGHT: 62 IN | TEMPERATURE: 98 F | HEART RATE: 83 BPM | WEIGHT: 222 LBS | SYSTOLIC BLOOD PRESSURE: 114 MMHG | BODY MASS INDEX: 40.85 KG/M2 | DIASTOLIC BLOOD PRESSURE: 72 MMHG | RESPIRATION RATE: 18 BRPM | OXYGEN SATURATION: 95 %

## 2018-01-01 VITALS — DIASTOLIC BLOOD PRESSURE: 70 MMHG | OXYGEN SATURATION: 93 % | SYSTOLIC BLOOD PRESSURE: 138 MMHG | HEART RATE: 80 BPM

## 2018-01-01 VITALS
RESPIRATION RATE: 18 BRPM | TEMPERATURE: 98.9 F | DIASTOLIC BLOOD PRESSURE: 80 MMHG | OXYGEN SATURATION: 98 % | HEART RATE: 88 BPM | SYSTOLIC BLOOD PRESSURE: 118 MMHG

## 2018-01-01 DIAGNOSIS — C78.7 LIVER METASTASIS (HCC): ICD-10-CM

## 2018-01-01 DIAGNOSIS — C18.9 METASTATIC COLON CANCER IN FEMALE (HCC): Primary | ICD-10-CM

## 2018-01-01 DIAGNOSIS — C79.9 METASTASIS FROM COLON CANCER (HCC): Primary | ICD-10-CM

## 2018-01-01 DIAGNOSIS — C18.9 METASTASIS FROM COLON CANCER (HCC): Primary | ICD-10-CM

## 2018-01-01 DIAGNOSIS — I10 ESSENTIAL HYPERTENSION: ICD-10-CM

## 2018-01-01 DIAGNOSIS — R19.00 INTRAABDOMINAL MASS: ICD-10-CM

## 2018-01-01 DIAGNOSIS — K85.10 GALLSTONE PANCREATITIS: Primary | ICD-10-CM

## 2018-01-01 DIAGNOSIS — D64.9 ANEMIA, UNSPECIFIED TYPE: ICD-10-CM

## 2018-01-01 DIAGNOSIS — C18.9 COLON CARCINOMA METASTATIC TO LIVER (HCC): Primary | ICD-10-CM

## 2018-01-01 DIAGNOSIS — R53.1 WEAKNESS GENERALIZED: ICD-10-CM

## 2018-01-01 DIAGNOSIS — M25.559 ARTHRALGIA OF HIP, UNSPECIFIED LATERALITY: ICD-10-CM

## 2018-01-01 DIAGNOSIS — E87.6 HYPOKALEMIA: ICD-10-CM

## 2018-01-01 DIAGNOSIS — C78.7 COLON CANCER METASTASIZED TO LIVER (HCC): Primary | ICD-10-CM

## 2018-01-01 DIAGNOSIS — C18.9 COLON CANCER METASTASIZED TO LIVER (HCC): Primary | ICD-10-CM

## 2018-01-01 DIAGNOSIS — C18.9 METASTATIC COLON CANCER IN FEMALE (HCC): ICD-10-CM

## 2018-01-01 DIAGNOSIS — C18.2 MALIGNANT NEOPLASM OF ASCENDING COLON (HCC): Primary | ICD-10-CM

## 2018-01-01 DIAGNOSIS — C78.7 METASTATIC ADENOCARCINOMA TO LIVER (HCC): Primary | ICD-10-CM

## 2018-01-01 DIAGNOSIS — C78.7 COLON CARCINOMA METASTATIC TO LIVER (HCC): Primary | ICD-10-CM

## 2018-01-01 DIAGNOSIS — L27.1 HAND FOOT SYNDROME: ICD-10-CM

## 2018-01-01 DIAGNOSIS — C78.7 METASTATIC COLON CANCER TO LIVER (HCC): Primary | ICD-10-CM

## 2018-01-01 DIAGNOSIS — C18.9 METASTATIC COLON CANCER TO LIVER (HCC): Primary | ICD-10-CM

## 2018-01-01 LAB
AFP-TM SERPL-MCNC: 1.3 NG/ML (ref 0–8.3)
ALBUMIN SERPL-MCNC: 2.6 G/DL (ref 3.5–5)
ALBUMIN SERPL-MCNC: 3.2 G/DL (ref 3.5–5)
ALBUMIN SERPL-MCNC: 3.6 G/DL (ref 3.5–4.7)
ALBUMIN/GLOB SERPL: 0.7 {RATIO} (ref 1.1–2.2)
ALBUMIN/GLOB SERPL: 0.8 {RATIO} (ref 1.1–2.2)
ALBUMIN/GLOB SERPL: 1.2 {RATIO} (ref 1.2–2.2)
ALP SERPL-CCNC: 113 U/L (ref 45–117)
ALP SERPL-CCNC: 155 U/L (ref 45–117)
ALP SERPL-CCNC: 92 IU/L (ref 39–117)
ALT SERPL-CCNC: 11 IU/L (ref 0–32)
ALT SERPL-CCNC: 133 U/L (ref 12–78)
ALT SERPL-CCNC: 66 U/L (ref 12–78)
AMYLASE SERPL-CCNC: 44 U/L (ref 31–124)
ANION GAP SERPL CALC-SCNC: 4 MMOL/L (ref 5–15)
ANION GAP SERPL CALC-SCNC: 6 MMOL/L (ref 5–15)
ANION GAP SERPL CALC-SCNC: 7 MMOL/L (ref 5–15)
ANION GAP SERPL CALC-SCNC: 8 MMOL/L (ref 5–15)
APPEARANCE UR: CLEAR
APTT PPP: 30.8 SEC (ref 22.1–32)
AST SERPL-CCNC: 13 IU/L (ref 0–40)
AST SERPL-CCNC: 64 U/L (ref 15–37)
AST SERPL-CCNC: 75 U/L (ref 15–37)
BACTERIA SPEC CULT: NORMAL
BACTERIA SPEC CULT: NORMAL
BACTERIA URNS QL MICRO: ABNORMAL /HPF
BASOPHILS # BLD AUTO: 0 X10E3/UL (ref 0–0.2)
BASOPHILS # BLD: 0 K/UL (ref 0–0.1)
BASOPHILS # BLD: 0 K/UL (ref 0–0.1)
BASOPHILS NFR BLD AUTO: 0 %
BASOPHILS NFR BLD: 0 % (ref 0–1)
BASOPHILS NFR BLD: 0 % (ref 0–1)
BILIRUB DIRECT SERPL-MCNC: 0.2 MG/DL (ref 0–0.2)
BILIRUB SERPL-MCNC: 0.6 MG/DL (ref 0–1.2)
BILIRUB SERPL-MCNC: 0.9 MG/DL (ref 0.2–1)
BILIRUB SERPL-MCNC: 1 MG/DL (ref 0.2–1)
BILIRUB UR QL CFM: NEGATIVE
BUN SERPL-MCNC: 10 MG/DL (ref 6–20)
BUN SERPL-MCNC: 13 MG/DL (ref 6–20)
BUN SERPL-MCNC: 13 MG/DL (ref 8–27)
BUN SERPL-MCNC: 15 MG/DL (ref 6–20)
BUN SERPL-MCNC: 15 MG/DL (ref 6–20)
BUN SERPL-MCNC: 17 MG/DL (ref 6–20)
BUN SERPL-MCNC: 17 MG/DL (ref 6–20)
BUN SERPL-MCNC: 20 MG/DL (ref 6–20)
BUN SERPL-MCNC: 20 MG/DL (ref 6–20)
BUN SERPL-MCNC: 8 MG/DL (ref 6–20)
BUN/CREAT SERPL: 19 (ref 12–20)
BUN/CREAT SERPL: 19 (ref 12–28)
BUN/CREAT SERPL: 23 (ref 12–20)
BUN/CREAT SERPL: 24 (ref 12–20)
BUN/CREAT SERPL: 27 (ref 12–20)
BUN/CREAT SERPL: 27 (ref 12–20)
BUN/CREAT SERPL: 29 (ref 12–20)
BUN/CREAT SERPL: 30 (ref 12–20)
CALCIUM SERPL-MCNC: 8 MG/DL (ref 8.5–10.1)
CALCIUM SERPL-MCNC: 8.1 MG/DL (ref 8.5–10.1)
CALCIUM SERPL-MCNC: 8.7 MG/DL (ref 8.5–10.1)
CALCIUM SERPL-MCNC: 8.8 MG/DL (ref 8.5–10.1)
CALCIUM SERPL-MCNC: 8.8 MG/DL (ref 8.5–10.1)
CALCIUM SERPL-MCNC: 8.9 MG/DL (ref 8.5–10.1)
CALCIUM SERPL-MCNC: 9.1 MG/DL (ref 8.5–10.1)
CALCIUM SERPL-MCNC: 9.4 MG/DL (ref 8.7–10.3)
CANCER AG19-9 SERPL-ACNC: 245 U/ML (ref 0–35)
CC UR VC: NORMAL
CEA SERPL-MCNC: 64.5 NG/ML
CHLORIDE SERPL-SCNC: 100 MMOL/L (ref 97–108)
CHLORIDE SERPL-SCNC: 100 MMOL/L (ref 97–108)
CHLORIDE SERPL-SCNC: 102 MMOL/L (ref 97–108)
CHLORIDE SERPL-SCNC: 102 MMOL/L (ref 97–108)
CHLORIDE SERPL-SCNC: 103 MMOL/L (ref 97–108)
CHLORIDE SERPL-SCNC: 103 MMOL/L (ref 97–108)
CHLORIDE SERPL-SCNC: 104 MMOL/L (ref 97–108)
CHLORIDE SERPL-SCNC: 105 MMOL/L (ref 97–108)
CHLORIDE SERPL-SCNC: 105 MMOL/L (ref 97–108)
CHLORIDE SERPL-SCNC: 95 MMOL/L (ref 96–106)
CHOLEST SERPL-MCNC: 128 MG/DL
CO2 SERPL-SCNC: 24 MMOL/L (ref 21–32)
CO2 SERPL-SCNC: 26 MMOL/L (ref 21–32)
CO2 SERPL-SCNC: 28 MMOL/L (ref 21–32)
CO2 SERPL-SCNC: 29 MMOL/L (ref 21–32)
CO2 SERPL-SCNC: 31 MMOL/L (ref 20–29)
CO2 SERPL-SCNC: 31 MMOL/L (ref 21–32)
COLOR UR: ABNORMAL
CREAT SERPL-MCNC: 0.43 MG/DL (ref 0.55–1.02)
CREAT SERPL-MCNC: 0.48 MG/DL (ref 0.55–1.02)
CREAT SERPL-MCNC: 0.54 MG/DL (ref 0.55–1.02)
CREAT SERPL-MCNC: 0.56 MG/DL (ref 0.55–1.02)
CREAT SERPL-MCNC: 0.57 MG/DL (ref 0.55–1.02)
CREAT SERPL-MCNC: 0.66 MG/DL (ref 0.55–1.02)
CREAT SERPL-MCNC: 0.69 MG/DL (ref 0.57–1)
CREAT SERPL-MCNC: 0.7 MG/DL (ref 0.55–1.02)
CREAT SERPL-MCNC: 0.82 MG/DL (ref 0.55–1.02)
CREAT SERPL-MCNC: 0.89 MG/DL (ref 0.55–1.02)
DIFFERENTIAL METHOD BLD: ABNORMAL
DIFFERENTIAL METHOD BLD: ABNORMAL
EOSINOPHIL # BLD AUTO: 0.1 X10E3/UL (ref 0–0.4)
EOSINOPHIL # BLD: 0 K/UL (ref 0–0.4)
EOSINOPHIL # BLD: 0 K/UL (ref 0–0.4)
EOSINOPHIL NFR BLD AUTO: 1 %
EOSINOPHIL NFR BLD: 0 % (ref 0–7)
EOSINOPHIL NFR BLD: 0 % (ref 0–7)
EPITH CASTS URNS QL MICRO: ABNORMAL /LPF
ERYTHROCYTE [DISTWIDTH] IN BLOOD BY AUTOMATED COUNT: 14 % (ref 11.5–14.5)
ERYTHROCYTE [DISTWIDTH] IN BLOOD BY AUTOMATED COUNT: 14.4 % (ref 11.5–14.5)
ERYTHROCYTE [DISTWIDTH] IN BLOOD BY AUTOMATED COUNT: 14.4 % (ref 11.5–14.5)
ERYTHROCYTE [DISTWIDTH] IN BLOOD BY AUTOMATED COUNT: 14.6 % (ref 12.3–15.4)
GLOBULIN SER CALC-MCNC: 2.9 G/DL (ref 1.5–4.5)
GLOBULIN SER CALC-MCNC: 4 G/DL (ref 2–4)
GLOBULIN SER CALC-MCNC: 4.1 G/DL (ref 2–4)
GLUCOSE BLD STRIP.AUTO-MCNC: 109 MG/DL (ref 65–100)
GLUCOSE BLD STRIP.AUTO-MCNC: 125 MG/DL (ref 65–100)
GLUCOSE BLD STRIP.AUTO-MCNC: 88 MG/DL (ref 65–100)
GLUCOSE BLD STRIP.AUTO-MCNC: 96 MG/DL (ref 65–100)
GLUCOSE SERPL-MCNC: 102 MG/DL (ref 65–100)
GLUCOSE SERPL-MCNC: 106 MG/DL (ref 65–99)
GLUCOSE SERPL-MCNC: 108 MG/DL (ref 65–100)
GLUCOSE SERPL-MCNC: 110 MG/DL (ref 65–100)
GLUCOSE SERPL-MCNC: 114 MG/DL (ref 65–100)
GLUCOSE SERPL-MCNC: 127 MG/DL (ref 65–100)
GLUCOSE SERPL-MCNC: 127 MG/DL (ref 65–100)
GLUCOSE SERPL-MCNC: 128 MG/DL (ref 65–100)
GLUCOSE SERPL-MCNC: 133 MG/DL (ref 65–100)
GLUCOSE SERPL-MCNC: 138 MG/DL (ref 65–100)
GLUCOSE UR STRIP.AUTO-MCNC: NEGATIVE MG/DL
HCT VFR BLD AUTO: 38.4 % (ref 34–46.6)
HCT VFR BLD AUTO: 41.7 % (ref 35–47)
HCT VFR BLD AUTO: 42.4 % (ref 35–47)
HCT VFR BLD AUTO: 44.7 % (ref 35–47)
HDLC SERPL-MCNC: 21 MG/DL
HDLC SERPL: 6.1 {RATIO} (ref 0–5)
HGB BLD-MCNC: 12.4 G/DL (ref 11.1–15.9)
HGB BLD-MCNC: 12.7 G/DL (ref 11.5–16)
HGB BLD-MCNC: 13 G/DL (ref 11.5–16)
HGB BLD-MCNC: 14 G/DL (ref 11.5–16)
HGB UR QL STRIP: NEGATIVE
IMM GRANULOCYTES # BLD: 0 K/UL (ref 0–0.04)
IMM GRANULOCYTES # BLD: 0 X10E3/UL (ref 0–0.1)
IMM GRANULOCYTES # BLD: 0.1 K/UL (ref 0–0.04)
IMM GRANULOCYTES NFR BLD AUTO: 0 % (ref 0–0.5)
IMM GRANULOCYTES NFR BLD AUTO: 1 % (ref 0–0.5)
IMM GRANULOCYTES NFR BLD: 0 %
INR PPP: 1.1 (ref 0.9–1.1)
KETONES UR QL STRIP.AUTO: NEGATIVE MG/DL
LACTATE SERPL-SCNC: 1.8 MMOL/L (ref 0.4–2)
LDLC SERPL CALC-MCNC: 100.8 MG/DL (ref 0–100)
LEUKOCYTE ESTERASE UR QL STRIP.AUTO: ABNORMAL
LIPASE SERPL-CCNC: 1087 U/L (ref 73–393)
LIPASE SERPL-CCNC: 20 U/L (ref 14–85)
LIPASE SERPL-CCNC: 273 U/L (ref 73–393)
LIPASE SERPL-CCNC: >3000 U/L (ref 73–393)
LIPID PROFILE,FLP: ABNORMAL
LYMPHOCYTES # BLD AUTO: 1.5 X10E3/UL (ref 0.7–3.1)
LYMPHOCYTES # BLD: 0.7 K/UL (ref 0.8–3.5)
LYMPHOCYTES # BLD: 1 K/UL (ref 0.8–3.5)
LYMPHOCYTES NFR BLD AUTO: 18 %
LYMPHOCYTES NFR BLD: 5 % (ref 12–49)
LYMPHOCYTES NFR BLD: 6 % (ref 12–49)
MAGNESIUM SERPL-MCNC: 2 MG/DL (ref 1.6–2.4)
MCH RBC QN AUTO: 26.4 PG (ref 26.6–33)
MCH RBC QN AUTO: 27 PG (ref 26–34)
MCH RBC QN AUTO: 27 PG (ref 26–34)
MCH RBC QN AUTO: 27.1 PG (ref 26–34)
MCHC RBC AUTO-ENTMCNC: 30.5 G/DL (ref 30–36.5)
MCHC RBC AUTO-ENTMCNC: 30.7 G/DL (ref 30–36.5)
MCHC RBC AUTO-ENTMCNC: 31.3 G/DL (ref 30–36.5)
MCHC RBC AUTO-ENTMCNC: 32.3 G/DL (ref 31.5–35.7)
MCV RBC AUTO: 82 FL (ref 79–97)
MCV RBC AUTO: 86.6 FL (ref 80–99)
MCV RBC AUTO: 88.1 FL (ref 80–99)
MCV RBC AUTO: 88.5 FL (ref 80–99)
MONOCYTES # BLD AUTO: 1 X10E3/UL (ref 0.1–0.9)
MONOCYTES # BLD: 1.2 K/UL (ref 0–1)
MONOCYTES # BLD: 1.6 K/UL (ref 0–1)
MONOCYTES NFR BLD AUTO: 12 %
MONOCYTES NFR BLD: 10 % (ref 5–13)
MONOCYTES NFR BLD: 9 % (ref 5–13)
NEUTROPHILS # BLD AUTO: 5.6 X10E3/UL (ref 1.4–7)
NEUTROPHILS NFR BLD AUTO: 69 %
NEUTS SEG # BLD: 11.5 K/UL (ref 1.8–8)
NEUTS SEG # BLD: 13.6 K/UL (ref 1.8–8)
NEUTS SEG NFR BLD: 83 % (ref 32–75)
NEUTS SEG NFR BLD: 86 % (ref 32–75)
NITRITE UR QL STRIP.AUTO: NEGATIVE
NRBC # BLD: 0 K/UL (ref 0–0.01)
NRBC BLD-RTO: 0 PER 100 WBC
PH UR STRIP: 6 [PH] (ref 5–8)
PLATELET # BLD AUTO: 176 K/UL (ref 150–400)
PLATELET # BLD AUTO: 193 K/UL (ref 150–400)
PLATELET # BLD AUTO: 201 K/UL (ref 150–400)
PLATELET # BLD AUTO: 285 X10E3/UL (ref 150–379)
PMV BLD AUTO: 10.5 FL (ref 8.9–12.9)
PMV BLD AUTO: 10.6 FL (ref 8.9–12.9)
PMV BLD AUTO: 9.6 FL (ref 8.9–12.9)
POTASSIUM SERPL-SCNC: 3.3 MMOL/L (ref 3.5–5.1)
POTASSIUM SERPL-SCNC: 3.3 MMOL/L (ref 3.5–5.1)
POTASSIUM SERPL-SCNC: 3.4 MMOL/L (ref 3.5–5.1)
POTASSIUM SERPL-SCNC: 3.5 MMOL/L (ref 3.5–5.1)
POTASSIUM SERPL-SCNC: 3.5 MMOL/L (ref 3.5–5.1)
POTASSIUM SERPL-SCNC: 3.6 MMOL/L (ref 3.5–5.1)
POTASSIUM SERPL-SCNC: 3.7 MMOL/L (ref 3.5–5.1)
POTASSIUM SERPL-SCNC: 3.8 MMOL/L (ref 3.5–5.1)
POTASSIUM SERPL-SCNC: 3.8 MMOL/L (ref 3.5–5.2)
POTASSIUM SERPL-SCNC: 4.1 MMOL/L (ref 3.5–5.1)
PROT SERPL-MCNC: 6.5 G/DL (ref 6–8.5)
PROT SERPL-MCNC: 6.6 G/DL (ref 6.4–8.2)
PROT SERPL-MCNC: 7.3 G/DL (ref 6.4–8.2)
PROT UR STRIP-MCNC: ABNORMAL MG/DL
PROTHROMBIN TIME: 11 SEC (ref 9–11.1)
RBC # BLD AUTO: 4.7 X10E6/UL (ref 3.77–5.28)
RBC # BLD AUTO: 4.71 M/UL (ref 3.8–5.2)
RBC # BLD AUTO: 4.81 M/UL (ref 3.8–5.2)
RBC # BLD AUTO: 5.16 M/UL (ref 3.8–5.2)
RBC #/AREA URNS HPF: ABNORMAL /HPF (ref 0–5)
RBC MORPH BLD: ABNORMAL
SERVICE CMNT-IMP: ABNORMAL
SERVICE CMNT-IMP: ABNORMAL
SERVICE CMNT-IMP: NORMAL
SODIUM SERPL-SCNC: 136 MMOL/L (ref 136–145)
SODIUM SERPL-SCNC: 136 MMOL/L (ref 136–145)
SODIUM SERPL-SCNC: 137 MMOL/L (ref 136–145)
SODIUM SERPL-SCNC: 137 MMOL/L (ref 136–145)
SODIUM SERPL-SCNC: 138 MMOL/L (ref 136–145)
SODIUM SERPL-SCNC: 139 MMOL/L (ref 136–145)
SODIUM SERPL-SCNC: 140 MMOL/L (ref 136–145)
SODIUM SERPL-SCNC: 141 MMOL/L (ref 134–144)
SP GR UR REFRACTOMETRY: 1.02 (ref 1–1.03)
THERAPEUTIC RANGE,PTTT: NORMAL SECS (ref 58–77)
TRIGL SERPL-MCNC: 31 MG/DL (ref ?–150)
UA: UC IF INDICATED,UAUC: ABNORMAL
UROBILINOGEN UR QL STRIP.AUTO: 1 EU/DL (ref 0.2–1)
VLDLC SERPL CALC-MCNC: 6.2 MG/DL
WBC # BLD AUTO: 13 K/UL (ref 3.6–11)
WBC # BLD AUTO: 13.4 K/UL (ref 3.6–11)
WBC # BLD AUTO: 16.3 K/UL (ref 3.6–11)
WBC # BLD AUTO: 8.2 X10E3/UL (ref 3.4–10.8)
WBC URNS QL MICRO: ABNORMAL /HPF (ref 0–4)

## 2018-01-01 PROCEDURE — 97165 OT EVAL LOW COMPLEX 30 MIN: CPT | Performed by: OCCUPATIONAL THERAPIST

## 2018-01-01 PROCEDURE — 3331090002 HH PPS REVENUE DEBIT

## 2018-01-01 PROCEDURE — 3331090001 HH PPS REVENUE CREDIT

## 2018-01-01 PROCEDURE — 74011000258 HC RX REV CODE- 258: Performed by: INTERNAL MEDICINE

## 2018-01-01 PROCEDURE — 74011250637 HC RX REV CODE- 250/637: Performed by: INTERNAL MEDICINE

## 2018-01-01 PROCEDURE — 77030003666 HC NDL SPINAL BD -A

## 2018-01-01 PROCEDURE — G0157 HHC PT ASSISTANT EA 15: HCPCS

## 2018-01-01 PROCEDURE — 97535 SELF CARE MNGMENT TRAINING: CPT

## 2018-01-01 PROCEDURE — G8987 SELF CARE CURRENT STATUS: HCPCS | Performed by: OCCUPATIONAL THERAPIST

## 2018-01-01 PROCEDURE — 74011250636 HC RX REV CODE- 250/636: Performed by: INTERNAL MEDICINE

## 2018-01-01 PROCEDURE — 65660000000 HC RM CCU STEPDOWN

## 2018-01-01 PROCEDURE — 97535 SELF CARE MNGMENT TRAINING: CPT | Performed by: OCCUPATIONAL THERAPIST

## 2018-01-01 PROCEDURE — 80061 LIPID PANEL: CPT | Performed by: INTERNAL MEDICINE

## 2018-01-01 PROCEDURE — 97161 PT EVAL LOW COMPLEX 20 MIN: CPT

## 2018-01-01 PROCEDURE — 36415 COLL VENOUS BLD VENIPUNCTURE: CPT | Performed by: INTERNAL MEDICINE

## 2018-01-01 PROCEDURE — 85610 PROTHROMBIN TIME: CPT | Performed by: INTERNAL MEDICINE

## 2018-01-01 PROCEDURE — A9585 GADOBUTROL INJECTION: HCPCS | Performed by: INTERNAL MEDICINE

## 2018-01-01 PROCEDURE — 77030028872 HC BN BIOP NDL ON CNTRL TY TELE -C

## 2018-01-01 PROCEDURE — 94760 N-INVAS EAR/PLS OXIMETRY 1: CPT

## 2018-01-01 PROCEDURE — 97116 GAIT TRAINING THERAPY: CPT

## 2018-01-01 PROCEDURE — 0FB13ZX EXCISION OF RIGHT LOBE LIVER, PERCUTANEOUS APPROACH, DIAGNOSTIC: ICD-10-PCS | Performed by: RADIOLOGY

## 2018-01-01 PROCEDURE — 74011250636 HC RX REV CODE- 250/636: Performed by: RADIOLOGY

## 2018-01-01 PROCEDURE — G8978 MOBILITY CURRENT STATUS: HCPCS

## 2018-01-01 PROCEDURE — 80048 BASIC METABOLIC PNL TOTAL CA: CPT | Performed by: INTERNAL MEDICINE

## 2018-01-01 PROCEDURE — 74183 MRI ABD W/O CNTR FLWD CNTR: CPT

## 2018-01-01 PROCEDURE — 85025 COMPLETE CBC W/AUTO DIFF WBC: CPT | Performed by: INTERNAL MEDICINE

## 2018-01-01 PROCEDURE — 77030031139 HC SUT VCRL2 J&J -A

## 2018-01-01 PROCEDURE — 77001 FLUOROGUIDE FOR VEIN DEVICE: CPT

## 2018-01-01 PROCEDURE — 80053 COMPREHEN METABOLIC PANEL: CPT | Performed by: SURGERY

## 2018-01-01 PROCEDURE — 85730 THROMBOPLASTIN TIME PARTIAL: CPT | Performed by: INTERNAL MEDICINE

## 2018-01-01 PROCEDURE — 71045 X-RAY EXAM CHEST 1 VIEW: CPT

## 2018-01-01 PROCEDURE — 77010033678 HC OXYGEN DAILY

## 2018-01-01 PROCEDURE — C1892 INTRO/SHEATH,FIXED,PEEL-AWAY: HCPCS

## 2018-01-01 PROCEDURE — G8988 SELF CARE GOAL STATUS: HCPCS | Performed by: OCCUPATIONAL THERAPIST

## 2018-01-01 PROCEDURE — C1788 PORT, INDWELLING, IMP: HCPCS

## 2018-01-01 PROCEDURE — 80076 HEPATIC FUNCTION PANEL: CPT | Performed by: INTERNAL MEDICINE

## 2018-01-01 PROCEDURE — 77030018781

## 2018-01-01 PROCEDURE — 83690 ASSAY OF LIPASE: CPT | Performed by: INTERNAL MEDICINE

## 2018-01-01 PROCEDURE — 82962 GLUCOSE BLOOD TEST: CPT

## 2018-01-01 PROCEDURE — G8979 MOBILITY GOAL STATUS: HCPCS

## 2018-01-01 PROCEDURE — 97110 THERAPEUTIC EXERCISES: CPT

## 2018-01-01 PROCEDURE — 85027 COMPLETE CBC AUTOMATED: CPT | Performed by: INTERNAL MEDICINE

## 2018-01-01 PROCEDURE — 77030014115

## 2018-01-01 PROCEDURE — 83690 ASSAY OF LIPASE: CPT | Performed by: SURGERY

## 2018-01-01 PROCEDURE — 87086 URINE CULTURE/COLONY COUNT: CPT | Performed by: INTERNAL MEDICINE

## 2018-01-01 PROCEDURE — 88307 TISSUE EXAM BY PATHOLOGIST: CPT | Performed by: INTERNAL MEDICINE

## 2018-01-01 PROCEDURE — G0151 HHCP-SERV OF PT,EA 15 MIN: HCPCS

## 2018-01-01 PROCEDURE — 83605 ASSAY OF LACTIC ACID: CPT | Performed by: INTERNAL MEDICINE

## 2018-01-01 PROCEDURE — 82105 ALPHA-FETOPROTEIN SERUM: CPT | Performed by: INTERNAL MEDICINE

## 2018-01-01 PROCEDURE — 77030014006 HC SPNG HEMSTAT J&J -A

## 2018-01-01 PROCEDURE — 400013 HH SOC

## 2018-01-01 PROCEDURE — 82378 CARCINOEMBRYONIC ANTIGEN: CPT | Performed by: INTERNAL MEDICINE

## 2018-01-01 PROCEDURE — G0152 HHCP-SERV OF OT,EA 15 MIN: HCPCS

## 2018-01-01 PROCEDURE — 88173 CYTOPATH EVAL FNA REPORT: CPT | Performed by: INTERNAL MEDICINE

## 2018-01-01 PROCEDURE — 36415 COLL VENOUS BLD VENIPUNCTURE: CPT | Performed by: SURGERY

## 2018-01-01 PROCEDURE — 77030003503 HC NDL BIOP TISS BD -B

## 2018-01-01 PROCEDURE — 87040 BLOOD CULTURE FOR BACTERIA: CPT | Performed by: INTERNAL MEDICINE

## 2018-01-01 PROCEDURE — 77030039266 HC ADH SKN EXOFIN S2SG -A

## 2018-01-01 PROCEDURE — 77030019698 HC SYR ANGI MDLON MRTM -A

## 2018-01-01 PROCEDURE — 83735 ASSAY OF MAGNESIUM: CPT | Performed by: INTERNAL MEDICINE

## 2018-01-01 PROCEDURE — 88333 PATH CONSLTJ SURG CYTO XM 1: CPT | Performed by: INTERNAL MEDICINE

## 2018-01-01 PROCEDURE — 81001 URINALYSIS AUTO W/SCOPE: CPT | Performed by: INTERNAL MEDICINE

## 2018-01-01 PROCEDURE — 86301 IMMUNOASSAY TUMOR CA 19-9: CPT | Performed by: INTERNAL MEDICINE

## 2018-01-01 PROCEDURE — 77012 CT SCAN FOR NEEDLE BIOPSY: CPT

## 2018-01-01 RX ORDER — MIDAZOLAM HYDROCHLORIDE 1 MG/ML
5 INJECTION, SOLUTION INTRAMUSCULAR; INTRAVENOUS
Status: DISCONTINUED | OUTPATIENT
Start: 2018-01-01 | End: 2018-01-01

## 2018-01-01 RX ORDER — SODIUM CHLORIDE 0.9 % (FLUSH) 0.9 %
5-10 SYRINGE (ML) INJECTION AS NEEDED
Status: DISCONTINUED | OUTPATIENT
Start: 2018-01-01 | End: 2018-01-01 | Stop reason: HOSPADM

## 2018-01-01 RX ORDER — SODIUM CHLORIDE 9 MG/ML
100 INJECTION, SOLUTION INTRAVENOUS CONTINUOUS
Status: DISCONTINUED | OUTPATIENT
Start: 2018-01-01 | End: 2018-01-01

## 2018-01-01 RX ORDER — POLYETHYLENE GLYCOL 3350 17 G/17G
17 POWDER, FOR SOLUTION ORAL DAILY
Qty: 30 PACKET | Refills: 0 | Status: SHIPPED | OUTPATIENT
Start: 2018-01-01

## 2018-01-01 RX ORDER — CEFAZOLIN SODIUM/WATER 2 G/20 ML
2 SYRINGE (ML) INTRAVENOUS ONCE
Status: COMPLETED | OUTPATIENT
Start: 2018-01-01 | End: 2018-01-01

## 2018-01-01 RX ORDER — AMOXICILLIN 250 MG
1 CAPSULE ORAL DAILY
Status: DISCONTINUED | OUTPATIENT
Start: 2018-01-01 | End: 2018-01-01

## 2018-01-01 RX ORDER — FENTANYL CITRATE 50 UG/ML
100 INJECTION, SOLUTION INTRAMUSCULAR; INTRAVENOUS
Status: DISCONTINUED | OUTPATIENT
Start: 2018-01-01 | End: 2018-01-01

## 2018-01-01 RX ORDER — POLYETHYLENE GLYCOL 3350 17 G/17G
17 POWDER, FOR SOLUTION ORAL DAILY
Status: DISCONTINUED | OUTPATIENT
Start: 2018-01-01 | End: 2018-01-01

## 2018-01-01 RX ORDER — HYDROCODONE BITARTRATE AND ACETAMINOPHEN 10; 325 MG/1; MG/1
1 TABLET ORAL
Qty: 10 TAB | Refills: 0 | Status: SHIPPED | OUTPATIENT
Start: 2018-01-01

## 2018-01-01 RX ORDER — CAPECITABINE 500 MG/1
2000 TABLET, FILM COATED ORAL 2 TIMES DAILY
COMMUNITY
End: 2019-01-01

## 2018-01-01 RX ORDER — POTASSIUM CHLORIDE 7.45 MG/ML
10 INJECTION INTRAVENOUS
Status: COMPLETED | OUTPATIENT
Start: 2018-01-01 | End: 2018-01-01

## 2018-01-01 RX ORDER — HEPARIN SODIUM 200 [USP'U]/100ML
400 INJECTION, SOLUTION INTRAVENOUS ONCE
Status: DISPENSED | OUTPATIENT
Start: 2018-01-01 | End: 2018-01-01

## 2018-01-01 RX ORDER — ADHESIVE BANDAGE
30 BANDAGE TOPICAL DAILY PRN
Status: DISCONTINUED | OUTPATIENT
Start: 2018-01-01 | End: 2018-01-01 | Stop reason: HOSPADM

## 2018-01-01 RX ORDER — LIDOCAINE HYDROCHLORIDE 20 MG/ML
18 INJECTION, SOLUTION INFILTRATION; PERINEURAL ONCE
Status: DISPENSED | OUTPATIENT
Start: 2018-01-01 | End: 2018-01-01

## 2018-01-01 RX ORDER — ACETAMINOPHEN 325 MG/1
650 TABLET ORAL
Status: DISCONTINUED | OUTPATIENT
Start: 2018-01-01 | End: 2018-01-01

## 2018-01-01 RX ORDER — LIDOCAINE HYDROCHLORIDE AND EPINEPHRINE 10; 10 MG/ML; UG/ML
18 INJECTION, SOLUTION INFILTRATION; PERINEURAL ONCE
Status: DISPENSED | OUTPATIENT
Start: 2018-01-01 | End: 2018-01-01

## 2018-01-01 RX ORDER — SODIUM CHLORIDE 9 MG/ML
25 INJECTION, SOLUTION INTRAVENOUS CONTINUOUS
Status: DISCONTINUED | OUTPATIENT
Start: 2018-01-01 | End: 2018-01-01

## 2018-01-01 RX ORDER — HYDROCODONE BITARTRATE AND ACETAMINOPHEN 10; 325 MG/1; MG/1
1 TABLET ORAL
Status: DISCONTINUED | OUTPATIENT
Start: 2018-01-01 | End: 2018-01-01 | Stop reason: HOSPADM

## 2018-01-01 RX ORDER — POTASSIUM CHLORIDE 750 MG/1
20 TABLET, FILM COATED, EXTENDED RELEASE ORAL DAILY
Qty: 360 TAB | Refills: 1 | Status: SHIPPED | OUTPATIENT
Start: 2018-01-01 | End: 2018-01-01 | Stop reason: SDUPTHER

## 2018-01-01 RX ORDER — ONDANSETRON 2 MG/ML
4 INJECTION INTRAMUSCULAR; INTRAVENOUS
Status: DISCONTINUED | OUTPATIENT
Start: 2018-01-01 | End: 2018-01-01 | Stop reason: HOSPADM

## 2018-01-01 RX ORDER — LIDOCAINE HYDROCHLORIDE 10 MG/ML
5 INJECTION, SOLUTION EPIDURAL; INFILTRATION; INTRACAUDAL; PERINEURAL
Status: DISPENSED | OUTPATIENT
Start: 2018-01-01 | End: 2018-01-01

## 2018-01-01 RX ORDER — LIDOCAINE HYDROCHLORIDE 10 MG/ML
INJECTION, SOLUTION EPIDURAL; INFILTRATION; INTRACAUDAL; PERINEURAL
Status: DISPENSED
Start: 2018-01-01 | End: 2018-01-01

## 2018-01-01 RX ORDER — CETIRIZINE HCL 10 MG
10 TABLET ORAL
COMMUNITY
Start: 2017-04-06

## 2018-01-01 RX ORDER — FUROSEMIDE 20 MG/1
20 TABLET ORAL 2 TIMES DAILY
Status: SHIPPED | COMMUNITY
Start: 2018-01-01 | End: 2018-01-01 | Stop reason: SDUPTHER

## 2018-01-01 RX ORDER — HYDROCHLOROTHIAZIDE 25 MG/1
25 TABLET ORAL DAILY
COMMUNITY
End: 2018-01-01

## 2018-01-01 RX ORDER — POTASSIUM CHLORIDE 20 MEQ/1
20 TABLET, EXTENDED RELEASE ORAL DAILY
Qty: 90 TAB | Refills: 1 | Status: SHIPPED | OUTPATIENT
Start: 2018-01-01

## 2018-01-01 RX ORDER — CAPECITABINE 500 MG/1
1000 TABLET, FILM COATED ORAL 2 TIMES DAILY
Qty: 112 TAB | Refills: 6 | Status: SHIPPED | OUTPATIENT
Start: 2018-01-01 | End: 2018-01-01

## 2018-01-01 RX ORDER — HEPARIN SODIUM 5000 [USP'U]/ML
5000 INJECTION, SOLUTION INTRAVENOUS; SUBCUTANEOUS EVERY 8 HOURS
Status: DISCONTINUED | OUTPATIENT
Start: 2018-01-01 | End: 2018-01-01 | Stop reason: HOSPADM

## 2018-01-01 RX ORDER — LIDOCAINE HCL 4 G/100G
1 CREAM TOPICAL
COMMUNITY
End: 2019-01-01

## 2018-01-01 RX ORDER — POTASSIUM CHLORIDE 1.5 G/1.77G
40 POWDER, FOR SOLUTION ORAL
Status: COMPLETED | OUTPATIENT
Start: 2018-01-01 | End: 2018-01-01

## 2018-01-01 RX ORDER — HEPARIN SODIUM 5000 [USP'U]/ML
5000 INJECTION, SOLUTION INTRAVENOUS; SUBCUTANEOUS EVERY 8 HOURS
Status: DISPENSED | OUTPATIENT
Start: 2018-01-01 | End: 2018-01-01

## 2018-01-01 RX ORDER — NALOXONE HYDROCHLORIDE 0.4 MG/ML
0.4 INJECTION, SOLUTION INTRAMUSCULAR; INTRAVENOUS; SUBCUTANEOUS AS NEEDED
Status: DISCONTINUED | OUTPATIENT
Start: 2018-01-01 | End: 2018-01-01 | Stop reason: HOSPADM

## 2018-01-01 RX ORDER — ADHESIVE BANDAGE
30 BANDAGE TOPICAL
Status: SHIPPED | COMMUNITY
Start: 2018-01-01 | End: 2018-01-01

## 2018-01-01 RX ORDER — HYDRALAZINE HYDROCHLORIDE 20 MG/ML
10 INJECTION INTRAMUSCULAR; INTRAVENOUS
Status: DISCONTINUED | OUTPATIENT
Start: 2018-01-01 | End: 2018-01-01 | Stop reason: HOSPADM

## 2018-01-01 RX ORDER — POTASSIUM CHLORIDE 750 MG/1
20 TABLET, FILM COATED, EXTENDED RELEASE ORAL
Status: COMPLETED | OUTPATIENT
Start: 2018-01-01 | End: 2018-01-01

## 2018-01-01 RX ORDER — ONDANSETRON HYDROCHLORIDE 8 MG/1
8 TABLET, FILM COATED ORAL
Qty: 30 TAB | Refills: 1 | Status: SHIPPED | OUTPATIENT
Start: 2018-01-01

## 2018-01-01 RX ORDER — SODIUM CHLORIDE 0.9 % (FLUSH) 0.9 %
5-10 SYRINGE (ML) INJECTION EVERY 8 HOURS
Status: DISCONTINUED | OUTPATIENT
Start: 2018-01-01 | End: 2018-01-01 | Stop reason: HOSPADM

## 2018-01-01 RX ORDER — HYDROMORPHONE HYDROCHLORIDE 2 MG/ML
0.5 INJECTION, SOLUTION INTRAMUSCULAR; INTRAVENOUS; SUBCUTANEOUS
Status: DISCONTINUED | OUTPATIENT
Start: 2018-01-01 | End: 2018-01-01 | Stop reason: HOSPADM

## 2018-01-01 RX ORDER — DILTIAZEM HYDROCHLORIDE 120 MG/1
120 CAPSULE, COATED, EXTENDED RELEASE ORAL DAILY
Status: DISCONTINUED | OUTPATIENT
Start: 2018-01-01 | End: 2018-01-01 | Stop reason: HOSPADM

## 2018-01-01 RX ORDER — ACETAMINOPHEN 325 MG/1
650 TABLET ORAL
Status: DISCONTINUED | OUTPATIENT
Start: 2018-01-01 | End: 2018-01-01 | Stop reason: HOSPADM

## 2018-01-01 RX ORDER — POLYETHYLENE GLYCOL 3350 17 G/17G
17 POWDER, FOR SOLUTION ORAL DAILY
Status: DISCONTINUED | OUTPATIENT
Start: 2018-01-01 | End: 2018-01-01 | Stop reason: HOSPADM

## 2018-01-01 RX ORDER — HEPARIN 100 UNIT/ML
300 SYRINGE INTRAVENOUS ONCE
Status: DISPENSED | OUTPATIENT
Start: 2018-01-01 | End: 2018-01-01

## 2018-01-01 RX ADMIN — DILTIAZEM HYDROCHLORIDE 120 MG: 120 CAPSULE, COATED, EXTENDED RELEASE ORAL at 08:48

## 2018-01-01 RX ADMIN — Medication 10 ML: at 06:00

## 2018-01-01 RX ADMIN — DOCUSATE SODIUM AND SENNOSIDES 1 TABLET: 8.6; 5 TABLET, FILM COATED ORAL at 09:57

## 2018-01-01 RX ADMIN — HEPARIN SODIUM 5000 UNITS: 5000 INJECTION INTRAVENOUS; SUBCUTANEOUS at 08:48

## 2018-01-01 RX ADMIN — DILTIAZEM HYDROCHLORIDE 120 MG: 120 CAPSULE, COATED, EXTENDED RELEASE ORAL at 10:18

## 2018-01-01 RX ADMIN — Medication 10 ML: at 05:34

## 2018-01-01 RX ADMIN — HEPARIN SODIUM 5000 UNITS: 5000 INJECTION INTRAVENOUS; SUBCUTANEOUS at 19:01

## 2018-01-01 RX ADMIN — Medication 10 ML: at 13:29

## 2018-01-01 RX ADMIN — ONDANSETRON 4 MG: 2 INJECTION, SOLUTION INTRAMUSCULAR; INTRAVENOUS at 18:06

## 2018-01-01 RX ADMIN — HYDROCODONE BITARTRATE AND ACETAMINOPHEN 1 TABLET: 10; 325 TABLET ORAL at 19:58

## 2018-01-01 RX ADMIN — HEPARIN SODIUM 5000 UNITS: 5000 INJECTION INTRAVENOUS; SUBCUTANEOUS at 09:39

## 2018-01-01 RX ADMIN — SODIUM CHLORIDE 100 ML/HR: 900 INJECTION, SOLUTION INTRAVENOUS at 07:31

## 2018-01-01 RX ADMIN — Medication 10 ML: at 17:28

## 2018-01-01 RX ADMIN — Medication 10 ML: at 14:00

## 2018-01-01 RX ADMIN — Medication 10 ML: at 05:01

## 2018-01-01 RX ADMIN — HYDROMORPHONE HYDROCHLORIDE 0.5 MG: 2 INJECTION, SOLUTION INTRAMUSCULAR; INTRAVENOUS; SUBCUTANEOUS at 06:28

## 2018-01-01 RX ADMIN — FENTANYL CITRATE 50 MCG: 50 INJECTION, SOLUTION INTRAMUSCULAR; INTRAVENOUS at 10:31

## 2018-01-01 RX ADMIN — DILTIAZEM HYDROCHLORIDE 120 MG: 120 CAPSULE, COATED, EXTENDED RELEASE ORAL at 09:11

## 2018-01-01 RX ADMIN — Medication 10 ML: at 06:53

## 2018-01-01 RX ADMIN — HEPARIN SODIUM 5000 UNITS: 5000 INJECTION, SOLUTION INTRAVENOUS; SUBCUTANEOUS at 17:17

## 2018-01-01 RX ADMIN — Medication 10 ML: at 16:03

## 2018-01-01 RX ADMIN — HEPARIN SODIUM 5000 UNITS: 5000 INJECTION INTRAVENOUS; SUBCUTANEOUS at 21:45

## 2018-01-01 RX ADMIN — Medication 10 ML: at 22:16

## 2018-01-01 RX ADMIN — MIDAZOLAM 1 MG: 1 INJECTION INTRAMUSCULAR; INTRAVENOUS at 10:34

## 2018-01-01 RX ADMIN — POLYETHYLENE GLYCOL 3350 17 G: 17 POWDER, FOR SOLUTION ORAL at 09:39

## 2018-01-01 RX ADMIN — HEPARIN SODIUM 5000 UNITS: 5000 INJECTION INTRAVENOUS; SUBCUTANEOUS at 09:56

## 2018-01-01 RX ADMIN — MIDAZOLAM 1 MG: 1 INJECTION INTRAMUSCULAR; INTRAVENOUS at 12:05

## 2018-01-01 RX ADMIN — HEPARIN SODIUM 5000 UNITS: 5000 INJECTION INTRAVENOUS; SUBCUTANEOUS at 17:27

## 2018-01-01 RX ADMIN — HYDROCODONE BITARTRATE AND ACETAMINOPHEN 1 TABLET: 10; 325 TABLET ORAL at 20:11

## 2018-01-01 RX ADMIN — HEPARIN SODIUM 5000 UNITS: 5000 INJECTION INTRAVENOUS; SUBCUTANEOUS at 05:30

## 2018-01-01 RX ADMIN — ONDANSETRON 4 MG: 2 INJECTION, SOLUTION INTRAMUSCULAR; INTRAVENOUS at 22:32

## 2018-01-01 RX ADMIN — HYDROCODONE BITARTRATE AND ACETAMINOPHEN 1 TABLET: 10; 325 TABLET ORAL at 22:27

## 2018-01-01 RX ADMIN — SODIUM CHLORIDE 100 ML/HR: 900 INJECTION, SOLUTION INTRAVENOUS at 18:13

## 2018-01-01 RX ADMIN — Medication 10 ML: at 23:13

## 2018-01-01 RX ADMIN — SODIUM CHLORIDE 100 ML/HR: 900 INJECTION, SOLUTION INTRAVENOUS at 23:02

## 2018-01-01 RX ADMIN — SODIUM CHLORIDE 100 ML/HR: 900 INJECTION, SOLUTION INTRAVENOUS at 03:17

## 2018-01-01 RX ADMIN — HYDROCODONE BITARTRATE AND ACETAMINOPHEN 1 TABLET: 10; 325 TABLET ORAL at 08:32

## 2018-01-01 RX ADMIN — HEPARIN SODIUM 5000 UNITS: 5000 INJECTION INTRAVENOUS; SUBCUTANEOUS at 13:41

## 2018-01-01 RX ADMIN — HYDROMORPHONE HYDROCHLORIDE 0.5 MG: 2 INJECTION, SOLUTION INTRAMUSCULAR; INTRAVENOUS; SUBCUTANEOUS at 20:37

## 2018-01-01 RX ADMIN — Medication 10 ML: at 14:22

## 2018-01-01 RX ADMIN — Medication 10 ML: at 05:30

## 2018-01-01 RX ADMIN — FENTANYL CITRATE 50 MCG: 50 INJECTION, SOLUTION INTRAMUSCULAR; INTRAVENOUS at 12:05

## 2018-01-01 RX ADMIN — DILTIAZEM HYDROCHLORIDE 120 MG: 120 CAPSULE, COATED, EXTENDED RELEASE ORAL at 08:21

## 2018-01-01 RX ADMIN — HYDROMORPHONE HYDROCHLORIDE 0.5 MG: 2 INJECTION, SOLUTION INTRAMUSCULAR; INTRAVENOUS; SUBCUTANEOUS at 18:08

## 2018-01-01 RX ADMIN — SODIUM CHLORIDE 100 ML/HR: 900 INJECTION, SOLUTION INTRAVENOUS at 21:41

## 2018-01-01 RX ADMIN — HEPARIN SODIUM 5000 UNITS: 5000 INJECTION INTRAVENOUS; SUBCUTANEOUS at 05:34

## 2018-01-01 RX ADMIN — Medication 10 ML: at 21:45

## 2018-01-01 RX ADMIN — POTASSIUM CHLORIDE 10 MEQ: 10 INJECTION, SOLUTION INTRAVENOUS at 12:44

## 2018-01-01 RX ADMIN — HYDROMORPHONE HYDROCHLORIDE 0.5 MG: 2 INJECTION, SOLUTION INTRAMUSCULAR; INTRAVENOUS; SUBCUTANEOUS at 19:43

## 2018-01-01 RX ADMIN — Medication 10 ML: at 22:36

## 2018-01-01 RX ADMIN — HYDROCODONE BITARTRATE AND ACETAMINOPHEN 1 TABLET: 10; 325 TABLET ORAL at 21:38

## 2018-01-01 RX ADMIN — PIPERACILLIN SODIUM,TAZOBACTAM SODIUM 3.38 G: 3; .375 INJECTION, POWDER, FOR SOLUTION INTRAVENOUS at 02:43

## 2018-01-01 RX ADMIN — HYDROCODONE BITARTRATE AND ACETAMINOPHEN 1 TABLET: 10; 325 TABLET ORAL at 03:12

## 2018-01-01 RX ADMIN — HYDROCODONE BITARTRATE AND ACETAMINOPHEN 1 TABLET: 10; 325 TABLET ORAL at 04:30

## 2018-01-01 RX ADMIN — DILTIAZEM HYDROCHLORIDE 120 MG: 120 CAPSULE, COATED, EXTENDED RELEASE ORAL at 08:32

## 2018-01-01 RX ADMIN — SODIUM CHLORIDE 100 ML/HR: 900 INJECTION, SOLUTION INTRAVENOUS at 20:29

## 2018-01-01 RX ADMIN — MIDAZOLAM 1 MG: 1 INJECTION INTRAMUSCULAR; INTRAVENOUS at 12:19

## 2018-01-01 RX ADMIN — HYDROCODONE BITARTRATE AND ACETAMINOPHEN 1 TABLET: 10; 325 TABLET ORAL at 18:13

## 2018-01-01 RX ADMIN — HEPARIN SODIUM 5000 UNITS: 5000 INJECTION INTRAVENOUS; SUBCUTANEOUS at 22:36

## 2018-01-01 RX ADMIN — POLYETHYLENE GLYCOL 3350 17 G: 17 POWDER, FOR SOLUTION ORAL at 12:24

## 2018-01-01 RX ADMIN — Medication 10 ML: at 06:15

## 2018-01-01 RX ADMIN — Medication 10 ML: at 05:40

## 2018-01-01 RX ADMIN — HEPARIN SODIUM 5000 UNITS: 5000 INJECTION INTRAVENOUS; SUBCUTANEOUS at 22:34

## 2018-01-01 RX ADMIN — SODIUM CHLORIDE 100 ML/HR: 900 INJECTION, SOLUTION INTRAVENOUS at 11:44

## 2018-01-01 RX ADMIN — SODIUM CHLORIDE 25 ML/HR: 900 INJECTION, SOLUTION INTRAVENOUS at 11:58

## 2018-01-01 RX ADMIN — MIDAZOLAM 1 MG: 1 INJECTION INTRAMUSCULAR; INTRAVENOUS at 10:31

## 2018-01-01 RX ADMIN — HYDROMORPHONE HYDROCHLORIDE 0.5 MG: 2 INJECTION, SOLUTION INTRAMUSCULAR; INTRAVENOUS; SUBCUTANEOUS at 13:28

## 2018-01-01 RX ADMIN — HEPARIN SODIUM 5000 UNITS: 5000 INJECTION INTRAVENOUS; SUBCUTANEOUS at 17:50

## 2018-01-01 RX ADMIN — POLYETHYLENE GLYCOL 3350 17 G: 17 POWDER, FOR SOLUTION ORAL at 09:56

## 2018-01-01 RX ADMIN — GADOBUTROL 10 ML: 604.72 INJECTION INTRAVENOUS at 13:26

## 2018-01-01 RX ADMIN — Medication 10 ML: at 04:38

## 2018-01-01 RX ADMIN — HEPARIN SODIUM 5000 UNITS: 5000 INJECTION INTRAVENOUS; SUBCUTANEOUS at 05:40

## 2018-01-01 RX ADMIN — ONDANSETRON 4 MG: 2 INJECTION, SOLUTION INTRAMUSCULAR; INTRAVENOUS at 01:29

## 2018-01-01 RX ADMIN — Medication 10 ML: at 06:02

## 2018-01-01 RX ADMIN — PIPERACILLIN SODIUM,TAZOBACTAM SODIUM 3.38 G: 3; .375 INJECTION, POWDER, FOR SOLUTION INTRAVENOUS at 16:37

## 2018-01-01 RX ADMIN — Medication 10 ML: at 21:26

## 2018-01-01 RX ADMIN — HYDROMORPHONE HYDROCHLORIDE 0.5 MG: 2 INJECTION, SOLUTION INTRAMUSCULAR; INTRAVENOUS; SUBCUTANEOUS at 02:44

## 2018-01-01 RX ADMIN — POTASSIUM CHLORIDE 10 MEQ: 10 INJECTION, SOLUTION INTRAVENOUS at 13:48

## 2018-01-01 RX ADMIN — HEPARIN SODIUM 5000 UNITS: 5000 INJECTION INTRAVENOUS; SUBCUTANEOUS at 23:41

## 2018-01-01 RX ADMIN — SODIUM CHLORIDE 100 ML/HR: 900 INJECTION, SOLUTION INTRAVENOUS at 01:11

## 2018-01-01 RX ADMIN — Medication 10 ML: at 22:29

## 2018-01-01 RX ADMIN — DILTIAZEM HYDROCHLORIDE 120 MG: 120 CAPSULE, COATED, EXTENDED RELEASE ORAL at 09:39

## 2018-01-01 RX ADMIN — SODIUM CHLORIDE 100 ML/HR: 900 INJECTION, SOLUTION INTRAVENOUS at 11:05

## 2018-01-01 RX ADMIN — HEPARIN SODIUM 5000 UNITS: 5000 INJECTION, SOLUTION INTRAVENOUS; SUBCUTANEOUS at 09:51

## 2018-01-01 RX ADMIN — MAGNESIUM HYDROXIDE 30 ML: 400 SUSPENSION ORAL at 05:01

## 2018-01-01 RX ADMIN — HYDROMORPHONE HYDROCHLORIDE 0.5 MG: 2 INJECTION, SOLUTION INTRAMUSCULAR; INTRAVENOUS; SUBCUTANEOUS at 08:53

## 2018-01-01 RX ADMIN — HYDROMORPHONE HYDROCHLORIDE 0.5 MG: 2 INJECTION, SOLUTION INTRAMUSCULAR; INTRAVENOUS; SUBCUTANEOUS at 14:06

## 2018-01-01 RX ADMIN — DOCUSATE SODIUM AND SENNOSIDES 1 TABLET: 8.6; 5 TABLET, FILM COATED ORAL at 16:03

## 2018-01-01 RX ADMIN — HEPARIN SODIUM 5000 UNITS: 5000 INJECTION INTRAVENOUS; SUBCUTANEOUS at 23:13

## 2018-01-01 RX ADMIN — HEPARIN SODIUM 5000 UNITS: 5000 INJECTION INTRAVENOUS; SUBCUTANEOUS at 14:47

## 2018-01-01 RX ADMIN — HEPARIN SODIUM 5000 UNITS: 5000 INJECTION INTRAVENOUS; SUBCUTANEOUS at 16:03

## 2018-01-01 RX ADMIN — HEPARIN SODIUM 5000 UNITS: 5000 INJECTION, SOLUTION INTRAVENOUS; SUBCUTANEOUS at 01:43

## 2018-01-01 RX ADMIN — HYDROMORPHONE HYDROCHLORIDE 0.5 MG: 2 INJECTION, SOLUTION INTRAMUSCULAR; INTRAVENOUS; SUBCUTANEOUS at 09:50

## 2018-01-01 RX ADMIN — POTASSIUM CHLORIDE 20 MEQ: 750 TABLET, EXTENDED RELEASE ORAL at 08:32

## 2018-01-01 RX ADMIN — HYDROMORPHONE HYDROCHLORIDE 0.5 MG: 2 INJECTION, SOLUTION INTRAMUSCULAR; INTRAVENOUS; SUBCUTANEOUS at 12:49

## 2018-01-01 RX ADMIN — MIDAZOLAM 1 MG: 1 INJECTION INTRAMUSCULAR; INTRAVENOUS at 10:39

## 2018-01-01 RX ADMIN — POLYETHYLENE GLYCOL 3350 17 G: 17 POWDER, FOR SOLUTION ORAL at 09:11

## 2018-01-01 RX ADMIN — SODIUM CHLORIDE 25 ML/HR: 900 INJECTION, SOLUTION INTRAVENOUS at 10:03

## 2018-01-01 RX ADMIN — HYDROMORPHONE HYDROCHLORIDE 0.5 MG: 2 INJECTION, SOLUTION INTRAMUSCULAR; INTRAVENOUS; SUBCUTANEOUS at 23:18

## 2018-01-01 RX ADMIN — FENTANYL CITRATE 50 MCG: 50 INJECTION, SOLUTION INTRAMUSCULAR; INTRAVENOUS at 10:42

## 2018-01-01 RX ADMIN — POTASSIUM CHLORIDE 40 MEQ: 1.5 POWDER, FOR SOLUTION ORAL at 11:05

## 2018-01-01 RX ADMIN — HYDROCODONE BITARTRATE AND ACETAMINOPHEN 1 TABLET: 10; 325 TABLET ORAL at 12:27

## 2018-01-01 RX ADMIN — DILTIAZEM HYDROCHLORIDE 120 MG: 120 CAPSULE, COATED, EXTENDED RELEASE ORAL at 09:57

## 2018-01-01 RX ADMIN — Medication 10 ML: at 21:30

## 2018-01-01 RX ADMIN — MAGNESIUM HYDROXIDE 30 ML: 400 SUSPENSION ORAL at 18:06

## 2018-01-01 RX ADMIN — HYDROMORPHONE HYDROCHLORIDE 0.5 MG: 2 INJECTION, SOLUTION INTRAMUSCULAR; INTRAVENOUS; SUBCUTANEOUS at 03:09

## 2018-01-01 RX ADMIN — SODIUM PHOSPHATE, DIBASIC AND SODIUM PHOSPHATE, MONOBASIC 1 ENEMA: 7; 19 ENEMA RECTAL at 16:04

## 2018-01-01 RX ADMIN — SODIUM CHLORIDE 100 ML/HR: 900 INJECTION, SOLUTION INTRAVENOUS at 17:17

## 2018-01-01 RX ADMIN — POLYETHYLENE GLYCOL 3350 17 G: 17 POWDER, FOR SOLUTION ORAL at 09:51

## 2018-01-01 RX ADMIN — SODIUM CHLORIDE 100 ML/HR: 900 INJECTION, SOLUTION INTRAVENOUS at 14:47

## 2018-01-01 RX ADMIN — SODIUM CHLORIDE 100 ML/HR: 900 INJECTION, SOLUTION INTRAVENOUS at 06:46

## 2018-01-01 RX ADMIN — SODIUM CHLORIDE 100 ML/HR: 900 INJECTION, SOLUTION INTRAVENOUS at 23:43

## 2018-01-01 RX ADMIN — PIPERACILLIN SODIUM,TAZOBACTAM SODIUM 3.38 G: 3; .375 INJECTION, POWDER, FOR SOLUTION INTRAVENOUS at 22:33

## 2018-01-01 RX ADMIN — SODIUM CHLORIDE 100 ML/HR: 900 INJECTION, SOLUTION INTRAVENOUS at 02:22

## 2018-01-01 RX ADMIN — ACETAMINOPHEN 650 MG: 325 TABLET ORAL at 11:40

## 2018-01-01 RX ADMIN — SODIUM CHLORIDE 100 ML/HR: 900 INJECTION, SOLUTION INTRAVENOUS at 21:27

## 2018-01-01 RX ADMIN — PIPERACILLIN SODIUM,TAZOBACTAM SODIUM 3.38 G: 3; .375 INJECTION, POWDER, FOR SOLUTION INTRAVENOUS at 12:23

## 2018-01-01 RX ADMIN — DILTIAZEM HYDROCHLORIDE 120 MG: 120 CAPSULE, COATED, EXTENDED RELEASE ORAL at 08:53

## 2018-01-01 RX ADMIN — HYDROCODONE BITARTRATE AND ACETAMINOPHEN 1 TABLET: 10; 325 TABLET ORAL at 13:41

## 2018-01-01 RX ADMIN — DILTIAZEM HYDROCHLORIDE 120 MG: 120 CAPSULE, COATED, EXTENDED RELEASE ORAL at 09:51

## 2018-01-01 RX ADMIN — PIPERACILLIN SODIUM,TAZOBACTAM SODIUM 3.38 G: 3; .375 INJECTION, POWDER, FOR SOLUTION INTRAVENOUS at 05:30

## 2018-01-01 RX ADMIN — Medication 2 G: at 10:16

## 2018-01-01 RX ADMIN — POTASSIUM CHLORIDE 10 MEQ: 10 INJECTION, SOLUTION INTRAVENOUS at 14:47

## 2018-01-01 RX ADMIN — Medication 10 ML: at 21:42

## 2018-01-01 RX ADMIN — HEPARIN SODIUM 5000 UNITS: 5000 INJECTION INTRAVENOUS; SUBCUTANEOUS at 01:04

## 2018-02-20 ENCOUNTER — OFFICE VISIT (OUTPATIENT)
Dept: FAMILY MEDICINE CLINIC | Age: 83
End: 2018-02-20

## 2018-02-20 VITALS
BODY MASS INDEX: 48.4 KG/M2 | DIASTOLIC BLOOD PRESSURE: 71 MMHG | OXYGEN SATURATION: 96 % | WEIGHT: 263 LBS | SYSTOLIC BLOOD PRESSURE: 139 MMHG | TEMPERATURE: 96.2 F | HEART RATE: 73 BPM | RESPIRATION RATE: 16 BRPM | HEIGHT: 62 IN

## 2018-02-20 DIAGNOSIS — K43.9 ABDOMINAL WALL HERNIA: ICD-10-CM

## 2018-02-20 DIAGNOSIS — I10 ESSENTIAL HYPERTENSION: ICD-10-CM

## 2018-02-20 DIAGNOSIS — R73.09 ELEVATED GLUCOSE: ICD-10-CM

## 2018-02-20 DIAGNOSIS — K21.9 GASTROESOPHAGEAL REFLUX DISEASE WITHOUT ESOPHAGITIS: ICD-10-CM

## 2018-02-20 DIAGNOSIS — D49.0 TONSIL NEOPLASM: ICD-10-CM

## 2018-02-20 DIAGNOSIS — E87.6 HYPOKALEMIA: ICD-10-CM

## 2018-02-20 DIAGNOSIS — E78.00 HYPERCHOLESTEROLEMIA: Primary | ICD-10-CM

## 2018-02-20 RX ORDER — HYDROCHLOROTHIAZIDE 25 MG/1
25 TABLET ORAL DAILY
Qty: 90 TAB | Refills: 1 | Status: SHIPPED | OUTPATIENT
Start: 2018-02-20 | End: 2018-01-01

## 2018-02-20 RX ORDER — POTASSIUM CHLORIDE 750 MG/1
20 TABLET, FILM COATED, EXTENDED RELEASE ORAL 2 TIMES DAILY
Qty: 360 TAB | Refills: 1 | Status: ON HOLD | OUTPATIENT
Start: 2018-02-20 | End: 2018-01-01

## 2018-02-20 RX ORDER — ATORVASTATIN CALCIUM 20 MG/1
20 TABLET, FILM COATED ORAL DAILY
Qty: 90 TAB | Refills: 1 | Status: SHIPPED | OUTPATIENT
Start: 2018-02-20 | End: 2018-01-01 | Stop reason: SDUPTHER

## 2018-02-20 RX ORDER — ESOMEPRAZOLE MAGNESIUM 40 MG/1
40 CAPSULE, DELAYED RELEASE ORAL DAILY
Qty: 90 CAP | Refills: 1 | Status: SHIPPED | OUTPATIENT
Start: 2018-02-20 | End: 2018-01-01

## 2018-02-20 RX ORDER — DILTIAZEM HYDROCHLORIDE 120 MG/1
120 CAPSULE, EXTENDED RELEASE ORAL DAILY
Qty: 90 CAP | Refills: 1 | Status: SHIPPED | OUTPATIENT
Start: 2018-02-20 | End: 2018-01-01 | Stop reason: SDUPTHER

## 2018-02-20 NOTE — MR AVS SNAPSHOT
[facility] 6 
[phone] Patient: Brian Stevenson MRN: [id number] LGI:[id number] Visit Information Date & Time Provider Department Dept. Phone Encounter #  
 2/20/2018  9:40 AM Melia Perdomo MD [address] [place] [address] [phone] [id number] Follow-up Instructions Return in about 6 months (around 8/20/2018). Follow-up and Disposition History Your Appointments 2/27/2018  2:10 PM  
ESTABLISHED PATIENT with Christen Nava MD  
[facility] [address] [facility] ([address]) Appt Note: REF [facility]- ABDOMINAL WALL HERNIA  
 [facility], [address] [place] [address] [address] [place] [address],[address], [address] [facility] Upcoming Health Maintenance Date Due  
 GLAUCOMA SCREENING Q2Y 10/22/2017 MEDICARE YEARLY EXAM 4/7/2018 COLONOSCOPY 3/9/2022 DTaP/Tdap/Td series (2 - Td) 8/24/2027 Allergies as of 2/20/2018  Review Complete On: 2/20/2018 By: Barbie Romero LPN Severity Noted Reaction Type Reactions Lisinopril Medium 05/02/2017   Side Effect Angioedema Niacin  10/22/2013    Itching Felt hot also Ultram [Tramadol]  10/22/2013    Nausea Only Current Immunizations  Reviewed on 10/10/2017 Name Date Influenza High Dose Vaccine PF 10/10/2017, 10/4/2016 Influenza Vaccine 10/22/2014 Influenza Vaccine (Quad) PF 11/5/2015 Pneumococcal Conjugate (PCV-13) 5/1/2015 Not reviewed this visit You Were Diagnosed With   
  
 Codes Comments Hypercholesterolemia    -  Primary ICD-10-CM: E78.00 ICD-9-CM: 272.0 Essential hypertension     ICD-10-CM: I10 
ICD-9-CM: 401.9 Elevated glucose     ICD-10-CM: R73.09 
ICD-9-CM: 790.29 Hypokalemia     ICD-10-CM: E87.6 ICD-9-CM: 276.8 Gastroesophageal reflux disease without esophagitis     ICD-10-CM: K21.9 ICD-9-CM: 530.81   
 Abdominal wall hernia     ICD-10-CM: K43.9 ICD-9-CM: 553.20 Tonsil neoplasm     ICD-10-CM: D49.0 ICD-9-CM: 239.0 Vitals BP Pulse Temp Resp Height(growth percentile) 139/71 (BP 1 Location: Right arm, BP Patient Position: Sitting) 73 96.2 °F (35.7 °C) (Temporal) 16 5' 2\" (1.575 m) Weight(growth percentile) SpO2 BMI OB Status Smoking Status 263 lb (119.3 kg) 96% 48.1 kg/m2 Hysterectomy Never Smoker BMI and BSA Data Body Mass Index Body Surface Area  
 48.1 kg/m 2 2.28 m 2 Preferred Pharmacy Pharmacy Name Phone [facility],[address] Floor, [address] [place] [address]  [phone] Your Updated Medication List  
  
   
This list is accurate as of: 2/20/18 10:25 AM.  Always use your most recent med list.  
  
  
  
  
 acetaminophen 325 mg tablet Commonly known as:  TYLENOL Take  by mouth every four (4) hours as needed for Pain. ASPERCREME EX  
by Apply Externally route. Applies to left knee 3-4 x daily  
  
 aspirin delayed-release 81 mg tablet Take  by mouth daily. atorvastatin 20 mg tablet Commonly known as:  LIPITOR Take 1 Tab by mouth daily. cetirizine 10 mg tablet Commonly known as:  ZyrTEC Take 1 Tab by mouth daily as needed for Allergies. CO Q-10 PO Take 200 mg by mouth daily. cyclobenzaprine 5 mg tablet Commonly known as:  FLEXERIL Take 1 Tab by mouth three (3) times daily as needed for Muscle Spasm(s). dilTIAZem 120 mg SR capsule Commonly known as:  [facility] Take 1 Cap by mouth daily. esomeprazole 40 mg capsule Commonly known as:  NexIUM Take 1 Cap by mouth daily. furosemide 20 mg tablet Commonly known as:  LASIX Take 20 mg by mouth as needed (swelling). hydroCHLOROthiazide 25 mg tablet Commonly known as:  HYDRODIURIL Take 1 Tab by mouth daily. magnesium oxide 500 mg Tab Take 1 Tab by mouth daily. multivitamin tablet Commonly known as:  ONE A DAY  
 Take 1 Tab by mouth daily. polyethylene glycol 17 gram packet Commonly known as:  German Mano Take 1 Packet by mouth daily as needed. potassium chloride SR 10 mEq tablet Commonly known as:  KLOR-CON 10 Take 2 Tabs by mouth two (2) times a day. VITAMIN D3 1,000 unit Cap Generic drug:  cholecalciferol Take 1,000 Units by mouth daily. Prescriptions Sent to Pharmacy Refills  
 esomeprazole (NEXIUM) 40 mg capsule 1 Sig: Take 1 Cap by mouth daily. Class: Normal  
 Pharmacy: [address] [place] [address] Dr Ph #: [phone] Route: Oral  
 hydroCHLOROthiazide (HYDRODIURIL) 25 mg tablet 1 Sig: Take 1 Tab by mouth daily. Class: Normal  
 Pharmacy: [address] [place] [address] Dr Ph #: [phone] Route: Oral  
 dilTIAZem (TIAZAC) 120 mg SR capsule 1 Sig: Take 1 Cap by mouth daily. Class: Normal  
 Pharmacy: [address] [place] [address] Dr Ph #: [phone] Route: Oral  
 atorvastatin (LIPITOR) 20 mg tablet 1 Sig: Take 1 Tab by mouth daily. Class: Normal  
 Pharmacy: [address] [place] [address] Dr Ph #: [phone] Route: Oral  
 potassium chloride SR (KLOR-CON 10) 10 mEq tablet 1 Sig: Take 2 Tabs by mouth two (2) times a day. Class: Normal  
 Pharmacy: [address] [place] [address] Dr Ph #: [phone] Route: Oral  
  
We Performed the Following CBC WITH AUTOMATED DIFF [00588 CPT(R)] HEMOGLOBIN A1C W/O EAG [81444 CPT(R)] LIPID PANEL WITH LDL/HDL RATIO [89596 CPT(R)] METABOLIC PANEL, COMPREHENSIVE [97909 CPT(R)] REFERRAL TO ENT-OTOLARYNGOLOGY [IAT36 Custom] REFERRAL TO GENERAL SURGERY [REF27 Custom] Comments:  
 Please evaluate patient for abdominal hernia. Follow-up Instructions Return in about 6 months (around 8/20/2018). Referral Information Referral ID Referred By Referred To [id number] [facility] Not Available Visits Status Start Date End Date 1 New Request 2/20/18 2/20/19 If your referral has a status of pending review or denied, additional information will be sent to support the outcome of this decision. Referral ID Referred By Referred To  
 [id number] [facility], Allyson Oscar MD  
   [address] Phone: [phone] Fax: [phone] Visits Status Start Date End Date 1 New Request 2/20/18 2/20/19 If your referral has a status of pending review or denied, additional information will be sent to support the outcome of this decision. Introducing [facility]! Dear Jeffrey Mullins: 
Thank you for requesting a [employer] account. Our records indicate that you already have an active Ass[employer] account. You can access your account anytime at [email or web address] (Chapter [employer]). [employer] ([employer]) Did you know that you can access your hospital and ER discharge instructions at any time in [employer]? You can also review all of your test results from your hospital stay or ER visit. Additional Information If you have questions, please visit the Frequently Asked Questions section of the Ass[employer] website at [email or web address] (Chapter [employer] (Chapter [employer])/. Remember, Asseta is NOT to be used for urgent needs. For medical emergencies, dial 911. Now available from your iPhone and Android! Please provide this summary of care documentation to your next provider. Your primary care clinician is listed as Dimple Greco. If you have any questions after today's visit, please call [phone].

## 2018-02-20 NOTE — PROGRESS NOTES
HISTORY OF PRESENT ILLNESS  Zuly Esteban is a 81 y.o. female. Chief Complaint   Patient presents with    Follow-up     says shes due for routine, followup labs       HPI  HTN  Needs refills    Hyperlipidemia  Fasting for BW    Prediabetes    Abdominal hernia  Had one repaired by Dr Nishi Mariscal on right side  But not on left side  Now left more bothersome  reducible    Sees spot on tonsil on right side  No sore throat      Review of Systems   Constitutional: Negative for fever. HENT: Negative for congestion and sore throat. Respiratory: Negative for cough. Gastrointestinal: Positive for abdominal pain (left abdomen with hernia). Negative for heartburn. Past Medical History:   Diagnosis Date    Allergic rhinitis     Cancer ([facility] [address]     Stage 1 cancer intestines    Cervical disc disease     Cervical disc disease     Colonic mass 3/20/2017    Eczema     Gastritis     GERD (gastroesophageal reflux disease)     mild    GI bleed     hx. of recurrent    Hernia     hiatal lt side    Hiatal hernia     Hypercholesterolemia     Hypertension     Morbid obesity ([facility] [address] 4/13/2017    Osteoarthritis (arthritis due to wear and tear of joints)      Current Outpatient Prescriptions   Medication Sig Dispense Refill    esomeprazole (NEXIUM) 40 mg capsule Take 1 Cap by mouth daily. 90 Cap 1    hydroCHLOROthiazide (HYDRODIURIL) 25 mg tablet Take 1 Tab by mouth daily. 90 Tab 1    dilTIAZem (TIAZAC) 120 mg SR capsule Take 1 Cap by mouth daily. 90 Cap 1    atorvastatin (LIPITOR) 20 mg tablet Take 1 Tab by mouth daily. 90 Tab 1    potassium chloride SR (KLOR-CON 10) 10 mEq tablet Take 2 Tabs by mouth two (2) times a day. 360 Tab 1    furosemide (LASIX) 20 mg tablet Take 20 mg by mouth as needed (swelling).  polyethylene glycol (MIRALAX) 17 gram packet Take 1 Packet by mouth daily as needed. 30 Packet 0    cetirizine (ZYRTEC) 10 mg tablet Take 1 Tab by mouth daily as needed for Allergies.  30 Tab 3    cholecalciferol (VITAMIN D3) 1,000 unit cap Take 1,000 Units by mouth daily.  UBIDECARENONE (CO Q-10 PO) Take 200 mg by mouth daily.  acetaminophen (TYLENOL) 325 mg tablet Take  by mouth every four (4) hours as needed for Pain.  TROLAMINE SALICYLATE (ASPERCREME EX) by Apply Externally route. Applies to left knee 3-4 x daily      aspirin delayed-release 81 mg tablet Take  by mouth daily.  magnesium oxide 500 mg tab Take 1 Tab by mouth daily.  cyclobenzaprine (FLEXERIL) 5 mg tablet Take 1 Tab by mouth three (3) times daily as needed for Muscle Spasm(s). 45 Tab 0    multivitamin (ONE A DAY) tablet Take 1 Tab by mouth daily. Allergies   Allergen Reactions    Lisinopril Angioedema    Niacin Itching     Felt hot also    Ultram [Tramadol] Nausea Only     Visit Vitals    /71 (BP 1 Location: Right arm, BP Patient Position: Sitting)    Pulse 73    Temp 96.2 °F (35.7 °C) (Temporal)    Resp 16    Ht 5' 2\" (1.575 m)    Wt 263 lb (119.3 kg)    SpO2 96%    BMI 48.1 kg/m2     Physical Exam   Constitutional: She is oriented to person, place, and time. She appears well-developed and well-nourished. No distress. HENT:   Head: Normocephalic and atraumatic. Mouth/Throat: No oropharyngeal exudate. Nodular lesion on right tonsil, about 3 mm   Eyes: Conjunctivae are normal. Pupils are equal, round, and reactive to light. Cardiovascular: Normal rate and regular rhythm. Pulmonary/Chest: Effort normal and breath sounds normal.   Abdominal: Soft. She exhibits distension (d/t obesity) and mass (left abdominal mass sim to hernia). Musculoskeletal: She exhibits no edema. Lymphadenopathy:     She has no cervical adenopathy. Neurological: She is alert and oriented to person, place, and time. Skin: Skin is warm and dry. Psychiatric: She has a normal mood and affect. Nursing note and vitals reviewed. ASSESSMENT and PLAN    ICD-10-CM ICD-9-CM    1.  Hypercholesterolemia E78.00 272.0 LIPID PANEL WITH LDL/HDL RATIO      atorvastatin (LIPITOR) 20 mg tablet   2. Essential hypertension I94 147.8 METABOLIC PANEL, COMPREHENSIVE      CBC WITH AUTOMATED DIFF      hydroCHLOROthiazide (HYDRODIURIL) 25 mg tablet      dilTIAZem (TIAZAC) 120 mg SR capsule   3. Elevated glucose R73.09 790.29 HEMOGLOBIN A1C W/O EAG   4. Hypokalemia E87.6 276.8 potassium chloride SR (KLOR-CON 10) 10 mEq tablet   5. Gastroesophageal reflux disease without esophagitis K21.9 530.81 esomeprazole (NEXIUM) 40 mg capsule   6. Abdominal wall hernia K43.9 553.20 REFERRAL TO GENERAL SURGERY   7.  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

## 2018-02-21 LAB
ALBUMIN SERPL-MCNC: 4.2 G/DL (ref 3.5–4.7)
ALBUMIN/GLOB SERPL: 1.8 {RATIO} (ref 1.2–2.2)
ALP SERPL-CCNC: 86 IU/L (ref 39–117)
ALT SERPL-CCNC: 8 IU/L (ref 0–32)
AST SERPL-CCNC: 16 IU/L (ref 0–40)
BASOPHILS # BLD AUTO: 0 X10E3/UL (ref 0–0.2)
BASOPHILS NFR BLD AUTO: 0 %
BILIRUB SERPL-MCNC: 0.8 MG/DL (ref 0–1.2)
BUN SERPL-MCNC: 11 MG/DL (ref 8–27)
BUN/CREAT SERPL: 16 (ref 12–28)
CALCIUM SERPL-MCNC: 9.3 MG/DL (ref 8.7–10.3)
CHLORIDE SERPL-SCNC: 98 MMOL/L (ref 96–106)
CHOLEST SERPL-MCNC: 173 MG/DL (ref 100–199)
CO2 SERPL-SCNC: 30 MMOL/L (ref 18–29)
CREAT SERPL-MCNC: 0.68 MG/DL (ref 0.57–1)
EOSINOPHIL # BLD AUTO: 0.1 X10E3/UL (ref 0–0.4)
EOSINOPHIL NFR BLD AUTO: 2 %
ERYTHROCYTE [DISTWIDTH] IN BLOOD BY AUTOMATED COUNT: 14.1 % (ref 12.3–15.4)
GFR SERPLBLD CREATININE-BSD FMLA CKD-EPI: 82 ML/MIN/{1.73_M2}
GFR SERPLBLD CREATININE-BSD FMLA CKD-EPI: 94 ML/MIN/{1.73_M2}
GLOBULIN SER CALC-MCNC: 2.4 G/L (ref 1.5–4.5)
GLUCOSE SERPL-MCNC: 110 MG/DL (ref 65–99)
HBA1C MFR BLD: 5.6 % (ref 4.8–5.6)
HCT VFR BLD AUTO: 43.9 % (ref 34–46.6)
HDLC SERPL-MCNC: 57 MG/DL
HGB BLD-MCNC: 13.5 G/DL (ref 11.1–15.9)
IMM GRANULOCYTES # BLD: 0 X10E3/UL (ref 0–0.1)
IMM GRANULOCYTES NFR BLD: 0 %
INTERPRETATION, 910389: NORMAL
LDLC SERPL CALC-MCNC: 104 MG/DL (ref 0–99)
LDLC/HDLC SERPL: 1.8 {RATIO} (ref 0–3.2)
LYMPHOCYTES # BLD AUTO: 1.4 X10E3/UL (ref 0.7–3.1)
LYMPHOCYTES NFR BLD AUTO: 22 %
MCH RBC QN AUTO: 26.1 PG (ref 26.6–33)
MCHC RBC AUTO-ENTMCNC: 30.8 G/DL (ref 31.5–35.7)
MCV RBC AUTO: 85 FL (ref 79–97)
MONOCYTES # BLD AUTO: 0.6 X10E3/UL (ref 0.1–0.9)
MONOCYTES NFR BLD AUTO: 9 %
NEUTROPHILS # BLD AUTO: 4.2 X10E3/UL (ref 1.4–7)
NEUTROPHILS NFR BLD AUTO: 67 %
PLATELET # BLD AUTO: 167 X10E3/UL (ref 150–379)
POTASSIUM SERPL-SCNC: 3.7 MMOL/L (ref 3.5–5.2)
PROT SERPL-MCNC: 6.6 G/DL (ref 6–8.5)
RBC # BLD AUTO: 5.17 X10E6/UL (ref 3.77–5.28)
SODIUM SERPL-SCNC: 143 MMOL/L (ref 134–144)
TRIGL SERPL-MCNC: 59 MG/DL (ref 0–149)
VLDLC SERPL CALC-MCNC: 12 MG/DL (ref 5–40)
WBC # BLD AUTO: 6.3 X10E3/UL (ref 3.4–10.8)

## 2018-02-21 NOTE — PROGRESS NOTES
Call pt, the HbA1C is in the high but normal range now, very good, cont low conc sweets diet  The Chol is ok  The kidney and liver tests are normal  The CBC is ok

## 2018-02-27 ENCOUNTER — OFFICE VISIT (OUTPATIENT)
Dept: SURGERY | Age: 83
End: 2018-02-27

## 2018-02-27 VITALS — BODY MASS INDEX: 43.02 KG/M2 | WEIGHT: 235.2 LBS

## 2018-02-27 DIAGNOSIS — K43.9 VENTRAL HERNIA WITHOUT OBSTRUCTION OR GANGRENE: Primary | ICD-10-CM

## 2018-02-28 NOTE — PROGRESS NOTES
81year old morbidly obese whict female with hernia for years. It was there before she had her colon resection in 2017 but the doctor elected not to fix it at the time. It does not hurt and has never been incarcerated. EXAM:  Large upper abdomen hernia, occupying the entie upper abdomen and even feels like it is under her right upper quadrant scar. Her muscles ae weak and the bulge is massive. She also has an umbilical hernia that is moderate in size. The hernia defect is large and the contents reduce nicely. IMP:  Large ventral hernia, not at risk for obstruction because of its large size  Repair is a major surgery and her surgical risks are too high to justify the repair  With her obesity she is at risk for it to recur    PLAN:  Reassured patient that it is not at risk for incarceration and advised no repair. If she is worried about the look she can wear a light girdle.     RTO prn

## 2018-05-01 ENCOUNTER — OFFICE VISIT (OUTPATIENT)
Dept: FAMILY MEDICINE CLINIC | Age: 83
End: 2018-05-01

## 2018-05-01 VITALS
HEIGHT: 60 IN | RESPIRATION RATE: 16 BRPM | WEIGHT: 236.6 LBS | HEART RATE: 73 BPM | DIASTOLIC BLOOD PRESSURE: 61 MMHG | SYSTOLIC BLOOD PRESSURE: 124 MMHG | TEMPERATURE: 97 F | BODY MASS INDEX: 46.45 KG/M2 | OXYGEN SATURATION: 98 %

## 2018-05-01 DIAGNOSIS — B37.2 CANDIDIASIS, INTERTRIGO: Primary | ICD-10-CM

## 2018-05-01 DIAGNOSIS — Z00.00 MEDICARE ANNUAL WELLNESS VISIT, SUBSEQUENT: ICD-10-CM

## 2018-05-01 RX ORDER — NYSTATIN 100000 [USP'U]/G
POWDER TOPICAL 2 TIMES DAILY
Qty: 60 G | Refills: 1 | Status: SHIPPED | OUTPATIENT
Start: 2018-05-01 | End: 2018-01-01

## 2018-05-01 RX ORDER — CHLORPHENIRAMINE MALEATE 4 MG
TABLET ORAL 2 TIMES DAILY
Qty: 45 G | Refills: 1 | Status: SHIPPED | OUTPATIENT
Start: 2018-05-01 | End: 2018-01-01

## 2018-05-01 NOTE — PROGRESS NOTES
Soledad Sportsman is a 81 y.o. female who presents to the office today with the following:  Chief Complaint   Patient presents with    Rash     Like the one she had before    Annual Wellness Visit            HPI   Pt has h/o yeast/intertrigo under breast folds and groin. Rash returned past week. Rash is itchy, not painful. No weeping, swelling, pain, or odor. Denies any recent fever/chills, HA, n/v/d, or  sxs. Has previously responded well to topical Lotrisone and nystatin power when mild. Otherwise feeling well with no other complaints or acute concerns. Review of Systems   Constitutional: Negative for chills and malaise/fatigue. HENT: Negative. Respiratory: Negative for cough and shortness of breath. Gastrointestinal: Negative. Genitourinary: Negative. Musculoskeletal: Negative for myalgias. Skin: Positive for itching and rash. See HPI. Past Medical History:   Diagnosis Date    Allergic rhinitis     Cancer ([facility] [address]     Stage 1 cancer intestines    Cervical disc disease     Cervical disc disease     Colonic mass 3/20/2017    Eczema     Gastritis     GERD (gastroesophageal reflux disease)     mild    GI bleed     hx.  of recurrent    Hernia     hiatal lt side    Hiatal hernia     Hypercholesterolemia     Hypertension     Morbid obesity ([address] 4/13/2017    Osteoarthritis (arthritis due to wear and tear of joints)        Past Surgical History:   Procedure Laterality Date    ABDOMEN SURGERY PROC UNLISTED      Colon Resection (Cancer)    HX COLONOSCOPY  3/05    HX COLONOSCOPY  2017    HX CYST REMOVAL      left side    HX ENDOSCOPY  1/09    HX ENDOSCOPY  2015    HX GYN      hysterectomy    HX ORTHOPAEDIC      rt knee replacement       Allergies   Allergen Reactions    Lisinopril Angioedema    Niacin Itching     Felt hot also    Ultram [Tramadol] Nausea Only       Current Outpatient Prescriptions   Medication Sig    docusate calcium (STOOL SOFTENER, DOCUSATE NEHEMIAH, PO) Take  by mouth as needed.  nystatin (MYCOSTATIN) powder Apply  to affected area two (2) times a day.  clotrimazole (LOTRIMIN) 1 % topical cream Apply  to affected area two (2) times a day.  esomeprazole (NEXIUM) 40 mg capsule Take 1 Cap by mouth daily.  hydroCHLOROthiazide (HYDRODIURIL) 25 mg tablet Take 1 Tab by mouth daily.  dilTIAZem (TIAZAC) 120 mg SR capsule Take 1 Cap by mouth daily.  atorvastatin (LIPITOR) 20 mg tablet Take 1 Tab by mouth daily.  potassium chloride SR (KLOR-CON 10) 10 mEq tablet Take 2 Tabs by mouth two (2) times a day.  cyclobenzaprine (FLEXERIL) 5 mg tablet Take 1 Tab by mouth three (3) times daily as needed for Muscle Spasm(s).  furosemide (LASIX) 20 mg tablet Take 20 mg by mouth as needed (swelling).  polyethylene glycol (MIRALAX) 17 gram packet Take 1 Packet by mouth daily as needed.  cetirizine (ZYRTEC) 10 mg tablet Take 1 Tab by mouth daily as needed for Allergies.  cholecalciferol (VITAMIN D3) 1,000 unit cap Take 1,000 Units by mouth daily.  UBIDECARENONE (CO Q-10 PO) Take 200 mg by mouth daily.  acetaminophen (TYLENOL) 325 mg tablet Take  by mouth every four (4) hours as needed for Pain.  TROLAMINE SALICYLATE (ASPERCREME EX) by Apply Externally route. Applies to left knee 3-4 x daily    aspirin delayed-release 81 mg tablet Take  by mouth daily.  magnesium oxide 500 mg tab Take 1 Tab by mouth daily.  multivitamin (ONE A DAY) tablet Take 1 Tab by mouth daily. No current facility-administered medications for this visit.         Social History     Social History    Marital status:      Spouse name: N/A    Number of children: N/A    Years of education: N/A     Social History Main Topics    Smoking status: Never Smoker    Smokeless tobacco: Never Used    Alcohol use No    Drug use: No    Sexual activity: No     Other Topics Concern    None     Social History Narrative       Family History   Problem Relation Age of Onset    No Known Problems Mother     No Known Problems Father     Arthritis-osteo Sister     Alzheimer Sister     Parkinson's Disease Brother     Cancer Brother     No Known Problems Brother     Anesth Problems Neg Hx          Physical Exam:  Visit Vitals    /61 (BP 1 Location: Left arm, BP Patient Position: Sitting)    Pulse 73    Temp 97 °F (36.1 °C) (Oral)    Resp 16    Ht 5' (1.524 m)    Wt 236 lb 9.6 oz (107.3 kg)    SpO2 98%    BMI 46.21 kg/m2     Physical Exam   Constitutional: She is oriented to person, place, and time and well-developed, well-nourished, and in no distress. HENT:   Head: Normocephalic and atraumatic. Mouth/Throat: Oropharynx is clear and moist.   Eyes: Conjunctivae are normal.   Neck: Neck supple. Cardiovascular: Normal rate and regular rhythm. Pulmonary/Chest: Effort normal and breath sounds normal.   Lymphadenopathy:     She has no cervical adenopathy. Neurological: She is alert and oriented to person, place, and time. Skin: Skin is warm and dry. Rash (erythematous plaques with satellite lesions under both breasts and intertriginous regions of groin) noted. Psychiatric: Mood and affect normal.   Nursing note and vitals reviewed. Assessment/Plan:    ICD-10-CM ICD-9-CM    1. Candidiasis, intertrigo B37.2 112.3 nystatin (MYCOSTATIN) powder      clotrimazole (LOTRIMIN) 1 % topical cream   2. Medicare annual wellness visit, subsequent Z00.00 V70.0      Counseled on home care. Keep area clean and as dry as possible, wear loose fitted clothing. .etc.  Use lotrimin sparingly. RTC if sxs persist/worsen/new sxs. Follow-up Disposition:  Return if symptoms worsen or fail to improve. Colt Hernandez PA-C            Discussed the patient's BMI with her.   The BMI follow up plan is as follows:     dietary management education, guidance, and counseling  encourage exercise  monitor weight  prescribed dietary intake    An After Visit Summary was printed and given to the patient.

## 2018-05-01 NOTE — PROGRESS NOTES
Aysha George is a 81 y.o. female and presents for annual Medicare Wellness Visit. Problem List: Reviewed with patient and discussed risk factors. Patient Active Problem List   Diagnosis Code    Hypertension I10    Hyperlipidemia E78.5    Hypercholesterolemia E78.00    Cervical disc disease M50.90    Osteoarthritis (arthritis due to wear and tear of joints) M19.90    GERD (gastroesophageal reflux disease) K21.9    Prediabetes R73.03    Occult blood in stools R19.5    Advance directive discussed with patient Z71.89    Allergic rhinitis J30.9    Colonic mass K63.9    Morbid obesity (HCC) E66.01       Current medical providers:  Patient Care Team:  Jason Charles MD as PCP - General (Family Practice)  Steffi Salcedo PA-C (Physician Assistant)  Denis Johnson MD (General Surgery)  Marcus Khan MD (Family Practice)  Cleveland Brandt MD (Surgery)    PSH: Reviewed with patient  Past Surgical History:   Procedure Laterality Date    ABDOMEN SURGERY [address] UNLISTED      Colon Resection (Cancer)    HX COLONOSCOPY  3/05    HX COLONOSCOPY  2017    HX CYST REMOVAL      left side    HX ENDOSCOPY  1/09    HX ENDOSCOPY  2015    HX GYN      hysterectomy    HX ORTHOPAEDIC      rt knee replacement        SH: Reviewed with patient  Social History   Substance Use Topics    Smoking status: Never Smoker    Smokeless tobacco: Never Used    Alcohol use No       FH: Reviewed with patient  Family History   Problem Relation Age of Onset    No Known Problems Mother     No Known Problems Father     Arthritis-osteo Sister     Alzheimer Sister     Parkinson's Disease Brother     Cancer Brother     No Known Problems Brother     Anesth Problems Neg Hx        Medications/Allergies: Reviewed with patient  Current Outpatient Prescriptions on File Prior to Visit   Medication Sig Dispense Refill    esomeprazole (NEXIUM) 40 mg capsule Take 1 Cap by mouth daily.  90 Cap 1    hydroCHLOROthiazide (HYDRODIURIL) 25 mg tablet Take 1 Tab by mouth daily. 90 Tab 1    dilTIAZem (TIAZAC) 120 mg SR capsule Take 1 Cap by mouth daily. 90 Cap 1    atorvastatin (LIPITOR) 20 mg tablet Take 1 Tab by mouth daily. 90 Tab 1    potassium chloride SR (KLOR-CON 10) 10 mEq tablet Take 2 Tabs by mouth two (2) times a day. 360 Tab 1    cyclobenzaprine (FLEXERIL) 5 mg tablet Take 1 Tab by mouth three (3) times daily as needed for Muscle Spasm(s). 45 Tab 0    furosemide (LASIX) 20 mg tablet Take 20 mg by mouth as needed (swelling).  polyethylene glycol (MIRALAX) 17 gram packet Take 1 Packet by mouth daily as needed. 30 Packet 0    cetirizine (ZYRTEC) 10 mg tablet Take 1 Tab by mouth daily as needed for Allergies. 30 Tab 3    cholecalciferol (VITAMIN D3) 1,000 unit cap Take 1,000 Units by mouth daily.  UBIDECARENONE (CO Q-10 PO) Take 200 mg by mouth daily.  acetaminophen (TYLENOL) 325 mg tablet Take  by mouth every four (4) hours as needed for Pain.  TROLAMINE SALICYLATE (ASPERCREME EX) by Apply Externally route. Applies to left knee 3-4 x daily      aspirin delayed-release 81 mg tablet Take  by mouth daily.  magnesium oxide 500 mg tab Take 1 Tab by mouth daily.  multivitamin (ONE A DAY) tablet Take 1 Tab by mouth daily. No current facility-administered medications on file prior to visit. Allergies   Allergen Reactions    Lisinopril Angioedema    Niacin Itching     Felt hot also    Ultram [Tramadol] Nausea Only       Objective: There were no vitals taken for this visit. There is no height or weight on file to calculate BMI. Assessment of cognitive impairment: Alert and oriented x 3     Depression Screen:   PHQ over the last two weeks 5/1/2018   Little interest or pleasure in doing things Not at all   Feeling down, depressed or hopeless Several days   Total Score PHQ 2 1       Fall Risk Assessment:    Fall Risk Assessment, last 12 mths 5/1/2018   Able to walk?  Yes   Fall in past 12 months? No       Functional Ability:   Does the patient exhibit a steady gait? yes   How long did it take the patient to get up and walk from a sitting position? 4 seconds   Is the patient self reliant?  (ie can do own laundry, meals, household chores)  yes     Does the patient handle his/her own medications? yes     Does the patient handle his/her own money? yes     Is the patients home safe (ie good lighting, handrails on stairs and bath, etc.)? yes     Did you notice or did patient express any hearing difficulties? no     Did you notice or did patient express any vision difficulties?   no     Were distance and reading eye charts used? no       Advance Care Planning:   Patient was offered the opportunity to discuss advance care planning:  yes     Does patient have an Advance Directive:  yes   If no, did you provide information on [facility] Connections? No, Reviewed with patient. Plan:      Orders Placed This Encounter    docusate calcium (STOOL SOFTENER, DOCUSATE NEHEMIAH, PO)       Health Maintenance   Topic Date Due    GLAUCOMA SCREENING Q2Y  10/22/2017  Dr Mary Hoskins / patient signed release for last screening    MEDICARE YEARLY EXAM  04/07/2018  Done at today's visit    Influenza Age 9 to Adult  08/01/2018    COLONOSCOPY  03/09/2022    DTaP/Tdap/Td series (2 - Td) 08/24/2027    Bone Densitometry (Dexa) Screening  Completed    ZOSTER VACCINE AGE 60>  Addressed    Pneumococcal 65+ Low/Medium Risk  Addressed       *Patient verbalized understanding and agreement with the plan. A copy of the After Visit Summary with personalized health plan was given to the patient today. 1. Have you been to the ER, urgent care clinic since your last visit? Hospitalized since your last visit? No    2. Have you seen or consulted any other health care providers outside of the [address] [place] [address] since your last visit? Include any pap smears or colon screening.  No     PHQ over the last two weeks 5/1/2018 Little interest or pleasure in doing things Not at all   Feeling down, depressed or hopeless Several days   Total Score PHQ 2 1

## 2018-05-01 NOTE — MR AVS SNAPSHOT
[place] [address] 
[phone] Patient: Rachael Cody MRN: [id number] LVO:[id number] Visit Information Date & Time Provider Department Dept. Phone Encounter #  
 5/1/2018  9:00 AM Roxana Mcgarry PA-C [id number] [id number] Upcoming Health Maintenance Date Due  
 GLAUCOMA SCREENING Q2Y 10/22/2017 MEDICARE YEARLY EXAM 4/7/2018 Influenza Age 5 to Adult 8/1/2018 COLONOSCOPY 3/9/2022 DTaP/Tdap/Td series (2 - Td) 8/24/2027 Allergies as of 5/1/2018  Review Complete On: 5/1/2018 By: Roxana Mgcarry PA-C Severity Noted Reaction Type Reactions Lisinopril Medium 05/02/2017   Side Effect Angioedema Niacin  10/22/2013    Itching Felt hot also Ultram [Tramadol]  10/22/2013    Nausea Only Current Immunizations  Reviewed on 10/10/2017 Name Date Influenza High Dose Vaccine PF 10/10/2017, 10/4/2016 Influenza Vaccine 10/22/2014 Influenza Vaccine (Quad) PF 11/5/2015 Pneumococcal Conjugate (PCV-13) 5/1/2015 Not reviewed this visit You Were Diagnosed With   
  
 Codes Comments Candidiasis, intertrigo    -  Primary ICD-10-CM: B37.2 ICD-9-CM: 112.3 Vitals BP Pulse Temp Resp Height(growth percentile) Weight(growth percentile) 124/61 (BP 1 Location: Left arm, BP Patient Position: Sitting) 73 97 °F (36.1 °C) (Oral) 16 5' (1.524 m) 236 lb 9.6 oz (107.3 kg) SpO2 BMI OB Status Smoking Status 98% 46.21 kg/m2 Hysterectomy Never Smoker BMI and BSA Data Body Mass Index Body Surface Area  
 46.21 kg/m 2 2.13 m 2 Preferred Pharmacy Pharmacy Name Phone [facility],[address], [address] [place] [address]  [phone] Your Updated Medication List  
  
   
This list is accurate as of 5/1/18  9:48 AM.  Always use your most recent med list.  
  
  
  
  
 acetaminophen 325 mg tablet Commonly known as:  TYLENOL Take  by mouth every four (4) hours as needed for Pain. ASPERCREME EX  
by Apply Externally route. Applies to left knee 3-4 x daily  
  
 aspirin delayed-release 81 mg tablet Take  by mouth daily. atorvastatin 20 mg tablet Commonly known as:  LIPITOR Take 1 Tab by mouth daily. cetirizine 10 mg tablet Commonly known as:  ZyrTEC Take 1 Tab by mouth daily as needed for Allergies. clotrimazole 1 % topical cream  
Commonly known as:  Praneeth Beau Apply  to affected area two (2) times a day. CO Q-10 PO Take 200 mg by mouth daily. cyclobenzaprine 5 mg tablet Commonly known as:  FLEXERIL Take 1 Tab by mouth three (3) times daily as needed for Muscle Spasm(s). dilTIAZem 120 mg SR capsule Commonly known as:  [facility] Take 1 Cap by mouth daily. esomeprazole 40 mg capsule Commonly known as:  NexIUM Take 1 Cap by mouth daily. furosemide 20 mg tablet Commonly known as:  LASIX Take 20 mg by mouth as needed (swelling). hydroCHLOROthiazide 25 mg tablet Commonly known as:  HYDRODIURIL Take 1 Tab by mouth daily. magnesium oxide 500 mg Tab Take 1 Tab by mouth daily. multivitamin tablet Commonly known as:  ONE A DAY Take 1 Tab by mouth daily. nystatin powder Commonly known as:  MYCOSTATIN Apply  to affected area two (2) times a day. polyethylene glycol 17 gram packet Commonly known as:  [place] [facility] Take 1 Packet by mouth daily as needed. potassium chloride SR 10 mEq tablet Commonly known as:  KLOR-CON 10 Take 2 Tabs by mouth two (2) times a day. STOOL SOFTENER (DOCUSATE NEHEMIAH) PO Take  by mouth as needed. VITAMIN D3 1,000 unit Cap Generic drug:  cholecalciferol Take 1,000 Units by mouth daily. Prescriptions Sent to Pharmacy Refills  
 nystatin (MYCOSTATIN) powder 1 Sig: Apply  to affected area two (2) times a day.   
 Class: Normal  
 Pharmacy: [address] [place] [address],[address]  Ph #: [phone] Route: Topical  
 clotrimazole (LOTRIMIN) 1 % topical cream 1 Sig: Apply  to affected area two (2) times a day. Class: Normal  
 Pharmacy: [address] [place] [address],[address] [place] [address]  Ph #: [phone] Route: Topical  
  
Patient Instructions Schedule of Personalized Health Plan (Provide Copy to Patient) The best way to stay healthy is to live a healthy lifestyle. A healthy lifestyle includes regular exercise, eating a well-balanced diet, keeping a healthy weight and not smoking. Regular physical exams and screening tests are another important way to take care of yourself. Preventive exams provided by health care providers can find health problems early when treatment works best and can keep you from getting certain diseases or illnesses. Preventive services include exams, lab tests, screenings, shots, monitoring and information to help you take care of your own health. All people over 65 should have a pneumonia shot. Pneumonia shots are usually only needed once in a lifetime unless your doctor decides differently. All people over 65 should have a yearly flu shot. People over 65 are at medium to high risk for Hepatitis B. Three shots are needed for complete protection. In addition to your physical exam, some screening tests are recommended: 
 
Bone mass measurement (dexa scan) is recommended every two years Diabetes Mellitus screening is recommended every year. Glaucoma is an eye disease caused by high pressure in the eye. An eye exam is recommended every year. Cardiovascular screening tests that check your cholesterol and other blood fat (lipid) levels are recommended every five years.   
 
Colorectal Cancer screening tests help to find pre-cancerous polyps (growths in the colon) so they can be removed before they turn into cancer. Tests ordered for screening depend on your personal and family history risk factors. Screening for Breast Cancer is recommended yearly with a mammogram. 
 
Screening for Cervical Cancer is recommended every two years (annually for certain risk factors, such as previous history of STD or abnormal PAP in past 7 years), with a Pelvic Exam with PAP Here is a list of your current Health Maintenance items with a due date: 
Health Maintenance Topic Date Due  GLAUCOMA SCREENING Q2Y  10/22/2017  MEDICARE YEARLY EXAM  04/07/2018  Influenza Age 5 to Adult  08/01/2018  COLONOSCOPY  03/09/2022  DTaP/Tdap/Td series (2 - Td) 08/24/2027  Bone Densitometry (Dexa) Screening  Completed  ZOSTER VACCINE AGE 60>  Addressed  Pneumococcal 65+ Low/Medium Risk  Addressed Body Mass Index: Care Instructions Your Care Instructions Body mass index (BMI) can help you see if your weight is raising your risk for health problems. It uses a formula to compare how much you weigh with how tall you are. · A BMI lower than 18.5 is considered underweight. · A BMI between 18.5 and 24.9 is considered healthy. · A BMI between 25 and 29.9 is considered overweight. A BMI of 30 or higher is considered obese. If your BMI is in the normal range, it means that you have a lower risk for weight-related health problems. If your BMI is in the overweight or obese range, you may be at increased risk for weight-related health problems, such as high blood pressure, heart disease, stroke, arthritis or joint pain, and diabetes. If your BMI is in the underweight range, you may be at increased risk for health problems such as fatigue, lower protection (immunity) against illness, muscle loss, bone loss, hair loss, and hormone problems. BMI is just one measure of your risk for weight-related health problems.  You may be at higher risk for health problems if you are not active, you eat an unhealthy diet, or you drink too much alcohol or use tobacco products. Follow-up care is a key part of your treatment and safety. Be sure to make and go to all appointments, and call your doctor if you are having problems. It's also a good idea to know your test results and keep a list of the medicines you take. How can you care for yourself at home? · Practice healthy eating habits. This includes eating plenty of fruits, vegetables, whole grains, lean protein, and low-fat dairy. · If your doctor recommends it, get more exercise. Walking is a good choice. Bit by bit, increase the amount you walk every day. Try for at least 30 minutes on most days of the week. · Do not smoke. Smoking can increase your risk for health problems. If you need help quitting, talk to your doctor about stop-smoking programs and medicines. These can increase your chances of quitting for good. · Limit alcohol to 2 drinks a day for men and 1 drink a day for women. Too much alcohol can cause health problems. If you have a BMI higher than 25 · Your doctor may do other tests to check your risk for weight-related health problems. This may include measuring the distance around your waist. A waist measurement of more than 40 inches in men or 35 inches in women can increase the risk of weight-related health problems. · Talk with your doctor about steps you can take to stay healthy or improve your health. You may need to make lifestyle changes to lose weight and stay healthy, such as changing your diet and getting regular exercise. If you have a BMI lower than 18.5 · Your doctor may do other tests to check your risk for health problems. · Talk with your doctor about steps you can take to stay healthy or improve your health. You may need to make lifestyle changes to gain or maintain weight and stay healthy, such as getting more healthy foods in your diet and doing exercises to build muscle. Where can you learn more? Go to http://suad-brenda.info/. Enter S176 in the search box to learn more about \"Body Mass Index: Care Instructions. \" Current as of: October 13, 2016 Content Version: 11.4 © 2370-1676 [employer]. Care instructions adapted under license by [employer] (which disclaims liability or warranty for this information). If you have questions about a medical condition or this instruction, always ask your healthcare professional. [address] any warranty or liability for your use of this information. Introducing [facility]! Dear Sarina Isabel: 
Thank you for requesting a [employer] account. Our records indicate that you already have an active [employer] account. You can access your account anytime at [email or web address] Did you know that you can access your hospital and ER discharge instructions at any time in [employer]? You can also review all of your test results from your hospital stay or ER visit. Additional Information If you have questions, please visit the Frequently Asked Questions section of the [employer] website at [email or web address] Remember, [employer] is NOT to be used for urgent needs. For medical emergencies, dial 911. Now available from your iPhone and Android! Please provide this summary of care documentation to your next provider. Your primary care clinician is listed as Dimple Greco. If you have any questions after today's visit, please call [phone].

## 2018-05-01 NOTE — PATIENT INSTRUCTIONS
Schedule of Personalized Health Plan  (Provide Copy to Patient)  The best way to stay healthy is to live a healthy lifestyle. A healthy lifestyle includes regular exercise, eating a well-balanced diet, keeping a healthy weight and not smoking. Regular physical exams and screening tests are another important way to take care of yourself. Preventive exams provided by health care providers can find health problems early when treatment works best and can keep you from getting certain diseases or illnesses. Preventive services include exams, lab tests, screenings, shots, monitoring and information to help you take care of your own health. All people over 65 should have a pneumonia shot. Pneumonia shots are usually only needed once in a lifetime unless your doctor decides differently. All people over 65 should have a yearly flu shot. People over 65 are at medium to high risk for Hepatitis B. Three shots are needed for complete protection. In addition to your physical exam, some screening tests are recommended:    Bone mass measurement (dexa scan) is recommended every two years  Diabetes Mellitus screening is recommended every year. Glaucoma is an eye disease caused by high pressure in the eye. An eye exam is recommended every year. Cardiovascular screening tests that check your cholesterol and other blood fat (lipid) levels are recommended every five years. Colorectal Cancer screening tests help to find pre-cancerous polyps (growths in the colon) so they can be removed before they turn into cancer. Tests ordered for screening depend on your personal and family history risk factors.     Screening for Breast Cancer is recommended yearly with a mammogram.    Screening for Cervical Cancer is recommended every two years (annually for certain risk factors, such as previous history of STD or abnormal PAP in past 7 years), with a Pelvic Exam with PAP    Here is a list of your current Health Maintenance items with a due date:  Health Maintenance   Topic Date Due    GLAUCOMA SCREENING Q2Y  10/22/2017    MEDICARE YEARLY EXAM  04/07/2018    Influenza Age 9 to Adult  08/01/2018    COLONOSCOPY  03/09/2022    DTaP/Tdap/Td series (2 - Td) 08/24/2027    Bone Densitometry (Dexa) Screening  Completed    ZOSTER VACCINE AGE 60>  Addressed    Pneumococcal 65+ Low/Medium Risk  Addressed                Body Mass Index: Care Instructions  Your Care Instructions    Body mass index (BMI) can help you see if your weight is raising your risk for health problems. It uses a formula to compare how much you weigh with how tall you are. · A BMI lower than 18.5 is considered underweight. · A BMI between 18.5 and 24.9 is considered healthy. · A BMI between 25 and 29.9 is considered overweight. A BMI of 30 or higher is considered obese. If your BMI is in the normal range, it means that you have a lower risk for weight-related health problems. If your BMI is in the overweight or obese range, you may be at increased risk for weight-related health problems, such as high blood pressure, heart disease, stroke, arthritis or joint pain, and diabetes. If your BMI is in the underweight range, you may be at increased risk for health problems such as fatigue, lower protection (immunity) against illness, muscle loss, bone loss, hair loss, and hormone problems. BMI is just one measure of your risk for weight-related health problems. You may be at higher risk for health problems if you are not active, you eat an unhealthy diet, or you drink too much alcohol or use tobacco products. Follow-up care is a key part of your treatment and safety. Be sure to make and go to all appointments, and call your doctor if you are having problems. It's also a good idea to know your test results and keep a list of the medicines you take. How can you care for yourself at home? · Practice healthy eating habits.  This includes eating plenty of fruits, vegetables, whole grains, lean protein, and low-fat dairy. · If your doctor recommends it, get more exercise. Walking is a good choice. Bit by bit, increase the amount you walk every day. Try for at least 30 minutes on most days of the week. · Do not smoke. Smoking can increase your risk for health problems. If you need help quitting, talk to your doctor about stop-smoking programs and medicines. These can increase your chances of quitting for good. · Limit alcohol to 2 drinks a day for men and 1 drink a day for women. Too much alcohol can cause health problems. If you have a BMI higher than 25  · Your doctor may do other tests to check your risk for weight-related health problems. This may include measuring the distance around your waist. A waist measurement of more than 40 inches in men or 35 inches in women can increase the risk of weight-related health problems. · Talk with your doctor about steps you can take to stay healthy or improve your health. You may need to make lifestyle changes to lose weight and stay healthy, such as changing your diet and getting regular exercise. If you have a BMI lower than 18.5  · Your doctor may do other tests to check your risk for health problems. · Talk with your doctor about steps you can take to stay healthy or improve your health. You may need to make lifestyle changes to gain or maintain weight and stay healthy, such as getting more healthy foods in your diet and doing exercises to build muscle. Where can you learn more? Go to http://suad-brenda.info/. Enter S176 in the search box to learn more about \"Body Mass Index: Care Instructions. \"  Current as of: October 13, 2016  Content Version: 11.4  © 1399-7064 [employer]. Care instructions adapted under license by [employer] (which disclaims liability or warranty for this information).  If you have questions about a medical condition or this instruction, always ask your healthcare professional. [address] any warranty or liability for your use of this information. Yeast Skin Infection: Care Instructions  Your Care Instructions    Yeast normally lives on your skin. Sometimes too much yeast can overgrow in certain areas of the skin and cause an infection. The infection causes red, scaly, moist patches on your skin that may itch. Common areas for skin yeast infections are skin folds under the breasts or belly area. The warm and moist areas in the skin folds can make it easier for yeast to overgrow. Yeast infections also can be found on other parts of the body such as the groin or armpits. You will probably get a cream or ointment that contains an antifungal medicine. Examples of these are miconazole and clotrimazole. You put it on your skin to treat the infection. Your doctor may give you a prescription for the cream or ointment. Or you may be able to buy it without a prescription at most drugstores. If the infection is severe, the doctor will prescribe antifungal pills. A yeast infection usually goes away after about a week of treatment. But it's important to use the medicine for as long as your doctor tells you to. Follow-up care is a key part of your treatment and safety. Be sure to make and go to all appointments, and call your doctor if you are having problems. It's also a good idea to know your test results and keep a list of the medicines you take. How can you care for yourself at home? · Be safe with medicines. Take your medicines exactly as prescribed. Call your doctor if you think you are having a problem with your medicine. · Keep your skin clean and dry. Your doctor may suggest using powder that contains an antifungal medicine in the skin folds. · Wear loose clothing. When should you call for help?   Call your doctor now or seek immediate medical care if:  ? · You have symptoms of infection, such as:  ¨ Increased pain, swelling, warmth, or redness. ¨ Red streaks leading from the area. ¨ Pus draining from the area. ¨ A fever. ? Watch closely for changes in your health, and be sure to contact your doctor if:  ? · You do not get better as expected. Where can you learn more? Go to http://suad-brenda.info/. Enter Q167 in the search box to learn more about \"Yeast Skin Infection: Care Instructions. \"  Current as of: October 13, 2016  Content Version: 11.4  © 1738-0943 [employer]. Care instructions adapted under license by [employer] (which disclaims liability or warranty for this information). If you have questions about a medical condition or this instruction, always ask your healthcare professional. [address] any warranty or liability for your use of this information.

## 2018-07-30 PROBLEM — K85.90 PANCREATITIS: Status: ACTIVE | Noted: 2018-01-01

## 2018-07-30 NOTE — PROGRESS NOTES
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

## 2018-07-30 NOTE — PROGRESS NOTES
TRANSFER - IN REPORT:    Verbal report received from 2001 Nayana Mcrae RN(name) on [facility]  being received from (unit) for routine progression of care      Report consisted of patients Situation, Background, Assessment and   Recommendations(SBAR). Information from the following report(s) SBAR, Kardex, Intake/Output and Recent Results was reviewed with the receiving nurse. Opportunity for questions and clarification was provided. Assessment completed upon patients arrival to unit and care assumed.

## 2018-07-30 NOTE — PROGRESS NOTES
Pt in office requesting appt for 'stomach pain', pt. C/o abdominal 'bloating/gassy feeling'; pt. Given first available appt. On our schedule which is tomorrow, Tuesday 2:30pm; I advised pt. And pt.'s Family member (son) that if pt. S/s worsened and/or she did not feel as if she could wait until appt tomorrow, that she should go to ER or urgent care; pt.  And her son verbalized understanding and agreed

## 2018-07-30 NOTE — IP AVS SNAPSHOT
[address] [facility] 
[phone] Patient: Kenia Davis MRN: [id number] YHI:3/20/2092 A check shaquille indicates which time of day the medication should be taken. My Medications START taking these medications Instructions Each Dose to Equal  
 Morning Noon Evening Bedtime HYDROcodone-acetaminophen  mg tablet Commonly known as:  Belkis Lovett Your last dose was: Your next dose is: Take 1 Tab by mouth every four (4) hours as needed. Max Daily Amount: 6 Tabs. 1 Tab  
    
   
   
   
  
 magnesium hydroxide 400 mg/5 mL suspension Commonly known as:  MILK OF MAGNESIA Your last dose was: Your next dose is: Take 30 mL by mouth daily as needed for Constipation. 30 mL CHANGE how you take these medications Instructions Each Dose to Equal  
 Morning Noon Evening Bedtime  
 furosemide 20 mg tablet Commonly known as:  LASIX What changed:   
- when to take this 
- reasons to take this Your last dose was: Your next dose is: Take 1 Tab by mouth daily. 20 mg  
    
   
   
   
  
 polyethylene glycol 17 gram packet Commonly known as:  Homero Melara What changed:   
- when to take this 
- reasons to take this Your last dose was: Your next dose is: Take 1 Packet by mouth daily. 17 g  
    
   
   
   
  
 potassium chloride SR 10 mEq tablet Commonly known as:  KLOR-CON 10 What changed:  when to take this Your last dose was: Your next dose is: Take 2 Tabs by mouth daily. 20 mEq CONTINUE taking these medications Instructions Each Dose to Equal  
 Morning Noon Evening Bedtime  
 acetaminophen 325 mg tablet Commonly known as:  TYLENOL Your last dose was: Your next dose is: Take  by mouth every four (4) hours as needed for Pain. ASPERCREME EX Your last dose was: Your next dose is:    
   
   
 by Apply Externally route. Applies to left knee 3-4 x daily  
     
   
   
   
  
 aspirin delayed-release 81 mg tablet Your last dose was: Your next dose is: Take  by mouth daily. atorvastatin 20 mg tablet Commonly known as:  LIPITOR Your last dose was: Your next dose is: Take 1 Tab by mouth daily. 20 mg  
    
   
   
   
  
 cetirizine 10 mg tablet Commonly known as:  ZyrTEC Your last dose was: Your next dose is: Take 1 Tab by mouth daily as needed for Allergies. 10 mg  
    
   
   
   
  
 clotrimazole 1 % topical cream  
Commonly known as:  Finis Ast Your last dose was: Your next dose is:    
   
   
 Apply  to affected area two (2) times a day. CO Q-10 PO Your last dose was: Your next dose is: Take 200 mg by mouth daily. 200 mg  
    
   
   
   
  
 dilTIAZem 120 mg SR capsule Commonly known as:  [facility] Your last dose was: Your next dose is: Take 1 Cap by mouth daily. 120 mg  
    
   
   
   
  
 esomeprazole 40 mg capsule Commonly known as:  NexIUM Your last dose was: Your next dose is: Take 1 Cap by mouth daily. 40 mg  
    
   
   
   
  
 magnesium oxide 500 mg Tab Your last dose was: Your next dose is: Take 1 Tab by mouth daily. 1 Tab  
    
   
   
   
  
 multivitamin tablet Commonly known as:  ONE A DAY Your last dose was: Your next dose is: Take 1 Tab by mouth daily. 1 Tab  
    
   
   
   
  
 nystatin powder Commonly known as:  MYCOSTATIN Your last dose was: Your next dose is: Apply  to affected area two (2) times a day. VITAMIN D3 1,000 unit Cap Generic drug:  cholecalciferol Your last dose was: Your next dose is: Take 1,000 Units by mouth daily. 1000 Units STOP taking these medications CLARITIN 10 mg tablet Generic drug:  loratadine  
   
  
 cyclobenzaprine 5 mg tablet Commonly known as:  FLEXERIL  
   
  
 hydroCHLOROthiazide 25 mg tablet Commonly known as:  HYDRODIURIL  
   
  
 STOOL SOFTENER (DOCUSATE NEHEMIAH) PO Where to Get Your Medications These medications were sent to [address] [place] [address],[address], [address] [place] [address] [address] Phone:  [phone]  
  polyethylene glycol 17 gram packet  
 potassium chloride SR 10 mEq tablet Information on where to get these meds will be given to you by the nurse or doctor. ! Ask your nurse or doctor about these medications HYDROcodone-acetaminophen  mg tablet

## 2018-07-30 NOTE — IP AVS SNAPSHOT
[address] [facility] 
[phone] Patient: Cha Ackerman MRN: [id number] About your hospitalization You were admitted on:  July 30, 2018 You last received care in the:  [facility] You were discharged on:  August 8, 2018 Why you were hospitalized Your primary diagnosis was:  Not on File Your diagnoses also included:  Gallstone Pancreatitis, Abnormal Ct Of The Abdomen, Abnormal Liver Enzymes, Sludge In Gallbladder, Class 3 Severe Obesity Due To Excess Calories With Serious Comorbidity And Body Mass Index (Bmi) Of 40.0 To 44.9 In Adult (Hcc), Incisional Hernia, Without Obstruction Or Gangrene Follow-up Information Follow up With Details Comments Contact Info Amalia Bhakta [address]  this is your home care provider. If you have not heard from them in one day prior to discharge please call them. [address] [place] [address] 
[phone] Lavinia Armendariz MD Schedule an appointment as soon as possible for a visit in 1 week with PCP for post hospital follow up appointment. [address] [address] 
[phone] Luci Amaya MD Call  [address] [place] [address] 
[phone] [facility]   Sakina Tovar Discharge Orders None A check shaquille indicates which time of day the medication should be taken. My Medications START taking these medications Instructions Each Dose to Equal  
 Morning Noon Evening Bedtime HYDROcodone-acetaminophen  mg tablet Commonly known as:  Santo Lisa Your last dose was: Your next dose is: Take 1 Tab by mouth every four (4) hours as needed. Max Daily Amount: 6 Tabs. 1 Tab  
    
   
   
   
  
 magnesium hydroxide 400 mg/5 mL suspension Commonly known as:  MILK OF MAGNESIA Your last dose was: Your next dose is: Take 30 mL by mouth daily as needed for Constipation. 30 mL CHANGE how you take these medications Instructions Each Dose to Equal  
 Morning Noon Evening Bedtime  
 furosemide 20 mg tablet Commonly known as:  LASIX What changed:   
- when to take this 
- reasons to take this Your last dose was: Your next dose is: Take 1 Tab by mouth daily. 20 mg  
    
   
   
   
  
 polyethylene glycol 17 gram packet Commonly known as:  Mell Conte What changed:   
- when to take this 
- reasons to take this Your last dose was: Your next dose is: Take 1 Packet by mouth daily. 17 g  
    
   
   
   
  
 potassium chloride SR 10 mEq tablet Commonly known as:  KLOR-CON 10 What changed:  when to take this Your last dose was: Your next dose is: Take 2 Tabs by mouth daily. 20 mEq CONTINUE taking these medications Instructions Each Dose to Equal  
 Morning Noon Evening Bedtime  
 acetaminophen 325 mg tablet Commonly known as:  TYLENOL Your last dose was: Your next dose is: Take  by mouth every four (4) hours as needed for Pain. ASPERCREME EX Your last dose was: Your next dose is:    
   
   
 by Apply Externally route. Applies to left knee 3-4 x daily  
     
   
   
   
  
 aspirin delayed-release 81 mg tablet Your last dose was: Your next dose is: Take  by mouth daily. atorvastatin 20 mg tablet Commonly known as:  LIPITOR Your last dose was: Your next dose is: Take 1 Tab by mouth daily. 20 mg  
    
   
   
   
  
 cetirizine 10 mg tablet Commonly known as:  ZyrTEC Your last dose was: Your next dose is: Take 1 Tab by mouth daily as needed for Allergies.   
 10 mg  
    
   
   
   
  
 clotrimazole 1 % topical cream  
Commonly known as:  Ya Kaska Your last dose was: Your next dose is:    
   
   
 Apply  to affected area two (2) times a day. CO Q-10 PO Your last dose was: Your next dose is: Take 200 mg by mouth daily. 200 mg  
    
   
   
   
  
 dilTIAZem 120 mg SR capsule Commonly known as:  [facility] Your last dose was: Your next dose is: Take 1 Cap by mouth daily. 120 mg  
    
   
   
   
  
 esomeprazole 40 mg capsule Commonly known as:  NexIUM Your last dose was: Your next dose is: Take 1 Cap by mouth daily. 40 mg  
    
   
   
   
  
 magnesium oxide 500 mg Tab Your last dose was: Your next dose is: Take 1 Tab by mouth daily. 1 Tab  
    
   
   
   
  
 multivitamin tablet Commonly known as:  ONE A DAY Your last dose was: Your next dose is: Take 1 Tab by mouth daily. 1 Tab  
    
   
   
   
  
 nystatin powder Commonly known as:  MYCOSTATIN Your last dose was: Your next dose is:    
   
   
 Apply  to affected area two (2) times a day. VITAMIN D3 1,000 unit Cap Generic drug:  cholecalciferol Your last dose was: Your next dose is: Take 1,000 Units by mouth daily. 1000 Units STOP taking these medications CLARITIN 10 mg tablet Generic drug:  loratadine  
   
  
 cyclobenzaprine 5 mg tablet Commonly known as:  FLEXERIL  
   
  
 hydroCHLOROthiazide 25 mg tablet Commonly known as:  HYDRODIURIL  
   
  
 STOOL SOFTENER (DOCUSATE NEHEMIAH) PO Where to Get Your Medications These medications were sent to [address] [place] [address],[address], [address] [place] [address] [address] Phone:  [phone]  
  polyethylene glycol 17 gram packet  
 potassium chloride SR 10 mEq tablet Information on where to get these meds will be given to you by the nurse or doctor. ! Ask your nurse or doctor about these medications HYDROcodone-acetaminophen  mg tablet Opioid Education Prescription Opioids: What You Need to Know: 
 
 
NAME: Ronaldo Astorga :  1935 MRN:  [id number] Date/Time:  2018 8:38 AM 
 
ADMIT DATE: 2018 DISCHARGE DATE: 2018 Attending Physician: Beatriz Ayon MD 
 
DISCHARGE DIAGNOSIS: 
Pancreatitis Liver mass Colon cancer Medications: Per above medication reconciliation. Pain Management: per above medications Recommended diet: Dysphagia diet-pureed with ensure clear Recommended activity: PT/OT Eval and Treat Wound care: None Indwelling devices:  None Supplemental Oxygen: None Required Lab work: Weekly BMP Glucose management:  None Code status: DNR Outside physician follow up: Follow-up Information Follow up With Details Comments Contact Info Amalia Bhakta 227  this is your home care provider. If you have not heard from them in one day prior to discharge please call them. [address] [facility] [address] 
[phone] Minh Butler MD Schedule an appointment as soon as possible for a visit in 1 week with PCP for post hospital follow up appointment. [facility] 6 
[phone] Marques Thompson MD Call  [address] [address] [address] [place] [address] 
[phone] [facility] Skilled nursing facility/ SNF MD responsible for above on discharge.   
 
 
 
Information obtained by : 
I understand that if any problems occur once I am at home I am to contact my physician. I understand and acknowledge receipt of the instructions indicated above. Physician's or R.N.'s Signature                                                                  Date/Time Patient or Repres ACO Transitions of Care Introducing [employer] [address] K[address] Bailey offers a voluntary care coordination program to provide high quality service and care to [facility] fee-for-service beneficiaries. Alejandro Chang was designed to help you enhance your health and well-being through the following services: ? Transitions of Care  support for individuals who are transitioning from one care setting to another (example: Hospital to home). ? Chronic and Complex Care Coordination  support for individuals and caregivers of those with serious or chronic illnesses or with more than one chronic (ongoing) condition and those who take a number of different medications. If you meet specific medical criteria, a [facility] [address] may call you directly to coordinate your care with your primary care physician and your other care providers. For questions about the [facility] programs, please, contact your physicians office. For general questions or additional information about Accountable Care Organizations: 
Please visit www.medicare.gov/acos. html or call 1-800-MEDICARE (6-854.316.7036) TTY users should call 4-890.423.4677. [employer]ent Announcement We are excited to announce that we are making your provider's discharge notes available to you in Sequent.   You will see these notes when they are completed and signed by the physician that discharged you from your recent hospital stay. If you have any questions or concerns about any information you see in [employer], please call the Health Information Department where you were seen or reach out to your Primary Care Provider for more information about your plan of care. Introducing [facility]! Dear Michael Jackson: 
Thank you for requesting a [employer] account. Our records indicate that you already have an active [employer] account. You can access your account anytime at [email or web address] Did you know that you can access your hospital and ER discharge instructions at any time in [employer]? You can also review all of your test results from your hospital stay or ER visit. Additional Information If you have questions, please visit the Frequently Asked Questions section of the [employer] website at [email or web address] Remember, [employer] is NOT to be used for urgent needs. For medical emergencies, dial 911. Now available from your iPhone and Android! Introducing Ryan Kearney As a [employer] patient, I wanted to make you aware of our electronic visit tool called Ryan Lianafin. [address] 24/7 allows you to connect within minutes with a medical provider 24 hours a day, seven days a week via a mobile device or tablet or logging into a secure website from your computer. You can access Ryan Kearney from anywhere in the [place]. A virtual visit might be right for you when you have a simple condition and feel like you just dont want to get out of bed, or cant get away from work for an appointment, when your regular [address] provider is not available (evenings, weekends or holidays), or when youre out of town and need minor care.   Electronic visits cost only $49 and if the Ryan Kearney provider determines a prescription is needed to treat your condition, one can be electronically transmitted to a nearby pharmacy*. Please take a moment to enroll today if you have not already done so. The enrollment process is free and takes just a few minutes. To enroll, please download the [facility] 24/7 marilu to your tablet or phone, or visit [email or web address] to enroll on your computer. And, as an [address] [place] [address] patient with a [employer] account, the results of your visits will be scanned into your electronic medical record and your primary care provider will be able to view the scanned results. We urge you to continue to see your regular [facility] provider for your ongoing medical care. And while your primary care provider may not be the one available when you seek a [employer] virtual visit, the peace of mind you get from getting a real diagnosis real time can be priceless. For more information on [employer], view our Frequently Asked Questions (FAQs) at [email or web address]. Sincerely, 
 
Katharina Arango MD 
Chief Medical Officer [address] [place] [address] *:  certain medications cannot be prescribed via [employer] Providers Seen During Your Hospitalization Provider Specialty Primary office phone Ananda Ruffin MD Internal Medicine [phone] Liam Carpenter MD Internal Medicine [phone] Adria Dotson DO Internal Medicine [phone] Laura Nix MD Internal Medicine [phone] Your Primary Care Physician (PCP) Primary Care Physician Office Phone Office Fax [facility], Azael Chong [phone] You are allergic to the following Allergen Reactions Lisinopril Angioedema Niacin Itching Felt hot also Ultram (Tramadol) Nausea Only Recent Documentation Height Weight BMI OB Status Smoking Status 1.575 m 106.6 kg 42.98 kg/m2 Hysterectomy Never Smoker Emergency Contacts Name Discharge Info Relation Home Work Mobile Evette Hernadez DISCHARGE CAREGIVER [3] Child [2] [phone] Patient Belongings The following personal items are in your possession at time of discharge: 
  Dental Appliances: None  Visual Aid: Magnifying glass, With patient      Home Medications: None   Jewelry: None  Clothing: None    Other Valuables: Cell Phone Please provide this summary of care documentation to your next provider. Signatures-by signing, you are acknowledging that this After Visit Summary has been reviewed with you and you have received a copy. Patient Signature:  ____________________________________________________________ Date:  ____________________________________________________________  
  
[facility] Provider Signature:  ____________________________________________________________ Date:  ____________________________________________________________

## 2018-07-30 NOTE — H&P
Hospitalist Admission Note    NAME: Jaime May   :  1935   MRN:  [id number]     Date/Time:  2018 7:07 PM    Patient PCP: Rolf Andersen MD  ______________________________________________________________________  Given the patient's current clinical presentation, I have a high level of concern for decompensation if discharged from the emergency department. Complex decision making was performed, which includes reviewing the patient's available past medical records, laboratory results, and x-ray films. My assessment of this patient's clinical condition and my plan of care is as follows. Assessment / Plan:  Acute pancreatitis   abdominal pain, generalized   Elevated liver function enzymes  -aggressive IVF NS  -pain management with low dose dilaudid   -NPO except chips ice for now and PO   -GI  possible ERCP   -ct scan  . Ill-defined pancreatic head and adjacent fat stranding. Pancreatitis is not  excluded. 2. Rectus muscle diastasis as described above. Ventral hernia. 3. Bilateral renal cysts, unchanged. 4. Diverticulosis. 5. Large area of decreased density in the right hepatic lobe of uncertain  etiology. Consider contrast-enhanced MRI for further evaluation. 6. Distended gallbladder with gravel-like stones versus dense sludge.       -+ antibiotics wbc elevated low grade temp  Check blood cx/la  - surgery to see  -trend lft - suspect will drop.  Is possible that there was a gallstone that has passed.       H/O Colon Ca  S/p  LAPAROSCOPIC HAND ASSISTED RIGHT COLECTOMY    Hypokalemia- replace prn  HLP- hold meds dt elevated lfts  Hypertension, Benign essential   -remain on home medication OP PO as was on, bp stable in ED  -watch for medications with side effect of pancreatitis  Hold hctz  Prn hydralazine      I have personally reviewed the radiographs, laboratory data in Epic and decisions and statements above are based partially on this personal interpretation.     Code Status: DNR d/w pt and family  Forms signed  DVT Prophylaxis: Hep SQ  GI Prophylaxis: not indicated              Subjective:   CHIEF COMPLAINT: abdominal pain    HISTORY OF PRESENT ILLNESS:     Pallavi Cardenas is a 81 y.o. Very pleasant female with h/o HTN,HLP colon ca S/p 4/17 LAPAROSCOPIC HAND ASSISTED RIGHT COLECTOMY presents to the er c/o  pain is located in the epigastric region and RUQ. The pain is at a severity of 4/10. The pain is moderate. Associated symptoms include constipation. Pertinent negatives include no fever, no diarrhea, ++nausea, no vomiting, no dysuria, no frequency, no hematuria, no headaches, no myalgias, no chest pain, no testicular pain and no back pain. Nothing worsens the pain. The pain is relieved by nothing.no h/o etoh use, no new meds. We were asked to admit for work up and evaluation of the above problems. Past Medical History:   Diagnosis Date    Allergic rhinitis     Cancer ([facility] [address]     Stage 1 cancer intestines    Cervical disc disease     Cervical disc disease     Colonic mass 3/20/2017    Eczema     Gastritis     GERD (gastroesophageal reflux disease)     mild    GI bleed     hx.  of recurrent    Hernia     hiatal lt side    Hiatal hernia     Hypercholesterolemia     Hypertension     Morbid obesity ([address] 4/13/2017    Osteoarthritis (arthritis due to wear and tear of joints)         Past Surgical History:   Procedure Laterality Date    ABDOMEN SURGERY PROC UNLISTED      Colon Resection (Cancer)    HX COLONOSCOPY  3/05    HX COLONOSCOPY  2017    HX CYST REMOVAL      left side    HX ENDOSCOPY  1/09    HX ENDOSCOPY  2015    HX GYN      hysterectomy    HX ORTHOPAEDIC      rt knee replacement       Social History   Substance Use Topics    Smoking status: Never Smoker    Smokeless tobacco: Never Used    Alcohol use No        Family History   Problem Relation Age of Onset    No Known Problems Mother     No Known Problems Father    [facility] Arthritis-osteo Sister     Alzheimer Sister     Parkinson's Disease Brother     Cancer Brother     No Known Problems Brother     Anesth Problems Neg Hx      Allergies   Allergen Reactions    Lisinopril Angioedema    Niacin Itching     Felt hot also    Ultram [Tramadol] Nausea Only        Prior to Admission medications    Medication Sig Start Date End Date Taking? Authorizing Provider   loratadine (CLARITIN) 10 mg tablet Take 10 mg by mouth daily. Lisy Mims, MD   docusate calcium (STOOL SOFTENER, DOCUSATE NEHEMIAH, PO) Take  by mouth as needed. Historical Provider   nystatin (MYCOSTATIN) powder Apply  to affected area two (2) times a day. 5/1/18   Devan Bhatia PA-C   clotrimazole (LOTRIMIN) 1 % topical cream Apply  to affected area two (2) times a day. 5/1/18   Devan Bhatia PA-C   esomeprazole (NEXIUM) 40 mg capsule Take 1 Cap by mouth daily. 2/20/18   Elizabeth Del Real MD   hydroCHLOROthiazide (HYDRODIURIL) 25 mg tablet Take 1 Tab by mouth daily. 2/20/18   Elizabeth Del Real MD   dilTIAZem (TIAZAC) 120 mg SR capsule Take 1 Cap by mouth daily. 2/20/18   Elizabeth Del Real MD   atorvastatin (LIPITOR) 20 mg tablet Take 1 Tab by mouth daily. 2/20/18   Elizabeth Del Real MD   potassium chloride SR (KLOR-CON 10) 10 mEq tablet Take 2 Tabs by mouth two (2) times a day. 2/20/18   Elizabeth Del Real MD   cyclobenzaprine (FLEXERIL) 5 mg tablet Take 1 Tab by mouth three (3) times daily as needed for Muscle Spasm(s). 8/24/17   Elizabeth Del Real MD   furosemide (LASIX) 20 mg tablet Take 20 mg by mouth as needed (swelling). 5/15/17   Elizabeth Del Real MD   polyethylene glycol (MIRALAX) 17 gram packet Take 1 Packet by mouth daily as needed. 4/19/17   Marten Hammans, NP   cetirizine (ZYRTEC) 10 mg tablet Take 1 Tab by mouth daily as needed for Allergies. 4/6/17   Elizabeth Del Real MD   cholecalciferol (VITAMIN D3) 1,000 unit cap Take 1,000 Units by mouth daily.     Historical Provider UBIDECARENONE (CO Q-10 PO) Take 200 mg by mouth daily. Historical Provider   acetaminophen (TYLENOL) 325 mg tablet Take  by mouth every four (4) hours as needed for Pain. Historical Provider   TROLAMINE SALICYLATE (ASPERCREME EX) by Apply Externally route. Applies to left knee 3-4 x daily    Historical Provider   aspirin delayed-release 81 mg tablet Take  by mouth daily. Historical Provider   magnesium oxide 500 mg tab Take 1 Tab by mouth daily. Historical Provider   multivitamin (ONE A DAY) tablet Take 1 Tab by mouth daily. Historical Provider       REVIEW OF SYSTEMS:     I am not able to complete the review of systems because: The patient is intubated and sedated    The patient has altered mental status due to his acute medical problems    The patient has baseline aphasia from prior stroke(s)    The patient has baseline dementia and is not reliable historian    The patient is in acute medical distress and unable to provide information           Total of 12 systems reviewed as follows:      Review of Systems   Constitutional: Negative for activity change, appetite change, chills, diaphoresis, fatigue and fever. HENT: Negative for mouth sores, rhinorrhea and sinus pain. Eyes: Negative for pain and redness. Respiratory: Negative for cough, choking, chest tightness, shortness of breath, wheezing and stridor. Cardiovascular: Negative for chest pain. Gastrointestinal: Positive for abdominal distention, abdominal pain and constipation. Negative for anal bleeding, blood in stool, diarrhea, nausea, rectal pain and vomiting. Endocrine: Negative for polydipsia, polyphagia and polyuria. Genitourinary: Negative for difficulty urinating, dysuria, flank pain, frequency, hematuria, pelvic pain,  urgency, vaginal bleeding, vaginal discharge and vaginal pain. Musculoskeletal: Negative for back pain and myalgias. Skin: Negative for color change, pallor and rash.    Neurological: Negative for dizziness, weakness, light-headedness and headaches. Psychiatric/Behavioral: Negative for agitation, behavioral problems and confusion. Objective:   VITALS:    Visit Vitals    /71    Pulse (!) 55    Temp 97.9 °F (36.6 °C)    Resp 16    SpO2 96%       PHYSICAL EXAM:    General:    Alert, cooperative, no distress, appears stated age. HEENT: Atraumatic, anicteric sclerae, pink conjunctivae     No oral ulcers, mucosa moist, throat clear, dentition fair  Neck:  Supple, symmetrical,  thyroid: non tender  Lungs:   Clear to auscultation bilaterally. No Wheezing or Rhonchi. No rales. Chest wall:  No tenderness  No Accessory muscle use. Heart:   Regular  rhythm,  No  murmur   + ariel  Edema no calf pain  Abdomen:   Soft, ++-tender mid epigas  w no rt or guarding . mild distended. Bowel sounds normal  Extremities: No cyanosis. No clubbing,      Skin turgor normal, Capillary refill normal, Radial dial pulse 2+ no cva ttp ariel   Skin:     Not pale. Not Jaundiced  No rashes   Psych:  Good insight. Not depressed. Not anxious or agitated. Neurologic: EOMs intact. No facial asymmetry. No aphasia or slurred speech. Symmetrical strength, Sensation grossly intact.  Alert and oriented X 4.     _______________________________________________________________________  Care Plan discussed with:    Comments   Patient x    Family  x    RN x    Care Manager                    Consultant:      _______________________________________________________________________  Expected  Disposition:   Home with Family x   HH/PT/OT/RN    SNF/LTC    AFIA    ________________________________________________________________________  TOTAL TIME:  61  Minutes    Critical Care Provided     Minutes non procedure based      Comments    x Reviewed previous records   >50% of visit spent in counseling and coordination of care x Discussion with patient and/or family and questions answered ________________________________________________________________________  Signed: Solange Harper MD    Procedures: see electronic medical records for all procedures/Xrays and details which were not copied into this note but were reviewed prior to creation of Plan.     LAB DATA REVIEWED:    Recent Results (from the past 24 hour(s))   EKG, 12 LEAD, INITIAL    Collection Time: 07/30/18 12:05 PM   Result Value Ref Range    Ventricular Rate 67 BPM    Atrial Rate 67 BPM    P-R Interval 174 ms    QRS Duration 84 ms    Q-T Interval 412 ms    QTC Calculation (Bezet) 435 ms    Calculated P Axis 28 degrees    Calculated R Axis -44 degrees    Calculated T Axis 16 degrees    Diagnosis       Normal sinus rhythm  Left axis deviation  Low voltage QRS  Abnormal ECG  When compared with ECG of 11-APR-2017 12:01,  Sinus rhythm has replaced Atrial fibrillation     URINALYSIS W/ RFLX MICROSCOPIC    Collection Time: 07/30/18 12:30 PM   Result Value Ref Range    Color YELLOW/STRAW      Appearance CLEAR CLEAR      Specific gravity 1.020 1.003 - 1.030      pH (UA) 6.0 5.0 - 8.0      Protein 30 (A) NEG mg/dL    Glucose NEGATIVE  NEG mg/dL    Ketone NEGATIVE  NEG mg/dL    Bilirubin NEGATIVE  NEG      Blood NEGATIVE  NEG      Urobilinogen 1.0 0.2 - 1.0 EU/dL    Nitrites NEGATIVE  NEG      Leukocyte Esterase NEGATIVE  NEG     URINE MICROSCOPIC ONLY    Collection Time: 07/30/18 12:30 PM   Result Value Ref Range    WBC 0-4 0 - 4 /hpf    RBC 0-5 0 - 5 /hpf    Epithelial cells FEW FEW /lpf    Bacteria NEGATIVE  NEG /hpf   CBC WITH AUTOMATED DIFF    Collection Time: 07/30/18 12:35 PM   Result Value Ref Range    WBC 14.6 (H) 3.6 - 11.0 K/uL    RBC 5.36 (H) 3.80 - 5.20 M/uL    HGB 14.4 11.5 - 16.0 g/dL    HCT 47.1 (H) 35.0 - 47.0 %    MCV 87.9 80.0 - 99.0 FL    MCH 26.9 26.0 - 34.0 PG    MCHC 30.6 30.0 - 36.5 g/dL    RDW 13.9 11.5 - 14.5 %    PLATELET 347 221 - 822 K/uL    MPV 9.9 8.9 - 12.9 FL    NRBC 0.0 0  WBC    ABSOLUTE NRBC 0.00 0.00 - 0.01 K/uL    NEUTROPHILS 76 (H) 32 - 75 %    LYMPHOCYTES 10 (L) 12 - 49 %    MONOCYTES 14 (H) 5 - 13 %    EOSINOPHILS 0 0 - 7 %    BASOPHILS 0 0 - 1 %    IMMATURE GRANULOCYTES 0 0.0 - 0.5 %    ABS. NEUTROPHILS 11.0 (H) 1.8 - 8.0 K/UL    ABS. LYMPHOCYTES 1.4 0.8 - 3.5 K/UL    ABS. MONOCYTES 2.0 (H) 0.0 - 1.0 K/UL    ABS. EOSINOPHILS 0.0 0.0 - 0.4 K/UL    ABS. BASOPHILS 0.1 0.0 - 0.1 K/UL    ABS. IMM. GRANS. 0.1 (H) 0.00 - 0.04 K/UL    DF AUTOMATED     METABOLIC PANEL, COMPREHENSIVE    Collection Time: 07/30/18 12:35 PM   Result Value Ref Range    Sodium 139 136 - 145 mmol/L    Potassium 3.2 (L) 3.5 - 5.1 mmol/L    Chloride 99 97 - 108 mmol/L    CO2 34 (H) 21 - 32 mmol/L    Anion gap 6 5 - 15 mmol/L    Glucose 127 (H) 65 - 100 mg/dL    BUN 12 6 - 20 MG/DL    Creatinine 0.93 0.55 - 1.02 MG/DL    BUN/Creatinine ratio 13 12 - 20      GFR est AA >60 >60 ml/min/1.73m2    GFR est non-AA 58 (L) >60 ml/min/1.73m2    Calcium 9.3 8.5 - 10.1 MG/DL    Bilirubin, total 1.5 (H) 0.2 - 1.0 MG/DL    ALT (SGPT) 187 (H) 12 - 78 U/L    AST (SGOT) 172 (H) 15 - 37 U/L    Alk.  phosphatase 186 (H) 45 - 117 U/L    Protein, total 7.9 6.4 - 8.2 g/dL    Albumin 3.5 3.5 - 5.0 g/dL    Globulin 4.4 (H) 2.0 - 4.0 g/dL    A-G Ratio 0.8 (L) 1.1 - 2.2     LIPASE    Collection Time: 07/30/18 12:35 PM   Result Value Ref Range    Lipase >3000 (H) 73 - 393 U/L   TROPONIN I    Collection Time: 07/30/18 12:35 PM   Result Value Ref Range    Troponin-I, Qt. <0.05 <0.05 ng/mL

## 2018-07-31 PROBLEM — R93.5 ABNORMAL CT OF THE ABDOMEN: Status: ACTIVE | Noted: 2018-01-01

## 2018-07-31 PROBLEM — K82.8 SLUDGE IN GALLBLADDER: Status: ACTIVE | Noted: 2018-01-01

## 2018-07-31 PROBLEM — K43.2 INCISIONAL HERNIA, WITHOUT OBSTRUCTION OR GANGRENE: Chronic | Status: ACTIVE | Noted: 2018-01-01

## 2018-07-31 PROBLEM — R74.8 ABNORMAL LIVER ENZYMES: Status: ACTIVE | Noted: 2018-01-01

## 2018-07-31 PROBLEM — E66.01 CLASS 3 SEVERE OBESITY DUE TO EXCESS CALORIES WITH SERIOUS COMORBIDITY AND BODY MASS INDEX (BMI) OF 40.0 TO 44.9 IN ADULT (HCC): Chronic | Status: ACTIVE | Noted: 2018-01-01

## 2018-07-31 PROBLEM — K85.10 GALLSTONE PANCREATITIS: Status: ACTIVE | Noted: 2018-01-01

## 2018-07-31 NOTE — PROGRESS NOTES
Reason for Admission:   pancreatitis                   RRAT Score:          1o           Plan for utilizing home health:      [facility]                    Likelihood of Readmission:  low                         Transition of Care Plan:                 Pt admitted with pancreatitis, lives in [place] with son Guillermo Henderson is son Veronica Hutchinson, has a cane, RW, BSC, shower chair, Medicare, DDNR, PCP Dr Jade Blanco, and used [facility] in 2017. Chart sent to them-will need orders//confirmation. Need to clarify physical address. Pt is an [facility] pt. Chart sent to  [facility]  Orders placed for [facility] nursing, PT, and OT. Pt states her address is [address] [place] [address] and Neda Coffey will transport her home. Care Management Interventions  PCP Verified by CM:  Yes  Transition of Care Consult (CM Consult): Discharge Planning  MyChart Signup: No  Discharge Durable Medical Equipment: No  Health Maintenance Reviewed: Yes  Physical Therapy Consult: No  Occupational Therapy Consult: No  Speech Therapy Consult: No  Current Support Network: Family Lives Nearby  Confirm Follow Up Transport: Family  Plan discussed with Pt/Family/Caregiver: Yes  Freedom of Choice Offered: Yes  Discharge Location  Discharge Placement: Home

## 2018-07-31 NOTE — PROGRESS NOTES
Bedside shift change report given to SALMA Patel (oncoming nurse) by Raymond Felty (offgoing nurse).  Report included the following information SBAR, Kardex, Procedure Summary, Intake/Output, MAR and Recent Results. '

## 2018-07-31 NOTE — PROGRESS NOTES
Bedside shift change report given to Gris Ruiz (oncoming nurse) by Carmela Li (offgoing nurse). Report included the following information SBAR, Kardex, Intake/Output, MAR and Recent Results.

## 2018-07-31 NOTE — PROGRESS NOTES
Problem: Falls - Risk of  Goal: *Absence of Falls  Document Alex Fall Risk and appropriate interventions in the flowsheet.    Outcome: Progressing Towards Goal  Fall Risk Interventions:  Mobility Interventions: Patient to call before getting OOB, PT Consult for mobility concerns, PT Consult for assist device competence         Medication Interventions: Patient to call before getting OOB    Elimination Interventions: Call light in reach

## 2018-07-31 NOTE — PROGRESS NOTES
Problem: Self Care Deficits Care Plan (Adult)  Goal: *Acute Goals and Plan of Care (Insert Text)  Occupational Therapy Goals:  Initiated 7/31/2018  1. Patient will perform grooming standing with modified independence within 7 days. 2. Patient will perform toileting with modified independence within 7 days. 3. Patient will perform lower body dressing with modified independence with AE PRN within 7 days. 4. Patient will transfer from toilet with modified independence using the least restrictive device and appropriate durable medical equipment within 7 days. Occupational Therapy EVALUATION  Patient: Berto Penn (51 y.o. female)  Date: 7/31/2018  Primary Diagnosis: Acute Pancreatitis  Pancreatitis        Precautions:   DNR    ASSESSMENT :  Based on the objective data described below, the patient presents with increased abdominal pain and was seated on bedside commode upon arrival.  CGA with increased time for sit to stand. Pt as able to manage emery care standing with CGA. Mobilized to bathroom with rolling walker for support to stand at sink and wash hands with CGA. Limited tolerance to mobility and ADLs due to pain and general weakness. Pt is performing UB ADLs at a CGA level and lower body ADLS at min assist to CGA level. Pt has assist at home from son as needed and recommend home health at discharge. Patient will benefit from skilled intervention to address the above impairments.   Patients rehabilitation potential is considered to be Good  Factors which may influence rehabilitation potential include:   []             None noted  []             Mental ability/status  []             Medical condition  []             Home/family situation and support systems  []             Safety awareness  []             Pain tolerance/management  []             Other:      PLAN :  Recommendations and Planned Interventions:  []               Self Care Training                  []        Therapeutic Activities  []               Functional Mobility Training    []        Cognitive Retraining  []               Therapeutic Exercises           []        Endurance Activities  []               Balance Training                   []        Neuromuscular Re-Education  []               Visual/Perceptual Training     []   Home Safety Training  []               Patient Education                 []        Family Training/Education  []               Other (comment):    Frequency/Duration: Patient will be followed by occupational therapy 4 times a week to address goals. Discharge Recommendations: Home Health  Further Equipment Recommendations for Discharge: none     SUBJECTIVE:   Patient stated I just hurt.     OBJECTIVE DATA SUMMARY:   HISTORY:   Past Medical History:   Diagnosis Date    Allergic rhinitis     Cancer ([facility] [address]     Stage 1 cancer intestines    Cervical disc disease     Cervical disc disease     Colonic mass 3/20/2017    Eczema     Gastritis     GERD (gastroesophageal reflux disease)     mild    GI bleed     hx.  of recurrent    Hernia     hiatal lt side    Hiatal hernia     Hypercholesterolemia     Hypertension     Morbid obesity ([facility] [address] 4/13/2017    Osteoarthritis (arthritis due to wear and tear of joints)      Past Surgical History:   Procedure Laterality Date    ABDOMEN SURGERY PROC UNLISTED      Colon Resection (Cancer)    HX COLONOSCOPY  3/05    HX COLONOSCOPY  2017    HX CYST REMOVAL      left side    HX ENDOSCOPY  1/09    HX ENDOSCOPY  2015    HX GYN      hysterectomy    HX ORTHOPAEDIC      rt knee replacement       Prior Level of Function/Environment/Context: ambulated with SPC; performed ADLs on her own and some light IADLs; son lives with pt but does not work  Expanded or extensive additional review of patient history:     Home Situation  Home Environment: Private residence  # Steps to Enter: 0  Wheelchair Ramp: Yes  One/Two Story Residence: One story  Living Alone: No  Support Systems: Child(kimberly)  Patient Expects to be Discharged to[de-identified] Private residence  Current DME Used/Available at Home: Indu Burgessg, rolling, Cane, straight, Commode, bedside, Shower chair  Tub or Shower Type: Shower    Hand dominance: Right    EXAMINATION OF PERFORMANCE DEFICITS:  Cognitive/Behavioral Status:  Neurologic State: Alert  Orientation Level: Oriented X4  Cognition: Appropriate decision making; Appropriate for age attention/concentration; Follows commands  Perception: Appears intact  Perseveration: No perseveration noted  Safety/Judgement: Awareness of environment; Fall prevention;Home safety        Hearing:   intact  Vision/Perceptual:                                Corrective Lenses: Glasses    Range of Motion:    AROM: Generally decreased, functional                         Strength:    Strength: Generally decreased, functional                Coordination:  Coordination: Within functional limits  Fine Motor Skills-Upper: Left Intact; Right Intact    Gross Motor Skills-Upper: Left Intact; Right Intact    Tone & Sensation:    Tone: Normal  Sensation: Intact                      Balance:  Sitting: Intact  Standing: Intact; With support    Functional Mobility and Transfers for ADLs:  Bed Mobility:  Rolling: Other (comment) (seated in bedside chair upon arrival )    Transfers:  Sit to Stand: Contact guard assistance;Assist x1  Stand to Sit: Contact guard assistance;Assist x1  Bed to Chair: Contact guard assistance;Assist x1  Toilet Transfer : Contact guard assistance    ADL Assessment:  Feeding: Independent    Oral Facial Hygiene/Grooming: Contact guard assistance; Other (comment) (standing at sink; limited standing tolerance)    Bathing: Minimum assistance (LB)    Upper Body Dressing: Setup    Lower Body Dressing: Minimum assistance    Toileting: Contact guard assistance                ADL Intervention and task modifications:     See assessment  Cognitive Retraining  Safety/Judgement: Awareness of environment; Fall prevention;Home safety      Functional Measure:  Barthel Index:    Bathin  Bladder: 10  Bowels: 10  Groomin  Dressin  Feeding: 10  Mobility: 0  Stairs: 0  Toilet Use: 5  Transfer (Bed to Chair and Back): 10  Total: 55       Barthel and G-code impairment scale:  Percentage of impairment CH  0% CI  1-19% CJ  20-39% CK  40-59% CL  60-79% CM  80-99% CN  100%   Barthel Score 0-100 100 99-80 79-60 59-40 20-39 1-19   0   Barthel Score 0-20 20 17-19 13-16 9-12 5-8 1-4 0      The Barthel ADL Index: Guidelines  1. The index should be used as a record of what a patient does, not as a record of what a patient could do. 2. The main aim is to establish degree of independence from any help, physical or verbal, however minor and for whatever reason. 3. The need for supervision renders the patient not independent. 4. A patient's performance should be established using the best available evidence. Asking the patient, friends/relatives and nurses are the usual sources, but direct observation and common sense are also important. However direct testing is not needed. 5. Usually the patient's performance over the preceding 24-48 hours is important, but occasionally longer periods will be relevant. 6. Middle categories imply that the patient supplies over 50 per cent of the effort. 7. Use of aids to be independent is allowed. Felipe Florence., Barthel, DJaimeW. (0816). Functional evaluation: the Barthel Index. [facility] (14)2. Bina Garcia eileen Francoise, J.J.MFARHANA, Sy Georges.Henrietta., Malika, [address] [place] [address] (). Measuring the change indisability after inpatient rehabilitation; comparison of the responsiveness of the Barthel Index and Functional [place] Measure. Journal of Neurology, Neurosurgery, and Psychiatry, 66(4), 024-681. Gaby Taylor N.J.A, TONIA Crisostomo, & Lucinda Gutierrez MJaimeA. (2004.) Assessment of post-stroke quality of life in cost-effectiveness studies:  The usefulness of the Barthel Index and the EuroQoL-5D. Quality of Life Research, 13, 100-41         G codes: In compliance with CMSs Claims Based Outcome Reporting, the following G-code set was chosen for this patient based on their primary functional limitation being treated: The outcome measure chosen to determine the severity of the functional limitation was the barthel with a score of 55/100 which was correlated with the impairment scale. ? Self Care:     - CURRENT STATUS: CK - 40%-59% impaired, limited or restricted    - GOAL STATUS: CJ - 20%-39% impaired, limited or restricted    - D/C STATUS:  ---------------To be determined---------------     Occupational Therapy Evaluation Charge Determination   History Examination Decision-Making   LOW Complexity : Brief history review  LOW Complexity : 1-3 performance deficits relating to physical, cognitive , or psychosocial skils that result in activity limitations and / or participation restrictions  MEDIUM Complexity : Patient may present with comorbidities that affect occupational performnce. Miniml to moderate modification of tasks or assistance (eg, physical or verbal ) with assesment(s) is necessary to enable patient to complete evaluation       Based on the above components, the patient evaluation is determined to be of the following complexity level: LOW   Pain:  Pain Scale 1: Numeric (0 - 10)  Pain Intensity 1: 8  Pain Location 1: Abdomen  Pain Orientation 1: Upper  Pain Description 1: Aching  Pain Intervention(s) 1: Medication (see MAR)  Activity Tolerance:     Please refer to the flowsheet for vital signs taken during this treatment.   After treatment:   [x] Patient left in no apparent distress sitting up in chair  [] Patient left in no apparent distress in bed  [x] Call bell left within reach  [x] Nursing notified  [] Caregiver present  [] Bed alarm activated    COMMUNICATION/EDUCATION:   The patients plan of care was discussed with: Physical Therapist, Registered Nurse and patient. [x] Home safety education was provided and the patient/caregiver indicated understanding. [x] Patient/family have participated as able in goal setting and plan of care. [x] Patient/family agree to work toward stated goals and plan of care. [] Patient understands intent and goals of therapy, but is neutral about his/her participation. [] Patient is unable to participate in goal setting and plan of care. This patients plan of care is appropriate for delegation to [facility].     Thank you for this referral.  Julieta Chu, OTR/L  Time Calculation: 23 mins

## 2018-07-31 NOTE — PROGRESS NOTES
Hospitalist Progress Note    NAME: Shannon Chandler   :  1935   MRN:  [id number]       Assessment / Plan:  Acute pancreatitis  -cont aggressive IVF hydration  -cont pain mgmnt w low dose dilaudid, pain well-controlled now  -IV zofran for nausea  -GI and general surgery consulted   -cont zosyn for now  -NPO    Hx of Colon CA  Elevated LFTs  -liver enzymes normalizing  -CT shows ill-defined pancreatic head and adjacent fat stranding, rectus muscle diastasis, ventral hernia, b/l renal cysts w no change, diverticulosis. Large area decreased density in R hepatic lobe  -distended GB with stones    HTN  -hold hctz, BP stable. Will monitor and treat w PRN IV meds for now    HLD  -hold home atorvastatin    Hypokalemia  -resolved, monitor labs    GERD    Code status: DNR  Prophylaxis: Hep SQ  Recommended Disposition: SNF/LTC     Subjective:     Chief Complaint / Reason for Physician Visit  Pain and nausea much better since admission, still some mild discomfort and nausea. No other acute complaints    Review of Systems:  Symptom Y/N Comments  Symptom Y/N Comments   Fever/Chills n   Chest Pain n    Poor Appetite    Edema n    Cough n   Abdominal Pain y    Sputum n   Joint Pain     SOB/WERNER n   Pruritis/Rash     Nausea/vomit y   Tolerating PT/OT     Diarrhea    Tolerating Diet     Constipation    Other       Could NOT obtain due to:      Objective:     VITALS:   Last 24hrs VS reviewed since prior progress note.  Most recent are:  Patient Vitals for the past 24 hrs:   Temp Pulse Resp BP SpO2   18 0853 97.9 °F (36.6 °C) (!) 59 17 140/70 93 %   18 0320 98 °F (36.7 °C) (!) 58 17 140/67 93 %   18 2314 97.9 °F (36.6 °C) (!) 54 18 140/70 93 %   18 2151 98.9 °F (37.2 °C) (!) 55 19 124/57 92 %   18 1935 97.6 °F (36.4 °C) 64 18 103/61 94 %   18 1839 97.9 °F (36.6 °C) (!) 55 16 134/71 96 %       Intake/Output Summary (Last 24 hours) at 18 1146  Last data filed at 18 0304   Gross per 24 hour   Intake              655 ml   Output              200 ml   Net              455 ml        PHYSICAL EXAM:  General: WD, WN. Alert, cooperative, no acute distress    EENT:  EOMI. Anicteric sclerae. MMM  Resp:  CTA bilaterally, no wheezing or rales. No accessory muscle use  CV:  Regular  rhythm,  No edema  GI:  Soft, Non distended, mildly tender, mostly epigastric area  +Bowel sounds  Neurologic:  Alert and oriented X 3, normal speech,   Psych:   Good insight. Not anxious nor agitated  Skin:  No rashes. No jaundice    Reviewed most current lab test results and cultures  YES  Reviewed most current radiology test results   YES  Review and summation of old records today    NO  Reviewed patient's current orders and MAR    YES  PMH/SH reviewed - no change compared to H&P  ________________________________________________________________________  Care Plan discussed with:    Comments   Patient x    Family      RN x    Care Manager     Consultant                        Multidiciplinary team rounds were held today with , nursing, pharmacist and clinical coordinator. Patient's plan of care was discussed; medications were reviewed and discharge planning was addressed. ________________________________________________________________________  Total NON critical care TIME:  25  Minutes    Total CRITICAL CARE TIME Spent:   Minutes non procedure based      Comments   >50% of visit spent in counseling and coordination of care x    ________________________________________________________________________  Pilo Cosme DO     Procedures: see electronic medical records for all procedures/Xrays and details which were not copied into this note but were reviewed prior to creation of Plan. LABS:  I reviewed today's most current labs and imaging studies.   Pertinent labs include:  Recent Labs      07/31/18   0252  07/30/18   1235   WBC  13.4*  14.6*   HGB  14.0  14.4   HCT  44.7  47.1*   PLT  193  221 Recent Labs      07/31/18   0252  07/30/18   1235   NA  136  139   K  3.3*  3.2*   CL  100  99   CO2  28  34*   GLU  128*  127*   BUN  17  12   CREA  0.89  0.93   CA  9.1  9.3   MG  2.0   --    ALB  3.2*  3.5   TBILI  0.9  1.5*   SGOT  75*  172*   ALT  133*  187*   INR  1.1   --        Signed: Say Bearden, DO

## 2018-07-31 NOTE — PROGRESS NOTES
Problem: Mobility Impaired (Adult and Pediatric)  Goal: *Acute Goals and Plan of Care (Insert Text)  Physical Therapy Goals  Initiated 7/31/2018  1. Patient will move from supine to sit and sit to supine , scoot up and down and roll side to side in bed with modified independence within 7 day(s). 2.  Patient will transfer from bed to chair and chair to bed with modified independence using the least restrictive device within 7 day(s). 3.  Patient will perform sit to stand with modified independence within 7 day(s). 4.  Patient will ambulate with supervision/set-up for 150 feet with the least restrictive device within 7 day(s). physical Therapy EVALUATION  Patient: Colby Deng (89 y.o. female)  Date: 7/31/2018  Primary Diagnosis: Acute Pancreatitis  Pancreatitis        Precautions:        ASSESSMENT :  Based on the objective data described below, the patient presents with increased abdominal pain, generalized weakness, decreased endurance/activity tolerance, and overall impaired functional mobility. Pt received seated in bedside chair, agreeable to participation with therapy. Pt required CGAx1 throughout all aspects of functional mobility including sit<>stand transfers and ambulation trial with RW support. Pt ambulated 45ft w/ RW and CGAx1, exhibiting decreased gait speed however steady gait overall. Ambulation trial limited this date secondary to pt c/o increased abdominal pain, reporting 8/10 pain. Anticipate that once pain resolves, pt will progress well with therapy and be appropriate to discharge home w/ [facility]. Will assess gait stability with use of SPC vs RW as pt was utilizing [facility] prior to admission. Patient will benefit from skilled intervention to address the above impairments.   Patients rehabilitation potential is considered to be Good  Factors which may influence rehabilitation potential include:   []         None noted  []         Mental ability/status  [] Medical condition  []         Home/family situation and support systems  []         Safety awareness  []         Pain tolerance/management  []         Other:      PLAN :  Recommendations and Planned Interventions:  [x]           Bed Mobility Training             []    Neuromuscular Re-Education  [x]           Transfer Training                   []    Orthotic/Prosthetic Training  [x]           Gait Training                         []    Modalities  [x]           Therapeutic Exercises           []    Edema Management/Control  [x]           Therapeutic Activities            [x]    Patient and Family Training/Education  []           Other (comment):    Frequency/Duration: Patient will be followed by physical therapy  4 times a week to address goals. Discharge Recommendations: Home Health  Further Equipment Recommendations for Discharge: None, owns necessary equipment     SUBJECTIVE:   Patient stated my stomach hurts.     OBJECTIVE DATA SUMMARY:   HISTORY:    Past Medical History:   Diagnosis Date    Allergic rhinitis     Cancer ([facility] [address]     Stage 1 cancer intestines    Cervical disc disease     Cervical disc disease     Colonic mass 3/20/2017    Eczema     Gastritis     GERD (gastroesophageal reflux disease)     mild    GI bleed     hx. of recurrent    Hernia     hiatal lt side    Hiatal hernia     Hypercholesterolemia     Hypertension     Morbid obesity ([facility] [address] 4/13/2017    Osteoarthritis (arthritis due to wear and tear of joints)      Past Surgical History:   Procedure Laterality Date    ABDOMEN SURGERY PROC UNLISTED      Colon Resection (Cancer)    HX COLONOSCOPY  3/05    HX COLONOSCOPY  2017    HX CYST REMOVAL      left side    HX ENDOSCOPY  1/09    HX ENDOSCOPY  2015    HX GYN      hysterectomy    HX ORTHOPAEDIC      rt knee replacement     Prior Level of Function/Home Situation: Pt lives at home w/ son. Reports modified independence w/ use of SPC during ambulation. Denies history of falls. Does not drive. Mostly sedentary  Personal factors and/or comorbidities impacting plan of care:     Home Situation  Home Environment: Private residence  # Steps to Enter: 0  Wheelchair Ramp: Yes  One/Two Story Residence: One story  Living Alone: No  Support Systems: Child(kimberly)  Patient Expects to be Discharged to[de-identified] Private residence  Current DME Used/Available at Home: Carpenter Dynes, rolling, [place], straight, Commode, bedside, Shower chair  Tub or Shower Type: Shower    EXAMINATION/PRESENTATION/DECISION MAKING:   Critical Behavior:  Neurologic State: Alert  Orientation Level: Oriented X4        Hearing:     Skin:  Intact  Edema: none noted   Range Of Motion:  AROM: Generally decreased, functional                       Strength:    Strength: Generally decreased, functional                    Tone & Sensation:   Tone: Normal              Sensation: Intact               Coordination:  Coordination: Within functional limits  Vision:      Functional Mobility:  Bed Mobility:  Rolling: Other (comment) (seated in bedside chair upon arrival )           Transfers:  Sit to Stand: Contact guard assistance;Assist x1  Stand to Sit: Contact guard assistance;Assist x1        Bed to Chair: Contact guard assistance;Assist x1              Balance:   Sitting: Intact  Standing: Intact; With support  Ambulation/Gait Training:  Distance (ft): 45 Feet (ft)  Assistive Device: Walker, rolling;Gait belt  Ambulation - Level of Assistance: Contact guard assistance        Gait Abnormalities: Decreased step clearance;Shuffling gait              Speed/Betty: Pace decreased (<100 feet/min)  Step Length: Left shortened;Right shortened                Functional Measure:  Barthel Index:    Bathin  Bladder: 10  Bowels: 10  Groomin  Dressin  Feeding: 10  Mobility: 0  Stairs: 0  Toilet Use: 5  Transfer (Bed to Chair and Back): 10  Total: 55       Barthel and G-code impairment scale:  Percentage of impairment CH  0% CI  1-19% CJ  20-39% CK  40-59% CL  60-79% CM  80-99% CN  100%   Barthel Score 0-100 100 99-80 79-60 59-40 20-39 1-19   0   Barthel Score 0-20 20 17-19 13-16 9-12 5-8 1-4 0      The Barthel ADL Index: Guidelines  1. The index should be used as a record of what a patient does, not as a record of what a patient could do. 2. The main aim is to establish degree of independence from any help, physical or verbal, however minor and for whatever reason. 3. The need for supervision renders the patient not independent. 4. A patient's performance should be established using the best available evidence. Asking the patient, friends/relatives and nurses are the usual sources, but direct observation and common sense are also important. However direct testing is not needed. 5. Usually the patient's performance over the preceding 24-48 hours is important, but occasionally longer periods will be relevant. 6. Middle categories imply that the patient supplies over 50 per cent of the effort. 7. Use of aids to be independent is allowed. Gregorio Díaz., Barthel, D.W. ([id number]). Functional evaluation: the Barthel Index. [facility] (14)2. Olivier Reynaga eileen SCOTT Owusu, Rai Duke., [facility]., [place], [address] [place] [address] (1999). Measuring the change indisability after inpatient rehabilitation; comparison of the responsiveness of the Barthel Index and Functional Grant Measure. Journal of Neurology, Neurosurgery, and Psychiatry, 66(4), 172-495. Savannah Stephen, N.J.A, TONIA Crisostomo, & Lincoln Augustine, M.A. (2004.) Assessment of post-stroke quality of life in cost-effectiveness studies: The usefulness of the Barthel Index and the EuroQoL-5D. Quality of Life Research, 13, 217-93       G codes: In compliance with CMSs Claims Based Outcome Reporting, the following G-code set was chosen for this patient based on their primary functional limitation being treated:     The outcome measure chosen to determine the severity of the functional limitation was the Barthel Index with a score of 55/100 which was correlated with the impairment scale. ? Mobility - Walking and Moving Around:     - CURRENT STATUS: CK - 40%-59% impaired, limited or restricted    - GOAL STATUS: CI - 1%-19% impaired, limited or restricted    - D/C STATUS:  ---------------To be determined---------------      Physical Therapy Evaluation Charge Determination   History Examination Presentation Decision-Making   MEDIUM  Complexity : 1-2 comorbidities / personal factors will impact the outcome/ POC  MEDIUM Complexity : 3 Standardized tests and measures addressing body structure, function, activity limitation and / or participation in recreation  MEDIUM Complexity : Evolving with changing characteristics  MEDIUM Complexity : FOTO score of 26-74      Based on the above components, the patient evaluation is determined to be of the following complexity level: MEDIUM    Pain:  Pain Scale 1: Numeric (0 - 10)  Pain Intensity 1: 0  Pain Location 1: Abdomen  Pain Orientation 1: Upper  Pain Description 1: Aching  Pain Intervention(s) 1: Medication (see MAR)  Activity Tolerance:   VSS throughout on RA, limited by abdominal pain this date  Please refer to the flowsheet for vital signs taken during this treatment. After treatment:   [x]         Patient left in no apparent distress sitting up in chair  []         Patient left in no apparent distress in bed  [x]         Call bell left within reach  [x]         Nursing notified  [x]         Caregiver present  []         Bed alarm activated    COMMUNICATION/EDUCATION:   The patients plan of care was discussed with: Occupational Therapist and Registered Nurse. [x]         Fall prevention education was provided and the patient/caregiver indicated understanding. [x]         Patient/family have participated as able in goal setting and plan of care. [x]         Patient/family agree to work toward stated goals and plan of care.   []         Patient understands intent and goals of therapy, but is neutral about his/her participation. []         Patient is unable to participate in goal setting and plan of care.     Thank you for this referral.  Jere Dawkins, PT, DPT   Time Calculation: 15 mins

## 2018-07-31 NOTE — CONSULTS
Gastroenterology Consult     Referring Physician: Dr. Cedric Duran    Consult Date: 7/31/2018     Subjective:     Chief Complaint: abd pain    History of Present Illness: Cranford Peabody is a 81 y.o. female who is seen in consultation for acute pancreatitis. Patient started feeling gasy with belching and burping several days ago. It was worse yesterday AM upon awakening. She did eat breakfast.  Sometime after that she developed abd pain that eventually became 10/10. She went to her PCP's office but couldn't be seen and decided to come to the ER. Lipase >3000. WBC 14.6. Bili was 1.5, down to 0.9 today. AST//187 respectively yesterday, down to 75/133 today. Alk Phos has improved from 186 to 155. Non contrast CT shows: IMPRESSION:     1. Ill-defined pancreatic head and adjacent fat stranding. Pancreatitis is not  excluded. 2. Rectus muscle diastasis as described above. Ventral hernia. 3. Bilateral renal cysts, unchanged. 4. Diverticulosis. 5. Large area of decreased density in the right hepatic lobe of uncertain  etiology. Consider contrast-enhanced MRI for further evaluation. 6. Distended gallbladder with gravel-like stones versus dense sludge. No hx of pancreatitis. No ETOH. Son had \"surgery on his pancreas? \"  Patient had R hemicolectomy for colon cancer 4/2017. Margins clear. No lymph nodes. No colonoscopy since. Denies significant weight loss. Past Medical History:   Diagnosis Date    Allergic rhinitis     Cancer ([address]     Stage 1 cancer intestines    Cervical disc disease     Cervical disc disease     Colonic mass 3/20/2017    Eczema     Gastritis     GERD (gastroesophageal reflux disease)     mild    GI bleed     hx.  of recurrent    Hernia     hiatal lt side    Hiatal hernia     Hypercholesterolemia     Hypertension     Morbid obesity ([address] 4/13/2017    Osteoarthritis (arthritis due to wear and tear of joints)      Past Surgical History:   Procedure Laterality Date  ABDOMEN SURGERY PROC UNLISTED      Colon Resection (Cancer)    HX COLONOSCOPY  3/05    HX COLONOSCOPY  2017    HX CYST REMOVAL      left side    HX ENDOSCOPY  1/09    HX ENDOSCOPY  2015    HX GYN      hysterectomy    HX ORTHOPAEDIC      rt knee replacement      Family History   Problem Relation Age of Onset    No Known Problems Mother     No Known Problems Father     Arthritis-osteo Sister     Alzheimer Sister     Parkinson's Disease Brother     Cancer Brother     No Known Problems Brother     Anesth Problems Neg Hx      Social History   Substance Use Topics    Smoking status: Never Smoker    Smokeless tobacco: Never Used    Alcohol use No      Allergies   Allergen Reactions    Lisinopril Angioedema    Niacin Itching     Felt hot also    Ultram [Tramadol] Nausea Only     Current Facility-Administered Medications   Medication Dose Route Frequency    potassium chloride 10 mEq in 100 ml IVPB  10 mEq IntraVENous Q1H    sodium chloride (NS) flush 5-10 mL  5-10 mL IntraVENous Q8H    sodium chloride (NS) flush 5-10 mL  5-10 mL IntraVENous PRN    0.9% sodium chloride infusion  100 mL/hr IntraVENous CONTINUOUS    HYDROmorphone (PF) (DILAUDID) injection 0.5 mg  0.5 mg IntraVENous Q4H PRN    naloxone (NARCAN) injection 0.4 mg  0.4 mg IntraVENous PRN    ondansetron (ZOFRAN) injection 4 mg  4 mg IntraVENous Q4H PRN    heparin (porcine) injection 5,000 Units  5,000 Units SubCUTAneous Q8H    piperacillin-tazobactam (ZOSYN) 3.375 g in 0.9% sodium chloride (MBP/ADV) 100 mL  3.375 g IntraVENous Q8H    dilTIAZem CD (CARDIZEM CD) capsule 120 mg  120 mg Oral DAILY    polyethylene glycol (MIRALAX) packet 17 g  17 g Oral DAILY    hydrALAZINE (APRESOLINE) 20 mg/mL injection 10 mg  10 mg IntraVENous Q6H PRN        Review of Systems:  A detailed 10 organ review of systems is obtained with pertinent positives as listed in the History of Present Illness and Past Medical History.      A detailed review of systems was performed as follows:  Constitutional:  Negative  Eyes:  No ocular sensitivity to the sun, blurred vision or double vision. ENMT:  No nose or sinus problems. Respiratory: No coughing, wheezing or sob  Cardiac:  No chest pain, exertional chest pain or palpitations  Gastrointestinal:  See history of the present illness  :   No pain with urination or hematuria  Musculoskeletal:  No arthritis or hot swollen joints. Endocrine:  No thyroid disease or diabetes  Psychiatric: No depression or feeling blue  Integumentary:  No skin rash or sensitivity to the sun. Neurologic:  No stroke or seizure; no numbness or tingling of the extremities. Heme-Lymphatic:  No history of anemia, no unexplained lumps or bumps      Objective:     Physical Exam:  Visit Vitals    /70    Pulse (!) 59    Temp 97.9 °F (36.6 °C)    Resp 17    SpO2 93%        Gen: Appears uncomfortable, holding abd  Skin:  Extremities and face reveal no rashes. No bateman erythema. No telangiectasias on the chest wall. HEENT: Sclerae anicteric. No abnormal pigmentation of the lips. The neck is supple. Cardiovascular: Regular rate and rhythm. No murmurs, gallops, or rubs. Respiratory:  Comfortable breathing with no accessory muscle use. Clear breath sounds with no wheezes, rales, or rhonchi. GI:  Abdomen obese, large ventral hernia and umbilical hernia, soft, and diffusely tender. Hypoactive bowel sounds. No enlargement of the liver or spleen. Rectal:  Deferred  Musculoskeletal:  No pitting edema of the lower legs. Neurological:  Gross memory appears intact. Patient is alert and oriented. Psychiatric:  Mood appears appropriate with judgement intact. Lymphatic:  No cervical or supraclavicular adenopathy.     Lab/Data Review:  CT Results (most recent):    Results from Hospital Encounter encounter on 07/30/18   CT ABD PELV WO CONT   Narrative CT ABDOMEN AND PELVIS WITHOUT IV CONTRAST    INDICATION: Abdominal pain, RLQ; bloating. COMPARISON: 4/17/2017. TECHNIQUE:     Axial images were obtained through the abdomen and pelvis without IV contrast.   Oral contrast was not administered. Coronal and sagittal reformations were  generated. CT dose reduction was achieved through use of a standardized  protocol tailored for this examination and automatic exposure control for dose  modulation. FINDINGS:    The included portions of the lung bases are clear. No pleural of pericardial  fluid. The spleen and adrenal glands are unremarkable. No intrahepatic or extrahepatic  bile duct dilatation. Bilateral renal cysts are unchanged. Distended gallbladder  containing multiple tiny gravel-like stones versus dense sludge. Nonspecific area of decreased density in the right hepatic lobe measuring 7.6  cm. Ill-defined pancreatic head with subtle stranding of the fat adjacent to the  pancreatic head. No dilated bowel loops. Surgical staple line in the right colon consistent with  right hemicolectomy. Diverticulosis. No pathologically enlarged lymph nodes,  free fluid or free air. Previous hysterectomy. No adnexal region pathology. Atherosclerotic  calcification of the abdominal aorta and branch vasculature without evidence of  aneurysm. Diastasis of the rectus musculature with protrusion of stomach,  liver, bowel and bowel mesentery through the defect. There is a ventral hernia  containing nondilated, non thickened small bowel. No acute osseous abnormalities. Impression IMPRESSION:    1. Ill-defined pancreatic head and adjacent fat stranding. Pancreatitis is not  excluded. 2. Rectus muscle diastasis as described above. Ventral hernia. 3. Bilateral renal cysts, unchanged. 4. Diverticulosis. 5. Large area of decreased density in the right hepatic lobe of uncertain  etiology. Consider contrast-enhanced MRI for further evaluation.   6. Distended gallbladder with gravel-like stones versus dense sludge. *Limited evaluation of the solid organs and bowel without IV and oral contrast.                    Lab Results   Component Value Date/Time    Sodium 136 07/31/2018 02:52 AM    Potassium 3.3 (L) 07/31/2018 02:52 AM    Chloride 100 07/31/2018 02:52 AM    CO2 28 07/31/2018 02:52 AM    Anion gap 8 07/31/2018 02:52 AM    Glucose 128 (H) 07/31/2018 02:52 AM    BUN 17 07/31/2018 02:52 AM    Creatinine 0.89 07/31/2018 02:52 AM    BUN/Creatinine ratio 19 07/31/2018 02:52 AM    GFR est AA >60 07/31/2018 02:52 AM    GFR est non-AA >60 07/31/2018 02:52 AM    Calcium 9.1 07/31/2018 02:52 AM    Bilirubin, total 0.9 07/31/2018 02:52 AM    AST (SGOT) 75 (H) 07/31/2018 02:52 AM    Alk. phosphatase 155 (H) 07/31/2018 02:52 AM    Protein, total 7.3 07/31/2018 02:52 AM    Albumin 3.2 (L) 07/31/2018 02:52 AM    Globulin 4.1 (H) 07/31/2018 02:52 AM    A-G Ratio 0.8 (L) 07/31/2018 02:52 AM    ALT (SGPT) 133 (H) 07/31/2018 02:52 AM     Lab Results   Component Value Date/Time    WBC 13.4 (H) 07/31/2018 02:52 AM    HGB (POC) 14.8 02/29/2016 11:30 AM    HGB 14.0 07/31/2018 02:52 AM    HCT (POC) 46.2 02/29/2016 11:30 AM    HCT 44.7 07/31/2018 02:52 AM    PLATELET 171 59/03/9169 02:52 AM    MCV 86.6 07/31/2018 02:52 AM           Assessment/Plan:     Active Problems:    Pancreatitis (7/30/2018)      Abnormal CT of the abdomen (7/31/2018)      Abnormal liver enzymes (7/31/2018)      Sludge in gallbladder (7/31/2018)     Symptoms and findings are consistent with acute biliary pancreatitis. Continue NPO status, IVF and supportive care with close monitoring. Agree with surgical consult. Given the improving LFT's, I suspect a passed stone but will order a MRCP to further evaluate. She also has a nonspecific liver lesion that needs further evaluation and further characterization with MRI was recommended. I also ordered AFP. Given her personal hx of colon cancer and her ? FH of pancreatic disease, I have also ordered tumor markers to include CEA and .     BETINA Quezada  07/31/18  1:49 PM

## 2018-07-31 NOTE — CDMP QUERY
Patient is noted to have a BMI of  42.98 Please clarify if this patient is: The [address] [place] [address] has issued a statement indicating that, \"Individuals who are overweight, obese, or morbidly obese are at an increased risk for certain medical conditions when compared to persons of normal weight. Therefore, these conditions are always clinically significant and reportable when documented by the provider. \" 
 
=>Obesity (BMI of 30-39.9) 
=> Morbid Obesity  (BMI 40 or greater) =>Overweight (BMI 25-29.9) 
=> Other weight status (specify  status) => Unable to determine Presentation: ht 5' 2' Wt 235lbs BMI 42.98 Please clarify and document your clinical opinion in the progress notes and discharge summary, including the definitive and or presumptive diagnosis, (suspected or probable), related to the above clinical findings. Please include clinical findings supporting your diagnosis. Thank You Joanna Tobias, [address] [facility] 
   [phone]

## 2018-07-31 NOTE — PROGRESS NOTES
Bedside shift change report given to Chris Kahn RN (oncoming nurse) by Rebecca Armas RN (offgoing nurse). Report included the following information SBAR, Kardex, Intake/Output, MAR and Recent Results.

## 2018-07-31 NOTE — CONSULTS
Surgery Consult    Subjective:   Patient 81 y.o.  female presents with abdominal pain. Hx of morbid obesity and s/p hand assist right colectomy last April. Onset of symptoms was abrupt last Friday with rapidly worsening course since that time. The pain is located across the upper abdomen. without radiation. Patient describes the pain as sharp, continuous and rated as severe. Pain has been associated with nausea. Patient denies reflux symptoms, melena, diarrhea and jaundice. Symptoms are aggravated by movement. Symptoms improve with none. Reviewed CT scan: large diastasis with very attenuated rectus muscles. +hernia containing loops of bowel in mid abdomen. +umbilical hernia as well. No obstruction. +pancreatitis. Sludge/stones in gallbladder. Past Medical & Surgical History:  Past Medical History:   Diagnosis Date    Allergic rhinitis     Cancer ([facility] [address]     Stage 1 cancer intestines    Cervical disc disease     Cervical disc disease     Colonic mass 3/20/2017    Eczema     Gastritis     GERD (gastroesophageal reflux disease)     mild    GI bleed     hx. of recurrent    Hernia     hiatal lt side    Hiatal hernia     Hypercholesterolemia     Hypertension     Morbid obesity ([facility] [address] 4/13/2017    Osteoarthritis (arthritis due to wear and tear of joints)       Past Surgical History:   Procedure Laterality Date    ABDOMEN SURGERY PROC UNLISTED      Colon Resection (Cancer)    HX COLONOSCOPY  3/05    HX COLONOSCOPY  2017    HX CYST REMOVAL      left side    HX ENDOSCOPY  1/09    HX ENDOSCOPY  2015    HX GYN      hysterectomy    HX ORTHOPAEDIC      rt knee replacement       Social History:  Social History     Social History    Marital status:      Spouse name: N/A    Number of children: N/A    Years of education: N/A     Occupational History    Not on file.      Social History Main Topics    Smoking status: Never Smoker    Smokeless tobacco: Never Used    Alcohol use No    Drug use: No    Sexual activity: No     Other Topics Concern    Not on file     Social History Narrative        Family History:  Family History   Problem Relation Age of Onset    No Known Problems Mother     No Known Problems Father     Arthritis-osteo Sister     Alzheimer Sister     Parkinson's Disease Brother     Cancer Brother     No Known Problems Brother     Anesth Problems Neg Hx         Prior to Admission Medications:  Prior to Admission Medications   Prescriptions Last Dose Informant Patient Reported? Taking? TROLAMINE SALICYLATE (ASPERCREME EX)   Yes No   Sig: by Apply Externally route. Applies to left knee 3-4 x daily   UBIDECARENONE (CO Q-10 PO)   Yes No   Sig: Take 200 mg by mouth daily. acetaminophen (TYLENOL) 325 mg tablet   Yes No   Sig: Take  by mouth every four (4) hours as needed for Pain. aspirin delayed-release 81 mg tablet   Yes No   Sig: Take  by mouth daily. atorvastatin (LIPITOR) 20 mg tablet   No No   Sig: Take 1 Tab by mouth daily. cetirizine (ZYRTEC) 10 mg tablet   No No   Sig: Take 1 Tab by mouth daily as needed for Allergies. cholecalciferol (VITAMIN D3) 1,000 unit cap   Yes No   Sig: Take 1,000 Units by mouth daily. clotrimazole (LOTRIMIN) 1 % topical cream   No No   Sig: Apply  to affected area two (2) times a day. cyclobenzaprine (FLEXERIL) 5 mg tablet   No No   Sig: Take 1 Tab by mouth three (3) times daily as needed for Muscle Spasm(s). dilTIAZem (TIAZAC) 120 mg SR capsule   No No   Sig: Take 1 Cap by mouth daily. docusate calcium (STOOL SOFTENER, DOCUSATE NEHEMIAH, PO)   Yes No   Sig: Take  by mouth as needed. esomeprazole (NEXIUM) 40 mg capsule   No No   Sig: Take 1 Cap by mouth daily. furosemide (LASIX) 20 mg tablet   Yes No   Sig: Take 20 mg by mouth as needed (swelling). hydroCHLOROthiazide (HYDRODIURIL) 25 mg tablet   No No   Sig: Take 1 Tab by mouth daily. loratadine (CLARITIN) 10 mg tablet   Yes No   Sig: Take 10 mg by mouth daily.    magnesium oxide 500 mg tab   Yes No   Sig: Take 1 Tab by mouth daily. multivitamin (ONE A DAY) tablet   Yes No   Sig: Take 1 Tab by mouth daily. nystatin (MYCOSTATIN) powder   No No   Sig: Apply  to affected area two (2) times a day. polyethylene glycol (MIRALAX) 17 gram packet   No No   Sig: Take 1 Packet by mouth daily as needed. potassium chloride SR (KLOR-CON 10) 10 mEq tablet   No No   Sig: Take 2 Tabs by mouth two (2) times a day. Facility-Administered Medications: None       Allergies: Allergies   Allergen Reactions    Lisinopril Angioedema    Niacin Itching     Felt hot also    Ultram [Tramadol] Nausea Only       Review of Systems  A comprehensive review of systems was negative except for that written in the HPI.     Objective:     Exam:    Visit Vitals    /70    Pulse (!) 59    Temp 97.9 °F (36.6 °C)    Resp 17    SpO2 93%     General appearance: alert, cooperative, no distress, appears stated age  Head: Normocephalic, without obvious abnormality, atraumatic  Neck: supple, symmetrical, trachea midline, no adenopathy, thyroid: not enlarged, symmetric, no tenderness/mass/nodules, no carotid bruit and no JVD  Lungs: clear to auscultation bilaterally  Heart: regular rate and rhythm, S1, S2 normal, no murmur, click, rub or gallop  Abdomen: normal findings: spleen non-palpable, abnormal findings:  umbilical hernia, incisional hernia, diastasis recti, obese, tenderness moderate in the upper abdomen, no R/G  Extremities: extremities normal, atraumatic, no cyanosis or edema  Skin: Skin color, texture, turgor normal. No rashes or lesions      Data Review  Recent Results (from the past 24 hour(s))   URINALYSIS W/ REFLEX CULTURE    Collection Time: 07/30/18  9:03 PM   Result Value Ref Range    Color DARK YELLOW      Appearance CLEAR CLEAR      Specific gravity 1.022 1.003 - 1.030      pH (UA) 6.0 5.0 - 8.0      Protein TRACE (A) NEG mg/dL    Glucose NEGATIVE  NEG mg/dL    Ketone NEGATIVE  NEG mg/dL Blood NEGATIVE  NEG      Urobilinogen 1.0 0.2 - 1.0 EU/dL    Nitrites NEGATIVE  NEG      Leukocyte Esterase TRACE (A) NEG      WBC 5-10 0 - 4 /hpf    RBC 0-5 0 - 5 /hpf    Epithelial cells FEW FEW /lpf    Bacteria 2+ (A) NEG /hpf    UA:UC IF INDICATED URINE CULTURE ORDERED (A) CNI     BILIRUBIN, CONFIRM    Collection Time: 07/30/18  9:03 PM   Result Value Ref Range    Bilirubin UA, confirm NEGATIVE  NEG     CULTURE, BLOOD    Collection Time: 07/30/18  9:15 PM   Result Value Ref Range    Special Requests: NO SPECIAL REQUESTS      Culture result: NO GROWTH AFTER 10 HOURS     LACTIC ACID    Collection Time: 07/31/18  2:52 AM   Result Value Ref Range    Lactic acid 1.8 0.4 - 2.0 MMOL/L   HEPATIC FUNCTION PANEL    Collection Time: 07/31/18  2:52 AM   Result Value Ref Range    Protein, total 7.3 6.4 - 8.2 g/dL    Albumin 3.2 (L) 3.5 - 5.0 g/dL    Globulin 4.1 (H) 2.0 - 4.0 g/dL    A-G Ratio 0.8 (L) 1.1 - 2.2      Bilirubin, total 0.9 0.2 - 1.0 MG/DL    Bilirubin, direct 0.2 0.0 - 0.2 MG/DL    Alk. phosphatase 155 (H) 45 - 117 U/L    AST (SGOT) 75 (H) 15 - 37 U/L    ALT (SGPT) 133 (H) 12 - 78 U/L   MAGNESIUM    Collection Time: 07/31/18  2:52 AM   Result Value Ref Range    Magnesium 2.0 1.6 - 2.4 mg/dL   LIPASE    Collection Time: 07/31/18  2:52 AM   Result Value Ref Range    Lipase >3000 (H) 73 - 393 U/L   CBC WITH AUTOMATED DIFF    Collection Time: 07/31/18  2:52 AM   Result Value Ref Range    WBC 13.4 (H) 3.6 - 11.0 K/uL    RBC 5.16 3.80 - 5.20 M/uL    HGB 14.0 11.5 - 16.0 g/dL    HCT 44.7 35.0 - 47.0 %    MCV 86.6 80.0 - 99.0 FL    MCH 27.1 26.0 - 34.0 PG    MCHC 31.3 30.0 - 36.5 g/dL    RDW 14.0 11.5 - 14.5 %    PLATELET 998 222 - 532 K/uL    MPV 9.6 8.9 - 12.9 FL    NRBC 0.0 0  WBC    ABSOLUTE NRBC 0.00 0.00 - 0.01 K/uL    NEUTROPHILS 86 (H) 32 - 75 %    LYMPHOCYTES 5 (L) 12 - 49 %    MONOCYTES 9 5 - 13 %    EOSINOPHILS 0 0 - 7 %    BASOPHILS 0 0 - 1 %    IMMATURE GRANULOCYTES 0 0.0 - 0.5 %    ABS.  NEUTROPHILS 11. 5 (H) 1.8 - 8.0 K/UL    ABS. LYMPHOCYTES 0.7 (L) 0.8 - 3.5 K/UL    ABS. MONOCYTES 1.2 (H) 0.0 - 1.0 K/UL    ABS. EOSINOPHILS 0.0 0.0 - 0.4 K/UL    ABS. BASOPHILS 0.0 0.0 - 0.1 K/UL    ABS. IMM. GRANS. 0.0 0.00 - 0.04 K/UL    DF SMEAR SCANNED      RBC COMMENTS NORMOCYTIC, NORMOCHROMIC     METABOLIC PANEL, BASIC    Collection Time: 07/31/18  2:52 AM   Result Value Ref Range    Sodium 136 136 - 145 mmol/L    Potassium 3.3 (L) 3.5 - 5.1 mmol/L    Chloride 100 97 - 108 mmol/L    CO2 28 21 - 32 mmol/L    Anion gap 8 5 - 15 mmol/L    Glucose 128 (H) 65 - 100 mg/dL    BUN 17 6 - 20 MG/DL    Creatinine 0.89 0.55 - 1.02 MG/DL    BUN/Creatinine ratio 19 12 - 20      GFR est AA >60 >60 ml/min/1.73m2    GFR est non-AA >60 >60 ml/min/1.73m2    Calcium 9.1 8.5 - 10.1 MG/DL   PROTHROMBIN TIME + INR    Collection Time: 07/31/18  2:52 AM   Result Value Ref Range    INR 1.1 0.9 - 1.1      Prothrombin time 11.0 9.0 - 11.1 sec   PTT    Collection Time: 07/31/18  2:52 AM   Result Value Ref Range    aPTT 30.8 22.1 - 32.0 sec    aPTT, therapeutic range     58.0 - 77.0 SECS   LIPID PANEL    Collection Time: 07/31/18  2:52 AM   Result Value Ref Range    LIPID PROFILE          Cholesterol, total 128 <200 MG/DL    Triglyceride 31 <150 MG/DL    HDL Cholesterol 21 MG/DL    LDL, calculated 100.8 (H) 0 - 100 MG/DL    VLDL, calculated 6.2 MG/DL    CHOL/HDL Ratio 6.1 (H) 0 - 5.0         Assessment:     Active Problems:    Gallstone pancreatitis (7/30/2018)      Abnormal CT of the abdomen (7/31/2018)      Abnormal liver enzymes (7/31/2018)      Sludge in gallbladder (7/31/2018)      Class 3 severe obesity due to excess calories with serious comorbidity and body mass index (BMI) of 40.0 to 44.9 in adult [facility]) (7/31/2018)      Incisional hernia, without obstruction or gangrene (7/31/2018)        Plan:     Consistent with gallstone pancreatitis. Discussed with Bentley Kern  Agree with current plan.     Will further discuss consideration for cholecystectomy as pancreatitis resolves. Would not address the umbilical hernia or the diastasis, but would consider fixing the incisional hernia at time of surgery to reduce risk of future incarceration. Will follow. Thank you for this consult.

## 2018-07-31 NOTE — PROGRESS NOTES
Eval complete and note to follow. Recommend home health with initial assist at discharge. Pt and family are in agreement.

## 2018-07-31 NOTE — ROUTINE PROCESS

## 2018-08-01 NOTE — PROGRESS NOTES
Problem: Mobility Impaired (Adult and Pediatric)  Goal: *Acute Goals and Plan of Care (Insert Text)  Physical Therapy Goals  Initiated 7/31/2018  1. Patient will move from supine to sit and sit to supine , scoot up and down and roll side to side in bed with modified independence within 7 day(s). 2.  Patient will transfer from bed to chair and chair to bed with modified independence using the least restrictive device within 7 day(s). 3.  Patient will perform sit to stand with modified independence within 7 day(s). 4.  Patient will ambulate with supervision/set-up for 150 feet with the least restrictive device within 7 day(s). physical Therapy TREATMENT  Patient: Nanda Santana (86 y.o. female)  Date: 8/1/2018  Diagnosis: Acute Pancreatitis  Pancreatitis <principal problem not specified>       Precautions: DNR  Chart, physical therapy assessment, plan of care and goals were reviewed. ASSESSMENT:  Patient initially having more difficulty ambulating today, but improves with distance. Bed mobility with min assist x 1. Good sitting balance EOB. Sit to stand with min assist x 1. Patient ambulates to door then into bathroom with OT. Patient initially ambulates very slowly with shuffling steps and flexed posture. Patient with complaints of pain in abdomen and right hip while ambulating. Patient's gait improves after about 15'. Up in chair at end of session. Likely will recommend HHPT at discharge but patient with procedure tomorrow so will continue to assess for needs. Progression toward goals:  []    Improving appropriately and progressing toward goals  [x]    Improving slowly and progressing toward goals  []    Not making progress toward goals and plan of care will be adjusted     PLAN:  Patient continues to benefit from skilled intervention to address the above impairments. Continue treatment per established plan of care.   Discharge Recommendations:  Home Health vs. SNF  Further Equipment Recommendations for Discharge:  TBD     SUBJECTIVE:   Patient stated My hip is hurting some.     OBJECTIVE DATA SUMMARY:   Critical Behavior:  Neurologic State: Alert  Orientation Level: Oriented X4  Cognition: Appropriate decision making, Appropriate for age attention/concentration, Appropriate safety awareness, Follows commands  Safety/Judgement: Awareness of environment, Fall prevention, Home safety  Functional Mobility Training:  Bed Mobility:     Supine to Sit: Minimum assistance;Assist x1     Scooting: Contact guard assistance        Transfers:  Sit to Stand: Minimum assistance;Assist x1  Stand to Sit: Contact guard assistance        Bed to Chair: Minimum assistance                    Balance:  Sitting: Intact  Standing: Intact; With support (initially fair)  Ambulation/Gait Training:  Distance (ft): 40 Feet (ft)  Assistive Device: Gait belt;Walker, rolling  Ambulation - Level of Assistance: Contact guard assistance        Gait Abnormalities: Decreased step clearance;Shuffling gait              Speed/Betty: Pace decreased (<100 feet/min)  Step Length: Left shortened;Right shortened                             Pain:  Pain Scale 1: Numeric (0 - 10)  Pain Intensity 1: 5  Pain Location 1: Hip; Abdomen  Pain Orientation 1: Left     Pain Intervention(s) 1: Medication (see MAR)  Activity Tolerance:   fair  Please refer to the flowsheet for vital signs taken during this treatment.   After treatment:   [x]    Patient left in no apparent distress sitting up in chair  []    Patient left in no apparent distress in bed  [x]    Call bell left within reach  [x]    Nursing notified  []    Caregiver present  []    Bed alarm activated    COMMUNICATION/COLLABORATION:   The patients plan of care was discussed with: Registered Nurse    Jose M Cho, PT   Time Calculation: 19 mins

## 2018-08-01 NOTE — PROGRESS NOTES
Spiritual Care Partner Volunteer visited patient in [address] on 8/1/18. Documented by:  IMANI Fitzgerald

## 2018-08-01 NOTE — PROGRESS NOTES
Problem: Self Care Deficits Care Plan (Adult)  Goal: *Acute Goals and Plan of Care (Insert Text)  Occupational Therapy Goals:  Initiated 7/31/2018  1. Patient will perform grooming standing with modified independence within 7 days. 2. Patient will perform toileting with modified independence within 7 days. 3. Patient will perform lower body dressing with modified independence with AE PRN within 7 days. 4. Patient will transfer from toilet with modified independence using the least restrictive device and appropriate durable medical equipment within 7 days. Occupational Therapy TREATMENT  Patient: Edgardo Bruner (43 y.o. female)  Date: 8/1/2018  Diagnosis: Acute Pancreatitis  Pancreatitis <principal problem not specified>       Precautions: DNR  Chart, occupational therapy assessment, plan of care, and goals were reviewed. ASSESSMENT:  Pt was supine upon arrival.  Pt reported right hip pain which initially limited activity but improved over time with mobility. Pt reports right hip pain is chronic and increased abdominal pain with activity. Min assist for first attempt at sit to stand from edge of bed. Pt was able to mobilize to bathroom with rolling walker with increased time. CGA for sit to stand from standard toilet. Stood to wipe with supervision. Verbal cues to get close to sink with RW to wash hands with CGA. Returned to bedside chair at end of session. Continue to recommend home health at discharge. Progression toward goals:  []       Improving appropriately and progressing toward goals  [x]       Improving slowly and progressing toward goals  []       Not making progress toward goals and plan of care will be adjusted     PLAN:  Patient continues to benefit from skilled intervention to address the above impairments. Continue treatment per established plan of care.   Discharge Recommendations:  Home Health  Further Equipment Recommendations for Discharge:  none     SUBJECTIVE:   Patient stated My hip always bothers me. It is worse today though.     OBJECTIVE DATA SUMMARY:   Cognitive/Behavioral Status:  Neurologic State: Alert  Orientation Level: Oriented X4  Cognition: Appropriate decision making; Appropriate for age attention/concentration; Appropriate safety awareness; Follows commands  Perception: Appears intact  Perseveration: No perseveration noted  Safety/Judgement: Awareness of environment; Fall prevention    Functional Mobility and Transfers for ADLs:  Bed Mobility:  Supine to Sit: Minimum assistance;Assist x1  Scooting: Contact guard assistance    Transfers:  Sit to Stand: Minimum assistance;Assist x1  Functional Transfers  Bathroom Mobility: Supervision/set up (with rolling walker)  Toilet Transfer : Contact guard assistance (standard commode)  Bed to Chair: Minimum assistance    Balance:  Sitting: Intact  Standing: Intact; With support (initially fair)    ADL Intervention:       Grooming  Washing Hands: Contact guard assistance (standing at sink with verbal cues to step close to sink)                        Toileting  Bladder Hygiene: Supervision/set-up (standing)  Clothing Management: Contact guard assistance    Cognitive Retraining  Safety/Judgement: Awareness of environment; Fall prevention      Pain:  Pain Scale 1: Numeric (0 - 10)  Pain Intensity 1: 5  Pain Location 1: Hip; Abdomen  Pain Orientation 1: Left     Pain Intervention(s) 1: Medication (see MAR)  Activity Tolerance:     Please refer to the flowsheet for vital signs taken during this treatment.   After treatment:   [x] Patient left in no apparent distress sitting up in chair  [] Patient left in no apparent distress in bed  [x] Call bell left within reach  [x] Nursing notified  [] Caregiver present  [] Bed alarm activated    COMMUNICATION/COLLABORATION:   The patients plan of care was discussed with: Physical Therapist, Registered Nurse and patient    KRISTIN Mercado/L  Time Calculation: 23 mins

## 2018-08-01 NOTE — PROGRESS NOTES
Surgical Note    Date/Time:  8/1/2018 9:30 AM        Assessment :    Plan:  Patient Active Problem List   Diagnosis Code    Hypertension I10    Hyperlipidemia E78.5    Hypercholesterolemia E78.00    Cervical disc disease M50.90    Osteoarthritis (arthritis due to wear and tear of joints) M19.90    GERD (gastroesophageal reflux disease) K21.9    Prediabetes R73.03    Occult blood in stools R19.5    Advance directive discussed with patient Z71.89    Allergic rhinitis J30.9    Colonic mass K63.9    Morbid obesity (HCC) E66.01    Gallstone pancreatitis K85.10    Abnormal CT of the abdomen R93.5    Abnormal liver enzymes R74.8    Sludge in gallbladder K82.8    Class 3 severe obesity due to excess calories with serious comorbidity and body mass index (BMI) of 40.0 to 44.9 in adult (HCC) E66.01, Z68.41    Incisional hernia, without obstruction or gangrene K43.2        Lipase trending down quickly and pt feels better, c/w working dz of having passed some sludge  CEA elevated secondary to pancreatitis    +pain but better  Start low fat diet    MRI pending    If cont to improve, consider incisional hernia repair (the one in the LU[facility]) and Sindhu on Friday. Will discuss with her further tomorrow.            Subjective:     Chief Complaint:      Review of Systems:  Y  N       Y  N  [] [x]  Fever/chills                                               [] [x]  Chest Pain  [] [x]  Cough                                                       [] [x]  Diarrhea   [] [x]  Sputum                                                     [] [x]  Constipation  [] [x]  SOB/WERNER                                                [] [x]  Nausea/Vomit  [x] []  Abd Pain                                                    [] [x]  Tolerating diet  [] [x]  Dysuria                                                           []Unable to obtain  ROS due to  []mental status change  []sedated   []intubated    Objective:     VITALS:   Last 24hrs VS reviewed since prior hospitalist progress note. Most recent are:  Visit Vitals    /62 (BP 1 Location: Right arm, BP Patient Position: At rest)    Pulse 65    Temp 96.8 °F (36 °C)    Resp 17    SpO2 92%     Temp (24hrs), Av.4 °F (36.3 °C), Min:96.8 °F (36 °C), Max:97.7 °F (36.5 °C)      Intake/Output Summary (Last 24 hours) at 18 0930  Last data filed at 18 0630   Gross per 24 hour   Intake             2800 ml   Output              400 ml   Net             2400 ml         []Souza []NGT  []Intubated on vent    PHYSICAL EXAM:  Gen:  [] A&O  []non-toxic  [x] No acute distress             [] ill apearing  []  Critical        HEENT:   [x]NC/AT/PERRLA/EOMI    []pink conjunctivae      []pale conjunctivae                  PERRL  []yes  []no      []moist mucosa    []dry mucosa    hearing intact to voice []yes  []No    RESP:   [x]CTA bialterally/no wheezing/rhonchi/rales/crackles    []clear bilaterally  []wheezing   []rhonchi   []crackles     use of accessory muscles []yes []no    CARD:   [x] regular rate and rhythm/No murmurs/rubs/gallops    murmur  []yes ()  []no      Rubs  []yes  []no       Gallops []yes  []no    Rate [] regular  [] irregular        carotid bruits  []Right  [] Left                 LE edema []yes  []no           JVP  [] yes   [] no    ABD:    [x]soft, obese  [x]non distended  [x] tender mild epigastric and over the hernia. Soft.  No R/G  [] NABS    SKIN:   Rashes [] yes   [x] no    Ulcers [] yes   [x] no               [x]normal   []tight to palpitation    skin turgor [] good  []poor  []decreased               Cyanosis/clubbing [] yes [x] no    NEUR:   [x]cranial nerves II-XII grossly intact       []Cranial nerves deficit                 [] facial droop    [] slurred speech   []aphasic     []Strength normal     [] weakness  [] LUE  []  RUE/ [] LLE  []  RLE    follows commands  [] yes [] no           PSYCH:   insight []poor [x]good   Alert and Oriented to  [x]person  [x]place  [x] time                    []depressed []anxious []agitated  []lethargic []stuporous  []sedated           Lab Data Reviewed: (see below)    Medications Reviewed: (see below)    PMH/SH reviewed - no change compared to H&P    Care Plan discussed with:  [x]Patient   []Family    []Care Manager   [x]RN    []Consultant/Specialist :    Prophylaxis:  []Lovenox  []Coumadin  []Hep SQ  []SCDs  []H2B/PPI    Disposition:  []Home w/ Family   [] PT,OT,RN   []SNF/LTC   []SAH/Rehab    Ancillary Serices:   [] PT     []OT      []SW      []Nut      []HH ________________________________________________________________________  LABS:  Recent Labs      07/31/18 0252 07/30/18   1235   WBC  13.4*  14.6*   HGB  14.0  14.4   HCT  44.7  47.1*   PLT  193  221     Recent Labs      08/01/18 0255 07/31/18 0252 07/30/18   1235   NA  138  136  139   K  3.5  3.3*  3.2*   CL  103  100  99   CO2  31  28  34*   BUN  20  17  12   CREA  0.82  0.89  0.93   GLU  108*  128*  127*   CA  8.7  9.1  9.3   MG   --   2.0   --      Recent Labs      08/01/18 0255 07/31/18 0252 07/30/18   1235   SGOT   --   75*  172*   ALT   --   133*  187*   AP   --   155*  186*   TBILI   --   0.9  1.5*   TP   --   7.3  7.9   ALB   --   3.2*  3.5   GLOB   --   4.1*  4.4*   LPSE  1087*  >3000*  >3000*     Recent Labs      07/31/18 0252   INR  1.1   PTP  11.0   APTT  30.8      No results for input(s): FE, TIBC, PSAT, FERR in the last 72 hours. No results for input(s): PH, PCO2, PO2 in the last 72 hours. No results for input(s): CPK, CKMB in the last 72 hours.     No lab exists for component: TROPONINI  No results found for: GLUCPOC    MEDICATIONS:  Current Facility-Administered Medications   Medication Dose Route Frequency    piperacillin-tazobactam (ZOSYN) 3.375 g in 0.9% sodium chloride (MBP/ADV) 100 mL  3.375 g IntraVENous Q8H    sodium chloride (NS) flush 5-10 mL  5-10 mL IntraVENous Q8H    sodium chloride (NS) flush 5-10 mL  5-10 mL IntraVENous PRN    0.9% sodium chloride infusion  100 mL/hr IntraVENous CONTINUOUS    HYDROmorphone (PF) (DILAUDID) injection 0.5 mg  0.5 mg IntraVENous Q4H PRN    naloxone (NARCAN) injection 0.4 mg  0.4 mg IntraVENous PRN    ondansetron (ZOFRAN) injection 4 mg  4 mg IntraVENous Q4H PRN    heparin (porcine) injection 5,000 Units  5,000 Units SubCUTAneous Q8H    dilTIAZem CD (CARDIZEM CD) capsule 120 mg  120 mg Oral DAILY    polyethylene glycol (MIRALAX) packet 17 g  17 g Oral DAILY    hydrALAZINE (APRESOLINE) 20 mg/mL injection 10 mg  10 mg IntraVENous Q6H PRN

## 2018-08-01 NOTE — PROGRESS NOTES
F/U for gallstone pancreatitis    S: Ms. Jaime May was seen by me today during rounds. At this time, she is resting +comfortably. The patient hasno new complaints today. She has pain in epigastruium and ruq. Better than yesterday. Please see admission consult for details of ROS; there are no other changes today. O: Blood pressure 113/62, pulse 65, temperature 96.8 °F (36 °C), resp. rate 17, SpO2 92 %. Gen: Patient is in no acute distress. There is no jaundice. Lungs: Clear to auscultation bilaterally . Heart:+RRR. Abd: Soft, non tender, non-distended, bowel sounds present. Extremities: Warm. Cross sectional imaging:  MRI Pending  Lab Results   Component Value Date/Time    WBC 13.4 (H) 07/31/2018 02:52 AM    HGB (POC) 14.8 02/29/2016 11:30 AM    HGB 14.0 07/31/2018 02:52 AM    HCT (POC) 46.2 02/29/2016 11:30 AM    HCT 44.7 07/31/2018 02:52 AM    PLATELET 681 27/87/4675 02:52 AM    MCV 86.6 07/31/2018 02:52 AM     Lab Results   Component Value Date/Time    Sodium 138 08/01/2018 02:55 AM    Potassium 3.5 08/01/2018 02:55 AM    Chloride 103 08/01/2018 02:55 AM    CO2 31 08/01/2018 02:55 AM    Anion gap 4 (L) 08/01/2018 02:55 AM    Glucose 108 (H) 08/01/2018 02:55 AM    BUN 20 08/01/2018 02:55 AM    Creatinine 0.82 08/01/2018 02:55 AM    BUN/Creatinine ratio 24 (H) 08/01/2018 02:55 AM    GFR est AA >60 08/01/2018 02:55 AM    GFR est non-AA >60 08/01/2018 02:55 AM    Calcium 8.7 08/01/2018 02:55 AM    Bilirubin, total 0.9 07/31/2018 02:52 AM    AST (SGOT) 75 (H) 07/31/2018 02:52 AM    Alk.  phosphatase 155 (H) 07/31/2018 02:52 AM    Protein, total 7.3 07/31/2018 02:52 AM    Albumin 3.2 (L) 07/31/2018 02:52 AM    Globulin 4.1 (H) 07/31/2018 02:52 AM    A-G Ratio 0.8 (L) 07/31/2018 02:52 AM    ALT (SGPT) 133 (H) 07/31/2018 02:52 AM     Lab Results   Component Value Date/Time    Lipase 1087 (H) 08/01/2018 02:55 AM         A: Active Problems:    Gallstone pancreatitis (7/30/2018)      Abnormal CT of the abdomen (7/31/2018)      Abnormal liver enzymes (7/31/2018)      Sludge in gallbladder (7/31/2018)      Class 3 severe obesity due to excess calories with serious comorbidity and body mass index (BMI) of 40.0 to 44.9 in adult [facility]) (7/31/2018)      Incisional hernia, without obstruction or gangrene (7/31/2018)        Comment:  Diet and plan per Dr Maricel Edwards: she has complex anatomy in upper abdomen.     Await mrcp  Will follow    Kan Zimmerman MD  9:19 AM  8/1/2018

## 2018-08-01 NOTE — PROGRESS NOTES
Bedside shift change report given to Dian (oncoming nurse) by Addis Deleon (offgoing nurse). Report included the following information SBAR, Kardex, Intake/Output, MAR and Recent Results. Zone Phone for oncoming shift:   [phone]    Shift Summary: Pt rested quietly through night. Issues with hip pain, partly relieved by medication. LDAs               Peripheral IV 08/01/18 Left Antecubital (Active)                        Intake & Output   Date 07/31/18 0700 - 08/01/18 0659 08/01/18 0700 - 08/02/18 0659   Shift 9279-8923 7592-4423 24 Hour Total 5087-5971 7900-3905 24 Hour Total   I  N  T  A  K  E   P. O. 0  0         P. O. 0  0       I.V. 400 2400 2800         I.V.  1200 1200         Volume (0.9% sodium chloride infusion)  1200 1200         Volume (piperacillin-tazobactam (ZOSYN) 3.375 g in 0.9% sodium chloride (MBP/ADV) 100 mL) 100  100         Volume (potassium chloride 10 mEq in 100 ml IVPB) 300  300       Shift Total 400 2400 2800      O  U  T  P  U  T   Urine  400 400         Urine Voided  400 400         Urine Occurrence(s) 2 x  2 x       Shift Total  400 400       2000 2400      Weight (kg)            Last Bowel Movement Last Bowel Movement Date: 07/30/18   Glucose Checks [x] N/A  [] AC/HS  [] Q6  Concerns:   Nutrition Active Orders   Diet    DIET NPO       Consults [x]PT  [x]OT  []Speech  [x]Case Management   Cardiac Monitoring [x]N/A []Yes Expires:

## 2018-08-01 NOTE — PROGRESS NOTES
Problem: Pressure Injury - Risk of  Goal: *Prevention of pressure injury  Document Michael Scale and appropriate interventions in the flowsheet. Outcome: Progressing Towards Goal  Pressure Injury Interventions:             Activity Interventions: Chair cushion, Increase time out of bed, PT/OT evaluation    Mobility Interventions: Chair cushion, Float heels, HOB 30 degrees or less    Nutrition Interventions: Document food/fluid/supplement intake    Friction and Shear Interventions: HOB 30 degrees or less, Feet elevated on foot rest, Sit at 90-degree angle, Lift team/patient mobility team

## 2018-08-01 NOTE — PROGRESS NOTES
Bedside shift change report given to Joann Mejia RN(oncoming nurse) by Krishan Moore RN (offgoing nurse). Report included the following information SBAR, Kardex, Intake/Output, Accordion and Recent Results.

## 2018-08-02 NOTE — PROGRESS NOTES
F/U for acute pancreatitis  New dx of liver mets by mri. S: Ms. Katarina Gamez was seen by me today during rounds. At this time, she is resting + reasonably comfortably. The patient has+  new complaints today. Please see admission consult for details of ROS; there are no other changes today. The only issue is right hip pain. O: Blood pressure 140/61, pulse 74, temperature 97.8 °F (36.6 °C), resp. rate 18, SpO2 95 %. Gen: Patient is in no acute distress. There is no jaundice. Lungs: Clear to auscultation bilaterally . Heart:+RRR. Abd: Soft, non tender, ++distended, bowel sounds present. Extremities: Warm. Cross sectional imaging:  See below    Lab Results   Component Value Date/Time    WBC 16.3 (H) 08/02/2018 04:31 AM    HGB (POC) 14.8 02/29/2016 11:30 AM    HGB 13.0 08/02/2018 04:31 AM    HCT (POC) 46.2 02/29/2016 11:30 AM    HCT 42.4 08/02/2018 04:31 AM    PLATELET 621 36/93/2406 04:31 AM    MCV 88.1 08/02/2018 04:31 AM     Lab Results   Component Value Date/Time    Lipase 273 08/02/2018 04:31 AM     Lab Results   Component Value Date/Time    Sodium 138 08/02/2018 04:31 AM    Potassium 3.3 (L) 08/02/2018 04:31 AM    Chloride 102 08/02/2018 04:31 AM    CO2 29 08/02/2018 04:31 AM    Anion gap 7 08/02/2018 04:31 AM    Glucose 127 (H) 08/02/2018 04:31 AM    BUN 20 08/02/2018 04:31 AM    Creatinine 0.70 08/02/2018 04:31 AM    BUN/Creatinine ratio 29 (H) 08/02/2018 04:31 AM    GFR est AA >60 08/02/2018 04:31 AM    GFR est non-AA >60 08/02/2018 04:31 AM    Calcium 8.9 08/02/2018 04:31 AM    Bilirubin, total 1.0 08/02/2018 04:31 AM    AST (SGOT) 64 (H) 08/02/2018 04:31 AM    Alk.  phosphatase 113 08/02/2018 04:31 AM    Protein, total 6.6 08/02/2018 04:31 AM    Albumin 2.6 (L) 08/02/2018 04:31 AM    Globulin 4.0 08/02/2018 04:31 AM    A-G Ratio 0.7 (L) 08/02/2018 04:31 AM    ALT (SGPT) 66 08/02/2018 04:31 AM      A: Active Problems:    Gallstone pancreatitis (7/30/2018)      Abnormal CT of the abdomen (7/31/2018)      Abnormal liver enzymes (7/31/2018)      Sludge in gallbladder (7/31/2018)      Class 3 severe obesity due to excess calories with serious comorbidity and body mass index (BMI) of 40.0 to 44.9 in adult [facility]) (7/31/2018)      Incisional hernia, without obstruction or gangrene (7/31/2018)        Comment:  I reviewed the mri images with a radiologist\"  IMPRESSION  IMPRESSION:   1. Acute pancreatitis is noted.     2. Nonspecific soft tissue mass versus extensive enhancing inflammatory change  in the mesentery inferior to the pancreas described above. Soft tissue mass is  favored, which is concerning for metastatic disease.     3. Aggressive appearing, heterogeneous mass in the inferior aspect of the right  lobe of the liver. Given history of colon cancer, findings are highly concerning  for metastatic disease. This lesion would be amenable to imaging guided biopsy.     4. Other findings described above. The liver mets are impressive. The area around the pancreas is abnormal, but not diagnostic  P:  I spoke to Dr Malgorzata Niño by phone this am  I reviewed his note. I spoke to patient about findings. I agree with his plan. We need tissue. Her pancreatitis has resolved clinically. She should have an oncology consult by her existing oncology team if she has seen one. I do not see an oncology note in the chart.   I did not order consult at this time    Opal Hargrove MD  4:44 PM  8/2/2018

## 2018-08-02 NOTE — PROGRESS NOTES
Bedside shift change report given to Carline Cummins (oncoming nurse) by Richard Malloy (offgoing nurse). Report included the following information SBAR, Kardex, Intake/Output, MAR and Recent Results. Zone Phone for oncoming shift:   [phone]    Shift Summary: Pt rested quietly through night. Some issues with pain, partly relieved by medication. LDAs               Peripheral IV 08/01/18 Left Antecubital (Active)   Site Assessment Clean, dry, & intact 8/2/2018  2:23 AM   Phlebitis Assessment 0 8/2/2018  2:23 AM   Infiltration Assessment 0 8/2/2018  2:23 AM   Dressing Status Clean, dry, & intact 8/2/2018  2:23 AM   Dressing Type Transparent;Tape 8/2/2018  2:23 AM   Hub Color/Line Status Blue; Infusing 8/2/2018  2:23 AM   Alcohol Cap Used Yes 8/1/2018  3:00 PM                        Intake & Output   Date 08/01/18 0700 - 08/02/18 0659 08/02/18 0700 - 08/03/18 0659   Shift 5033-5655 3322-9720 24 Hour Total 1801-5407 2616-8801 24 Hour Total   I  N  T  A  K  E   P.O. 240 240 480         P. O. 240 240 480       I.V.  1200 1200         I.V.  1200 1200       Shift Total 240 1440 1680      O  U  T  P  U  T   Urine            Urine Occurrence(s) 6 x 2 x 8 x       Stool            Stool Occurrence(s)  1 x 1 x       Shift Total          1440 1680      Weight (kg)            Last Bowel Movement Last Bowel Movement Date: 08/01/18   Glucose Checks [x] N/A  [] AC/HS  [] Q6  Concerns:   Nutrition Active Orders   Diet    DIET REGULAR 50GM Fat       Consults [x]PT  [x]OT  []Speech  [x]Case Management   Cardiac Monitoring [x]N/A []Yes Expires:

## 2018-08-02 NOTE — PROGRESS NOTES
Hospitalist Progress Note    NAME: Linus Alejo   :  1935   MRN:  [id number]       Assessment / Plan:  Acute pancreatitis  Liver Mass  Hx of Colon Ca  -Discussed finding of liver mass with patient and family. Will need to discuss treatment plan with surgyer and GI  -cont aggressive IVF hydration  -cont pain mgmnt w norco for moderate pain, low dose dilaudid for breakthrough pain  -IV zofran for nausea  -GI and general surgery consulted   -DC zosyn  -diet advanced per surgery  -liver enzymes normalizing  -distended GB with stones    HTN  -hold hctz, BP stable. Will monitor and treat w PRN IV meds for now    HLD  -hold home atorvastatin    Hypokalemia  -resolved, monitor labs    GERD    Code status: DNR  Prophylaxis: Hep SQ  Recommended Disposition: SNF/LTC     Subjective:     Chief Complaint / Reason for Physician Visit  Patient still having some breakthrough epigastric pain. Tolerating PO, no other acute complaints    Review of Systems:  Symptom Y/N Comments  Symptom Y/N Comments   Fever/Chills n   Chest Pain n    Poor Appetite n   Edema n    Cough n   Abdominal Pain y    Sputum n   Joint Pain     SOB/WERNER n   Pruritis/Rash     Nausea/vomit y   Tolerating PT/OT     Diarrhea    Tolerating Diet     Constipation    Other       Could NOT obtain due to:      Objective:     VITALS:   Last 24hrs VS reviewed since prior progress note. Most recent are:  Patient Vitals for the past 24 hrs:   Temp Pulse Resp BP SpO2   18 2306 98 °F (36.7 °C) 74 16 128/62 95 %   18 1600 97.5 °F (36.4 °C) 79 17 131/53 94 %   18 0752 96.8 °F (36 °C) 65 17 113/62 92 %       Intake/Output Summary (Last 24 hours) at 18 2359  Last data filed at 18 1750   Gross per 24 hour   Intake             1440 ml   Output              200 ml   Net             1240 ml        PHYSICAL EXAM:  General: WD, WN. Alert, cooperative, no acute distress    EENT:  EOMI. Anicteric sclerae.  MMM  Resp:  CTA bilaterally, no wheezing or rales. No accessory muscle use  CV:  Regular  rhythm,  No edema  GI:  Soft, Non distended, mildly tender, epigastric and b/l upper quadrants  +Bowel sounds  Neurologic:  Alert and oriented X 3, normal speech,   Psych:   Good insight. Not anxious nor agitated  Skin:  No rashes. No jaundice    Reviewed most current lab test results and cultures  YES  Reviewed most current radiology test results   YES  Review and summation of old records today    NO  Reviewed patient's current orders and MAR    YES  PMH/SH reviewed - no change compared to H&P  ________________________________________________________________________  Care Plan discussed with:    Comments   Patient x    Family  x    RN x    Care Manager     Consultant                        Multidiciplinary team rounds were held today with , nursing, pharmacist and clinical coordinator. Patient's plan of care was discussed; medications were reviewed and discharge planning was addressed. ________________________________________________________________________  Total NON critical care TIME:  30  Minutes    Total CRITICAL CARE TIME Spent:   Minutes non procedure based      Comments   >50% of visit spent in counseling and coordination of care x    ________________________________________________________________________  Ira Come, DO     Procedures: see electronic medical records for all procedures/Xrays and details which were not copied into this note but were reviewed prior to creation of Plan. LABS:  I reviewed today's most current labs and imaging studies.   Pertinent labs include:  Recent Labs      07/31/18   0252  07/30/18   1235   WBC  13.4*  14.6*   HGB  14.0  14.4   HCT  44.7  47.1*   PLT  193  221     Recent Labs      08/01/18   0255  07/31/18   0252  07/30/18   1235   NA  138  136  139   K  3.5  3.3*  3.2*   CL  103  100  99   CO2  31  28  34*   GLU  108*  128*  127*   BUN  20  17  12   CREA  0.82  0.89  0.93   CA  8.7  9.1  9.3 MG   --   2.0   --    ALB   --   3.2*  3.5   TBILI   --   0.9  1.5*   SGOT   --   75*  172*   ALT   --   133*  187*   INR   --   1.1   --        Signed: Marivel Pires DO

## 2018-08-02 NOTE — PROGRESS NOTES
Bedside shift change report given to Jo Melton (oncoming nurse) by [place] (offgoing nurse). Report included the following information SBAR, Kardex, Intake/Output, MAR and Recent Results     Pt given PRN pain medication for hip pain. Pt resting quietly at this time.

## 2018-08-02 NOTE — PROGRESS NOTES
Problem: Mobility Impaired (Adult and Pediatric)  Goal: *Acute Goals and Plan of Care (Insert Text)  Physical Therapy Goals  Initiated 7/31/2018  1. Patient will move from supine to sit and sit to supine , scoot up and down and roll side to side in bed with modified independence within 7 day(s). 2.  Patient will transfer from bed to chair and chair to bed with modified independence using the least restrictive device within 7 day(s). 3.  Patient will perform sit to stand with modified independence within 7 day(s). 4.  Patient will ambulate with supervision/set-up for 150 feet with the least restrictive device within 7 day(s). physical Therapy TREATMENT  Patient: Abigail Last (72 y.o. female)  Date: 8/2/2018  Diagnosis: Acute Pancreatitis  Pancreatitis <principal problem not specified>       Precautions: DNR  Chart, physical therapy assessment, plan of care and goals were reviewed. ASSESSMENT:  Patient is asleep in the chair with son present. Patient agreeable to working with PT. Patient has difficulty getting out of chair, needs to scoot buttocks to front of chair which takes effort and additional time due to pain in R \"hip\" though when pt points to where it hurts it is in inguinal area on R.  Pt stood eventually but took mod A and extra time. Pt then ambulated 60 ft with very slow pace and RW and cga R hip painlimiting speed and distance. .  Pt then did standing squat ex with walker, and chair push ups x 3 with S.  Pt able to do other LE exercises independently. MD in to see patient , and then pt needed to go to the BR. Patient ambulated into the BR, again slowly and left in BR with call bell. Nurse and tech notified. Patient will need HHPT and supervision/min A at home with son. Will follow.   Progression toward goals:  [x]    Improving appropriately and progressing toward goals  []    Improving slowly and progressing toward goals  []    Not making progress toward goals and plan of care will be adjusted     PLAN:  Patient continues to benefit from skilled intervention to address the above impairments. Continue treatment per established plan of care. Discharge Recommendations:  Home Health  Further Equipment Recommendations for Discharge:  rolling walker     SUBJECTIVE:   Patient stated I used to do everything for myself. This hip pain is really bothering me. Mitchel Grady    OBJECTIVE DATA SUMMARY:   Critical Behavior:  Neurologic State: Alert  Orientation Level: Oriented X4  Cognition: Follows commands, Appropriate safety awareness, Appropriate for age attention/concentration, Appropriate decision making  Safety/Judgement: Awareness of environment, Fall prevention  Functional Mobility Training:  Bed Mobility:                    Transfers:  Sit to Stand: Moderate assistance  Stand to Sit: Contact guard assistance                             Balance:  Sitting: Intact  Standing: Intact; With support  Ambulation/Gait Training:  Distance (ft): 60 Feet (ft)  Assistive Device: Walker, rolling;Gait belt  Ambulation - Level of Assistance: Contact guard assistance        Gait Abnormalities: Decreased step clearance              Speed/Betty: Pace decreased (<100 feet/min)  Step Length: Left shortened;Right shortened                    Stairs:              Neuro Re-Education:    Therapeutic Exercises:   Standing squats with walker and S x 5  Sitting knee ext x 10  Ankle pumps x 20  Chair push ups x 3  Pain:  Pain Scale 1: Numeric (0 - 10)  Pain Intensity 1: 0  Pain Location 1: Hip  Pain Orientation 1: Right  Pain Description 1: Aching  Pain Intervention(s) 1: Medication (see MAR)  Activity Tolerance:   Fair, vitals stable but fatigues easily  Please refer to the flowsheet for vital signs taken during this treatment.   After treatment:   [x]    Patient left in no apparent distress sitting up in chair  []    Patient left in no apparent distress in bed  [x]    Call bell left within reach  [x]    Nursing notified  [] Caregiver present  []    Bed alarm activated    COMMUNICATION/COLLABORATION:   The patients plan of care was discussed with: Registered Nurse and Physician, JEFFREY Sauceda PT

## 2018-08-02 NOTE — PROGRESS NOTES
11:09- Pt complaining of hip pain. States it is chronic and that she takes tylenol at home for it. Received telephone with read back order for tylenol 650 mg q6 PRN from Dr. Alessandro Balderas.

## 2018-08-02 NOTE — PROGRESS NOTES
Referral was sent to [facility]. They have accepted referral.  CM added to AVS and completed milestone. Family will be able to transport her home in car when stable. Pt lives in [facility] so it will take some time for them to come get her possibly once deemed medically stable.

## 2018-08-02 NOTE — PROGRESS NOTES
Surgical Note    Date/Time:  8/2/2018 3:42 PM        Assessment :    Plan:  Patient Active Problem List   Diagnosis Code    Hypertension I10    Hyperlipidemia E78.5    Hypercholesterolemia E78.00    Cervical disc disease M50.90    Osteoarthritis (arthritis due to wear and tear of joints) M19.90    GERD (gastroesophageal reflux disease) K21.9    Prediabetes R73.03    Occult blood in stools R19.5    Advance directive discussed with patient Z71.89    Allergic rhinitis J30.9    Colonic mass K63.9    Morbid obesity (HCC) E66.01    Gallstone pancreatitis K85.10    Abnormal CT of the abdomen R93.5    Abnormal liver enzymes R74.8    Sludge in gallbladder K82.8    Class 3 severe obesity due to excess calories with serious comorbidity and body mass index (BMI) of 40.0 to 44.9 in adult ([facility] [address] E66.01, Z68.41    Incisional hernia, without obstruction or gangrene K43.2        Reviewed MRI   Concerning liver lesion and questionable soft tissue mass bellow the pancreas, although I think that still maybe pancreatitis related. She is currently tolerating a diet. Abd pain is better. Only c/o of her chronic hip pain. Seems best to proceed with IR liver bx tomorrow. If stable to leave the hospital. Could await Bx results as out patient.              Subjective:     Chief Complaint:      Review of Systems:  Y  N       Y  N  [] [x]  Fever/chills                                               [] [x]  Chest Pain  [] [x]  Cough                                                       [] [x]  Diarrhea   [] [x]  Sputum                                                     [] [x]  Constipation  [] [x]  SOB/WERNER                                                [] [x]  Nausea/Vomit  [] [x]  Abd Pain                                                    [x] []  Tolerating diet  [] [x]  Dysuria                                                           []Unable to obtain  ROS due to  []mental status change  []sedated []intubated    Objective:     VITALS:   Last 24hrs VS reviewed since prior hospitalist progress note. Most recent are:  Visit Vitals    /61 (BP 1 Location: Right arm, BP Patient Position: Sitting)    Pulse 74    Temp 97.8 °F (36.6 °C)    Resp 18    SpO2 95%     Temp (24hrs), Av.8 °F (36.6 °C), Min:97.5 °F (36.4 °C), Max:98 °F (36.7 °C)      Intake/Output Summary (Last 24 hours) at 18 1542  Last data filed at 18 1306   Gross per 24 hour   Intake             1800 ml   Output              500 ml   Net             1300 ml         []Souza []NGT  []Intubated on vent    PHYSICAL EXAM:  Gen:  [] A&O  []non-toxic  [x] No acute distress             [] ill apearing  []  Critical        HEENT:   [x]NC/AT/PERRLA/EOMI    []pink conjunctivae      []pale conjunctivae                  PERRL  []yes  []no      []moist mucosa    []dry mucosa    hearing intact to voice []yes  []No    RESP:   [x]CTA bialterally/no wheezing/rhonchi/rales/crackles    []clear bilaterally  []wheezing   []rhonchi   []crackles     use of accessory muscles []yes []no    CARD:   [x] regular rate and rhythm/No murmurs/rubs/gallops    murmur  []yes ()  []no      Rubs  []yes  []no       Gallops []yes  []no    Rate [] regular  [] irregular        carotid bruits  []Right  [] Left                 LE edema []yes  []no           JVP  [] yes   [] no    ABD:    [x]soft, obese  [x]mildly distended, hernias soft.     SKIN:   Rashes [] yes   [x] no    Ulcers [] yes   [x] no               [x]normal   []tight to palpitation    skin turgor [] good  []poor  []decreased               Cyanosis/clubbing [] yes [x] no    NEUR:   [x]cranial nerves II-XII grossly intact       []Cranial nerves deficit                 [] facial droop    [] slurred speech   []aphasic     []Strength normal     [] weakness  [] LUE  []  RUE/ [] LLE  []  RLE    follows commands  [] yes [] no           PSYCH:   insight []poor [x]good   Alert and Oriented to  [x]person  [x]place  [x] time                    []depressed []anxious []agitated  []lethargic []stuporous  []sedated           Lab Data Reviewed: (see below)    Medications Reviewed: (see below)    PMH/SH reviewed - no change compared to H&P    Care Plan discussed with:  [x]Patient   []Family    []Care Manager   []RN    []Consultant/Specialist :    Prophylaxis:  []Lovenox  []Coumadin  []Hep SQ  []SCDs  []H2B/PPI    Disposition:  []Home w/ Family   [] PT,OT,RN   []SNF/LTC   []SAH/Rehab    Ancillary Serices:   [] PT     []OT      []SW      []Nut      []HH ________________________________________________________________________  LABS:  Recent Labs      08/02/18 0431 07/31/18 0252   WBC  16.3*  13.4*   HGB  13.0  14.0   HCT  42.4  44.7   PLT  176  193     Recent Labs      08/02/18 0431 08/01/18 0255 07/31/18 0252   NA  138  138  136   K  3.3*  3.5  3.3*   CL  102  103  100   CO2  29  31  28   BUN  20  20  17   CREA  0.70  0.82  0.89   GLU  127*  108*  128*   CA  8.9  8.7  9.1   MG   --    --   2.0     Recent Labs      08/02/18 0431 08/01/18 0255 07/31/18 0252   SGOT  64*   --   75*   ALT  66   --   133*   AP  113   --   155*   TBILI  1.0   --   0.9   TP  6.6   --   7.3   ALB  2.6*   --   3.2*   GLOB  4.0   --   4.1*   LPSE  273  1087*  >3000*     Recent Labs      07/31/18 0252   INR  1.1   PTP  11.0   APTT  30.8      No results for input(s): FE, TIBC, PSAT, FERR in the last 72 hours. No results for input(s): PH, PCO2, PO2 in the last 72 hours. No results for input(s): CPK, CKMB in the last 72 hours.     No lab exists for component: TROPONINI  No results found for: GLUCPOC    MEDICATIONS:  Current Facility-Administered Medications   Medication Dose Route Frequency    acetaminophen (TYLENOL) tablet 650 mg  650 mg Oral Q6H PRN    HYDROcodone-acetaminophen (NORCO)  mg tablet 1 Tab  1 Tab Oral Q4H PRN    sodium chloride (NS) flush 5-10 mL  5-10 mL IntraVENous Q8H    sodium chloride (NS) flush 5-10 mL  5-10 mL IntraVENous PRN    0.9% sodium chloride infusion  100 mL/hr IntraVENous CONTINUOUS    HYDROmorphone (PF) (DILAUDID) injection 0.5 mg  0.5 mg IntraVENous Q4H PRN    naloxone (NARCAN) injection 0.4 mg  0.4 mg IntraVENous PRN    ondansetron (ZOFRAN) injection 4 mg  4 mg IntraVENous Q4H PRN    heparin (porcine) injection 5,000 Units  5,000 Units SubCUTAneous Q8H    dilTIAZem CD (CARDIZEM CD) capsule 120 mg  120 mg Oral DAILY    polyethylene glycol (MIRALAX) packet 17 g  17 g Oral DAILY    hydrALAZINE (APRESOLINE) 20 mg/mL injection 10 mg  10 mg IntraVENous Q6H PRN

## 2018-08-03 NOTE — ROUTINE PROCESS
Name of procedure:CT guided liver lesion biopsy      Complications, if any, r/t procedure: None      Sedation medications given:Versed 2 mg and Fentanyl 50 mcg      Sedation tolerated: Yes      VS : Stable       Post Procedure Care Needed/order sets in connectcare: Yes          TRANSFER - OUT REPORT:    Verbal report given to Shahida Melendrez on Berto Penn  being transferred to room [phone] for routine progression of care       Report consisted of patients Situation, Background, Assessment and   Recommendations(SBAR). Information from the following report(s) Procedure Summary was reviewed with the receiving nurse. Opportunity for questions and clarification was provided.       Patient transported with:  Transporter via stretcher

## 2018-08-03 NOTE — PROGRESS NOTES
Surgical Note    Date/Time:  8/3/2018 8:42 AM        Assessment :    Plan:  Patient Active Problem List   Diagnosis Code    Hypertension I10    Hyperlipidemia E78.5    Hypercholesterolemia E78.00    Cervical disc disease M50.90    Osteoarthritis (arthritis due to wear and tear of joints) M19.90    GERD (gastroesophageal reflux disease) K21.9    Prediabetes R73.03    Occult blood in stools R19.5    Advance directive discussed with patient Z71.89    Allergic rhinitis J30.9    Colonic mass K63.9    Morbid obesity (HCC) E66.01    Gallstone pancreatitis K85.10    Abnormal CT of the abdomen R93.5    Abnormal liver enzymes R74.8    Sludge in gallbladder K82.8    Class 3 severe obesity due to excess calories with serious comorbidity and body mass index (BMI) of 40.0 to 44.9 in adult (HCC) E66.01, Z68.41    Incisional hernia, without obstruction or gangrene K43.2      Got some rest last night    Currently NPO and heparin held for IR liver Bx    Will await results. If negative, will consider laparoscopic cholecystectomy and laparoscopic staging /  liver biopsy. If +for malignancy, will need to devise a treatment plan with oncology. While technically feasible based on imaging, she is not the best candidate for a liver resection. I'll get Dr. Marlee Jackson opinion as well. Son's mobile number: [phone]    He met me in his mother's room and was updated.                Subjective:     Chief Complaint:      Review of Systems:  Y  N       Y  N  [] [x]  Fever/chills                                               [] [x]  Chest Pain  [] [x]  Cough                                                       [] [x]  Diarrhea   [] [x]  Sputum                                                     [] [x]  Constipation  [] [x]  SOB/WERNER                                                [] [x]  Nausea/Vomit  [] [x]  Abd Pain                                                    [x] []  Tolerating diet  [] [x]  Dysuria []Unable to obtain  ROS due to  []mental status change  []sedated   []intubated    Objective:     VITALS:   Last 24hrs VS reviewed since prior hospitalist progress note. Most recent are:  Visit Vitals    /60 (BP 1 Location: Right arm, BP Patient Position: At rest)    Pulse 78    Temp 98.2 °F (36.8 °C)    Resp 18    SpO2 95%     Temp (24hrs), Av.1 °F (36.7 °C), Min:97.8 °F (36.6 °C), Max:98.3 °F (36.8 °C)      Intake/Output Summary (Last 24 hours) at 18 2563  Last data filed at 18 1936   Gross per 24 hour   Intake             1680 ml   Output              500 ml   Net             1180 ml         []Souza []NGT  []Intubated on vent    PHYSICAL EXAM:  Gen:  [] A&O  []non-toxic  [x] No acute distress             [] ill apearing  []  Critical        HEENT:   [x]NC/AT/PERRLA/EOMI    []pink conjunctivae      []pale conjunctivae                  PERRL  []yes  []no      []moist mucosa    []dry mucosa    hearing intact to voice []yes  []No    RESP:   [x]CTA bialterally/no wheezing/rhonchi/rales/crackles    []clear bilaterally  []wheezing   []rhonchi   []crackles     use of accessory muscles []yes []no    CARD:   [x] regular rate and rhythm/No murmurs/rubs/gallops    murmur  []yes ()  []no      Rubs  []yes  []no       Gallops []yes  []no    Rate [] regular  [] irregular        carotid bruits  []Right  [] Left                 LE edema []yes  []no           JVP  [] yes   [] no    ABD:    [x]hernias soft.     SKIN:   Rashes [] yes   [x] no    Ulcers [] yes   [x] no               [x]normal   []tight to palpitation    skin turgor [] good  []poor  []decreased               Cyanosis/clubbing [] yes [x] no    NEUR:   [x]cranial nerves II-XII grossly intact       []Cranial nerves deficit                 [] facial droop    [] slurred speech   []aphasic     []Strength normal     [] weakness  [] LUE  []  RUE/ [] LLE  []  RLE    follows commands  [] yes [] no PSYCH:   insight []poor [x]good   Alert and Oriented to  [x]person  [x]place  [x] time                    []depressed []anxious []agitated  []lethargic []stuporous  []sedated           Lab Data Reviewed: (see below)    Medications Reviewed: (see below)    PMH/SH reviewed - no change compared to H&P    Care Plan discussed with:  [x]Patient   [x]Family    []Care Manager   []RN    []Consultant/Specialist :    Prophylaxis:  []Lovenox  []Coumadin  []Hep SQ  []SCDs  []H2B/PPI    Disposition:  []Home w/ Family   []HH PT,OT,RN   []SNF/LTC   []SAH/Rehab    Ancillary Serices:   [] PT     []OT      []SW      []Nut      []HH ________________________________________________________________________  LABS:  Recent Labs      08/02/18 0431   WBC  16.3*   HGB  13.0   HCT  42.4   PLT  176     Recent Labs      08/03/18   0133  08/02/18   0431 08/01/18   0255   NA  138  138  138   K  3.6  3.3*  3.5   CL  103  102  103   CO2  28  29  31   BUN  17  20  20   CREA  0.57  0.70  0.82   GLU  138*  127*  108*   CA  8.8  8.9  8.7     Recent Labs      08/02/18 0431 08/01/18   0255   SGOT  64*   --    ALT  66   --    AP  113   --    TBILI  1.0   --    TP  6.6   --    ALB  2.6*   --    GLOB  4.0   --    LPSE  273  1087*     No results for input(s): INR, PTP, APTT in the last 72 hours. No lab exists for component: INREXT   No results for input(s): FE, TIBC, PSAT, FERR in the last 72 hours. No results for input(s): PH, PCO2, PO2 in the last 72 hours. No results for input(s): CPK, CKMB in the last 72 hours.     No lab exists for component: TROPONINI  No results found for: GLUCPOC    MEDICATIONS:  Current Facility-Administered Medications   Medication Dose Route Frequency    lidocaine (PF) (XYLOCAINE) 10 mg/mL (1 %) injection 5 mL  5 mL SubCUTAneous RAD ONCE    acetaminophen (TYLENOL) tablet 650 mg  650 mg Oral Q6H PRN    HYDROcodone-acetaminophen (NORCO)  mg tablet 1 Tab  1 Tab Oral Q4H PRN    sodium chloride (NS) flush 5-10 mL  5-10 mL IntraVENous Q8H    sodium chloride (NS) flush 5-10 mL  5-10 mL IntraVENous PRN    0.9% sodium chloride infusion  100 mL/hr IntraVENous CONTINUOUS    HYDROmorphone (PF) (DILAUDID) injection 0.5 mg  0.5 mg IntraVENous Q4H PRN    naloxone (NARCAN) injection 0.4 mg  0.4 mg IntraVENous PRN    ondansetron (ZOFRAN) injection 4 mg  4 mg IntraVENous Q4H PRN    dilTIAZem CD (CARDIZEM CD) capsule 120 mg  120 mg Oral DAILY    polyethylene glycol (MIRALAX) packet 17 g  17 g Oral DAILY    hydrALAZINE (APRESOLINE) 20 mg/mL injection 10 mg  10 mg IntraVENous Q6H PRN

## 2018-08-03 NOTE — PROGRESS NOTES
Bedside and Verbal shift change report given to [address] [place] [address] (oncoming nurse) by Bryce Acosta RN (offgoing nurse). Report included the following information SBAR, Kardex, Intake/Output, MAR and Recent Results.

## 2018-08-03 NOTE — H&P
Radiology History and Physical    Patient: Cranford Peabody 81 y.o. female     Chief Complaint: No chief complaint on file. History of Present Illness:Colon cancer. History:    Past Medical History:   Diagnosis Date    Allergic rhinitis     Cancer ([facility] [address]     Stage 1 cancer intestines    Cervical disc disease     Cervical disc disease     Colonic mass 3/20/2017    Eczema     Gastritis     GERD (gastroesophageal reflux disease)     mild    GI bleed     hx. of recurrent    Hernia     hiatal lt side    Hiatal hernia     Hypercholesterolemia     Hypertension     Morbid obesity ([facility] [address] 4/13/2017    Osteoarthritis (arthritis due to wear and tear of joints)      Family History   Problem Relation Age of Onset    No Known Problems Mother     No Known Problems Father     Arthritis-osteo Sister     Alzheimer Sister     Parkinson's Disease Brother     Cancer Brother     No Known Problems Brother     Anesth Problems Neg Hx      Social History     Social History    Marital status:      Spouse name: N/A    Number of children: N/A    Years of education: N/A     Occupational History    Not on file. Social History Main Topics    Smoking status: Never Smoker    Smokeless tobacco: Never Used    Alcohol use No    Drug use: No    Sexual activity: No     Other Topics Concern    Not on file     Social History Narrative       Allergies:    Allergies   Allergen Reactions    Lisinopril Angioedema    Niacin Itching     Felt hot also    Ultram [Tramadol] Nausea Only       Current Medications:  Current Facility-Administered Medications   Medication Dose Route Frequency    lidocaine (PF) (XYLOCAINE) 10 mg/mL (1 %) injection 5 mL  5 mL SubCUTAneous RAD ONCE    lidocaine (PF) (XYLOCAINE) 10 mg/mL (1 %) injection        acetaminophen (TYLENOL) tablet 650 mg  650 mg Oral Q6H PRN    HYDROcodone-acetaminophen (NORCO)  mg tablet 1 Tab  1 Tab Oral Q4H PRN    sodium chloride (NS) flush 5-10 mL 5-10 mL IntraVENous Q8H    sodium chloride (NS) flush 5-10 mL  5-10 mL IntraVENous PRN    0.9% sodium chloride infusion  100 mL/hr IntraVENous CONTINUOUS    HYDROmorphone (PF) (DILAUDID) injection 0.5 mg  0.5 mg IntraVENous Q4H PRN    naloxone (NARCAN) injection 0.4 mg  0.4 mg IntraVENous PRN    ondansetron (ZOFRAN) injection 4 mg  4 mg IntraVENous Q4H PRN    dilTIAZem CD (CARDIZEM CD) capsule 120 mg  120 mg Oral DAILY    polyethylene glycol (MIRALAX) packet 17 g  17 g Oral DAILY    hydrALAZINE (APRESOLINE) 20 mg/mL injection 10 mg  10 mg IntraVENous Q6H PRN        Physical Exam:  Blood pressure 122/56, pulse 65, temperature 98.6 °F (37 °C), resp. rate 20, SpO2 91 %. GENERAL: alert, cooperative, no distress, appears stated age, LUNG: clear to auscultation bilaterally, HEART: regular rate and rhythm      Alerts:    Hospital Problems  Date Reviewed: 5/1/2018          Codes Class Noted POA    Abnormal CT of the abdomen ICD-10-CM: R93.5  ICD-9-CM: 793.6  7/31/2018 Unknown        Abnormal liver enzymes ICD-10-CM: R74.8  ICD-9-CM: 790.5  7/31/2018 Yes        Sludge in gallbladder ICD-10-CM: K82.8  ICD-9-CM: 575.8  7/31/2018 Yes        Class 3 severe obesity due to excess calories with serious comorbidity and body mass index (BMI) of 40.0 to 44.9 in adult [facility]) (Chronic) ICD-10-CM: E66.01, Z68.41  ICD-9-CM: 278.01, V85.41  7/31/2018 Yes        Incisional hernia, without obstruction or gangrene (Chronic) ICD-10-CM: K43.2  ICD-9-CM: 553.21  7/31/2018 Yes        Gallstone pancreatitis ICD-10-CM: K85.10  ICD-9-CM: 577.0, 574.20  7/30/2018 Yes              Laboratory:      Recent Labs      08/03/18   0133  08/02/18   0431   HGB   --   13.0   HCT   --   42.4   WBC   --   16.3*   PLT   --   176   BUN  17  20   CREA  0.57  0.70   K  3.6  3.3*         Plan of Care/Planned Procedure:  Risks, benefits, and alternatives reviewed with patient and she agrees to proceed with the procedure.

## 2018-08-03 NOTE — PROGRESS NOTES
Spoke to Dr. Kitty Oliver regarding DVT prophylaxis. Received verbal with read back order to reinstate heparin (discontinued for biopsy).

## 2018-08-03 NOTE — PROCEDURES
PROCEDURE:CT-guided liver biopsy. INDICATION:liver mass;hx colon cancer. ANESTHESIA:sedation and local.  COMPLICATION:NONE. SPECIMENS REMOVED:cores to Dr. Ashutosh Delcid. BLOOD LOSS:NONE. /ASSISTANT:ROXIE Barton RECOMMENDATIONS:none.   CONSENT OBTAINED:YES.  NOTES:tract embolized with gelfoam.

## 2018-08-03 NOTE — PROGRESS NOTES
CM completed chart review. Noted that we are just waiting for MD to clear for medically stability.       CM noted that referral was sent to [facility].  They have accepted referral.  CM had added to AVS and completed milestone.  Family will be able to transport her home in car when stable.  Pt lives in [address] [place] [address] so it will take some time for them to come get her possibly once deemed medically stable. Dr. Vivek Lima, Surgery note indicated that Pt is NPO and heparin was held for IR Liver Biposy scheduled for today. CM will await further instructions re discharge planning. 10:48 AM   CM reviewed therapy note from yesterday, 8/2/18 recommendation still HH and RW. Per evaluation note: Pt has a RW at home. CM will try to double check that with Pt today. CM called [facility] to let them know she was a maybe discharge for the weekend and to be on standby. CM spoke to Lila and she indicated she has her on the radar    11:07 AM.  GLOS: 3  LOS 3.5. Entered Delay in System d/t waiting for clinical stability. CM will continue to monitor discharge plan. Care Management Interventions  PCP Verified by CM: Yes  Palliative Care Criteria Met (RRAT>21 & CHF Dx)?: No  Mode of Transport at Discharge:  Other (see comment)  Transition of Care Consult (CM Consult): [address]: Yes  MyChart Signup: Yes  Discharge Durable Medical Equipment: No  Health Maintenance Reviewed: Yes  Physical Therapy Consult: Yes  Occupational Therapy Consult: Yes  Speech Therapy Consult: No  Current Support Network: Family Lives Nearby, Lives with Caregiver, Own Home  Confirm Follow Up Transport: Family  Plan discussed with Pt/Family/Caregiver: Yes  Freedom of Choice Offered: Yes  Discharge Location  Discharge Placement: Home with home health    Corina Roman

## 2018-08-03 NOTE — PROGRESS NOTES
F/U for liver met (likely)  Gallstone pancreatitis    S: Ms. Stacey Brooks was seen by me today during rounds. At this time, she is resting + comfortably. The patient has no new complaints today. Please see admission consult for details of ROS; there are no other changes today. O: Blood pressure 142/60, pulse 78, temperature 98.2 °F (36.8 °C), resp. rate 18, SpO2 95 %. Gen: Patient is in no acute distress. There is no jaundice. Lungs: Clear to auscultation bilaterally . Heart:+RRR. Abd: Soft, non tender, ++++distended, bowel sounds present. Extremities: Warm. Lab Results   Component Value Date/Time    WBC 16.3 (H) 08/02/2018 04:31 AM    HGB (POC) 14.8 02/29/2016 11:30 AM    HGB 13.0 08/02/2018 04:31 AM    HCT (POC) 46.2 02/29/2016 11:30 AM    HCT 42.4 08/02/2018 04:31 AM    PLATELET 201 95/73/1032 04:31 AM    MCV 88.1 08/02/2018 04:31 AM     Lab Results   Component Value Date/Time    Sodium 138 08/03/2018 01:33 AM    Potassium 3.6 08/03/2018 01:33 AM    Chloride 103 08/03/2018 01:33 AM    CO2 28 08/03/2018 01:33 AM    Anion gap 7 08/03/2018 01:33 AM    Glucose 138 (H) 08/03/2018 01:33 AM    BUN 17 08/03/2018 01:33 AM    Creatinine 0.57 08/03/2018 01:33 AM    BUN/Creatinine ratio 30 (H) 08/03/2018 01:33 AM    GFR est AA >60 08/03/2018 01:33 AM    GFR est non-AA >60 08/03/2018 01:33 AM    Calcium 8.8 08/03/2018 01:33 AM    Bilirubin, total 1.0 08/02/2018 04:31 AM    AST (SGOT) 64 (H) 08/02/2018 04:31 AM    Alk.  phosphatase 113 08/02/2018 04:31 AM    Protein, total 6.6 08/02/2018 04:31 AM    Albumin 2.6 (L) 08/02/2018 04:31 AM    Globulin 4.0 08/02/2018 04:31 AM    A-G Ratio 0.7 (L) 08/02/2018 04:31 AM    ALT (SGPT) 66 08/02/2018 04:31 AM      Cross sectional imaging:  See yesterday's discussion    A: Active Problems:    Gallstone pancreatitis (7/30/2018)      Abnormal CT of the abdomen (7/31/2018)      Abnormal liver enzymes (7/31/2018)      Sludge in gallbladder (7/31/2018)      Class 3 severe obesity due to excess calories with serious comorbidity and body mass index (BMI) of 40.0 to 44.9 in adult [facility]) (7/31/2018)      Incisional hernia, without obstruction or gangrene (7/31/2018)        Comment:  Await biopsy of liver mass  P:  Dr Mariposa Rudolph addressing waiting versus proceeding with cholecystectomy. Likely will need oncology evaluation.   I did not order  Call partners to see one day this weekend  8.4, 8.5    Main Brooke MD  6:32 AM  8/3/2018

## 2018-08-03 NOTE — PROGRESS NOTES
Problem: Self Care Deficits Care Plan (Adult)  Goal: *Acute Goals and Plan of Care (Insert Text)  Occupational Therapy Goals:  Initiated 7/31/2018  1. Patient will perform grooming standing with modified independence within 7 days. 2. Patient will perform toileting with modified independence within 7 days. 3. Patient will perform lower body dressing with modified independence with AE PRN within 7 days. 4. Patient will transfer from toilet with modified independence using the least restrictive device and appropriate durable medical equipment within 7 days. Occupational Therapy TREATMENT  Patient: Ellie Damon (89 y.o. female)  Date: 8/3/2018  Diagnosis: Acute Pancreatitis  Pancreatitis <principal problem not specified>       Precautions: DNR  Chart, occupational therapy assessment, plan of care, and goals were reviewed. ASSESSMENT:  Pt was seated at bedside chair upon arrival with nursing attempting to assist pt to [facility]. Pt also had transport waiting to take pt to a test.  More assist needed for all tasks today and pt reported feeling poorly. Max assist x2 for sit to stand from bedside chair. Moderate assist x2 with RW to pivot to bedside commode. Moderate assist to perform sit to stand from [facility] and pt was unable to perform toilet hygiene due to pain and fatigue. Mobilized to stretcher with RW with CGA. Max assist to return to supine on stretcher. Pending progress pt may need SNF. Progression toward goals:  []       Improving appropriately and progressing toward goals  [x]       Improving slowly and progressing toward goals  []       Not making progress toward goals and plan of care will be adjusted     PLAN:  Patient continues to benefit from skilled intervention to address the above impairments. Continue treatment per established plan of care.   Discharge Recommendations:  Home Health vs. SNF (due to in consistency of mobility/ADLS)  Further Equipment Recommendations for Discharge:  TBD SUBJECTIVE:   Patient stated I don't feel good today.     OBJECTIVE DATA SUMMARY:   Cognitive/Behavioral Status:  Neurologic State: Alert  Orientation Level: Oriented X4  Cognition: Follows commands  Perception: Appears intact  Perseveration: No perseveration noted  Safety/Judgement: Awareness of environment; Fall prevention    Functional Mobility and Transfers for ADLs:  Bed Mobility:  Sit to Supine: Maximum assistance;Assist x2    Transfers:  Sit to Stand: Maximum assistance  Functional Transfers  Toilet Transfer : Moderate assistance;Assist x2 (to bedside commode)       Balance:  Sitting: Intact  Standing: Impaired  Standing - Static: Fair  Standing - Dynamic :  (NT tested today)    ADL Intervention:          Toileting  Bladder Hygiene: Total assistance (dependent)  Clothing Management: Total assistance (dependent)    Cognitive Retraining  Safety/Judgement: Awareness of environment; Fall prevention      Pain:  Pain Scale 1: Numeric (0 - 10)  Pain Intensity 1: 3  Pain Location 1: Hip  Pain Orientation 1: Right  Pain Description 1: Aching  Pain Intervention(s) 1: Medication (see MAR)  Activity Tolerance:     Please refer to the flowsheet for vital signs taken during this treatment.   After treatment:   [] Patient left in no apparent distress sitting up in chair  [x] Patient left in no apparent distress on stretcher to go for testing  [x] Call bell left within reach  [x] Nursing notified  [x] Caregiver present  [] Bed alarm activated    COMMUNICATION/COLLABORATION:   The patients plan of care was discussed with: Registered Nurse and patient    KRISTIN Parker/L  Time Calculation: 11 mins

## 2018-08-03 NOTE — PROGRESS NOTES
Problem: Falls - Risk of  Goal: *Absence of Falls  Document Alex Fall Risk and appropriate interventions in the flowsheet. Outcome: Progressing Towards Goal  Fall Risk Interventions:  Mobility Interventions: Utilize walker, cane, or other assistive device, PT Consult for mobility concerns, PT Consult for assist device competence, Patient to call before getting OOB         Medication Interventions: Patient to call before getting OOB    Elimination Interventions: Call light in reach             Problem: Pressure Injury - Risk of  Goal: *Prevention of pressure injury  Document Michael Scale and appropriate interventions in the flowsheet. Outcome: Progressing Towards Goal  Pressure Injury Interventions:             Activity Interventions: Increase time out of bed, PT/OT evaluation    Mobility Interventions: HOB 30 degrees or less, PT/OT evaluation    Nutrition Interventions: Document food/fluid/supplement intake    Friction and Shear Interventions: HOB 30 degrees or less, Minimize layers

## 2018-08-03 NOTE — PROGRESS NOTES
Hospitalist Progress Note    NAME: Bisi Davis   :  1935   MRN:  [id number]       Assessment / Plan:  Acute pancreatitis  Liver Mass  Hx of Colon Ca  -Discussed finding of liver mass with patient and family.   -greatly appreciate assistance of surgery and GI consults, Dr Jimbo Bustillos will review MRCP with GI, discuss best approach for Bx of concerning liver lesion. Sequencing of lap maria also under consideration  -discussed plan with family  -cont pain mgmnt w norco for moderate pain, low dose dilaudid for breakthrough pain  -IV zofran for nausea  -s/p IV zosyn  -tolerating  -liver enzymes have normalized  -distended GB with stones    HTN  -hold hctz, BP stable. Will monitor and treat w PRN IV meds for now    HLD  -hold home atorvastatin    Hypokalemia  -resolved, monitor labs    GERD    Code status: DNR  Prophylaxis: Hep SQ  Recommended Disposition: SNF/LTC     Subjective:     Chief Complaint / Reason for Physician Visit  Patient still having moderate pain, but tolerating diet    Review of Systems:  Symptom Y/N Comments  Symptom Y/N Comments   Fever/Chills n   Chest Pain n    Poor Appetite n   Edema n    Cough n   Abdominal Pain y    Sputum n   Joint Pain     SOB/WERNER n   Pruritis/Rash     Nausea/vomit y   Tolerating PT/OT     Diarrhea    Tolerating Diet     Constipation    Other       Could NOT obtain due to:      Objective:     VITALS:   Last 24hrs VS reviewed since prior progress note. Most recent are:  Patient Vitals for the past 24 hrs:   Temp Pulse Resp BP SpO2   18 2307 98.3 °F (36.8 °C) 80 16 135/70 96 %   18 1520 97.8 °F (36.6 °C) 74 18 140/61 95 %   18 0808 97.9 °F (36.6 °C) 70 16 111/55 92 %       Intake/Output Summary (Last 24 hours) at 18 2347  Last data filed at 18 1935   Gross per 24 hour   Intake             3120 ml   Output              500 ml   Net             2620 ml        PHYSICAL EXAM:  General: WD, WN. Alert, cooperative, no acute distress    EENT:  EOMI. Anicteric sclerae. MMM  Resp:  CTA bilaterally, no wheezing or rales. No accessory muscle use  CV:  Regular  rhythm,  No edema  GI:  Soft, Non distended, mildly tender, epigastric and b/l upper quadrants  +Bowel sounds  Neurologic:  Alert and oriented X 3, normal speech,   Psych:   Good insight. Not anxious nor agitated  Skin:  No rashes. No jaundice    Reviewed most current lab test results and cultures  YES  Reviewed most current radiology test results   YES  Review and summation of old records today    NO  Reviewed patient's current orders and MAR    YES  PMH/SH reviewed - no change compared to H&P  ________________________________________________________________________  Care Plan discussed with:    Comments   Patient x    Family  x    RN x    Care Manager     Consultant                        Multidiciplinary team rounds were held today with , nursing, pharmacist and clinical coordinator. Patient's plan of care was discussed; medications were reviewed and discharge planning was addressed. ________________________________________________________________________  Total NON critical care TIME:  30  Minutes    Total CRITICAL CARE TIME Spent:   Minutes non procedure based      Comments   >50% of visit spent in counseling and coordination of care x    ________________________________________________________________________  Douglas Cano DO     Procedures: see electronic medical records for all procedures/Xrays and details which were not copied into this note but were reviewed prior to creation of Plan. LABS:  I reviewed today's most current labs and imaging studies.   Pertinent labs include:  Recent Labs      08/02/18   0431  07/31/18   0252   WBC  16.3*  13.4*   HGB  13.0  14.0   HCT  42.4  44.7   PLT  176  193     Recent Labs      08/02/18   0431  08/01/18   0255  07/31/18   0252   NA  138  138  136   K  3.3*  3.5  3.3*   CL  102  103  100   CO2  29  31  28   GLU  127*  108*  128* BUN  20  20  17   CREA  0.70  0.82  0.89   CA  8.9  8.7  9.1   MG   --    --   2.0   ALB  2.6*   --   3.2*   TBILI  1.0   --   0.9   SGOT  64*   --   75*   ALT  66   --   133*   INR   --    --   1.1       Signed: Zuly Bradshaw, DO

## 2018-08-03 NOTE — PROGRESS NOTES
TRANSFER - IN REPORT:    Verbal report received from [facility] RN(name) on Bisi Davis  being received from Radiology(unit) for ordered procedure      Report consisted of patients Situation, Background, Assessment and   Recommendations(SBAR). Information from the following report(s) Procedure Summary was reviewed with the receiving nurse. Opportunity for questions and clarification was provided. Assessment completed upon patients arrival to unit and care assumed.

## 2018-08-03 NOTE — PROGRESS NOTES
Hospitalist Progress Note    NAME: Colby Deng   :  1935   MRN:  [id number]       Assessment / Plan:  Acute pancreatitis  Liver Mass  Hx of Colon Ca  -S/p liver bx today, will await results and make further determination re treatment plan and discuss w surgery  -If Bx positive for malignancy will need to involve oncology. I negative, surgery to conisder lap maria and interoperative liver Bx   -Discussed finding of liver mass with patient and family.   -discussed plan with family  -cont pain mgmnt w norco for moderate pain, low dose dilaudid for breakthrough pain  -IV zofran for nausea  -s/p IV zosyn  -tolerating  -liver enzymes have normalized  -distended GB with stones    HTN  -hold hctz, BP stable. Will monitor and treat w PRN IV meds for now    HLD  -hold home atorvastatin    Hypokalemia  -resolved, monitor labs    Code status: DNR  Prophylaxis: Hep SQ  Recommended Disposition: SNF/LTC     Subjective:     Chief Complaint / Reason for Physician Visit  Pt s/p liver Bx, main complaint is some pain at bx site and hip pain    Review of Systems:  Symptom Y/N Comments  Symptom Y/N Comments   Fever/Chills n   Chest Pain n    Poor Appetite n   Edema n    Cough    Abdominal Pain y    Sputum    Joint Pain y    SOB/WERNER n   Pruritis/Rash     Nausea/vomit y   Tolerating PT/OT     Diarrhea    Tolerating Diet     Constipation    Other       Could NOT obtain due to:      Objective:     VITALS:   Last 24hrs VS reviewed since prior progress note.  Most recent are:  Patient Vitals for the past 24 hrs:   Temp Pulse Resp BP SpO2   18 1355 - 66 - 116/60 91 %   18 1338 98.3 °F (36.8 °C) 68 20 131/68 90 %   18 1319 - 71 20 116/69 93 %   18 1300 - 72 18 117/55 93 %   18 1245 - 72 20 128/53 93 %   18 1240 - 71 18 116/65 93 %   18 1235 - 65 20 122/56 91 %   18 1230 - 70 18 116/65 92 %   18 1225 - 63 18 116/48 99 %   18 1220 - 63 18 105/50 99 %   18 1215 - 65 18 112/58 99 %   08/03/18 1210 - 65 18 113/57 99 %   08/03/18 1205 - 67 22 110/56 99 %   08/03/18 1138 98.6 °F (37 °C) 72 18 152/68 93 %   08/03/18 0818 97.8 °F (36.6 °C) 67 17 148/77 93 %   08/03/18 0436 98.2 °F (36.8 °C) 78 18 142/60 95 %   08/02/18 2307 98.3 °F (36.8 °C) 80 16 135/70 96 %   08/02/18 1520 97.8 °F (36.6 °C) 74 18 140/61 95 %       Intake/Output Summary (Last 24 hours) at 08/03/18 1358  Last data filed at 08/03/18 1137   Gross per 24 hour   Intake             1200 ml   Output              200 ml   Net             1000 ml        PHYSICAL EXAM:  General: WD, WN. Alert, cooperative, no acute distress    EENT:  EOMI. Anicteric sclerae. MMM  Resp:  CTA bilaterally, no wheezing or rales. No accessory muscle use  CV:  Regular  rhythm,  No edema  GI:  Obese, Soft, Non distended, mildly tender, epigastric and b/l upper quadrants  +Bowel sounds  Neurologic:  Alert and oriented X 3, normal speech,   Psych:   Good insight. Not anxious nor agitated  Skin:  No rashes. No jaundice    Reviewed most current lab test results and cultures  YES  Reviewed most current radiology test results   YES  Review and summation of old records today    NO  Reviewed patient's current orders and MAR    YES  PMH/SH reviewed - no change compared to H&P  ________________________________________________________________________  Care Plan discussed with:    Comments   Patient x    Family  x    RN x    Care Manager     Consultant                        Multidiciplinary team rounds were held today with , nursing, pharmacist and clinical coordinator. Patient's plan of care was discussed; medications were reviewed and discharge planning was addressed.      ________________________________________________________________________  Total NON critical care TIME:  30  Minutes    Total CRITICAL CARE TIME Spent:   Minutes non procedure based      Comments   >50% of visit spent in counseling and coordination of care x ________________________________________________________________________  Caryl Bernal DO     Procedures: see electronic medical records for all procedures/Xrays and details which were not copied into this note but were reviewed prior to creation of Plan. LABS:  I reviewed today's most current labs and imaging studies.   Pertinent labs include:  Recent Labs      08/02/18   0431   WBC  16.3*   HGB  13.0   HCT  42.4   PLT  176     Recent Labs      08/03/18   0133  08/02/18   0431  08/01/18   0255   NA  138  138  138   K  3.6  3.3*  3.5   CL  103  102  103   CO2  28  29  31   GLU  138*  127*  108*   BUN  17  20  20   CREA  0.57  0.70  0.82   CA  8.8  8.9  8.7   ALB   --   2.6*   --    TBILI   --   1.0   --    SGOT   --   64*   --    ALT   --   66   --        Signed: Caryl Bernal DO

## 2018-08-04 NOTE — PROGRESS NOTES
[facility].  [address] [facility] [place] 140 [facility], [address]  [phone]                GI PROGRESS NOTE      NAME:   Maine Cornell   :    1935   MRN:    [id number]     Assessment/Plan   Biliary pancreatitis- resolving. Liver mass- awaiting FNA results  Constipation and bloating- would give fleets enema. Continue current regimen     Patient Active Problem List   Diagnosis Code    Hypertension I10    Hyperlipidemia E78.5    Hypercholesterolemia E78.00    Cervical disc disease M50.90    Osteoarthritis (arthritis due to wear and tear of joints) M19.90    GERD (gastroesophageal reflux disease) K21.9    Prediabetes R73.03    Occult blood in stools R19.5    Advance directive discussed with patient Z71.89    Allergic rhinitis J30.9    Colonic mass K63.9    Morbid obesity (HCC) E66.01    Gallstone pancreatitis K85.10    Abnormal CT of the abdomen R93.5    Abnormal liver enzymes R74.8    Sludge in gallbladder K82.8    Class 3 severe obesity due to excess calories with serious comorbidity and body mass index (BMI) of 40.0 to 44.9 in adult (HCC) E66.01, Z68.41    Incisional hernia, without obstruction or gangrene K43.2       Subjective:     Maine Cornell is a 81 y.o.  female who is feeling better. Pain improved. She has some abdominal bloating, and has not had a BM.       Review of Systems    Constitutional: negative fever, negative chills, negative weight loss  Eyes:   negative visual changes  ENT:   negative sore throat, tongue or lip swelling  Respiratory:  negative cough, negative dyspnea  Cards:  negative for chest pain, palpitations, lower extremity edema  GI:   See HPI  :  negative for frequency, dysuria  Integument:  negative for rash and pruritus  Heme:  negative for easy bruising and gum/nose bleeding  Musculoskel: negative for myalgias,  back pain and muscle weakness  Neuro: negative for headaches, dizziness, vertigo  Psych:  negative for feelings of anxiety, depression           Objective:     VITALS:   Last 24hrs VS reviewed since prior hospitalist progress note. Most recent are:  Visit Vitals    /65 (BP 1 Location: Left arm, BP Patient Position: At rest)    Pulse 72    Temp 98.2 °F (36.8 °C)    Resp 18    SpO2 90%       Intake/Output Summary (Last 24 hours) at 08/04/18 1739  Last data filed at 08/04/18 1621   Gross per 24 hour   Intake             2500 ml   Output              200 ml   Net             2300 ml        PHYSICAL EXAM:  General   well developed, well nourished, appears stated age, in no acute distress  EENT  Normocephalic, Atraumatic, PERRLA, EOMI, sclera clear, nares clear, pharynx normal  Neck   Supple without nodes or mass. No thyromegaly or bruit  Respiratory   Clear To Auscultation bilaterally - no wheezes, rales, rhonchi, or crackles  Cardiology  Regular Rate and Rythmn  - no murmurs, rubs or gallops  Abdominal  Soft, non-tender, distended, positive bowel sounds, no hepatosplenomegaly, no palpable mass  Extremities  No clubbing, cyanosis, or edema. Pulses intact. Back  No spinal or muscle pain. No CVAT. Skin  Normal skin turgor. No rashes or skin ulcers noted  Neurological  No focal neurological deficits noted  Psychological  Oriented x 3. Normal affect.        Lab Data   Recent Results (from the past 12 hour(s))   CBC W/O DIFF    Collection Time: 08/04/18  6:23 AM   Result Value Ref Range    WBC 13.0 (H) 3.6 - 11.0 K/uL    RBC 4.71 3.80 - 5.20 M/uL    HGB 12.7 11.5 - 16.0 g/dL    HCT 41.7 35.0 - 47.0 %    MCV 88.5 80.0 - 99.0 FL    MCH 27.0 26.0 - 34.0 PG    MCHC 30.5 30.0 - 36.5 g/dL    RDW 14.4 11.5 - 14.5 %    PLATELET 053 292 - 664 K/uL    MPV 10.5 8.9 - 12.9 FL    NRBC 0.0 0  WBC    ABSOLUTE NRBC 0.00 0.00 - 0.01 K/uL         Medications: Reviewed    PMH/SH reviewed - no change compared to H&P  Attending Physician: Rexann Lennox, MD   Date/Time:  8/4/2018

## 2018-08-04 NOTE — PROGRESS NOTES
Bedside and Verbal shift change report given to Jason Vigil (oncoming nurse) by Anna Estes RN (offgoing nurse). Report included the following information SBAR, Kardex, ED Summary, Intake/Output, MAR, Accordion, Recent Results and Med Rec Status.

## 2018-08-04 NOTE — PROGRESS NOTES
Surgery      Awaiting results of liver biopsy to make treatment plan.     Cont present RX      Chel Dee MD, [facility]

## 2018-08-04 NOTE — PROGRESS NOTES
Bedside and Verbal shift change report given to [address] [place] [address] (oncoming nurse) by Leander Raymond RN (offgoing nurse). Report included the following information SBAR, Kardex, Intake/Output, MAR and Recent Results.

## 2018-08-05 NOTE — PROGRESS NOTES
Hospitalist Progress Note    NAME: Pravin Sow   :  1935   MRN:  [id number]       Assessment / Plan:  Acute pancreatitis  Liver Mass  Hx of Colon Ca  -S/p liver bx 8/3, will await results and make further determination re treatment plan and discuss w surgery  -If Bx positive for malignancy will need to involve oncology. I negative, surgery to conisder lap maria and interoperative liver Bx   -Discussed finding of liver mass with patient and family.   -discussed plan with family  -cont pain mgmnt w norco for moderate pain, low dose dilaudid for breakthrough pain  -IV zofran for nausea  -s/p IV zosyn  -tolerating  -liver enzymes have normalized  -distended GB with stones    Constipation  -start bowel regimen     Chronic pain  -worse at R hip  -cont norco for mod pain, dilaudid for breakthrough    HTN  -hold hctz, BP stable. Will monitor and treat w PRN IV meds for now    HLD  -hold home atorvastatin    Hypokalemia  -resolved, monitor labs    Code status: DNR  Prophylaxis: Hep SQ  Recommended Disposition: SNF/LTC     Subjective:     Chief Complaint / Reason for Physician Visit  Pt complains of hip pain and constipation. No other acute complaints. Tolerating diet    Review of Systems:  Symptom Y/N Comments  Symptom Y/N Comments   Fever/Chills n   Chest Pain n    Poor Appetite n   Edema n    Cough    Abdominal Pain y    Sputum    Joint Pain y    SOB/WERNER n   Pruritis/Rash     Nausea/vomit y   Tolerating PT/OT     Diarrhea    Tolerating Diet     Constipation    Other       Could NOT obtain due to:      Objective:     VITALS:   Last 24hrs VS reviewed since prior progress note.  Most recent are:  Patient Vitals for the past 24 hrs:   Temp Pulse Resp BP SpO2   18 1457 97.6 °F (36.4 °C) 73 16 124/60 90 %   18 0729 97.7 °F (36.5 °C) 77 17 131/59 91 %   18 2255 98.4 °F (36.9 °C) 71 17 133/66 95 %       Intake/Output Summary (Last 24 hours) at 18 1609  Last data filed at 18 0310   Gross per 24 hour   Intake          2388.34 ml   Output                0 ml   Net          2388.34 ml        PHYSICAL EXAM:  General: WD, WN. Alert, cooperative, no acute distress    EENT:  EOMI. Anicteric sclerae. MMM  Resp:  CTA bilaterally, no wheezing or rales. No accessory muscle use  CV:  Regular  rhythm,  No edema  GI:  Obese, Soft, +incisional hernia mildly tender, epigastric and b/l upper quadrants  +Bowel sounds  Neurologic:  Alert and oriented X 3, normal speech,   Psych:   Good insight. Not anxious nor agitated  Skin:  No rashes. No jaundice    Reviewed most current lab test results and cultures  YES  Reviewed most current radiology test results   YES  Review and summation of old records today    NO  Reviewed patient's current orders and MAR    YES  PMH/SH reviewed - no change compared to H&P  ________________________________________________________________________  Care Plan discussed with:    Comments   Patient x    Family  x    RN x    Care Manager     Consultant                        Multidiciplinary team rounds were held today with , nursing, pharmacist and clinical coordinator. Patient's plan of care was discussed; medications were reviewed and discharge planning was addressed. ________________________________________________________________________  Total NON critical care TIME:  30  Minutes    Total CRITICAL CARE TIME Spent:   Minutes non procedure based      Comments   >50% of visit spent in counseling and coordination of care x    ________________________________________________________________________  Siria Snow DO     Procedures: see electronic medical records for all procedures/Xrays and details which were not copied into this note but were reviewed prior to creation of Plan. LABS:  I reviewed today's most current labs and imaging studies.   Pertinent labs include:  Recent Labs      08/04/18   0623   WBC  13.0*   HGB  12.7   HCT  41.7   PLT  201     Recent Labs      08/05/18   0321  08/04/18   0338  08/03/18   0133   NA  136  137  138   K  3.7  4.1  3.6   CL  100  105  103   CO2  28  24  28   GLU  133*  127*  138*   BUN  15  15  17   CREA  0.56  0.66  0.57   CA  8.9  8.9  8.8       Signed: Giulia Magaña DO

## 2018-08-05 NOTE — PROGRESS NOTES
Surgery      Weekend Surgical Coverage    FNA of liver from Friday pending. Once resulted an operative plan will be made. Dr Breezy Ortega returns in AM to resume Surgical management        Lesley Muhammad.  Desire Ventura MD, [facility]

## 2018-08-05 NOTE — PROGRESS NOTES
Bedside and Verbal shift change report given to Jason Vigil (oncoming nurse) by Pop Herrera RN (offgoing nurse). Report included the following information SBAR, Kardex, Intake/Output, MAR, Accordion, Recent Results and Med Rec Status.

## 2018-08-05 NOTE — PROGRESS NOTES
Problem: Pressure Injury - Risk of  Goal: *Prevention of pressure injury  Document Michael Scale and appropriate interventions in the flowsheet. Outcome: Progressing Towards Goal  Pressure Injury Interventions:             Activity Interventions: Assess need for specialty bed    Mobility Interventions: HOB 30 degrees or less    Nutrition Interventions: Document food/fluid/supplement intake    Friction and Shear Interventions: Apply protective barrier, creams and emollients

## 2018-08-05 NOTE — PROGRESS NOTES
Hospitalist Progress Note    NAME: Ellie Damon   :  1935   MRN:  [id number]       Assessment / Plan:  Acute pancreatitis  Liver Mass  Hx of Colon Ca  -S/p liver bx 8/3, will await results and make further determination re treatment plan and discuss w surgery  -If Bx positive for malignancy will need to involve oncology. I negative, surgery to conisder lap maria and interoperative liver Bx   -Discussed finding of liver mass with patient and family.   -cont pain mgmnt w norco for moderate pain, low dose dilaudid for breakthrough pain  -IV zofran for nausea  -s/p IV zosyn  -tolerating  -liver enzymes have normalized  -distended GB with stones    Hip pain  -pain regimen as above    HTN  -hold hctz, BP stable. Will monitor and treat w PRN IV meds for now    HLD  -hold home atorvastatin    Hypokalemia  -resolved, monitor labs    Code status: DNR  Prophylaxis: Hep SQ  Recommended Disposition: SNF/LTC     Subjective:     Chief Complaint / Reason for Physician Visit  Pt complains of hip pain. Also notes loose stools after laxative regimen started to address constipation. No n/v, mod abd pain. Review of Systems:  Symptom Y/N Comments  Symptom Y/N Comments   Fever/Chills n   Chest Pain n    Poor Appetite n   Edema n    Cough    Abdominal Pain y    Sputum    Joint Pain y    SOB/WERNER n   Pruritis/Rash     Nausea/vomit y   Tolerating PT/OT     Diarrhea    Tolerating Diet     Constipation    Other       Could NOT obtain due to:      Objective:     VITALS:   Last 24hrs VS reviewed since prior progress note.  Most recent are:  Patient Vitals for the past 24 hrs:   Temp Pulse Resp BP SpO2   18 1457 97.6 °F (36.4 °C) 73 16 124/60 90 %   18 0729 97.7 °F (36.5 °C) 77 17 131/59 91 %   18 2255 98.4 °F (36.9 °C) 71 17 133/66 95 %       Intake/Output Summary (Last 24 hours) at 18 1609  Last data filed at 18 0310   Gross per 24 hour   Intake          2388.34 ml   Output                0 ml   Net 2388.34 ml        PHYSICAL EXAM:  General: WD, WN. Alert, cooperative, mod discomfort    EENT:  EOMI. Anicteric sclerae. MMM  Resp:  CTA bilaterally, no wheezing or rales. No accessory muscle use  CV:  Regular  rhythm,  No edema  GI:  Obese, Soft, mildly tender, +incisional hernia epigastric and b/l upper quadrants  +Bowel sounds  Neurologic:  Alert and oriented X 3, normal speech,   Psych:   Good insight. Not anxious nor agitated  Skin:  No rashes. No jaundice    Reviewed most current lab test results and cultures  YES  Reviewed most current radiology test results   YES  Review and summation of old records today    NO  Reviewed patient's current orders and MAR    YES  PMH/SH reviewed - no change compared to H&P  ________________________________________________________________________  Care Plan discussed with:    Comments   Patient x    Family  x    RN x    Care Manager     Consultant                        Multidiciplinary team rounds were held today with , nursing, pharmacist and clinical coordinator. Patient's plan of care was discussed; medications were reviewed and discharge planning was addressed. ________________________________________________________________________  Total NON critical care TIME:  30  Minutes    Total CRITICAL CARE TIME Spent:   Minutes non procedure based      Comments   >50% of visit spent in counseling and coordination of care x    ________________________________________________________________________  Timmy Demetrius, DO     Procedures: see electronic medical records for all procedures/Xrays and details which were not copied into this note but were reviewed prior to creation of Plan. LABS:  I reviewed today's most current labs and imaging studies.   Pertinent labs include:  Recent Labs      08/04/18   0623   WBC  13.0*   HGB  12.7   HCT  41.7   PLT  201     Recent Labs      08/05/18   0321  08/04/18   0338  08/03/18   0133   NA  136  137  138 K  3.7  4.1  3.6   CL  100  105  103   CO2  28  24  28   GLU  133*  127*  138*   BUN  15  15  17   CREA  0.56  0.66  0.57   CA  8.9  8.9  8.8       Signed: Devante Honeycutt, DO

## 2018-08-06 NOTE — PROGRESS NOTES
Initial Nutrition Assessment:    INTERVENTIONS/RECOMMENDATIONS:   · Meals/Snacks: General/healthful diet:  Continue pureed diet for now. Progress to soft diet once pt has bottom dentures. · Supplements: Commercial supplement:  Continue Ensure Clear. ASSESSMENT:   8/6:  Chart reviewed; med noted for gallstone pancreatitis. H/O obesity, HTN, GERD and elevated lipids. Pt was on full liquids but diet has been progressed to a pureed. Pt is on a pureed diet because missing bottom dentures. Pt is agreeable to this diet. RD explained pureed diet at bedside. Pt took sips of Ensure Clear today. I assisted pt with ordering dinner meal.    Diet Order: Puree  % Eaten:  No data found. Pertinent Medications: [x]Reviewed []Other  Pertinent Labs: [x]Reviewed []Other  Food Allergies: [x]None []Other   Last BM: 8/6 watery stool   []Active     [x]Hyperactive  []Hypoactive       [] Absent BS  Skin:    [] Intact   [] Incision  [] Breakdown  [] Other:    Anthropometrics:   Height: 5' 2\" (157.5 cm) Weight: 106.6 kg (235 lb)   IBW (%IBW):   ( ) UBW (%UBW):   (  %)   Last Weight Metrics:  Weight Loss Metrics 8/6/2018 7/30/2018 7/30/2018 7/30/2018 5/1/2018 2/27/2018 2/20/2018   Today's Wt 235 lb - 235 lb - 236 lb 9.6 oz 235 lb 3.2 oz 263 lb   BMI - 42.98 kg/m2 - 42.98 kg/m2 46.21 kg/m2 43.02 kg/m2 48.1 kg/m2       BMI: Body mass index is 42.98 kg/(m^2). This BMI is indicative of:   []Underweight    []Normal    []Overweight    [] Obesity   [x] Extreme Obesity (BMI>40)     Estimated Nutrition Needs (Based on):   1671 Kcals/day (BMR (1478) x 1.3AF (-250 kcals)) , 85 g (0.8 g/kg bw) Protein  Carbohydrate:  At Least 130 g/day  Fluids: 1700 mL/day (1ml/kcal)    NUTRITION DIAGNOSES:   Problem:  No nutritional diagnosis at this time      Etiology: related to       Signs/Symptoms: as evidenced by        NUTRITION INTERVENTIONS:  Meals/Snacks: General/healthful diet   Supplements: Commercial supplement              GOAL:   PO intake at least 50% of meals next 5-7 days    LEARNING NEEDS (Diet, Food/Nutrient-Drug Interaction):    [x] None Identified   [] Identified and Education Provided/Documented   [] Identified and Pt declined/was not appropriate     Cultureal, Nondenominational, OR Ethnic Dietary Needs:    [x] None Identified   [] Identified and Addressed     [x] Interdisciplinary Care Plan Reviewed/Documented    [x] Discharge Planning: Continue PO as tolerated; soft foods      MONITORING /EVALUATION:   Physical Signs/Symptoms Outcomes: Weight/weight change, GI profile    NUTRITION RISK:    [x] Patient At Nutritional Risk    [] High              [] Moderate/Mild           [x]  Low     [] Patient Not At Nutritional Risk    PT SEEN FOR:    []  MD Consult: []Calorie Count      []Diabetic Diet Education        []Diet Education     []Electrolyte Management     []General Nutrition Management and Supplements     []Management of Tube Feeding     []TPN Recommendations    []  RN Referral:  []MST score >=2     []Enteral/Parenteral Nutrition PTA     []Pregnant: Gestational DM or Multigestation     []Pressure Ulcer/Wound Care needs        []  Low BMI  [x]  [facility], [address] [place] [address]  Pager [phone]  Weekend Pager [phone]

## 2018-08-06 NOTE — PROGRESS NOTES
Surgical Note with addendum    Date/Time:  8/6/2018 10:57 AM        Assessment :    Plan:  Patient Active Problem List   Diagnosis Code    Hypertension I10    Hyperlipidemia E78.5    Hypercholesterolemia E78.00    Cervical disc disease M50.90    Osteoarthritis (arthritis due to wear and tear of joints) M19.90    GERD (gastroesophageal reflux disease) K21.9    Prediabetes R73.03    Occult blood in stools R19.5    Advance directive discussed with patient Z71.89    Allergic rhinitis J30.9    Colonic mass K63.9    Morbid obesity (HCC) E66.01    Gallstone pancreatitis K85.10    Abnormal CT of the abdomen R93.5    Abnormal liver enzymes R74.8    Sludge in gallbladder K82.8    Class 3 severe obesity due to excess calories with serious comorbidity and body mass index (BMI) of 40.0 to 44.9 in adult (HCC) E66.01, Z68.41    Incisional hernia, without obstruction or gangrene K43.2        Awaiting path from liver Bx    Pancreatitis resolved    fátima PO, pt's son took home her bottom dentures, will switch to puree diet until she gets her teeth back    ADDENDUM:    Pt stable. fátima PO and ensure    Liver bx +metastatic colon cancer c/w colon primary  (cell block available if markers needed)    Pt informed. Tried calling son, no answer    Recommend continuing with discharge planning HH vs SNF. F/u with Dr. Amalia Jj as outpatient for possible surgical management of this single met. He will coordinate oncology eval as well. Pt agreeable. Discussed with Dr. Amira Clarke.                Subjective:     Chief Complaint:      Review of Systems:  Y  N       Y  N  [] [x]  Fever/chills                                               [] [x]  Chest Pain  [] [x]  Cough                                                       [] [x]  Diarrhea   [] [x]  Sputum                                                     [] [x]  Constipation  [] [x]  SOB/WERNER                                                [] [x]  Nausea/Vomit  [] [x]  Abd Pain                                                    [x] []  Tolerating diet  [] [x]  Dysuria                                                           []Unable to obtain  ROS due to  []mental status change  []sedated   []intubated    Objective:     VITALS:   Last 24hrs VS reviewed since prior hospitalist progress note.  Most recent are:  Visit Vitals    /66    Pulse 66    Temp 97.8 °F (36.6 °C)    Resp 14    SpO2 94%     Temp (24hrs), Av.6 °F (36.4 °C), Min:97.4 °F (36.3 °C), Max:97.8 °F (36.6 °C)    No intake or output data in the 24 hours ending 18 1057      []Souza []NGT  []Intubated on vent    PHYSICAL EXAM:  Gen:  [] A&O  []non-toxic  [x] No acute distress             [] ill apearing  []  Critical        HEENT:   [x]NC/AT/PERRLA/EOMI    []pink conjunctivae      []pale conjunctivae                  PERRL  []yes  []no      []moist mucosa    []dry mucosa    hearing intact to voice []yes  []No    RESP:   [x]CTA bialterally/no wheezing/rhonchi/rales/crackles    []clear bilaterally  []wheezing   []rhonchi   []crackles     use of accessory muscles []yes []no    CARD:   [x] regular rate and rhythm/No murmurs/rubs/gallops    murmur  []yes ()  []no      Rubs  []yes  []no       Gallops []yes  []no    Rate [] regular  [] irregular        carotid bruits  []Right  [] Left                 LE edema []yes  []no           JVP  [] yes   [] no    ABD:    [x]soft  [x]non distended  [x]obese, mild non-specific tenderness  [] NABS    SKIN:   Rashes [] yes   [x] no    Ulcers [] yes   [x] no               [x]normal   []tight to palpitation    skin turgor [] good  []poor  []decreased               Cyanosis/clubbing [] yes [x] no    NEUR:   [x]cranial nerves II-XII grossly intact       []Cranial nerves deficit                 [] facial droop    [] slurred speech   []aphasic     []Strength normal     [] weakness  [] LUE  []  RUE/ [] LLE  []  RLE    follows commands  [] yes [] no           PSYCH:   insight []poor [x]good   Alert and Oriented to  [x]person  [x]place  [x] time                    []depressed []anxious []agitated  []lethargic []stuporous  []sedated           Lab Data Reviewed: (see below)    Medications Reviewed: (see below)    PMH/SH reviewed - no change compared to H&P    Care Plan discussed with:  [x]Patient   []Family    []Care Manager   []RN    []Consultant/Specialist :    Prophylaxis:  []Lovenox  []Coumadin  []Hep SQ  []SCDs  []H2B/PPI    Disposition:  []Home w/ Family   []HH PT,OT,RN   []SNF/LTC   []SAH/Rehab    Ancillary Serices:   [] PT     []OT      []SW      []Nut      []HH ________________________________________________________________________  LABS:  Recent Labs      08/04/18   0623   WBC  13.0*   HGB  12.7   HCT  41.7   PLT  201     Recent Labs      08/06/18   0107  08/05/18   0321  08/04/18   0338   NA  137  136  137   K  3.8  3.7  4.1   CL  102  100  105   CO2  29  28  24   BUN  13  15  15   CREA  0.48*  0.56  0.66   GLU  110*  133*  127*   CA  8.8  8.9  8.9     No results for input(s): SGOT, GPT, ALT, AP, TBIL, TBILI, TP, ALB, GLOB, GGT, AML, LPSE in the last 72 hours. No lab exists for component: AMYP, HLPSE  No results for input(s): INR, PTP, APTT in the last 72 hours. No lab exists for component: INREXT   No results for input(s): FE, TIBC, PSAT, FERR in the last 72 hours. No results for input(s): PH, PCO2, PO2 in the last 72 hours. No results for input(s): CPK, CKMB in the last 72 hours.     No lab exists for component: TROPONINI  No results found for: GLUCPOC    MEDICATIONS:  Current Facility-Administered Medications   Medication Dose Route Frequency    magnesium hydroxide (MILK OF MAGNESIA) 400 mg/5 mL oral suspension 30 mL  30 mL Oral DAILY PRN    heparin (porcine) injection 5,000 Units  5,000 Units SubCUTAneous Q8H    acetaminophen (TYLENOL) tablet 650 mg  650 mg Oral Q6H PRN    HYDROcodone-acetaminophen (NORCO)  mg tablet 1 Tab  1 Tab Oral Q4H PRN    sodium chloride (NS) flush 5-10 mL  5-10 mL IntraVENous Q8H    sodium chloride (NS) flush 5-10 mL  5-10 mL IntraVENous PRN    0.9% sodium chloride infusion  100 mL/hr IntraVENous CONTINUOUS    HYDROmorphone (PF) (DILAUDID) injection 0.5 mg  0.5 mg IntraVENous Q4H PRN    naloxone (NARCAN) injection 0.4 mg  0.4 mg IntraVENous PRN    ondansetron (ZOFRAN) injection 4 mg  4 mg IntraVENous Q4H PRN    dilTIAZem CD (CARDIZEM CD) capsule 120 mg  120 mg Oral DAILY    hydrALAZINE (APRESOLINE) 20 mg/mL injection 10 mg  10 mg IntraVENous Q6H PRN

## 2018-08-06 NOTE — PROGRESS NOTES
GI PROGRESS NOTE      NAME:   Nanda Santana   :    1935   MRN:    [id number]     Assessment/Plan   Biliary pancreatitis- resolving. Liver mass- awaiting FNA results. Pending bx results she will need oncology input as well. Continue current regimen. Patient Active Problem List   Diagnosis Code    Hypertension I10    Hyperlipidemia E78.5    Hypercholesterolemia E78.00    Cervical disc disease M50.90    Osteoarthritis (arthritis due to wear and tear of joints) M19.90    GERD (gastroesophageal reflux disease) K21.9    Prediabetes R73.03    Occult blood in stools R19.5    Advance directive discussed with patient Z71.89    Allergic rhinitis J30.9    Colonic mass K63.9    Morbid obesity (HCC) E66.01    Gallstone pancreatitis K85.10    Abnormal CT of the abdomen R93.5    Abnormal liver enzymes R74.8    Sludge in gallbladder K82.8    Class 3 severe obesity due to excess calories with serious comorbidity and body mass index (BMI) of 40.0 to 44.9 in adult (HCC) E66.01, Z68.41    Incisional hernia, without obstruction or gangrene K43.2       Subjective:     Nanda Santana is a 81 y.o.  female who is feeling better. Some pain. Review of Systems    Constitutional: negative fever, negative chills, negative weight loss  Eyes:   negative visual changes  ENT:   negative sore throat, tongue or lip swelling  Respiratory:  negative cough, negative dyspnea  Cards:  negative for chest pain, palpitations, lower extremity edema  GI:   See HPI  :  negative for frequency, dysuria  Integument:  negative for rash and pruritus  Heme:  negative for easy bruising and gum/nose bleeding  Musculoskel: negative for myalgias,  back pain and muscle weakness  Neuro: negative for headaches, dizziness, vertigo  Psych:  negative for feelings of anxiety, depression           Objective:     VITALS:   Last 24hrs VS reviewed since prior hospitalist progress note.  Most recent are:  Visit Vitals    BP 146/66    Pulse 66    Temp 97.8 °F (36.6 °C)    Resp 14    SpO2 94%     No intake or output data in the 24 hours ending 08/06/18 0819     PHYSICAL EXAM:  General   well developed, well nourished, appears stated age,   EENT  Normocephalic, Atraumatic, PERRLA,  Neck   Supple. Cardiology  Regular Rate and Rythmn    Abdominal  Soft, non-tender, large incisional hernia, positive bowel sounds  Extremities  No clubbing,   Back  No  muscle pain. Skin  Normal skin turgor. Neurological  No focal neurological deficits noted  Psychological  Oriented x 3. Normal affect.        Lab Data   Recent Results (from the past 12 hour(s))   METABOLIC PANEL, BASIC    Collection Time: 08/06/18  1:07 AM   Result Value Ref Range    Sodium 137 136 - 145 mmol/L    Potassium 3.8 3.5 - 5.1 mmol/L    Chloride 102 97 - 108 mmol/L    CO2 29 21 - 32 mmol/L    Anion gap 6 5 - 15 mmol/L    Glucose 110 (H) 65 - 100 mg/dL    BUN 13 6 - 20 MG/DL    Creatinine 0.48 (L) 0.55 - 1.02 MG/DL    BUN/Creatinine ratio 27 (H) 12 - 20      GFR est AA >60 >60 ml/min/1.73m2    GFR est non-AA >60 >60 ml/min/1.73m2    Calcium 8.8 8.5 - 10.1 MG/DL         Medications: Reviewed    PMH/ reviewed - no change compared to H&P  Attending Physician: Monica Desai MD   Date/Time:  8/6/2018

## 2018-08-06 NOTE — PROGRESS NOTES
Bedside and Verbal shift change report given to [id number] [facility] (oncoming nurse) by Lupe Nava RN (offgoing nurse). Report included the following information SBAR, Kardex, Intake/Output, MAR and Recent Results.

## 2018-08-06 NOTE — PROGRESS NOTES
Bedside shift change report given to Zena Champagne (oncoming nurse) by Isabelle Bliss (offgoing nurse). Report included the following information SBAR, Kardex, Intake/Output, MAR, Recent Results and Cardiac Rhythm . Pt resting quietly at this time.

## 2018-08-06 NOTE — PROGRESS NOTES
Dr. Diogo Moss paged. Pts. son called saying a MD called him but was unable to answer but would like to be able to speak with him. Bedside shift change report given to Hayley Bowen RN (oncoming nurse) by Luis Suarez (offgoing nurse). Report included the following information SBAR, Kardex, Procedure Summary, Intake/Output, MAR and Recent Results.

## 2018-08-06 NOTE — PROGRESS NOTES
Bedside and Verbal shift change report given to Tiara Ortiz 70 (oncoming nurse) by Erica Martinez (offgoing nurse). Report included the following information SBAR, Kardex, Intake/Output, MAR and Recent Results.

## 2018-08-06 NOTE — PROGRESS NOTES
Problem: Falls - Risk of  Goal: *Absence of Falls  Document Alex Fall Risk and appropriate interventions in the flowsheet.    Outcome: Progressing Towards Goal  Fall Risk Interventions:  Mobility Interventions: OT consult for ADLs, Patient to call before getting OOB, PT Consult for mobility concerns, PT Consult for assist device competence, Strengthening exercises (ROM-active/passive), Utilize walker, cane, or other assistive device, Utilize gait belt for transfers/ambulation         Medication Interventions: Bed/chair exit alarm, Evaluate medications/consider consulting pharmacy, Patient to call before getting OOB, Teach patient to arise slowly, Utilize gait belt for transfers/ambulation    Elimination Interventions: Call light in reach, Patient to call for help with toileting needs, Toileting schedule/hourly rounds

## 2018-08-06 NOTE — PROGRESS NOTES
Physical Therapy Goals  Initiated 7/31/2018  1. Patient will move from supine to sit and sit to supine , scoot up and down and roll side to side in bed with modified independence within 7 day(s). 2. Patient will transfer from bed to chair and chair to bed with modified independence using the least restrictive device within 7 day(s). 3. Patient will perform sit to stand with modified independence within 7 day(s). 4. Patient will ambulate with supervision/set-up for 150 feet with the least restrictive device within 7 day(s). physical Therapy TREATMENT  Patient: Nanda Santana (64 y.o. female)  Date: 8/6/2018  Diagnosis: Acute Pancreatitis  Pancreatitis <principal problem not specified>       Precautions: DNR, Fall  Chart, physical therapy assessment, plan of care and goals were reviewed. ASSESSMENT:  Patient continues to experience waxing and waning functional mobility, as nursing reports patient at times requiring x2 assist vs x1. She was agreeable to limited gait this date, where she indeed required no more than CGA-min A for slowed and non-functional gait limited by self-reported fatigue and R 'groin' pain as detailed. Patient unwilling to participate in further activity despite education to include education on need to prepare for possible surgical procedure per MD notation. Given these deficits, as well as sedentary lifestyle PTA, she is at risk of falls as well as readmissions ahead. If she returns to home, she will need increased assistance and 24/7 supervision. Otherwise, with patient agreeable to it at this time, she will benefit from SNF rehab. Progression toward goals:  []    Improving appropriately and progressing toward goals  []    Improving slowly and progressing toward goals  []    Not making progress toward goals and plan of care will be adjusted     PLAN:  Patient continues to benefit from skilled intervention to address the above impairments.   Continue treatment per established plan of care. Discharge Recommendations:  [facility] vs return to home with [facility] and 24/7 supervision  Further Equipment Recommendations for Discharge: defer     SUBJECTIVE:   Patient stated I sometimes can't make it all the way there.  Patient reports bowel incontinence with diarrhea that at times limits her to use of BSC rather than bathroom itself. OBJECTIVE DATA SUMMARY:   Critical Behavior:  Neurologic State: Alert  Orientation Level: Oriented X4  Cognition: Appropriate decision making, Appropriate safety awareness, Appropriate for age attention/concentration, Follows commands  Safety/Judgement: Awareness of environment, Fall prevention  Functional Mobility Training:  Bed Mobility:  Rolling:  (at chair throughout )                 Transfers:  Sit to Stand: Minimum assistance  Stand to Sit: Contact guard assistance                             Balance:  Sitting: Intact  Standing: Intact; With support (RW)  Ambulation/Gait Training:  Distance (ft): 40 Feet (ft)  Assistive Device: Walker, rolling;Gait belt  Ambulation - Level of Assistance: Contact guard assistance        Gait Abnormalities: Decreased step clearance;Trunk sway increased        Base of Support: Widened     Speed/Betty: Slow  Step Length: Right shortened;Left shortened        Interventions: Safety awareness training; Tactile cues; Verbal cues         Self-limiting; gait speed non-functional   Activity Tolerance:   Limited by self reported fatigue, report of R hip pain (groin focally, reports told it was 'arthritis' in the past, no radiculopathy or similar)     Please refer to the flowsheet for vital signs taken during this treatment.   After treatment:   [x]    Patient left in no apparent distress sitting up in chair  []    Patient left in no apparent distress in bed  [x]    Call bell left within reach  [x]    Nursing notified  []    Caregiver present  []    Bed alarm activated    COMMUNICATION/COLLABORATION:   The patients plan of care was discussed with: Registered Nurse    Isael Cortes PT, DPT   Board-Certified Geriatric Clinical Specialist   Certified Exercise Expert for Aging Adults      Time Calculation: 10 mins

## 2018-08-07 NOTE — PROGRESS NOTES
D/C plans discussed with MD who anticipates dc to SNF Tues//Wed. Will call son and provide choice. Anticipate d/c Wed by son to transport(walked 40 feet with PT)at 11a    FOC given and choices were [facility], [facility], and [facility]. Transportation discussed. Chart ecin'd to the first 2. FOC on chart. D/C plans discussed with son who will transport pt to [facility] tomorrow at 11a if stable. Please fax d/c instructions to [phone], call report to [phone], send emar, d/c instructions, Rx for narcotics if any, and completed DDNR.

## 2018-08-07 NOTE — PROGRESS NOTES
Problem: Self Care Deficits Care Plan (Adult)  Goal: *Acute Goals and Plan of Care (Insert Text)  Occupational Therapy Goals:  Weekly re-assessment 8/7/18; Initiated 7/31/2018  1. Patient will perform grooming standing with modified independence within 7 days. CONT  2. Patient will perform toileting with modified independence within 7 days. CONT  3. Patient will perform lower body dressing with modified independence with AE PRN within 7 days. CONT  4. Patient will transfer from toilet with modified independence using the least restrictive device and appropriate durable medical equipment within 7 days. CONT         Occupational Therapy TREATMENT: WEEKLY REASSESSMENT  Patient: Cha Ackerman (50 y.o. female)  Date: 8/7/2018  Diagnosis: Acute Pancreatitis  Pancreatitis <principal problem not specified>       Precautions: DNR, Fall  Chart, occupational therapy assessment, plan of care, and goals were reviewed. ASSESSMENT:  Nursing cleared for therapy. Received in chair, agreeable to therapy. She amb at [facility] level to bathroom with CGA, transfer at  level to toilet with CGA with use of grab bar, and standing at sink with CGA for hand hygiene. Left up in chair. Patient with flucuating progression this week initially with amb and completion of toileting at [facility] then 8/3 with decline in function requiring x 2 person assist for sit <> stand aspects of ADL tasks. Today with good progression towards return to PLOF. At this time able to complete toileting transfer with CGA, anterior hygiene with CGA and posterior hygiene with maxA. Stnading for grooming with CGA. Goals remain appropriate and have been continued. Recommend SNF for additional rehab prior to dc home. If patient not in agreement to SNF then recommend [facility] with 24 hour supervision.       Progression toward goals:  []            Improving appropriately and progressing toward goals  [x]            Improving slowly and progressing toward goals  [] Not making progress toward goals and plan of care will be adjusted     PLAN:  Goals have been updated based on progression since last assessment. Patient continues to benefit from skilled intervention to address the above impairments. Continue to follow patient 4 times a week to address goals. Planned Interventions:  [x]                    Self Care Training                  [x]             Therapeutic Activities  [x]                    Functional Mobility Training    []             Cognitive Retraining  [x]                    Therapeutic Exercises           [x]             Endurance Activities  [x]                    Balance Training                   []             Neuromuscular Re-Education  []                    Visual/Perceptual Training     [x]        Home Safety Training  [x]                    Patient Education                 [x]             Family Training/Education  []                    Other (comment):  Discharge Recommendations: Skilled Nursing Facility  Further Equipment Recommendations for Discharge: TBD SNF     SUBJECTIVE:   Patient stated I can try.     OBJECTIVE DATA SUMMARY:   Cognitive/Behavioral Status:  Neurologic State: Alert  Orientation Level: Oriented X4  Cognition: Appropriate decision making; Appropriate for age attention/concentration; Appropriate safety awareness; Follows commands             Functional Mobility and Transfers for ADLs:  Bed Mobility:       Transfers:  Sit to Stand: Contact guard assistance  Functional Transfers  Toilet Transfer : Contact guard assistance (RW level)        Balance:  Sitting: Intact  Standing: Impaired; With support (RW)  Standing - Static: Good;Constant support  Standing - Dynamic : Good    ADL Intervention:   Provided education on sequencing, safety and RW use for toileting tasks and grooming standing at sink. Fair carry over. Slow pace. No LOB at RW level.      Grooming  Washing Hands: Contact guard assistance    Toileting  Bladder Hygiene: Stand-by assistance  Bowel Hygiene: Maximum assistance    Pain:  Pain Scale 1: Numeric (0 - 10)  Pain Intensity 1: 0  Pain Location 1: Abdomen; Hip  Pain Orientation 1: Right  Pain Description 1: Constant  Pain Intervention(s) 1: Medication (see MAR)     Activity Tolerance:   Slow pace. Denies dizziness.       After treatment:   [x] Patient left in no apparent distress sitting up in chair  [] Patient left in no apparent distress in bed  [x] Call bell left within reach  [x] Nursing notified  [] Caregiver present  [] Bed alarm activated    COMMUNICATION/COLLABORATION:   The patients plan of care was discussed with: Physical Therapist and Patient    Osmel Armando OT  Time Calculation: 16 mins

## 2018-08-07 NOTE — PROGRESS NOTES
Hospitalist Progress Note    NAME: Manjinder Keys   :  1935   MRN:  [id number]       Assessment / Plan:  Acute biliarly pancreatitis, resolved  Liver metastasis  Hx of Colon Ca  -S/p liver bx 8/3: + metastatic adenocarcinoma compatible with colorectal primary  -patient to follow with Dr Rita Alejandra surgery/oncology to discuss possible surgical interventions. Dr Katerine Zacarias office to make arrangements for oncology appointment. Patient and son in agreement with SNF rehab. Discussed with CM DC planning.     -cont pain mgmnt w norco for moderate pain, low dose dilaudid for breakthrough pain  -IV zofran for nausea  -s/p IV zosyn  -liver enzymes have normalized  -distended GB with stones    Constipation?  -try miralax    Deconditioning  Hip pain  -pain regimen as above  -discussed SNF rehab with patient and son. DC planning for today or tomorrow once bed available. HTN  -hold hctz, BP stable. Will monitor and treat w PRN IV meds for now    HLD  -hold home atorvastatin    Hypokalemia  -resolved, monitor labs    Code status: DNR  Prophylaxis: Hep SQ  Recommended Disposition: SNF/LTC     Subjective:     Chief Complaint / Reason for Physician Visit  Follow up pancreatitis, liver mass, hip pain  Feels constipated today    Review of Systems:  Symptom Y/N Comments  Symptom Y/N Comments   Fever/Chills n   Chest Pain n    Poor Appetite n   Edema n    Cough    Abdominal Pain n    Sputum    Joint Pain y    SOB/WERNER n   Pruritis/Rash     Nausea/vomit n   Tolerating PT/OT     Diarrhea    Tolerating Diet     Constipation y   Other       Could NOT obtain due to:      Objective:     VITALS:   Last 24hrs VS reviewed since prior progress note.  Most recent are:  Patient Vitals for the past 24 hrs:   Temp Pulse Resp BP SpO2   18 0257 98.1 °F (36.7 °C) 68 16 133/71 92 %   18 2236 97.7 °F (36.5 °C) 67 16 148/68 93 %   18 1506 97.8 °F (36.6 °C) 78 14 133/60 93 %   18 1217 98.2 °F (36.8 °C) 71 14 128/59 96 %       Intake/Output Summary (Last 24 hours) at 08/07/18 0743  Last data filed at 08/06/18 1548   Gross per 24 hour   Intake             1040 ml   Output                0 ml   Net             1040 ml        PHYSICAL EXAM:  General: WD, WN. Alert, cooperative, mod discomfort    EENT:  EOMI. Anicteric sclerae. MMM  Resp:  CTA bilaterally, no wheezing or rales. No accessory muscle use  CV:  Regular  rhythm,  No edema  GI:  Obese, Soft, mildly tender, +incisional hernia epigastric and b/l upper quadrants  +Bowel sounds  Neurologic:  Alert and oriented X 3, normal speech,   Psych:   Good insight. Not anxious nor agitated  Skin:  No rashes. No jaundice    Reviewed most current lab test results and cultures  YES  Reviewed most current radiology test results   YES  Review and summation of old records today    NO  Reviewed patient's current orders and MAR    YES  PMH/ reviewed - no change compared to H&P  ________________________________________________________________________  Care Plan discussed with:    Comments   Patient x    Family  x son   RN x    Care Manager x    Consultant                        Multidiciplinary team rounds were held today with , nursing, pharmacist and clinical coordinator. Patient's plan of care was discussed; medications were reviewed and discharge planning was addressed. ________________________________________________________________________  Total NON critical care TIME:  20  Minutes    Total CRITICAL CARE TIME Spent:   Minutes non procedure based      Comments   >50% of visit spent in counseling and coordination of care x    ________________________________________________________________________  Carla Cortez MD     Procedures: see electronic medical records for all procedures/Xrays and details which were not copied into this note but were reviewed prior to creation of Plan. LABS:  I reviewed today's most current labs and imaging studies.   Pertinent labs include:  No results for input(s): WBC, HGB, HCT, PLT, HGBEXT, HCTEXT, PLTEXT, HGBEXT, HCTEXT, PLTEXT in the last 72 hours.   Recent Labs      08/07/18   0258  08/06/18   0107  08/05/18   0321   NA  139  137  136   K  3.4*  3.8  3.7   CL  105  102  100   CO2  26  29  28   GLU  102*  110*  133*   BUN  8  13  15   CREA  0.43*  0.48*  0.56   CA  8.0*  8.8  8.9       Signed: Anusha Fair MD

## 2018-08-07 NOTE — PROGRESS NOTES
Surgical Note    Date/Time:  2018       Assessment :    Plan:  Patient Active Problem List   Diagnosis Code    Hypertension I10    Hyperlipidemia E78.5    Hypercholesterolemia E78.00    Cervical disc disease M50.90    Osteoarthritis (arthritis due to wear and tear of joints) M19.90    GERD (gastroesophageal reflux disease) K21.9    Prediabetes R73.03    Occult blood in stools R19.5    Advance directive discussed with patient Z71.89    Allergic rhinitis J30.9    Colonic mass K63.9    Morbid [facility] E66.01    Gallstone pancreatitis K85.10    Abnormal CT of the abdomen R93.5    Abnormal liver enzymes R74.8    Sludge in gallbladder K82.8    Class 3 severe obesity due to excess calories with serious comorbidity and body mass index (BMI) of 40.0 to 44.9 in adult ([facility].) E66.01, Z68.41    Incisional hernia, without obstruction or gangrene K43.2        Tolerated breakfast  Ready for lunch    Agree with discharge plans and f/u with Dr. Stephane Concepcion:     Chief Complaint:      Review of Systems:  Y  N       Y  N  [] [x]  Fever/chills                                               [] [x]  Chest Pain  [] [x]  Cough                                                       [] [x]  Diarrhea   [] [x]  Sputum                                                     [] [x]  Constipation  [] [x]  SOB/WERNER                                                [] [x]  Nausea/Vomit  [] [x]  Abd Pain                                                    [x] []  Tolerating diet  [] [x]  Dysuria                                                           []Unable to obtain  ROS due to  []mental status change  []sedated   []intubated    Objective:     VITALS:   Last 24hrs VS reviewed since prior hospitalist progress note.  Most recent are:  Visit Vitals    /64    Pulse 73    Temp 97.2 °F (36.2 °C)    Resp 20    Ht 5' 2\" (1.575 m)    Wt 106.6 kg (235 lb)    SpO2 91%    BMI 42.98 kg/m2     Temp (24hrs), Av.7 °F (36.5 °C), Min:97.2 °F (36.2 °C), Max:98.1 °F (36.7 °C)      Intake/Output Summary (Last 24 hours) at 08/07/18 1444  Last data filed at 08/07/18 0830   Gross per 24 hour   Intake             1040 ml   Output                0 ml   Net             1040 ml         []Souza []NGT  []Intubated on vent    PHYSICAL EXAM:  Gen:  [] A&O  []non-toxic  [x] No acute distress             [] ill apearing  []  Critical        HEENT:   [x]NC/AT/PERRLA/EOMI    []pink conjunctivae      []pale conjunctivae                  PERRL  []yes  []no      []moist mucosa    []dry mucosa    hearing intact to voice []yes  []No    RESP:   [x]CTA bialterally/no wheezing/rhonchi/rales/crackles    []clear bilaterally  []wheezing   []rhonchi   []crackles     use of accessory muscles []yes []no    CARD:   [x] regular rate and rhythm/No murmurs/rubs/gallops    murmur  []yes ()  []no      Rubs  []yes  []no       Gallops []yes  []no    Rate [] regular  [] irregular        carotid bruits  []Right  [] Left                 LE edema []yes  []no           JVP  [] yes   [] no    ABD:    [x]soft, obese  [x]non distended  [x]mild tender, non-specific   [] NABS    SKIN:   Rashes [] yes   [x] no    Ulcers [] yes   [x] no               [x]normal   []tight to palpitation    skin turgor [] good  []poor  []decreased               Cyanosis/clubbing [] yes [x] no    NEUR:   [x]cranial nerves II-XII grossly intact       []Cranial nerves deficit                 [] facial droop    [] slurred speech   []aphasic     []Strength normal     [] weakness  [] LUE  []  RUE/ [] LLE  []  RLE    follows commands  [] yes [] no           PSYCH:   insight []poor [x]good   Alert and Oriented to  [x]person  [x]place  [x] time                    []depressed []anxious []agitated  []lethargic []stuporous  []sedated           Lab Data Reviewed: (see below)    Medications Reviewed: (see below)    PMH/SH reviewed - no change compared to H&P    Care Plan discussed with:  [x]Patient   []Family []Care Manager   []RN    []Consultant/Specialist :    Prophylaxis:  []Lovenox  []Coumadin  []Hep SQ  []SCDs  []H2B/PPI    Disposition:  []Home w/ Family   [] PT,OT,RN   []SNF/LTC   []SAH/Rehab    Ancillary Serices:   [] PT     []OT      []SW      []Nut      []HH ________________________________________________________________________  LABS:  No results for input(s): WBC, HGB, HCT, PLT, HGBEXT, HCTEXT, PLTEXT in the last 72 hours. Recent Labs      08/07/18   0258  08/06/18   0107  08/05/18   0321   NA  139  137  136   K  3.4*  3.8  3.7   CL  105  102  100   CO2  26  29  28   BUN  8  13  15   CREA  0.43*  0.48*  0.56   GLU  102*  110*  133*   CA  8.0*  8.8  8.9     No results for input(s): SGOT, GPT, ALT, AP, TBIL, TBILI, TP, ALB, GLOB, GGT, AML, LPSE in the last 72 hours. No lab exists for component: AMYP, HLPSE  No results for input(s): INR, PTP, APTT in the last 72 hours. No lab exists for component: INREXT   No results for input(s): FE, TIBC, PSAT, FERR in the last 72 hours. No results for input(s): PH, PCO2, PO2 in the last 72 hours. No results for input(s): CPK, CKMB in the last 72 hours.     No lab exists for component: TROPONINI  Lab Results   Component Value Date/Time    Glucose (POC) 96 08/07/2018 12:23 PM    Glucose (POC) 88 08/07/2018 08:15 AM    Glucose (POC) 109 (H) 08/06/2018 04:44 PM       MEDICATIONS:  Current Facility-Administered Medications   Medication Dose Route Frequency    polyethylene glycol (MIRALAX) packet 17 g  17 g Oral DAILY    magnesium hydroxide (MILK OF MAGNESIA) 400 mg/5 mL oral suspension 30 mL  30 mL Oral DAILY PRN    acetaminophen (TYLENOL) tablet 650 mg  650 mg Oral Q6H PRN    HYDROcodone-acetaminophen (NORCO)  mg tablet 1 Tab  1 Tab Oral Q4H PRN    sodium chloride (NS) flush 5-10 mL  5-10 mL IntraVENous Q8H    sodium chloride (NS) flush 5-10 mL  5-10 mL IntraVENous PRN    0.9% sodium chloride infusion  100 mL/hr IntraVENous CONTINUOUS    HYDROmorphone (PF) (DILAUDID) injection 0.5 mg  0.5 mg IntraVENous Q4H PRN    naloxone (NARCAN) injection 0.4 mg  0.4 mg IntraVENous PRN    ondansetron (ZOFRAN) injection 4 mg  4 mg IntraVENous Q4H PRN    dilTIAZem CD (CARDIZEM CD) capsule 120 mg  120 mg Oral DAILY    hydrALAZINE (APRESOLINE) 20 mg/mL injection 10 mg  10 mg IntraVENous Q6H PRN

## 2018-08-07 NOTE — PROGRESS NOTES
Bedside shift change report given to Michael Lemus (oncoming nurse) by Estrella Mclaughlin (offgoing nurse). Report included the following information SBAR, Kardex, Intake/Output, MAR and Recent Results     Discharge planned for tomorrow.

## 2018-08-07 NOTE — PROGRESS NOTES
GI PROGRESS NOTE      NAME:   Shannon Chandler   :    1935   MRN:    [id number]     Assessment/Plan   Biliary pancreatitis- resolving. Liver mass- FNA showed    Metastatic adenocarcinoma, see comment   Positive for malignancy. Specimen Adequacy   Satisfactory for evaluation. Comment   Touch imprint smears and sections of the core needle biopsies are examined showing metastatic adenocarcinoma. Although morphologic features can be nonspecific, this is compatible with colorectal primary. Confirmatory stains can be performed upon request. Adequate tissue is present in both blocks (A and B) for additional testing as clinically indicated. Seen by surgery who communicated with Dr Amira Clarke. Patient Active Problem List   Diagnosis Code    Hypertension I10    Hyperlipidemia E78.5    Hypercholesterolemia E78.00    Cervical disc disease M50.90    Osteoarthritis (arthritis due to wear and tear of joints) M19.90    GERD (gastroesophageal reflux disease) K21.9    Prediabetes R73.03    Occult blood in stools R19.5    Advance directive discussed with patient Z71.89    Allergic rhinitis J30.9    Colonic mass K63.9    Morbid obesity (HCC) E66.01    Gallstone pancreatitis K85.10    Abnormal CT of the abdomen R93.5    Abnormal liver enzymes R74.8    Sludge in gallbladder K82.8    Class 3 severe obesity due to excess calories with serious comorbidity and body mass index (BMI) of 40.0 to 44.9 in adult (HCC) E66.01, Z68.41    Incisional hernia, without obstruction or gangrene K43.2       Subjective:     Shannon Chandler is a 81 y.o.  female who is feeling better.      Review of Systems    Constitutional: negative fever, negative chills, negative weight loss  Eyes:   negative visual changes  ENT:   negative sore throat, tongue or lip swelling  Respiratory:  negative cough, negative dyspnea  Cards:  negative for chest pain, palpitations, lower extremity edema  GI:   See HPI  :  negative for frequency, dysuria  Integument:  negative for rash and pruritus  Heme:  negative for easy bruising and gum/nose bleeding  Musculoskel: negative for myalgias,  back pain and muscle weakness  Neuro: negative for headaches, dizziness, vertigo  Psych:  negative for feelings of anxiety, depression           Objective:     VITALS:   Last 24hrs VS reviewed since prior hospitalist progress note. Most recent are:  Visit Vitals    /71    Pulse 68    Temp 98.1 °F (36.7 °C)    Resp 16    Ht 5' 2\" (1.575 m)    Wt 106.6 kg (235 lb)    SpO2 92%    BMI 42.98 kg/m2       Intake/Output Summary (Last 24 hours) at 08/07/18 0809  Last data filed at 08/06/18 1548   Gross per 24 hour   Intake             1040 ml   Output                0 ml   Net             1040 ml        PHYSICAL EXAM:  General   well developed, well nourished, appears stated age,   EENT  Normocephalic, Atraumatic, PERRLA,  Neck   Supple. Cardiology  Regular Rate and Rythmn    Abdominal  Soft, non-tender, large incisional hernia, positive bowel sounds  Extremities  No clubbing,   Back  No  muscle pain. Skin  Normal skin turgor. Neurological  No focal neurological deficits noted  Psychological  Oriented x 3. Normal affect.        Lab Data   Recent Results (from the past 12 hour(s))   METABOLIC PANEL, BASIC    Collection Time: 08/07/18  2:58 AM   Result Value Ref Range    Sodium 139 136 - 145 mmol/L    Potassium 3.4 (L) 3.5 - 5.1 mmol/L    Chloride 105 97 - 108 mmol/L    CO2 26 21 - 32 mmol/L    Anion gap 8 5 - 15 mmol/L    Glucose 102 (H) 65 - 100 mg/dL    BUN 8 6 - 20 MG/DL    Creatinine 0.43 (L) 0.55 - 1.02 MG/DL    BUN/Creatinine ratio 19 12 - 20      GFR est AA >60 >60 ml/min/1.73m2    GFR est non-AA >60 >60 ml/min/1.73m2    Calcium 8.0 (L) 8.5 - 10.1 MG/DL         Medications: Reviewed    PMH/ reviewed - no change compared to H&P  Attending Physician: Jessenia Webster MD   Date/Time:  8/7/2018

## 2018-08-07 NOTE — PROGRESS NOTES
Problem: Mobility Impaired (Adult and Pediatric)  Goal: *Acute Goals and Plan of Care (Insert Text)  Physical Therapy Goals  Initiated 7/31/2018 - remain appropriate 8/7/18  1. Patient will move from supine to sit and sit to supine , scoot up and down and roll side to side in bed with modified independence within 7 day(s). 2.  Patient will transfer from bed to chair and chair to bed with modified independence using the least restrictive device within 7 day(s). 3.  Patient will perform sit to stand with modified independence within 7 day(s). 4.  Patient will ambulate with supervision/set-up for 150 feet with the least restrictive device within 7 day(s). physical Therapy TREATMENT: WEEKLY REASSESSMENT  Patient: Elsa Henrdicks (89 y.o. female)  Date: 8/7/2018  Diagnosis: Acute Pancreatitis  Pancreatitis <principal problem not specified>       Precautions: DNR, Fall  Chart, physical therapy assessment, plan of care and goals were reviewed. ASSESSMENT:  Pt received seated in bedside chair, agreeable to participation with therapy. All aspects of functional mobility occurred with CGAx1 including sit<>stand transfer (from bedside chair, commode) and ambulation trial with RW support. Gait slow and shuffled overall with mild increase in trunk flexion. Pt ambulated 65ft before fatigue, requiring seated rest break in bedside chair. Pt continues to function below her baseline modified independent level secondary to increased weakness, decreased endurance/activity tolerance, increased abdominal/groin pain, and impaired balance. Continue to recommend SNF vs  PT w/ 24hr supervision at discharge. Patient's progression toward goals since last assessment: All goals reviewed and remain appropriate. Progress limited by increased abdominal pain which limits participation     PLAN:  Goals have been updated based on progression since last assessment.   Patient continues to benefit from skilled intervention to address the above impairments. Continue to follow the patient 4 times a week to address goals. Planned Interventions:  [x]              Bed Mobility Training             []       Neuromuscular Re-Education  [x]              Transfer Training                   []       Orthotic/Prosthetic Training  [x]              Gait Training                         []       Modalities  [x]              Therapeutic Exercises           []       Edema Management/Control  [x]              Therapeutic Activities            [x]       Patient and Family Training/Education  []              Other (comment):  Discharge Recommendations: SNF  Further Equipment Recommendations for Discharge: TBD by facility     SUBJECTIVE:   Patient stated I feel better.     OBJECTIVE DATA SUMMARY:   Critical Behavior:  Neurologic State: Alert  Orientation Level: Oriented X4  Cognition: Appropriate decision making, Appropriate for age attention/concentration, Appropriate safety awareness, Follows commands  Safety/Judgement: Awareness of environment, Fall prevention    Strength:                          Functional Mobility Training:  Bed Mobility:                    Transfers:  Sit to Stand: Contact guard assistance  Stand to Sit: Contact guard assistance                             Balance:  Sitting: Intact  Standing: Impaired; With support (RW)  Standing - Static: Good;Constant support  Standing - Dynamic : Good  Ambulation/Gait Training:  Distance (ft): 65 Feet (ft)  Assistive Device: Walker, rolling;Gait belt  Ambulation - Level of Assistance: Contact guard assistance        Gait Abnormalities: Decreased step clearance;Shuffling gait        Base of Support: Widened     Speed/Betty: Shuffled  Step Length: Left shortened;Right shortened                    Pain:  Pain Scale 1: Numeric (0 - 10)  Pain Intensity 1: 0  Pain Location 1: Abdomen; Hip  Pain Orientation 1: Right  Pain Description 1: Constant  Pain Intervention(s) 1: Medication (see MAR)  Activity Tolerance: VSS throughout on RA  Please refer to the flowsheet for vital signs taken during this treatment.   After treatment:   [x]  Patient left in no apparent distress sitting up in chair  []  Patient left in no apparent distress in bed  [x]  Call bell left within reach  [x]  Nursing notified  []  Caregiver present  [x]  Bed alarm activated    COMMUNICATION/COLLABORATION:   The patients plan of care was discussed with: Occupational Therapist and Registered Nurse    Joy Kim, PT, DPT   Time Calculation: 16 mins

## 2018-08-07 NOTE — PROGRESS NOTES
Hospitalist Progress Note    NAME: Meaghan Victor   :  1935   MRN:  [id number]       Assessment / Plan:  Acute pancreatitis  Liver Mass  Hx of Colon Ca  -S/p liver bx 8/3: + metastatic adenocarcinoma compatible with colorectal primary  -Discussed with Dr Chano Delgado, who has made arrangements for patient to follow with Dr Myrna Grigsby surgery/oncology to discuss possible surgical interventions. Dr Cyndie Zheng office to make arrangements for oncology appointment. I discussed plan with patient's son. Patient and son in agreement with treatment team that SNF placement is appropriate. PT/OT and case management consulted  -Discussed finding of liver mass with patient and family.   -cont pain mgmnt w norco for moderate pain, low dose dilaudid for breakthrough pain  -IV zofran for nausea  -s/p IV zosyn  -tolerating  -liver enzymes have normalized  -distended GB with stones    Hip pain  -pain regimen as above    HTN  -hold hctz, BP stable. Will monitor and treat w PRN IV meds for now    HLD  -hold home atorvastatin    Hypokalemia  -resolved, monitor labs    Code status: DNR  Prophylaxis: Hep SQ  Recommended Disposition: SNF/LTC     Subjective:     Chief Complaint / Reason for Physician Visit  Pt resting in bed comfortably. Discussed Bx findings and treatment plan. Review of Systems:  Symptom Y/N Comments  Symptom Y/N Comments   Fever/Chills n   Chest Pain n    Poor Appetite n   Edema n    Cough    Abdominal Pain y    Sputum    Joint Pain y    SOB/WERNER n   Pruritis/Rash     Nausea/vomit y   Tolerating PT/OT     Diarrhea    Tolerating Diet     Constipation    Other       Could NOT obtain due to:      Objective:     VITALS:   Last 24hrs VS reviewed since prior progress note.  Most recent are:  Patient Vitals for the past 24 hrs:   Temp Pulse Resp BP SpO2   18 0257 98.1 °F (36.7 °C) 68 16 133/71 92 %   18 2236 97.7 °F (36.5 °C) 67 16 148/68 93 %   18 1506 97.8 °F (36.6 °C) 78 14 133/60 93 %   18 1217 98.2 °F (36.8 °C) 71 14 128/59 96 %   08/06/18 0731 97.8 °F (36.6 °C) 66 14 146/66 94 %       Intake/Output Summary (Last 24 hours) at 08/07/18 0622  Last data filed at 08/06/18 1548   Gross per 24 hour   Intake             1040 ml   Output                0 ml   Net             1040 ml        PHYSICAL EXAM:  General: WD, WN. Alert, cooperative, mod discomfort    EENT:  EOMI. Anicteric sclerae. MMM  Resp:  CTA bilaterally, no wheezing or rales. No accessory muscle use  CV:  Regular  rhythm,  No edema  GI:  Obese, Soft, mildly tender, +incisional hernia epigastric and b/l upper quadrants  +Bowel sounds  Neurologic:  Alert and oriented X 3, normal speech,   Psych:   Good insight. Not anxious nor agitated  Skin:  No rashes. No jaundice    Reviewed most current lab test results and cultures  YES  Reviewed most current radiology test results   YES  Review and summation of old records today    NO  Reviewed patient's current orders and MAR    YES  PMH/ reviewed - no change compared to H&P  ________________________________________________________________________  Care Plan discussed with:    Comments   Patient x    Family  x    RN x    Care Manager     Consultant                        Multidiciplinary team rounds were held today with , nursing, pharmacist and clinical coordinator. Patient's plan of care was discussed; medications were reviewed and discharge planning was addressed.      ________________________________________________________________________  Total NON critical care TIME:  30  Minutes    Total CRITICAL CARE TIME Spent:   Minutes non procedure based      Comments   >50% of visit spent in counseling and coordination of care x    ________________________________________________________________________  Rodney Monet DO     Procedures: see electronic medical records for all procedures/Xrays and details which were not copied into this note but were reviewed prior to creation of Plan.      LABS:  I reviewed today's most current labs and imaging studies.   Pertinent labs include:  Recent Labs      08/04/18   0623   WBC  13.0*   HGB  12.7   HCT  41.7   PLT  201     Recent Labs      08/07/18   0258  08/06/18   0107  08/05/18   0321   NA  139  137  136   K  3.4*  3.8  3.7   CL  105  102  100   CO2  26  29  28   GLU  102*  110*  133*   BUN  8  13  15   CREA  0.43*  0.48*  0.56   CA  8.0*  8.8  8.9       Signed: Timmy Romo DO

## 2018-08-08 NOTE — PROGRESS NOTES
D/C plans discussed with son who will transport pt to [facility] at [phone] if stable. Please fax d/c instructions to [phone], call report to [phone], send emar, send [facility](in [facility])d/c instructions, Rx for narcotics if any, and completed DDNR. I spoke with son Mr Caroline Hill and Nat Bloch at [facility] who are both in agreement. [facility] is aware of the d/c plan.

## 2018-08-08 NOTE — PROGRESS NOTES
Patient discharged to [facility] with family member. All discharge instructions, prescription for Leonard Lacks and [facility] given to patient prior to discharge. Discharge instructions faxed to SNF.  IV removed prior to discharge

## 2018-08-08 NOTE — PROGRESS NOTES
Hospital to [facility] SBAR Handoff - Orjeanine Enrique                                                                        81 y.o.   female    [address]   Room: [facility] 3 MED TELE  Unit Phone# :  [phone]      [address]  [facility]  [address] [address]  Dept: [phone]  Loc: [phone]                    SITUATION     Admitted:  7/30/2018         Attending Provider:  Aylin William MD       Consultations:  IP CONSULT TO GASTROENTEROLOGY  IP CONSULT TO GENERAL SURGERY    PCP:  Pati Galicia MD   [phone]    Treatment Team: Attending Provider: Aylin William MD; Consulting Provider: Guanako Berry MD; Consulting Provider: Moose Mishra MD; Utilization Review: Nicola Olivarez; Care Manager: Mariel Mosley, RN; Care Manager: Sari Lucio; Care Manager: Mariel Mosley, RN    Admitting Dx:  Acute Pancreatitis  Pancreatitis       Principal Problem: <principal problem not specified>    * No surgery found * of      BY: * Surgery not found *             ON: * No surgery found *                  Code Status: DNR                Advance Directives:   Advance Care Planning 8/2/2018   Patient's Healthcare Decision Maker is: Named in scanned ACP document   Primary Decision Maker Name -   Primary Decision [address] Phone Number -   Primary Decision Maker Relationship to Patient -   Confirm Advance Directive Yes, on file   Does the patient have other document types -    (Send w/patient)   Received       Isolation:  There are currently no Active Isolations       MDRO: No current active infections    Pain Medications given:  yes    Last dose: 8/8/2018 at  1000    Special Equipment needed: no  Type of equipment:     BACKGROUND     Allergies:   Allergies   Allergen Reactions    Lisinopril Angioedema    Niacin Itching     Felt hot also    Ultram [Tramadol] Nausea Only       Past Medical History:   Diagnosis Date    Allergic rhinitis     Cancer ([facility] [address] Stage 1 cancer intestines    Cervical disc disease     Cervical disc disease     Colonic mass 3/20/2017    Eczema     Gastritis     GERD (gastroesophageal reflux disease)     mild    GI bleed     hx. of recurrent    Hernia     hiatal lt side    Hiatal hernia     Hypercholesterolemia     Hypertension     Morbid obesity ([address] 4/13/2017    Osteoarthritis (arthritis due to wear and tear of joints)        Past Surgical History:   Procedure Laterality Date    ABDOMEN SURGERY PROC UNLISTED      Colon Resection (Cancer)    HX COLONOSCOPY  3/05    HX COLONOSCOPY  2017    HX CYST REMOVAL      left side    HX ENDOSCOPY  1/09    HX ENDOSCOPY  2015    HX GYN      hysterectomy    HX ORTHOPAEDIC      rt knee replacement       Prescriptions Prior to Admission   Medication Sig    loratadine (CLARITIN) 10 mg tablet Take 10 mg by mouth daily.  docusate calcium (STOOL SOFTENER, DOCUSATE NEHEMIAH, PO) Take  by mouth as needed.  nystatin (MYCOSTATIN) powder Apply  to affected area two (2) times a day.  clotrimazole (LOTRIMIN) 1 % topical cream Apply  to affected area two (2) times a day.  esomeprazole (NEXIUM) 40 mg capsule Take 1 Cap by mouth daily.  hydroCHLOROthiazide (HYDRODIURIL) 25 mg tablet Take 1 Tab by mouth daily.  dilTIAZem (TIAZAC) 120 mg SR capsule Take 1 Cap by mouth daily.  atorvastatin (LIPITOR) 20 mg tablet Take 1 Tab by mouth daily.  cyclobenzaprine (FLEXERIL) 5 mg tablet Take 1 Tab by mouth three (3) times daily as needed for Muscle Spasm(s).  cetirizine (ZYRTEC) 10 mg tablet Take 1 Tab by mouth daily as needed for Allergies.  cholecalciferol (VITAMIN D3) 1,000 unit cap Take 1,000 Units by mouth daily.  UBIDECARENONE (CO Q-10 PO) Take 200 mg by mouth daily.  acetaminophen (TYLENOL) 325 mg tablet Take  by mouth every four (4) hours as needed for Pain.  TROLAMINE SALICYLATE (ASPERCREME EX) by Apply Externally route.  Applies to left knee 3-4 x daily    aspirin delayed-release 81 mg tablet Take  by mouth daily.  magnesium oxide 500 mg tab Take 1 Tab by mouth daily.  multivitamin (ONE A DAY) tablet Take 1 Tab by mouth daily. Hard scripts included in transfer packet yes    Vaccinations:    Immunization History   Administered Date(s) Administered    Influenza High Dose Vaccine PF 10/04/2016, 10/10/2017    Influenza Vaccine 10/22/2014    Influenza Vaccine (Quad) PF 11/05/2015    Pneumococcal Conjugate (PCV-13) 05/01/2015       Readmission Risks:    Known Risks: Fall        The Charlson CoMorbitiy Index tool is an evidenced based tool that has more automatic generated information. The tool looks at many different items such as the age of the patient, how many times they were admitted in the last calendar year, current length of stay in the hospital and their diagnosis. All of these items are pulled automatically from information documented in the chart from various places and will generate a score that predicts whether a patient is at low (less than 13), medium (13-20) or high (21 or greater) risk of being readmitted.         ASSESSMENT                Temp: 97.9 °F (36.6 °C) (08/08/18 0729) Pulse (Heart Rate): 65 (08/08/18 0729)     Resp Rate: 18 (08/08/18 0729)           BP: 137/84 (08/08/18 0729)     O2 Sat (%): 95 % (08/08/18 0729)     Weight: 106.6 kg (235 lb)    Height: 5' 2\" (157.5 cm) (08/06/18 1325)       If above not within 1 hour of discharge:    BP:_____  P:____  R:____ T:_____ O2 Sat: ___%  O2: ______    Active Orders   Diet    DIET PUREED 50GM Fat         Orientation: oriented to time, place, person and situation     Active Behaviors: None                                   Active Lines/Drains:  (Peg Tube / Souza / CL or S/L?): no    Urinary Status: Voiding     Last BM: Last Bowel Movement Date: 08/06/18     Skin Integrity: Excoriation (groin, buttocksd)             Mobility: Slightly limited   Weight Bearing Status: WBAT (Weight Bearing as Tolerated)      Gait Training  Assistive Device: Walker, rolling, Gait belt  Ambulation - Level of Assistance: Contact guard assistance  Distance (ft): 65 Feet (ft)  Interventions: Safety awareness training, Tactile cues, Verbal cues         Lab Results   Component Value Date/Time    Glucose 114 (H) 08/08/2018 02:22 AM    Hemoglobin A1c 5.6 02/20/2018 10:25 AM    INR 1.1 07/31/2018 02:52 AM    HGB 12.7 08/04/2018 06:23 AM    HGB 13.0 08/02/2018 04:31 AM        RECOMMENDATION     See After Visit Summary (AVS) for:  · Discharge instructions  · After [address]   · Special equipment needed (entered pre-discharge by Care Management)  · Medication Reconciliation    · Follow up Appointment(s)         Report given/sent by:  Mitali Adames                    Verbal report given to: Greta Aguayo  FAXED to:  [phone]         Estimated discharge time:  8/8/2018 at 1100

## 2018-08-08 NOTE — DISCHARGE INSTRUCTIONS
HOSPITALIST DISCHARGE INSTRUCTIONS    NAME: Cranford Peabody   :  1935   MRN:  [id number]     Date/Time:  2018 8:38 AM    ADMIT DATE: 2018   DISCHARGE DATE: 2018     Attending Physician: Deepti Valdez MD    DISCHARGE DIAGNOSIS:  Pancreatitis   Liver mass  Colon cancer      Medications: Per above medication reconciliation. Pain Management: per above medications    Recommended diet: Dysphagia diet-pureed with ensure clear     Recommended activity: PT/OT Eval and Treat    Wound care: None    Indwelling devices:  None    Supplemental Oxygen: None    Required Lab work: Weekly BMP    Glucose management:  None    Code status: DNR        Outside physician follow up: Follow-up Information     Follow up With Details Comments [address]  this is your home care provider. If you have not heard from them in one day prior to discharge please call them. [facility] [phone] [facility]    Ayah Chavez MD Schedule an appointment as soon as possible for a visit in 1 week with PCP for post hospital follow up appointment. [address]  [address] Kalin Buckner MD Call  [address]  [phone]      SNF   [facility] nursing facility/ SNF MD responsible for above on discharge. Information obtained by :  I understand that if any problems occur once I am at home I am to contact my physician. I understand and acknowledge receipt of the instructions indicated above.                                                                                                                                            Physician's or R.N.'s Signature                                                                  Date/Time Patient or Repres

## 2018-08-08 NOTE — DISCHARGE SUMMARY
Hospitalist Discharge Summary     Patient ID:  Vinay Arellano  [id number]  81 y.o.  1935    PCP on record: Trinidad Munoz MD    Admit date: 7/30/2018  Discharge date and time: 8/8/2018      DISCHARGE DIAGNOSIS:    Acute biliarly pancreatitis, resolved  Liver metastasis  Hx of Colon Ca  -S/p liver bx 8/3: + metastatic adenocarcinoma compatible with colorectal primary  Deconditioning  Hip pain  HTN  HLD  Hypokalemia      CONSULTATIONS:  IP CONSULT TO GASTROENTEROLOGY  IP CONSULT TO GENERAL SURGERY    Excerpted HPI from H&P of Solange Harper MD:  CHIEF COMPLAINT: abdominal pain     HISTORY OF PRESENT ILLNESS:     Ester Godwin is a 81 y.o. Very pleasant female with h/o HTN,HLP colon ca S/p 4/17 LAPAROSCOPIC HAND ASSISTED RIGHT COLECTOMY presents to the er c/o  pain is located in the epigastric region and RUQ. The pain is at a severity of 4/10. The pain is moderate. Associated symptoms include constipation. Pertinent negatives include no fever, no diarrhea, ++nausea, no vomiting, no dysuria, no frequency, no hematuria, no headaches, no myalgias, no chest pain, no testicular pain and no back pain. Nothing worsens the pain. The pain is relieved by nothing.no h/o etoh use, no new meds.     ______________________________________________________________________  DISCHARGE SUMMARY/HOSPITAL COURSE:  for full details see H&P, daily progress notes, labs, consult notes. Acute biliarly pancreatitis, resolved  -liver enzymes and lipase have normalized  -distended GB with stones  -tolerating diet    Liver metastasis  Hx of Colon Ca  -S/p liver bx 8/3: + metastatic adenocarcinoma compatible with colorectal primary   Comment   Touch imprint smears and sections of the core needle biopsies are examined showing metastatic adenocarcinoma. Although morphologic features can be nonspecific, this is compatible with colorectal primary.  Confirmatory stains can be performed upon request. Adequate tissue is present in both blocks (A and B) for additional testing as clinically indicated     -patient to follow with Dr Keiko Galvan surgery/oncology to discuss possible surgical interventions. Appreciate Dr Maryam Wolf help. Dr Uvaldo Castañeda office to make arrangements for oncology appointment. Patient and son in agreement with SNF rehab to improve her performance status.       -cont pain mgmnt w norco for moderate breakthrough pain  -bowel regimen with miralax     Deconditioning  Hip pain  -pain regimen as above  -discussed SNF rehab with patient and son. DC planning for today or tomorrow once bed available.      HTN  -hold hctz. Restart low dose lasix for swelling. BP stable.      HLD  -atorvastatin     Hypokalemia  -resolved. Continue K repletion with lasix    _______________________________________________________________________  Patient seen and examined by me on discharge day. Pertinent Findings:  Gen:    Not in distress  Chest: Clear lungs  CVS:   Regular rhythm. No edema  Abd:  Soft, mildly distended, not tender  Neuro:  Alert, Oriented x 4, grossly non focal exam  _______________________________________________________________________  DISCHARGE MEDICATIONS:   Current Discharge Medication List      START taking these medications    Details   magnesium hydroxide (MILK OF MAGNESIA) 400 mg/5 mL suspension Take 30 mL by mouth daily as needed for Constipation. HYDROcodone-acetaminophen (NORCO)  mg tablet Take 1 Tab by mouth every four (4) hours as needed. Max Daily Amount: 6 Tabs. Qty: 10 Tab, Refills: 0    Associated Diagnoses: Gallstone pancreatitis         CONTINUE these medications which have CHANGED    Details   polyethylene glycol (MIRALAX) 17 gram packet Take 1 Packet by mouth daily. Qty: 30 Packet, Refills: 0      furosemide (LASIX) 20 mg tablet Take 1 Tab by mouth daily. potassium chloride SR (KLOR-CON 10) 10 mEq tablet Take 2 Tabs by mouth daily.   Qty: 360 Tab, Refills: 1    Associated Diagnoses: Hypokalemia         CONTINUE these medications which have NOT CHANGED    Details   nystatin (MYCOSTATIN) powder Apply  to affected area two (2) times a day. Qty: 60 g, Refills: 1    Associated Diagnoses: Candidiasis, intertrigo      clotrimazole (LOTRIMIN) 1 % topical cream Apply  to affected area two (2) times a day. Qty: 45 g, Refills: 1    Associated Diagnoses: Candidiasis, intertrigo      esomeprazole (NEXIUM) 40 mg capsule Take 1 Cap by mouth daily. Qty: 90 Cap, Refills: 1    Associated Diagnoses: Gastroesophageal reflux disease without esophagitis      dilTIAZem (TIAZAC) 120 mg SR capsule Take 1 Cap by mouth daily. Qty: 90 Cap, Refills: 1    Associated Diagnoses: Essential hypertension      atorvastatin (LIPITOR) 20 mg tablet Take 1 Tab by mouth daily. Qty: 90 Tab, Refills: 1    Associated Diagnoses: Hypercholesterolemia      cetirizine (ZYRTEC) 10 mg tablet Take 1 Tab by mouth daily as needed for Allergies. Qty: 30 Tab, Refills: 3    Associated Diagnoses: Allergic rhinitis, unspecified allergic rhinitis trigger, unspecified rhinitis seasonality      cholecalciferol (VITAMIN D3) 1,000 unit cap Take 1,000 Units by mouth daily. UBIDECARENONE (CO Q-10 PO) Take 200 mg by mouth daily. acetaminophen (TYLENOL) 325 mg tablet Take  by mouth every four (4) hours as needed for Pain. TROLAMINE SALICYLATE (ASPERCREME EX) by Apply Externally route. Applies to left knee 3-4 x daily      aspirin delayed-release 81 mg tablet Take  by mouth daily. magnesium oxide 500 mg tab Take 1 Tab by mouth daily. multivitamin (ONE A DAY) tablet Take 1 Tab by mouth daily.     Associated Diagnoses: ERRONEOUS ENCOUNTER--DISREGARD         STOP taking these medications       loratadine (CLARITIN) 10 mg tablet Comments:   Reason for Stopping:         docusate calcium (STOOL SOFTENER, DOCUSATE NEHEMIAH, PO) Comments:   Reason for Stopping:         hydroCHLOROthiazide (HYDRODIURIL) 25 mg tablet Comments:   Reason for Stopping:         cyclobenzaprine (FLEXERIL) 5 mg tablet Comments:   Reason for Stopping:               My Recommended Diet, Activity, Wound Care, and follow-up labs are listed in the patient's Discharge Insturctions which I have personally completed and reviewed. Risk of deterioration: Low    Condition at Discharge:  Stable  _____________________________________________________________________    Disposition  SNF/LTC  ____________________________________________________________________    Care Plan discussed with:   Patient, Family, RN, Care Manager, Consultant    ____________________________________________________________________    Code Status: DNR/DNI  ____________________________________________________________________      Condition at Discharge:  Stable  _____________________________________________________________________  Follow up with:   PCP : Pilo Horne MD  Follow-up Information     Follow up With Details Comments [address]  this is your home care provider. If you have not heard from them in one day prior to discharge please call them. [facility] [phone] Wilbur Horne MD Schedule an appointment as soon as possible for a visit in 1 week with PCP for post hospital follow up appointment.   [address]  1593 Kalin Buckner MD Call  [address] [place] [address] [phone]      [facility]   Nahun Hughes              Total time in minutes spent coordinating this discharge (includes going over instructions, follow-up, prescriptions, and preparing report for sign off to her PCP) :  35 minutes    Signed:  Lencho White MD

## 2018-08-08 NOTE — PROGRESS NOTES
Bedside shift change report given to Dian (oncoming nurse) by Latasha Zheng (offgoing nurse). Report included the following information SBAR, Kardex, Intake/Output, MAR and Recent Results. Zone Phone for oncoming shift:   [phone]    Shift Summary: Pt rested quietly through night. Some issues with pain, partly relieved by medication. Attempting to have bowel movement. None since Sunday. LDAs               Peripheral IV 08/06/18 Right Antecubital (Active)   Site Assessment Clean, dry, & intact 8/8/2018  7:00 AM   Phlebitis Assessment 0 8/8/2018  7:00 AM   Infiltration Assessment 0 8/8/2018  7:00 AM   Dressing Status Clean, dry, & intact 8/8/2018  7:00 AM   Dressing Type Transparent 8/8/2018  7:00 AM   Hub Color/Line Status Blue;Capped 8/8/2018  7:00 AM   Action Taken Blood drawn 8/6/2018  4:11 AM   Alcohol Cap Used Yes 8/8/2018  7:00 AM                        Intake & Output   Date 08/07/18 0700 - 08/08/18 0659 08/08/18 0700 - 08/09/18 0659   Shift 6655-9684 8959-0916 24 Hour Total 2050-2105 3288-5762 24 Hour Total   I  N  T  A  K  E   P.O. 480 480 960         P. O. 480 480 960       I.V.  (mL/kg/hr)  2400  (1.9) 2400  (0.9)         I.V.  1200 1200         Volume (0.9% sodium chloride infusion)  1200 1200       Shift Total  (mL/kg) 480  (4.5) 2880  (27) 3360  (31.5)      O  U  T  P  U  T   Urine  (mL/kg/hr)            Urine Occurrence(s) 4 x 3 x 7 x       Shift Total  (mL/kg)          2880 3360      Weight (kg) 106.6 106.6 106.6 106.6 106.6 106.6      Last Bowel Movement Last Bowel Movement Date: 08/06/18   Glucose Checks [x] N/A  [] AC/HS  [] Q6  Concerns:   Nutrition Active Orders   Diet    DIET PUREED 50GM Fat       Consults [x]PT  [x]OT  [x]Speech  [x]Case Management   Cardiac Monitoring [x]N/A []Yes Expires:

## 2018-08-15 NOTE — PROGRESS NOTES
UAI was successfully completed. I faxed this to William Espinoza at [phone]. Copy kept in the CM office.

## 2018-08-28 NOTE — TELEPHONE ENCOUNTER
Tiffani Collado at [place] [address] wants to know if we need to see the patient before she is admitted to [facility]. She was last seen in early May.   Tiffani Collado can be reached at [phone]

## 2018-08-29 NOTE — TELEPHONE ENCOUNTER
Call [facility]  Pt needs to be seen unless she got an order from the hospital

## 2018-08-30 NOTE — PROGRESS NOTES
HISTORY OF PRESENT ILLNESS  Morenita Dumas is a 81 y.o. female. Chief Complaint   Patient presents with   [facility] Follow Up     [facility]       HPI  Was in the hospital for Gallstone Pancreatitis at first  But then Diagnosed with liver mets from Colon Ca  Has F/U with Dr Tyree Gilliam who diagnosed her with Colon Ca last year  Has apt 9/10/18 to discuss treatment and seeing Oncologist  Not sure if she will have surgery or other treatments    Was sent to rehab  Came home Tue, 2 d ago    [facility]  Feeling weak  Not too strong  Have not yet tried taking a shower  No open wounds per pt    Can't sleep in bed  Sleeping in recliner  Can't get out of bed d/t hip pain  Can't turn over    Has not been taking HCTZ since out of hospital  BP low now  Has lost 20 lbs this mo    Review of Systems   Constitutional: Positive for weight loss. Negative for fever. HENT: Positive for congestion. Respiratory: Positive for cough (little). Negative for sputum production. Cardiovascular: Negative for chest pain and leg swelling. Gastrointestinal: Negative for abdominal pain, blood in stool, constipation, diarrhea, melena, nausea and vomiting. Genitourinary: Negative for dysuria. Past Medical History:   Diagnosis Date    Allergic rhinitis     Cancer ([address]     Stage 1 cancer intestines    Cervical disc disease     Cervical disc disease     Colonic mass 3/20/2017    Eczema     Gastritis     GERD (gastroesophageal reflux disease)     mild    GI bleed     hx.  of recurrent    Hernia     hiatal lt side    Hiatal hernia     Hypercholesterolemia     Hypertension     Morbid obesity ([address] 4/13/2017    Osteoarthritis (arthritis due to wear and tear of joints)     Pancreatitis, gallstone     8/18       Past Surgical History:   Procedure Laterality Date    ABDOMEN SURGERY PROC UNLISTED      Colon Resection (Cancer)    HX COLONOSCOPY  3/05    HX COLONOSCOPY  2017    HX CYST REMOVAL      left side    HX ENDOSCOPY  1/09    HX ENDOSCOPY  2015    HX GYN      hysterectomy    HX ORTHOPAEDIC      rt knee replacement       Current Outpatient Prescriptions   Medication Sig Dispense Refill    magnesium hydroxide (MILK OF MAGNESIA) 400 mg/5 mL suspension Take 30 mL by mouth daily as needed for Constipation.  polyethylene glycol (MIRALAX) 17 gram packet Take 1 Packet by mouth daily. 30 Packet 0    furosemide (LASIX) 20 mg tablet Take 20 mg by mouth two (2) times a day.  potassium chloride SR (KLOR-CON 10) 10 mEq tablet Take 2 Tabs by mouth daily. (Patient taking differently: Take  by mouth daily.) 360 Tab 1    HYDROcodone-acetaminophen (NORCO)  mg tablet Take 1 Tab by mouth every four (4) hours as needed. Max Daily Amount: 6 Tabs. 10 Tab 0    esomeprazole (NEXIUM) 40 mg capsule Take 1 Cap by mouth daily. 90 Cap 1    dilTIAZem (TIAZAC) 120 mg SR capsule Take 1 Cap by mouth daily. 90 Cap 1    atorvastatin (LIPITOR) 20 mg tablet Take 1 Tab by mouth daily. 90 Tab 1    cholecalciferol (VITAMIN D3) 1,000 unit cap Take 1,000 Units by mouth daily.  UBIDECARENONE (CO Q-10 PO) Take 200 mg by mouth daily.  acetaminophen (TYLENOL) 325 mg tablet Take  by mouth every four (4) hours as needed for Pain.  TROLAMINE SALICYLATE (ASPERCREME EX) by Apply Externally route. Applies to left knee 3-4 x daily      aspirin delayed-release 81 mg tablet Take  by mouth daily.  magnesium oxide 500 mg tab Take 1 Tab by mouth daily.  multivitamin (ONE A DAY) tablet Take 1 Tab by mouth daily.          Allergies   Allergen Reactions    Lisinopril Angioedema    Niacin Itching     Felt hot also    Ultram [Tramadol] Nausea Only       Visit Vitals    /49 (BP 1 Location: Left arm, BP Patient Position: At rest)    Pulse 80    Temp 96.8 °F (36 °C) (Oral)    Resp 16    Ht 5' 2\" (1.575 m)    Wt 215 lb (97.5 kg)    SpO2 95%    BMI 39.32 kg/m2       Physical Exam   Constitutional: She is oriented to person, place, and time. She appears well-developed and well-nourished. No distress. HENT:   Head: Normocephalic and atraumatic. Mouth/Throat: Oropharynx is clear and moist. No oropharyngeal exudate. Eyes: Conjunctivae and EOM are normal. Pupils are equal, round, and reactive to light. Cardiovascular: Normal rate and regular rhythm. Pulmonary/Chest: Effort normal and breath sounds normal.   Abdominal: Soft. She exhibits no distension. There is no tenderness. There is no guarding. Musculoskeletal: She exhibits no edema. Neurological: She is alert and oriented to person, place, and time. Skin: Skin is warm and dry. Psychiatric: She has a normal mood and affect. Nursing note and vitals reviewed. ASSESSMENT and PLAN    ICD-10-CM ICD-9-CM    1. Gallstone pancreatitis C84.15 664.7 METABOLIC PANEL, COMPREHENSIVE     574.20 CBC WITH AUTOMATED DIFF      LIPASE      AMYLASE   2. Liver metastasis ([facility] [address] C78.7 197.7 REFERRAL TO [address]   3. Weakness generalized R53.1 780.79 REFERRAL TO [address]   4. Arthralgia of hip, unspecified laterality M25.559 719.45    5.  Essential hypertension C81 198.5 METABOLIC PANEL, COMPREHENSIVE   hold HCTZ for now since BP low and pt lost weight  F/U with Specialist  Rx given for hospital bed since weak, has liver mets and hip pain and needs hospital bed to achieve body positioning to alleviate pain, see copy  Recheck in 1 mo

## 2018-08-30 NOTE — MR AVS SNAPSHOT
[facility] 6 
[phone] Patient: Linus Alejo MRN: [id number] NUA:[id number] Visit Information Date & Time Provider Department Dept. Phone Encounter #  
 8/30/2018  9:20 AM Marivel Whitaker MD [address] [place] [address] [phone] [id number] Follow-up Instructions Return in about 4 weeks (around 9/27/2018). Your Appointments 9/10/2018  2:40 PM  
ESTABLISHED PATIENT with Pilo Mensah MD  
57 [facility] 963 ([address]) Appt Note: EP   Reevaluation for gallbladder surgery, liver problem. Appt. scheduled by patient's son, Mr. Octaviano Miranda. vca  
 [address] [facility] [address] [place] [address] [phone]  
[address] [place] [address] Upcoming Health Maintenance Date Due Influenza Age 5 to Adult 8/1/2018 MEDICARE YEARLY EXAM 5/2/2019 GLAUCOMA SCREENING Q2Y 11/27/2019 COLONOSCOPY 3/9/2022 DTaP/Tdap/Td series (2 - Td) 8/24/2027 Allergies as of 8/30/2018  Review Complete On: 8/30/2018 By: Kusum Strickland LPN Severity Noted Reaction Type Reactions Lisinopril Medium 05/02/2017   Side Effect Angioedema Niacin  10/22/2013    Itching Felt hot also Ultram [Tramadol]  10/22/2013    Nausea Only Current Immunizations  Reviewed on 10/10/2017 Name Date Influenza High Dose Vaccine PF 10/10/2017, 10/4/2016 Influenza Vaccine 10/22/2014 Influenza Vaccine (Quad) PF 11/5/2015 Pneumococcal Conjugate (PCV-13) 5/1/2015 Not reviewed this visit You Were Diagnosed With   
  
 Codes Comments Gallstone pancreatitis    -  Primary ICD-10-CM: K85.10 ICD-9-CM: 096.0, 574.20 Liver metastasis ([facility] [address]     ICD-10-CM: C78.7 ICD-9-CM: 197.7 Weakness generalized     ICD-10-CM: R53.1 ICD-9-CM: 780.79   
 Arthralgia of hip, unspecified laterality     ICD-10-CM: M25.559 ICD-9-CM: 719.45 Essential hypertension     ICD-10-CM: I10 
ICD-9-CM: 401.9 Vitals BP Pulse Temp Resp Height(growth percentile) Weight(growth percentile) 107/49 (BP 1 Location: Left arm, BP Patient Position: At rest) 80 96.8 °F (36 °C) (Oral) 16 5' 2\" (1.575 m) 215 lb (97.5 kg) SpO2 BMI OB Status Smoking Status 95% 39.32 kg/m2 Hysterectomy Never Smoker Vitals History BMI and BSA Data Body Mass Index Body Surface Area  
 39.32 kg/m 2 2.07 m 2 Preferred Pharmacy Pharmacy Name Phone [facility],[address], [address] [place] [address]  [phone] Your Updated Medication List  
  
   
This list is accurate as of 8/30/18 10:34 AM.  Always use your most recent med list.  
  
  
  
  
 acetaminophen 325 mg tablet Commonly known as:  TYLENOL Take  by mouth every four (4) hours as needed for Pain. ASPERCREME EX  
by Apply Externally route. Applies to left knee 3-4 x daily  
  
 aspirin delayed-release 81 mg tablet Take  by mouth daily. atorvastatin 20 mg tablet Commonly known as:  LIPITOR Take 1 Tab by mouth daily. CO Q-10 PO Take 200 mg by mouth daily. dilTIAZem 120 mg SR capsule Commonly known as:  [facility] Take 1 Cap by mouth daily. esomeprazole 40 mg capsule Commonly known as:  NexIUM Take 1 Cap by mouth daily. furosemide 20 mg tablet Commonly known as:  LASIX Take 20 mg by mouth two (2) times a day. HYDROcodone-acetaminophen  mg tablet Commonly known as:  [place] Take 1 Tab by mouth every four (4) hours as needed. Max Daily Amount: 6 Tabs.  
  
 magnesium hydroxide 400 mg/5 mL suspension Commonly known as:  MILK OF MAGNESIA Take 30 mL by mouth daily as needed for Constipation. magnesium oxide 500 mg Tab Take 1 Tab by mouth daily. multivitamin tablet Commonly known as:  ONE A DAY  
 Take 1 Tab by mouth daily. polyethylene glycol 17 gram packet Commonly known as:  [place] Take 1 Packet by mouth daily. potassium chloride SR 10 mEq tablet Commonly known as:  KLOR-CON 10 Take 2 Tabs by mouth daily. VITAMIN D3 1,000 unit Cap Generic drug:  cholecalciferol Take 1,000 Units by mouth daily. We Performed the Following AMYLASE [id number] CPT(R)] CBC WITH AUTOMATED DIFF [69548 CPT(R)] LIPASE [id number] CPT(R)] METABOLIC PANEL, COMPREHENSIVE [43810 CPT(R)] [address] Comments:  
 Please evaluate patient for PT and OT Generalized weakness after hospitalization and rehab For History of Colon Ca and met in liver, pancreatitis Follow-up Instructions Return in about 4 weeks (around 9/27/2018). Referral Information Referral ID Referred By Referred To  
  
 [id number] Peri Reynolds Not Available Visits Status Start Date End Date 1 New Request 8/30/18 8/30/19 If your referral has a status of pending review or denied, additional information will be sent to support the outcome of this decision. Introducing [facility]! Dear Corine Gaspar: 
Thank you for requesting a [employer] account. Our records indicate that you already have an active [employer] account. You can access your account anytime at [email or web address] Did you know that you can access your hospital and ER discharge instructions at any time in [employer]? You can also review all of your test results from your hospital stay or ER visit. Additional Information If you have questions, please visit the Frequently Asked Questions section of the [employer] website at [email or web address] Remember, [employer] is NOT to be used for urgent needs. For medical emergencies, dial 911. Now available from your iPhone and Android! Please provide this summary of care documentation to your next provider. Your primary care clinician is listed as Dimple Greco. If you have any questions after today's visit, please call [phone].

## 2018-08-31 NOTE — PROGRESS NOTES
Call pt, the sugar is a touch up  The kidney and liver tests are normal  The Pancreas tests are normal now  The CBC is ok now

## 2018-09-04 NOTE — Clinical Note
Pt admitted 9/4/18 for home health PT and OT services. Med reconciliation found the following discrepencies: Pt currently taking furosemide 40mg BID but per med list should be 20mg BID. Please clarify correct dose. Pt currently reports she does not take ASA 81mg daily. Pt is taking OTC loratadine 10mg QD and simethicone 125mg PRN. These are not listed on her med list. Would you like her to continue?

## 2018-09-05 NOTE — PROGRESS NOTES
Please have pt continue Lasix 20 mg bid, hold the ASA 81 mg until after surgery, she may cont Loratadine 10 mg prn for allergies and Simethicone prn

## 2018-09-10 PROBLEM — C78.7 METASTATIC ADENOCARCINOMA TO LIVER (HCC): Status: ACTIVE | Noted: 2018-01-01

## 2018-09-10 NOTE — PROGRESS NOTES
Subjective:        Dinah Loredo is a 81 y.o.  female who presents for a reevaluation for gallbladder surgery. She is seen today with two new problems. She had an episode of gallstone pancreatitis and while being evaluated for that, was found to have a mass in segment five of the liver which is biopsy-proven metastatic adenocarcinoma. She spent several weeks in a skilled nursing facility after being discharged from her episode of pancreatitis. She has had no further symptoms of pancreatitis. MRI ABD W MRCP W WO CONT from 08/01/2018:  FINDINGS:      MRCP: Suboptimal MRCP. In particular, evaluation for choledocholithiasis is  significantly limited. The intrahepatic biliary tree demonstrates mild  dilatation, which was not present on the 4/17/2017 CT the common bile duct is  not dilated. The common bile duct can be followed all the way to the ampulla on  the postcontrast sequences. The pancreatic duct is normal in course and caliber. LIVER: No steatosis. There is a mildly T2 hyperintense, markedly heterogeneous  mass in the inferior aspect of the right lobe in segment 5 measuring 4.5 x 8.9 x  4.7 cm. There are areas of nonenhancement within this lesion that could  represent necrosis. GALLBLADDER: Numerous tiny layering stones and/or sludge are seen in the  gallbladder. No wall thickening. Morna Rout PANCREAS: Fairly extensive edema is seen in the uncinate process, head, and neck  region of the pancreas with minimal surrounding peripancreatic edema. No fluid  collection. There is a vague area soft tissue in the mesentery inferior to the  pancreas that demonstrates enhancement and measures approximately 3.3 x 5.1 cm  series 1402 image 87. SPLEEN: Normal.  ADRENALS: Normal.  KIDNEYS: Numerous parapelvic and exophytic cysts are seen arising from both  kidneys. No hydronephrosis or solid mass. Morna Rout   DISTAL ESOPHAGUS, STOMACH, AND VISUALIZED BOWEL: Diastasis of the rectus muscles  and a ventral hernia redemonstrated. No obstruction. PERITONEUM: No free fluid and no abdominal lymphadenopathy. VISUALIZED LUNG BASES: Grossly clear within the sensitivity of MRI. BONES: No suspicious lesions. IMPRESSION  IMPRESSION:   1. Acute pancreatitis is noted. 2. Nonspecific soft tissue mass versus extensive enhancing inflammatory change  in the mesentery inferior to the pancreas described above. Soft tissue mass is  favored, which is concerning for metastatic disease. 3. Aggressive appearing, heterogeneous mass in the inferior aspect of the right  lobe of the liver. Given history of colon cancer, findings are highly concerning  for metastatic disease. This lesion would be amenable to imaging guided biopsy. 4. Other findings described above. FNA bx from 08/03/2018: I independently reviewed these images.      Chief Complaint   Patient presents with   [facility] Follow Up     gallstone pancreatitis     Liver Cancer     see biopsy 8/3/18       Patient Active Problem List    Diagnosis Date Noted    Metastatic colon cancer to liver ([address] 10/04/2018    Metastatic adenocarcinoma to liver ([address] 09/10/2018    Abnormal CT of the abdomen 07/31/2018    Abnormal liver enzymes 07/31/2018    Sludge in gallbladder 07/31/2018    Class 3 severe obesity due to excess calories with serious comorbidity and body mass index (BMI) of 40.0 to 44.9 in adult ([address] 07/31/2018    Incisional hernia, without obstruction or gangrene 07/31/2018    Gallstone pancreatitis 07/30/2018    Colonic mass 03/20/2017    Allergic rhinitis 11/18/2016    Advance directive discussed with patient 02/29/2016    Occult blood in stools 12/04/2015    Prediabetes 11/05/2015    Hypercholesterolemia     Cervical disc disease     Osteoarthritis (arthritis due to wear and tear of joints)     GERD (gastroesophageal reflux disease)     Hypertension 10/22/2013    Hyperlipidemia 10/22/2013     Past Medical History:   Diagnosis Date  Allergic rhinitis     Cancer ([facility]     Stage 1 cancer intestines    Cervical disc disease     Colonic mass 3/20/2017    Eczema     Gastritis     GERD (gastroesophageal reflux disease)     mild    GI bleed     hx. of recurrent    Hiatal hernia     Hypercholesterolemia     Hypertension     Metastatic adenocarcinoma to liver ([facility] 9/10/2018    Morbid obesity ([facility] 4/13/2017    Osteoarthritis (arthritis due to wear and tear of joints)     Pancreatitis, gallstone     8/18      Past Surgical History:   Procedure Laterality Date    ABDOMEN SURGERY PROC UNLISTED      Colon Resection (Cancer)    HX COLONOSCOPY  3/05    HX COLONOSCOPY  2017    HX CYST REMOVAL      left side    HX ENDOSCOPY  1/09    HX ENDOSCOPY  2015    HX GYN      hysterectomy    HX ORTHOPAEDIC      rt knee replacement      Social History     Tobacco Use    Smoking status: Never Smoker    Smokeless tobacco: Never Used   Substance Use Topics    Alcohol use: No      Family History   Problem Relation Age of Onset    No Known Problems Mother     No Known Problems Father     Arthritis-osteo Sister     Alzheimer Sister     Parkinson's Disease Brother     Cancer Brother     No Known Problems Brother     Anesth Problems Neg Hx       Prior to Admission medications    Medication Sig Start Date End Date Taking? Authorizing Provider   aspirin delayed-release 81 mg tablet Take 81 mg by mouth daily. Yes Dimple Greco MD   polyethylene glycol (MIRALAX) 17 gram packet Take 1 Packet by mouth daily. 8/8/18  Yes Army Lauren MD   HYDROcodone-acetaminophen [facility])  mg tablet Take 1 Tab by mouth every four (4) hours as needed. Max Daily Amount: 6 Tabs. 8/8/18  Yes Army Lauren MD   bisacodyl (DULCOLAX, BISACODYL,) 10 mg suppository Insert 10 mg into rectum daily. Provider, Historical   haloperidol (HALDOL) 2 mg tablet Take  by mouth.     Provider, Historical   hyoscyamine SL (LEVSIN/SL) 0.125 mg SL tablet 0.125 mg by SubLINGual route every four (4) hours as needed for Cramping. Provider, Historical   LORazepam (INTENSOL) 2 mg/mL concentrated solution Take 1 mg by mouth every eight (8) hours as needed for Anxiety. Provider, Historical   morphine 20 mg/5 mL (4 mg/mL) solution Take 5 mg by mouth every two (2) hours as needed for Pain. Provider, Historical   cetirizine (ZYRTEC) 10 mg tablet Take 10 mg by mouth daily as needed. 4/6/17   Provider, Historical   potassium chloride (K-DUR, KLOR-CON) 20 mEq tablet Take 1 Tab by mouth daily. 10/29/18   Dimple Greco MD   ondansetron hcl (ZOFRAN) 8 mg tablet Take 1 Tab by mouth every eight (8) hours as needed for Nausea. Take every 8 hours only as needed. Indications: CANCER CHEMOTHERAPY-INDUCED NAUSEA AND VOMITING 10/10/18   Raiza Mckeon MD   omeprazole (PRILOSEC) 20 mg capsule Take 1 Cap by mouth daily. 9/27/18   Dimple Greco MD   dilTIAZem AGUILAR [facility]) 120 mg SR capsule Take 1 Cap by mouth daily. 9/27/18   Dimple Greco MD   atorvastatin (LIPITOR) 20 mg tablet Take 1 Tab by mouth daily. 9/27/18   Dimple Greco MD   cholecalciferol (VITAMIN D3) 1,000 unit cap Take 1 Cap by mouth daily. 9/27/18   Dimple Greco MD   furosemide (LASIX) 20 mg tablet Take 1 Tab by mouth two (2) times a day. 9/27/18   Satish Greco MD   acetaminophen (TYLENOL) 325 mg tablet Take 325 mg by mouth every four (4) hours as needed for Pain. Provider, Historical     Allergies   Allergen Reactions    Lisinopril Angioedema    Niacin Itching     Felt hot also    Ultram [Tramadol] Nausea Only         Review of Systems:    A comprehensive review of systems was negative except for that written in the History of Present Illness.     Objective:     Visit Vitals  /70 (BP 1 Location: Right arm, BP Patient Position: Sitting)   Pulse 79   Temp 98 °F (36.7 °C) (Oral)   Resp 18   Ht 5' 2\" (1.575 m)   Wt 215 lb (97.5 kg)   SpO2 98%   BMI 39.32 kg/m²       Physical Exam:  General appearance: alert, cooperative, no distress, appears stated age  Head: Normocephalic, without obvious abnormality, atraumatic  Lungs: clear to auscultation bilaterally  Heart: regular rate and rhythm, S1, S2 normal, no murmur, click, rub or gallop  Neurologic: Grossly normal  Abdomen: Has a huge ventral incisional hernia with almost complete loss of domain. The liver is easily palpable in this dfect but I cannot appreciate the lesion in the right lobe of the liver. There is no ascites. Assessment:       ICD-10-CM ICD-9-CM    1. Metastatic adenocarcinoma to liver ([facility] C78.7 197.7        Plan:     I do not believe she would be a good surgical candidate for resection of her liver metastasis. The confounding problem is that she indeed have gallstone pancreatitis and should have a cholecystectomy. I think it is worthwhile to at least consider liver-directed therapy for her metastatic disease. I will discuss her situation and review her films with Dr Kenzie Salgado regarding possibly Y90 treatment for her liver. I will defer management of her gallbladder for the time being. Written by wilmer Faust, as dictated by Bharat Ospina MD.    Total face to face time with patient: 15 minutes. Greater than 50% of the time was spent in counseling. Signed By: Bharat Ospian MD    February 21, 2019

## 2018-09-10 NOTE — MR AVS SNAPSHOT
[address] [address] 
[phone] Patient: Emerson Ramirez MRN: [id number] BKE:[birth year] Visit Information Date & Time Provider Department Dept. Phone Encounter #  
 9/10/2018  2:40 PM Ericka Ann, [address] [phone] [id number] Your Appointments 9/27/2018  9:00 AM  
Any with Kateryna Perales MD  
[address] [facility]) Appt Note: 1 mo f/u $0 CP mbm  
 [address] [facility] [address] [phone]  
  
   
 [address] [place] [id number] Upcoming Health Maintenance Date Due Pneumococcal 65+ High/Highest Risk (2 of 2 - PPSV23) 6/26/2015 Influenza Age 5 to Adult 8/1/2018 MEDICARE YEARLY EXAM 5/2/2019 GLAUCOMA SCREENING Q2Y 11/27/2019 COLONOSCOPY 3/9/2022 DTaP/Tdap/Td series (2 - Td) 8/24/2027 Allergies as of 9/10/2018  Review Complete On: 9/10/2018 By: Ericka Ann MD  
  
 Severity Noted Reaction Type Reactions Lisinopril Medium 05/02/2017   Side Effect Angioedema Niacin  10/22/2013    Itching Felt hot also Ultram [Tramadol]  10/22/2013    Nausea Only Current Immunizations  Reviewed on 10/10/2017 Name Date Influenza High Dose Vaccine PF 10/10/2017, 10/4/2016 Influenza Vaccine 10/22/2014 Influenza Vaccine (Quad) PF 11/5/2015 Pneumococcal Conjugate (PCV-13) 5/1/2015 Not reviewed this visit You Were Diagnosed With   
  
 Codes Comments Metastatic adenocarcinoma to liver [facility])    -  Primary ICD-10-CM: C78.7 ICD-9-CM: 197.7 Vitals BP Pulse Temp Resp Height(growth percentile) Weight(growth percentile) 118/70 (BP 1 Location: Right arm, BP Patient Position: Sitting) 79 98 °F (36.7 °C) (Oral) 18 5' 2\" (1.575 m) 215 lb (97.5 kg) SpO2 BMI OB Status Smoking Status 98% 39.32 kg/m2 Hysterectomy Never Smoker BMI and BSA Data Body Mass Index Body Surface Area  
 39.32 kg/m 2 2.07 m 2 Preferred Pharmacy Pharmacy Name Phone [address], [address] [place] [address]  [phone] Your Updated Medication List  
  
   
This list is accurate as of 9/10/18  4:19 PM.  Always use your most recent med list.  
  
  
  
  
 acetaminophen 325 mg tablet Commonly known as:  TYLENOL Take  by mouth every four (4) hours as needed for Pain. ASPERCREME EX  
by Apply Externally route. Applies to left knee 3-4 x daily  
  
 aspirin delayed-release 81 mg tablet Take 81 mg by mouth daily. atorvastatin 20 mg tablet Commonly known as:  LIPITOR Take 1 Tab by mouth daily. CO Q-10 PO Take 200 mg by mouth daily. dilTIAZem 120 mg SR capsule Commonly known as:  [facility] Take 1 Cap by mouth daily. esomeprazole 40 mg capsule Commonly known as:  NexIUM Take 1 Cap by mouth daily. furosemide 20 mg tablet Commonly known as:  LASIX Take 20 mg by mouth two (2) times a day. HYDROcodone-acetaminophen  mg tablet Commonly known as:  Marvelyn Code Take 1 Tab by mouth every four (4) hours as needed. Max Daily Amount: 6 Tabs.  
  
 magnesium hydroxide 400 mg/5 mL suspension Commonly known as:  MILK OF MAGNESIA Take 30 mL by mouth daily as needed for Constipation. magnesium oxide 500 mg Tab Take 1 Tab by mouth daily. multivitamin tablet Commonly known as:  ONE A DAY Take 1 Tab by mouth daily. polyethylene glycol 17 gram packet Commonly known as:  [employer] Lies Take 1 Packet by mouth daily. * potassium chloride 20 mEq tablet Commonly known as:  K-DUR, KLOR-CON Take 20 mEq by mouth daily. * potassium chloride SR 10 mEq tablet Commonly known as:  KLOR-CON 10 Take 2 Tabs by mouth daily. VITAMIN D3 1,000 unit Cap Generic drug:  cholecalciferol Take 1,000 Units by mouth daily. * Notice: This list has 2 medication(s) that are the same as other medications prescribed for you. Read the directions carefully, and ask your doctor or other care provider to review them with you. To-Do List   
 09/10/2018 7:00 PM  
  Appointment with Radha Christensen OT at [place] [address]  
  
 09/11/2018 To Be Determined Appointment with Tato Abad at [place] [address]  
  
 09/13/2018 To Be Determined Appointment with Poly Mishra PTA at [place] [address]  
  
 09/18/2018 To Be Determined Appointment with Poly Mishra PTA at [place] [address]  
  
 09/19/2018 To Be Determined Appointment with Radha Christensen OT at [place] [address]  
  
 09/20/2018 To Be Determined Appointment with Poly Mishra PTA at [place] [address]  
  
 09/25/2018 To Be Determined Appointment with Poly Mishra PTA at [place] [address]  
  
 09/26/2018 To Be Determined Appointment with Radha Christensen OT at [place] [address]  
  
 09/27/2018 To Be Determined Appointment with Abdoul Desai PT at [address] & [facility] SERVICES! Dear Henna Mackenzie: 
Thank you for requesting a [employer] account. Our records indicate that you already have an active [employer] account. You can access your account anytime at [email or web address] Did you know that you can access your hospital and ER discharge instructions at any time in [employer]? You can also review all of your test results from your hospital stay or ER visit. Additional Information If you have questions, please visit the Frequently Asked Questions section of the [employer] website at [email or web address] Remember, MyChart is NOT to be used for urgent needs. For medical emergencies, dial 911. Now available from your iPhone and Android! Please provide this summary of care documentation to your next provider. Your primary care clinician is listed as Dimple Greco. If you have any questions after today's visit, please call [phone].

## 2018-09-10 NOTE — PROGRESS NOTES
Subjective:  
  
 
Olga Luther is a 81 y.o.  female who presents for a reevaluation for gallbladder surgery. She is seen today with two new problems. She had an episode of gallstone pancreatitis and while being evaluated for that, was found to have a mass in segment five of the liver which is biopsy-proven metastatic adenocarcinoma. She spent several weeks in a skilled nursing facility after being discharged from her episode of pancreatitis. She has had no further symptoms of pancreatitis. MRI ABD W MRCP W WO CONT from 08/01/2018: 
FINDINGS:  
  
MRCP: Suboptimal MRCP. In particular, evaluation for choledocholithiasis is 
significantly limited. The intrahepatic biliary tree demonstrates mild 
dilatation, which was not present on the 4/17/2017 CT the common bile duct is 
not dilated. The common bile duct can be followed all the way to the ampulla on 
the postcontrast sequences. The pancreatic duct is normal in course and caliber. LIVER: No steatosis. There is a mildly T2 hyperintense, markedly heterogeneous 
mass in the inferior aspect of the right lobe in segment 5 measuring 4.5 x 8.9 x 
4.7 cm. There are areas of nonenhancement within this lesion that could 
represent necrosis. GALLBLADDER: Numerous tiny layering stones and/or sludge are seen in the 
gallbladder. No wall thickening. Angella Eves PANCREAS: Fairly extensive edema is seen in the uncinate process, head, and neck 
region of the pancreas with minimal surrounding peripancreatic edema. No fluid 
collection. There is a vague area soft tissue in the mesentery inferior to the 
pancreas that demonstrates enhancement and measures approximately 3.3 x 5.1 cm 
series 1402 image 87. SPLEEN: Normal. 
ADRENALS: Normal. 
KIDNEYS: Numerous parapelvic and exophytic cysts are seen arising from both 
kidneys. No hydronephrosis or solid mass. Angella Eves DISTAL ESOPHAGUS, STOMACH, AND VISUALIZED BOWEL: Diastasis of the rectus muscles and a ventral hernia redemonstrated. No obstruction. PERITONEUM: No free fluid and no abdominal lymphadenopathy. VISUALIZED LUNG BASES: Grossly clear within the sensitivity of MRI. BONES: No suspicious lesions. IMPRESSION IMPRESSION:  
1. Acute pancreatitis is noted. 2. Nonspecific soft tissue mass versus extensive enhancing inflammatory change 
in the mesentery inferior to the pancreas described above. Soft tissue mass is 
favored, which is concerning for metastatic disease. 3. Aggressive appearing, heterogeneous mass in the inferior aspect of the right 
lobe of the liver. Given history of colon cancer, findings are highly concerning 
for metastatic disease. This lesion would be amenable to imaging guided biopsy. 4. Other findings described above. FNA bx from 08/03/2018: I independently reviewed these images. Chief Complaint Patient presents with  
[facility] Follow Up  
  gallstone pancreatitis  Liver Cancer  
  see biopsy 8/3/18 Patient Active Problem List  
 Diagnosis Date Noted  Metastatic adenocarcinoma to liver ([facility] 09/10/2018  Abnormal CT of the abdomen 07/31/2018  Abnormal liver enzymes 07/31/2018  Sludge in gallbladder 07/31/2018  Class 3 severe obesity due to excess calories with serious comorbidity and body mass index (BMI) of 40.0 to 44.9 in adult [facility]) 07/31/2018  Incisional hernia, without obstruction or gangrene 07/31/2018  Gallstone pancreatitis 07/30/2018  Morbid obesity ([address] 04/13/2017  Colonic mass 03/20/2017  Allergic rhinitis 11/18/2016  Advance directive discussed with patient 02/29/2016  Occult blood in stools 12/04/2015  Prediabetes 11/05/2015  Hypercholesterolemia  Cervical disc disease  Osteoarthritis (arthritis due to wear and tear of joints)  GERD (gastroesophageal reflux disease)  Hypertension 10/22/2013  Hyperlipidemia 10/22/2013 Past Medical History:  
Diagnosis Date  Allergic rhinitis  Cancer ([facility] Stage 1 cancer intestines  Cervical disc disease  Cervical disc disease  Colonic mass 3/20/2017  Eczema  Gastritis  GERD (gastroesophageal reflux disease)   
 mild  GI bleed   
 hx. of recurrent  Hernia   
 hiatal lt side  Hiatal hernia  Hypercholesterolemia  Hypertension  Metastatic adenocarcinoma to liver ([facility] 9/10/2018  Morbid obesity ([facility] 4/13/2017  Osteoarthritis (arthritis due to wear and tear of joints)  Pancreatitis, gallstone 8/18 Past Surgical History:  
Procedure Laterality Date [address] UNLISTED Colon Resection (Cancer)  HX COLONOSCOPY  3/05  HX COLONOSCOPY  2017  HX CYST REMOVAL    
 left side  HX ENDOSCOPY  1/09  HX ENDOSCOPY  2015  HX GYN    
 hysterectomy  HX ORTHOPAEDIC    
 rt knee replacement Social History Substance Use Topics  Smoking status: Never Smoker  Smokeless tobacco: Never Used  Alcohol use No  
  
Family History Problem Relation Age of Onset  No Known Problems Mother  No Known Problems Father  Arthritis-osteo Sister  Alzheimer Sister  Parkinson's Disease Brother  Cancer Brother  No Known Problems Brother  Anesth Problems Neg Hx Prior to Admission medications Medication Sig Start Date End Date Taking? Authorizing Provider  
aspirin delayed-release 81 mg tablet Take 81 mg by mouth daily. Yes Reji Huynh MD  
potassium chloride (K-DUR, KLOR-CON) 20 mEq tablet Take 20 mEq by mouth daily. Yes Reji Huynh MD  
magnesium hydroxide (MILK OF MAGNESIA) 400 mg/5 mL suspension Take 30 mL by mouth daily as needed for Constipation. 8/8/18  Yes Alexandria Cazares MD  
polyethylene glycol (MIRALAX) 17 gram packet Take 1 Packet by mouth daily. 8/8/18  Yes Alexandria Cazares MD  
furosemide (LASIX) 20 mg tablet Take 20 mg by mouth two (2) times a day.  8/8/18  Yes Alexandria Cazares MD  
 potassium chloride SR (KLOR-CON 10) 10 mEq tablet Take 2 Tabs by mouth daily. Patient taking differently: Take  by mouth daily. 8/8/18  Yes Aliyah Hermosillo MD  
HYDROcodone-acetaminophen [facility])  mg tablet Take 1 Tab by mouth every four (4) hours as needed. Max Daily Amount: 6 Tabs. 8/8/18  Yes Aliyah Hermosillo MD  
esomeprazole (NEXIUM) 40 mg capsule Take 1 Cap by mouth daily. 2/20/18  Yes Dimple Greco MD  
dilTIAZem (TIAZAC) 120 mg SR capsule Take 1 Cap by mouth daily. 2/20/18  Yes Dimple Greco MD  
atorvastatin (LIPITOR) 20 mg tablet Take 1 Tab by mouth daily. 2/20/18  Yes Johnson Matthews MD  
cholecalciferol (VITAMIN D3) 1,000 unit cap Take 1,000 Units by mouth daily. Yes Historical Provider UBIDECARENONE (CO Q-10 PO) Take 200 mg by mouth daily. Yes Historical Provider  
acetaminophen (TYLENOL) 325 mg tablet Take  by mouth every four (4) hours as needed for Pain. Yes Historical Provider TROLAMINE SALICYLATE (ASPERCREME EX) by Apply Externally route. Applies to left knee 3-4 x daily   Yes Historical Provider  
magnesium oxide 500 mg tab Take 1 Tab by mouth daily. Yes Historical Provider  
multivitamin (ONE A DAY) tablet Take 1 Tab by mouth daily. Yes Historical Provider Allergies Allergen Reactions  Lisinopril Angioedema  Niacin Itching Felt hot also  Ultram [Tramadol] Nausea Only Review of Systems: A comprehensive review of systems was negative except for that written in the History of Present Illness. Objective:  
 
Visit Vitals  /70 (BP 1 Location: Right arm, BP Patient Position: Sitting)  Pulse 79  Temp 98 °F (36.7 °C) (Oral)  Resp 18  Ht 5' 2\" (1.575 m)  Wt 215 lb (97.5 kg)  SpO2 98%  BMI 39.32 kg/m2 Physical Exam: 
General appearance: alert, cooperative, no distress, appears stated age Head: Normocephalic, without obvious abnormality, atraumatic Lungs: clear to auscultation bilaterally Heart: regular rate and rhythm, S1, S2 normal, no murmur, click, rub or gallop Neurologic: Grossly normal 
Abdomen: Has a huge ventral incisional hernia with almost complete loss of domain. The liver is easily palpable in this dfect but I cannot appreciate the lesion in the right lobe of the liver. There is no ascites. Assessment: ICD-10-CM ICD-9-CM 1. Metastatic adenocarcinoma to liver ([facility]) C78.7 197.7 Plan: I do not believe she would be a good surgical candidate for resection of her liver metastasis. The confounding problem is that she indeed have gallstone pancreatitis and should have a cholecystectomy. I think it is worthwhile to at least consider liver-directed therapy for her metastatic disease. I will discuss her situation and review her films with Dr Mellisa Brandt regarding possibly Y90 treatment for her liver. I will defer management of her gallbladder for the time being. Written by wilmer Zimmer, as dictated by Dafne Britt MD. Total face to face time with patient: 15 minutes. Greater than 50% of the time was spent in counseling. Signed By: Dafne Britt MD  
 September 10, 2018

## 2018-09-10 NOTE — PROGRESS NOTES
1. Have you been to the ER, urgent care clinic since your last visit? Hospitalized since your last visit? No    2. Have you seen or consulted any other health care providers outside of the [address] [place] [address] since your last visit? Include any pap smears or colon screening.  No

## 2018-09-20 NOTE — TELEPHONE ENCOUNTER
I returned patients call and told her Dr. Yue Jon wanted her to see Dr. Beto Sampson. She requested I make the appoitnment for her. While she was on the phone I called and made appt. for 10/4/18  at 11:00am arrival time 10:45am. She wrote down the date time, name and phone number for Dr. Beto Sampson . Of note, Dr. Ambreen Delacruz last office visit note says Dr. Yue Jon was thinking about reviewing films with Dr. Sherry Chirinos regarding possible Y90 treatment for liver but he has decided he wants patient to see Dr. Beto Sampson.

## 2018-09-21 NOTE — PROGRESS NOTES
Son, Mr Julio Mejias, called and will call Lorene Bateman at [facility] for a copy of the [facility].

## 2018-09-21 NOTE — TELEPHONE ENCOUNTER
[facility]resh Tressa called and said no one has followed up with them and they're  waiting for someone to call them about an Oncologist but he said when you call to please ask for Ms. Murray Plata.   [phone]

## 2018-09-21 NOTE — TELEPHONE ENCOUNTER
I returned Mr. Michelle Argueta (Patients son Brit Gonzalez [phone] [phone])call and told him I had spoken with his mother yesterday and she had written down all the info I had given her. I gave him the same information: Dr. Kitty Cueva appt. 10/4/18 11:00 , arrival time 10:45 [facility] [place] [address].

## 2018-10-04 PROBLEM — C18.9 METASTATIC COLON CANCER TO LIVER (HCC): Status: ACTIVE | Noted: 2018-01-01

## 2018-10-04 PROBLEM — C78.7 METASTATIC COLON CANCER TO LIVER (HCC): Status: ACTIVE | Noted: 2018-01-01

## 2018-10-04 NOTE — PROGRESS NOTES
[facility] [place] at [place] [address] [address], [address]  W: [phone]  F: [phone]    Reason for Visit:   Dk Knight is a 81 y.o. female who is seen in consultation at the request of Dr. Rahul Feliciano for evaluation of metastatic colon cancer. Treatment History:   Colon resection 4/17 for T2N0 colon cancer  Liver biopsy 8/18    History of Present Illness:     Pt seen today for office consult for new metastatic colon cancer recently dx via IR liver biospy. Pt was in hospital at [facility] and had MRI which showed a liver mass. Biopsy at then showed adenocarcinoma. Pt went to rehab. Pt lives near [facility]. Pt is here with her son today. Uses a walker. Hx of early stage 1 colon cancer 4/17 with resection. Pt has arthritis hips and abdominal hernias. Labs good 8/18. Pt is here to discuss therapy for her colon cancer. Has been told no surgery now. Pt feels ok today. Able to eat fine. CEA 74 7/18        Past Medical History:   Diagnosis Date    Allergic rhinitis     Cancer ([address]     Stage 1 cancer intestines    Cervical disc disease     Colonic mass 3/20/2017    Eczema     Gastritis     GERD (gastroesophageal reflux disease)     mild    GI bleed     hx.  of recurrent    Hiatal hernia     Hypercholesterolemia     Hypertension     Metastatic adenocarcinoma to liver ([address] 9/10/2018    Morbid obesity ([address] 4/13/2017    Osteoarthritis (arthritis due to wear and tear of joints)     Pancreatitis, gallstone     8/18      Past Surgical History:   Procedure Laterality Date    ABDOMEN SURGERY PROC UNLISTED      Colon Resection (Cancer)    HX COLONOSCOPY  3/05    HX COLONOSCOPY  2017    HX CYST REMOVAL      left side    HX ENDOSCOPY  1/09    HX ENDOSCOPY  2015    HX GYN      hysterectomy    HX ORTHOPAEDIC      rt knee replacement      Social History   Substance Use Topics    Smoking status: Never Smoker    Smokeless tobacco: Never Used    Alcohol use No      Family History   Problem Relation Age of Onset    No Known Problems Mother     No Known Problems Father     Arthritis-osteo Sister     Alzheimer Sister     Parkinson's Disease Brother     Cancer Brother     No Known Problems Brother     Anesth Problems Neg Hx      Current Outpatient Prescriptions   Medication Sig    omeprazole (PRILOSEC) 20 mg capsule Take 1 Cap by mouth daily.  dilTIAZem (TIAZAC) 120 mg SR capsule Take 1 Cap by mouth daily.  atorvastatin (LIPITOR) 20 mg tablet Take 1 Tab by mouth daily.  magnesium oxide 500 mg tab Take 1 Tab by mouth daily.  cholecalciferol (VITAMIN D3) 1,000 unit cap Take 1 Cap by mouth daily.  furosemide (LASIX) 20 mg tablet Take 1 Tab by mouth two (2) times a day.  acetaminophen (TYLENOL) 325 mg tablet Take 325 mg by mouth every four (4) hours as needed for Pain.  trolamine salicylate (MYOFLEX) 10 % topical cream Apply 1 Part to affected area three (3) times daily as needed for Pain. apply small amount externally to left knee    coenzyme Q-10 (CO Q-10) 200 mg capsule Take 200 mg by mouth daily.  aspirin delayed-release 81 mg tablet Take 81 mg by mouth daily.  potassium chloride (K-DUR, KLOR-CON) 20 mEq tablet Take 20 mEq by mouth daily.  polyethylene glycol (MIRALAX) 17 gram packet Take 1 Packet by mouth daily.  potassium chloride SR (KLOR-CON 10) 10 mEq tablet Take 2 Tabs by mouth daily. (Patient taking differently: Take  by mouth daily.)    HYDROcodone-acetaminophen (NORCO)  mg tablet Take 1 Tab by mouth every four (4) hours as needed. Max Daily Amount: 6 Tabs.  multivitamin (ONE A DAY) tablet Take 1 Tab by mouth daily.  HYDROcodone-acetaminophen (NORCO)  mg tablet Take 1 Tab by mouth every four (4) hours as needed for Pain. No current facility-administered medications for this visit.        Allergies   Allergen Reactions    Lisinopril Angioedema    Niacin Itching     Felt hot also    Ultram [Tramadol] Nausea Only        Review of Systems: A complete review of systems was obtained, negative except as described above. Physical Exam:     Visit Vitals    /75    Pulse 76    Temp 98.5 °F (36.9 °C) (Oral)    Resp 16    Ht 5' 2\" (1.575 m)    Wt 220 lb (99.8 kg)    SpO2 93%    BMI 40.24 kg/m2     ECOG PS: 2  General: No distress  Eyes: PERRLA, anicteric sclerae  HENT: Atraumatic, OP clear  Neck: Supple  Respiratory: CTAB, normal respiratory effort  CV: Normal rate, regular rhythm, no murmurs, trace peripheral edema  GI: Soft, with large hernias  MS: walks with a walker  Skin: warm, dry  Psych: Alert, oriented, appropriate affect, normal judgment/insight    Results:   Labs reviewed    Lab Results   Component Value Date/Time    WBC 8.2 08/30/2018 10:19 AM    HGB 12.4 08/30/2018 10:19 AM    HCT 38.4 08/30/2018 10:19 AM    PLATELET 676 46/94/3359 10:19 AM    MCV 82 08/30/2018 10:19 AM    ABS. NEUTROPHILS 5.6 08/30/2018 10:19 AM    HGB (POC) 14.8 02/29/2016 11:30 AM    HCT (POC) 46.2 02/29/2016 11:30 AM     Lab Results   Component Value Date/Time    Sodium 141 08/30/2018 10:19 AM    Potassium 3.8 08/30/2018 10:19 AM    Chloride 95 (L) 08/30/2018 10:19 AM    CO2 31 (H) 08/30/2018 10:19 AM    Glucose 106 (H) 08/30/2018 10:19 AM    BUN 13 08/30/2018 10:19 AM    Creatinine 0.69 08/30/2018 10:19 AM    GFR est AA 94 08/30/2018 10:19 AM    GFR est non-AA 81 08/30/2018 10:19 AM    Calcium 9.4 08/30/2018 10:19 AM    Glucose (POC) 125 (H) 08/07/2018 04:03 PM     Lab Results   Component Value Date/Time    Bilirubin, total 0.6 08/30/2018 10:19 AM    ALT (SGPT) 11 08/30/2018 10:19 AM    AST (SGOT) 13 08/30/2018 10:19 AM    Alk.  phosphatase 92 08/30/2018 10:19 AM    Protein, total 6.5 08/30/2018 10:19 AM    Albumin 3.6 08/30/2018 10:19 AM    Globulin 4.0 08/02/2018 04:31 AM       MRI Results (most recent):    Results from [place] encounter on 07/30/18   MRI ABD W MRCP W WO CONT   Narrative MRI ABDOMEN AND MRCP WITH AND WITHOUT CONTRAST. INDICATION: Abnormal liver function tests; Cholelithiasis; Pancreatitis; Pancreatitis - acute; evaluate for choledocholithiasis as well as further  evaluate hepatic lesion    COMPARISON: 4/17/2017, 4/30/2018    TECHNIQUE: Multisequence and multiplanar MRI of the abdomen was performed after  the administration of 7 mL of Gadavist (gadobutrol)  IV contrast. Images were  obtained without contrast; and in late arterial, portal venous, and delayed  phases after the administration of contrast. Subtraction images were  reconstructed. Heavily T2-weighted thick slab and thin slice images were obtained in the  oblique coronal plane through the biliary tree (MRCP). FINDINGS:     MRCP: Suboptimal MRCP. In particular, evaluation for choledocholithiasis is  significantly limited. The intrahepatic biliary tree demonstrates mild  dilatation, which was not present on the 4/17/2017 CT the common bile duct is  not dilated. The common bile duct can be followed all the way to the ampulla on  the postcontrast sequences. The pancreatic duct is normal in course and caliber. LIVER: No steatosis. There is a mildly T2 hyperintense, markedly heterogeneous  mass in the inferior aspect of the right lobe in segment 5 measuring 4.5 x 8.9 x  4.7 cm. There are areas of nonenhancement within this lesion that could  represent necrosis. GALLBLADDER: Numerous tiny layering stones and/or sludge are seen in the  gallbladder. No wall thickening. Theadore Ramona PANCREAS: Fairly extensive edema is seen in the uncinate process, head, and neck  region of the pancreas with minimal surrounding peripancreatic edema. No fluid  collection. There is a vague area soft tissue in the mesentery inferior to the  pancreas that demonstrates enhancement and measures approximately 3.3 x 5.1 cm  series 1402 image 87. SPLEEN: Normal.  ADRENALS: Normal.  KIDNEYS: Numerous parapelvic and exophytic cysts are seen arising from both  kidneys. No hydronephrosis or solid mass. Gates Sr DISTAL ESOPHAGUS, STOMACH, AND VISUALIZED BOWEL: Diastasis of the rectus muscles  and a ventral hernia redemonstrated. No obstruction. PERITONEUM: No free fluid and no abdominal lymphadenopathy. VISUALIZED LUNG BASES: Grossly clear within the sensitivity of MRI. BONES: No suspicious lesions. Impression IMPRESSION:   1. Acute pancreatitis is noted. 2. Nonspecific soft tissue mass versus extensive enhancing inflammatory change  in the mesentery inferior to the pancreas described above. Soft tissue mass is  favored, which is concerning for metastatic disease. 3. Aggressive appearing, heterogeneous mass in the inferior aspect of the right  lobe of the liver. Given history of colon cancer, findings are highly concerning  for metastatic disease. This lesion would be amenable to imaging guided biopsy. 4. Other findings described above. Records reviewed and summarized above. Pathology report(s) reviewed above. Radiology report(s) reviewed above. Assessment:     1)  Stage 4 colon cancer 4/17 with recurrence and mets to liver post liver biopsy 8/18  Original dx of colon cancer stage 1 4/17 post surgery resection then. Records reviewed. Reviewed history today with pt and son. Pt has [place] [id number] and was dx with recurrence there. Pt lives near [facility]. Reviewed new stage 4 dx today with pt and son. Discussed incurable cancer but palliative chemo options available. No surgical options for liver mass/ has multiple inoperable hernias. Will do molecular testing. Discussed chemo options of modified FOLFOX/ avastin vs xeloda vs supportive care. Pt and son think getting a port and doing IV chemo may be too difficult. Pt wants to start with xeloda. Reviewed risks, benefits and alternatives of xeloda today. Pt and son agreeable to trying xeloda. Labs good. Will set up xeloda and f/u at [facility] clinic as pt lives near there.      2) abdominal hernias/ HTN/ arthritis with walker. Per PCP. 3) support good via sons. Pt lives near [facility] and needs care there. Contact clinic there today. Call if questions  F/u here prn. F/u with [facility]. I appreciate the opportunity to participate in Ms. Julia Enrique's care.     Signed By: Josie Forbes,

## 2018-10-04 NOTE — PATIENT INSTRUCTIONS
Capecitabine Hanna Olson   Capecitabine is a chemotherapy drug usually given to treat cancer of the colon, rectum, breast, stomach, pancreas and gullet (oesophagus). This information should ideally be read with our general information about chemotherapy and your type of cancer. On this page     What capecitabine looks like   How it is given   Possible side effects   Less common side effects   Additional information   Things to remember about capecitabine tablets   References     What capecitabine looks like Back to top  Capecitabine is available as 500mg peach-coloured tablets and 150mg light peach-coloured tablets. How it is given Back to top  Your doctor may want you to take a combination of 500mg and 150mg tablets. You need to make sure that you're taking the right dose and that you don't take too much or too little. The tablets should be swallowed whole with a glass of water. They should be taken within half an hour of finishing a meal as capecitabine works best if it's broken down in the stomach with food. You should take them in the morning just after breakfast, and then again just after your evening meal, so that the doses are spaced at least eight hours apart or, if possible, 10-12 hours apart. If you have trouble swallowing capecitabine tablets, they can be dissolved in a 200ml glass of warm water. The mixture should be stirred with a spoon until the tablets are completely dissolved, and then drunk immediately. You can add some blackcurrant juice just before swallowing to help disguise the taste. The glass and spoon should be washed and kept separate from your other crockery and cooking utensils. Capecitabine tablets are usually taken twice daily for a number of days, followed by a rest period when no tablets are taken. Alternatively, they are taken twice daily (every day) with no rest period. This may vary depending on the type of cancer you have.  It's important to follow the instructions carefully and take the tablets as directed by your doctor, chemotherapy nurse or pharmacist.   Blanca Carlos should only get the tablets from your hospital. You can't get a repeat prescription from your GP. Before you begin your treatment your doctor will arrange for you to have blood tests. Dang Jurado usually be given anti-sickness drugs before and/or during your treatment. Possible side effects Back to top  Each persons reaction to chemotherapy is different. Some people have very few side effects while others may experience more. The side effects described here won't affect everyone who is given capecitabine and may be different if you're being treated with more than one type of chemotherapy drug. We have outlined the most common side effects but haven't included those that are very rare and unlikely to affect you. If you notice any effects that are not listed here, discuss them with your doctor or nurse. Feeling sick (nausea) and being sick (vomiting)   This is usually mild. Your doctor can prescribe very effective anti-sickness (anti-emetic) drugs to prevent or greatly reduce, nausea and vomiting. If the sickness isn't controlled, or if it continues, tell your doctor; they can prescribe other anti-sickness drugs which may be more effective. Some anti-sickness drugs can cause constipation. Let your doctor or nurse know if this is a problem. Sore mouth and ulcers   Your mouth may become sore, or you may notice small ulcers during this treatment. Drinking plenty of fluids, and cleaning your teeth regularly and gently with a soft toothbrush, can help reduce the risk of this happening. Some people may find sucking on ice soothing. Tell your nurse or doctor if you have any of these problems, as they can prescribe mouthwashes and medicine to prevent or clear mouth infections. Taste changes   You may notice that food tastes different. Normal taste usually comes back after treatment finishes.  A dietitian or specialist nurse at your hospital can give you advice about ways of coping with this side effect. Diarrhoea   This can be severe but can usually be controlled with medicines. It's important to drink plenty of fluids and you may be given medicine to take. Follow the instructions and take it immediately. If you have diarrhoea more than 4-6 times a day, or if you have it at night, contact your specialist for advice immediately. You may be advised to stop taking capecitabine. However, once the diarrhoea has eased your doctor will tell you if you can restart the treatment and may recommend a lower dose. Abdominal pain and constipation   Capecitabine can cause pain in your tummy (abdomen) and constipation. Let your doctor know if you develop pain. It can usually be controlled with mild painkillers. Constipation can usually be helped by drinking plenty of fluids, eating more fibre and doing some exercise. You may need to take medicine (laxatives) to help ease it. Your doctor can prescribe these or you can buy them at a pharmacy. Loss of appetite   Some people lose their appetite while theyre having chemotherapy. This can be mild and may only last a few days. If it doesnt improve you can ask to see a dietitian or specialist nurse at your hospital. They can give you advice on improving your appetite and keeping to a healthy weight. Skin changes   Soreness and redness, or darkening of the palms of the hands and soles of the feet (sometimes known as palmar plantar or hand-foot syndrome) can occur. You may be prescribed vitamin B6 (pyridoxine), which can help to reduce this. A rash and dry or itchy skin may also occur. Let your specialist doctor or nurse know if you notice this. Tiredness (fatigue)   You'll probably feel extremely tired.  This is a very common side effect and it's important to get plenty of rest. Try to balance this with some gentle exercise, such as short walks, which will help. Nata Media probably get tired easily and feel fairly weak for several more months after your treatment. Pain in joints or muscles   This may be caused by capecitabine. It's important to tell your doctor about this, so that they can prescribe painkillers to help. Less common side effects Back to top  Risk of infection   Capecitabine can reduce the number of white blood cells, which help fight infection. White blood cells are produced by the bone marrow. If the number of your white blood cells is low you will be more prone to infections. A low white blood cell count is called neutropenia. Neutropenia begins seven days after treatment, and your resistance to infection is usually at its lowest 10-14 days after chemotherapy. The number of your white blood cells will then increase steadily and usually return to normal before your next cycle of chemotherapy is due. Contact your doctor or the hospital straight away if:   your temperature goes above 38ºC (100. 4ºF)   you suddenly feel unwell, even with a normal temperature. You'll have a blood test before having more chemotherapy to check the number of white blood cells. Occasionally, your treatment may need to be delayed if the number of your blood cells (blood count) is still low. Bruising and bleeding   Capecitabine can reduce the production of platelets, which help the blood to clot. Tell your doctor if you have any unexplained bruising or bleeding, such as nosebleeds, bleeding gums, blood spots or rashes on the skin. You may need to have a platelet transfusion if your platelet count is low. Anaemia   Capecitabine can reduce the number of red blood cells, which carry oxygen around the body. A low red blood cell count is called anaemia. This may make you feel tired and breathless. Tell your doctor or nurse if you have these symptoms. You may need to have a blood transfusion if the number of red blood cells becomes too low.    Hair loss   This is rare, but your hair may thin or occasionally fall out completely. If this happens, it usually begins about 3-4 weeks after starting treatment, although it may occur earlier. This is temporary and your hair will start to grow back once the treatment has finished. Your hair may grow back straighter, curlier, finer, or a slightly different colour than it was before. Your nurse can give you advice about coping with hair loss. Headaches   Some people find that capecitabine causes headaches. Let your doctor or nurse know. They can give you painkillers to relieve this. Dizziness   Some people may feel dizzy or light-headed. Tell your doctor if you have any of these side effects. Changes in the way your heart works   Capecitabine may affect the way your heart works. Some people may experience chest pain and tightening across the centre of the chest while taking it. Chest pain can be caused by many different things other than chemotherapy. If you develop any of these symptoms, contact your doctor immediately. Increased production of tears   This may be caused by capecitabine and is temporary. You may also notice that your eyes become sore and inflamed (conjunctivitis). Let your doctor know so they can prescribe soothing eye drops if necessary. Its important to let your doctor know straight away if you feel unwell or have any severe side effects, even if theyre not mentioned above. Additional information Back to top  Risk of developing a blood clot   Cancer can increase the risk of developing a blood clot (thrombosis), and chemotherapy may increase this risk further. A blood clot may cause symptoms such as pain, redness and swelling in a leg, or breathlessness and chest pain. Blood clots can be very serious so its important to tell your doctor straight away if you notice any of these symptoms. Most clots can be treated with drugs that thin the blood. The doctor or nurse can give you more information.    Drug interactions   Capecitabine interacts with the drugs phenytoin (Epanutin®), allopurinol (Zyloric®) and interferon (IntronA®, Roferon-A®). It may also affect the action of some medicines given to thin the blood (anti-coagulants), such as warfarin. Let your doctor know if you are taking any of these. You should also tell your doctor if youre taking folic acid because it might increase the side effects of capecitabine. Some other medicines, including those you can buy in a shop or , can be harmful to take when you are having chemotherapy. Tell your doctor about any medicines you're taking, including over-the-counter drugs, complementary therapies and herbal drugs. Fertility   Your ability to become pregnant or father a child may be affected by having this treatment. It's important to discuss fertility with your doctor before starting treatment. Contraception   It's not advisable to become pregnant or father a child while having capecitabine, as it may harm the developing baby. Its important to use effective contraception while having this drug, and for at least a few months afterwards. You can discuss this with your doctor. Its not known whether chemotherapy drugs can be present in semen or vaginal fluids. To protect your partner, its safest to either avoid sex or use a barrier form of contraception for about 48 hours after chemotherapy. Breastfeeding   There is a potential risk that chemotherapy drugs may be present in breast milk. Women are advised not to breastfeed during chemotherapy and for a few months afterwards. Non-cancer admission   If youre admitted to hospital for a reason not related to the cancer, its important to tell the doctors and nurses looking after you that you're having chemotherapy treatment. You should tell them the name of your cancer specialist so that they can ask for advice.    Emergency contacts   Its a good idea to know who you should contact if you have any problems or troublesome side effects when youre at home. During office hours you can contact the clinic or carter where you had your treatment. Your chemotherapy nurse or doctor will tell you who to contact during the evening or at weekends. Things to remember about capecitabine tablets Back to top  Its important to take your tablets at the right times as directed by your doctor. Always tell any doctors treating you for non-cancerous conditions that you're taking a course of chemotherapy tablets that should not be stopped or restarted without advice from your cancer specialist.   Keep the tablets in their original packaging, and store them at room temperature away from heat and direct sunlight. Keep the tablets in a safe place and out of the reach of children. If your doctor decides to stop the treatment, return any remaining tablets to the pharmacist. Don't flush them down the toilet or throw them away. If you are sick just after taking the tablets tell your doctor as you may need to take another dose. Don't take another tablet without telling your doctor first.   If you forget to take a tablet, don't take a double dose. Let your doctor know and keep to your regular dose schedule. References Back to top  This section is based on our Capecitabine factsheet, which has been compiled using information from a number of reliable sources, including:   Johnna Cummins: The Complete Drug Reference. 37th edition. 2011. [employer]. electronic Medicines Compendium. (accessed October 2011). [employer]. 62nd edition. 2011. [address]. [place] Neeraj Buckner. The Chemotherapy Source Book. 4th edition. 2007. Mellisa Vasquez and Malu Hale.    Content last reviewed: 1 December 2011  Next planned review: 2013

## 2018-10-04 NOTE — TELEPHONE ENCOUNTER
[facility] info on xeloda and Chemo Safety in the Home given to patient for review. Spoke with [address] and advised that patient to establish care with [facility] oncologist.  [phone]  Patient advised of above and that will be contacted for follow up appt. Verbalized understanding and thanked for call.

## 2018-10-04 NOTE — TELEPHONE ENCOUNTER
Information provided to [facility], she will contact patient to schedule appointment.

## 2018-10-10 NOTE — PROGRESS NOTES
Mirella Eid is a 81 y.o. female denies N/V, pain, diarrhea, she does have constipation which she is taking yelena lax and occasional stool softener.

## 2018-10-10 NOTE — PROGRESS NOTES
At  today during office visit, [facility] information on xeloda and advastin provided, including safe handling of chemo reviewed with patient and Son. Side effects/symptom management and chemo journal reviewed. Reviewed plan for chemo. Advised role of speciality pharmacy. Provided and reviewed use of chemotherapy and you book. Answered questions. Education also provided as to why need for port and how it is accessed and maintained. Patient will call after port is scheduled so that chemo can be scheduled.

## 2018-10-10 NOTE — MR AVS SNAPSHOT
[address] [place] [id number] [phone] Patient: Candy Fermin MRN: [id number] Visit Information Date & Time Provider Department Dept. Phone Encounter #  
 10/10/2018  9:30 AM Chuy Rich MD [facility] [address] [id number] Follow-up Instructions Return in about 1 week (around 10/17/2018). Follow-up and Disposition History Your Appointments 10/17/2018  9:00 AM  
Follow Up with Chuy Rich MD  
[facility] [address]) Appt Note: 1wk F/U  
 [address] [place] [id number] [phone]  
  
   
 [address] Upcoming Health Maintenance Date Due Shingrix Vaccine Age 50> (1 of 2) 2/10/1986 Pneumococcal 65+ High/Highest Risk (2 of 2 - PPSV23) 2015 MEDICARE YEARLY EXAM 2019 GLAUCOMA SCREENING Q2Y 2019 COLONOSCOPY 3/9/2022 DTaP/Tdap/Td series (2 - Td) 2027 Allergies as of 10/10/2018  Review Complete On: 10/10/2018 By: Chuy Rich MD  
  
 Severity Noted Reaction Type Reactions Lisinopril Medium 2017   Side Effect Angioedema Niacin  10/22/2013    Itching Felt hot also Ultram [Tramadol]  10/22/2013    Nausea Only Current Immunizations  Reviewed on 10/10/2018 Name Date Influenza High Dose Vaccine PF 10/10/2017, 10/4/2016 Influenza Vaccine 10/22/2014 Influenza Vaccine (Quad) PF 2015 Influenza Vaccine (Tri) Adjuvanted 2018 Pneumococcal Conjugate (PCV-13) 2015 Reviewed by Chelsey Santamaria RN on 10/10/2018 at  9:43 AM  
You Were Diagnosed With   
  
 Codes Comments Metastatic colon cancer in female [facility])    -  Primary ICD-10-CM: C18.9 ICD-9-CM: 197.5 Vitals BP Pulse Temp Resp Height(growth percentile) Weight(growth percentile) 115/69 76 97.9 °F (36.6 °C) (Oral) 16 5' 2\" (1.575 m) 222 lb (100.7 kg) SpO2 BMI OB Status Smoking Status 94% 40.6 kg/m2 Hysterectomy Never Smoker BMI and BSA Data Body Mass Index Body Surface Area  
 40.6 kg/m 2 2.1 m 2 Preferred Pharmacy Pharmacy Name Phone [facility],[address], [address] [place] [address]  [phone] Your Updated Medication List  
  
   
This list is accurate as of 10/10/18 11:02 AM.  Always use your most recent med list.  
  
  
  
  
 acetaminophen 325 mg tablet Commonly known as:  TYLENOL Take 325 mg by mouth every four (4) hours as needed for Pain. aspirin delayed-release 81 mg tablet Take 81 mg by mouth daily. atorvastatin 20 mg tablet Commonly known as:  LIPITOR Take 1 Tab by mouth daily. capecitabine 500 mg tablet Commonly known as:  XELODA Take 4 Tabs by mouth two (2) times a day for 14 days. Indications: metastatic colorectal cancer  
  
 cholecalciferol 1,000 unit Cap Commonly known as:  VITAMIN D3 Take 1 Cap by mouth daily. CO Q-10 200 mg capsule Generic drug:  coenzyme Q-10 Take 200 mg by mouth daily. dilTIAZem 120 mg SR capsule Commonly known as:  [facility] Take 1 Cap by mouth daily. furosemide 20 mg tablet Commonly known as:  LASIX Take 1 Tab by mouth two (2) times a day. * HYDROcodone-acetaminophen  mg tablet Commonly known as:  Jeneal Yaneth Take 1 Tab by mouth every four (4) hours as needed for Pain. * HYDROcodone-acetaminophen  mg tablet Commonly known as:  Jeneal Yaneth Take 1 Tab by mouth every four (4) hours as needed. Max Daily Amount: 6 Tabs.  
  
 magnesium oxide 500 mg Tab Take 1 Tab by mouth daily. multivitamin tablet Commonly known as:  ONE A DAY Take 1 Tab by mouth daily. omeprazole 20 mg capsule Commonly known as:  PRILOSEC Take 1 Cap by mouth daily. ondansetron hcl 8 mg tablet Commonly known as:  Stephon Albers  
 Take 1 Tab by mouth every eight (8) hours as needed for Nausea. Take every 8 hours only as needed. Indications: CANCER CHEMOTHERAPY-INDUCED NAUSEA AND VOMITING  
  
 polyethylene glycol 17 gram packet Commonly known as:  Wander Sequin Take 1 Packet by mouth daily. potassium chloride 20 mEq tablet Commonly known as:  K-DUR, KLOR-CON Take 20 mEq by mouth daily. trolamine salicylate 10 % topical cream  
Commonly known as:  MYOFLEX Apply 1 Part to affected area three (3) times daily as needed for Pain. apply small amount externally to left knee * Notice: This list has 2 medication(s) that are the same as other medications prescribed for you. Read the directions carefully, and ask your doctor or other care provider to review them with you. Prescriptions Printed Refills  
 capecitabine (XELODA) 500 mg tablet 6 Sig: Take 4 Tabs by mouth two (2) times a day for 14 days. Indications: metastatic colorectal cancer Class: Print Route: Oral  
 ondansetron hcl (ZOFRAN) 8 mg tablet 1 Sig: Take 1 Tab by mouth every eight (8) hours as needed for Nausea. Take every 8 hours only as needed. Indications: CANCER CHEMOTHERAPY-INDUCED NAUSEA AND VOMITING Class: Print Route: Oral  
  
We Performed the Following REFERRAL TO GENERAL SURGERY [REF27 Custom] Comments: For chemo port placement Follow-up Instructions Return in about 1 week (around 10/17/2018). To-Do List   
 Around 10/12/2018 Imaging:  IR INSERT TUNL CVC W PORT OVER 5 YEARS Referral Information Referral ID Referred By Referred To  
  
 [id number] [facility], [address] Orlin Mena MD   
   [address] [place] [address] Way Phone: [phone] Fax: [phone] Visits Status Start Date End Date 1 New Request 10/10/18 10/10/19  If your referral has a status of pending review or denied, additional information will be sent to support the outcome of this decision. Introducing [facility]! Dear Jm Mendenhall: 
Thank you for requesting a listedplaces account. Our records indicate that you already have an active listedplaces account. You can access your account anytime at [email or web address] Did you know that you can access your hospital and ER discharge instructions at any time in listedplaces? You can also review all of your test results from your hospital stay or ER visit. Additional Information If you have questions, please visit the Frequently Asked Questions section of the listedplaces website at [email or web address] Remember, listedplaces is NOT to be used for urgent needs. For medical emergencies, dial 911. Now available from your iPhone and Android! Please provide this summary of care documentation to your next provider. Your primary care clinician is listed as Dimple Greco. If you have any questions after today's visit, please call [phone].

## 2018-10-10 NOTE — PROGRESS NOTES
Hematology/Oncology  Progress Note    Name: Quang Harrison  Date: 10/10/2018  : 1935    Ms. Nathalie Cook is a 81y.o. year old female with history of stage I T2N0M0 colon cancer s/p hemicolectomy in 2017 now with diagnosis of recurrent metastatic colon cancer. She had an MRI abdomen done  suggestive of 2 intraabdominal masses in the inferior aspect of pancreas and liver. She was seen by Dr. Adan Ventura at [facility] the patient lives close to [facility], she is here accompanied by her son to establish care with us. She has 4 sons and one of them lives with her here. Subjective:     She denies any abdominal pain, diarrhea, bloody or black stools, loss of weight or apetite but admits to constipation. Past Medical History:   Diagnosis Date    Allergic rhinitis     Cancer ([address]     Stage 1 cancer intestines    Cervical disc disease     Colonic mass 3/20/2017    Eczema     Gastritis     GERD (gastroesophageal reflux disease)     mild    GI bleed     hx. of recurrent    Hiatal hernia     Hypercholesterolemia     Hypertension     Metastatic adenocarcinoma to liver ([address] 9/10/2018    Morbid obesity ([address] 2017    Osteoarthritis (arthritis due to wear and tear of joints)     Pancreatitis, gallstone          Past Surgical History:   Procedure Laterality Date    ABDOMEN SURGERY PROC UNLISTED      Colon Resection (Cancer)    HX COLONOSCOPY  3/05    HX COLONOSCOPY      HX CYST REMOVAL      left side    HX ENDOSCOPY      HX ENDOSCOPY      HX GYN      hysterectomy    HX ORTHOPAEDIC      rt knee replacement     Social History     Social History    Marital status:      Spouse name: N/A    Number of children: N/A    Years of education: N/A     Occupational History    Not on file.      Social History Main Topics    Smoking status: Never Smoker    Smokeless tobacco: Never Used    Alcohol use No    Drug use: No    Sexual activity: No     Other Topics Concern    Not on file     Social History Narrative     Family History   Problem Relation Age of Onset    No Known Problems Mother     No Known Problems Father     Arthritis-osteo Sister     Alzheimer Sister     Parkinson's Disease Brother     Cancer Brother     No Known Problems Brother     Anesth Problems Neg Hx      Current Outpatient Prescriptions   Medication Sig Dispense Refill    capecitabine (XELODA) 500 mg tablet Take 4 Tabs by mouth two (2) times a day for 14 days. Indications: metastatic colorectal cancer 112 Tab 6    omeprazole (PRILOSEC) 20 mg capsule Take 1 Cap by mouth daily. 90 Cap 1    dilTIAZem (TIAZAC) 120 mg SR capsule Take 1 Cap by mouth daily. 90 Cap 1    atorvastatin (LIPITOR) 20 mg tablet Take 1 Tab by mouth daily. 90 Tab 1    magnesium oxide 500 mg tab Take 1 Tab by mouth daily. 90 Tab 1    cholecalciferol (VITAMIN D3) 1,000 unit cap Take 1 Cap by mouth daily. 90 Cap 1    furosemide (LASIX) 20 mg tablet Take 1 Tab by mouth two (2) times a day. 180 Tab 1    acetaminophen (TYLENOL) 325 mg tablet Take 325 mg by mouth every four (4) hours as needed for Pain.  coenzyme Q-10 (CO Q-10) 200 mg capsule Take 200 mg by mouth daily.  HYDROcodone-acetaminophen (NORCO)  mg tablet Take 1 Tab by mouth every four (4) hours as needed for Pain.  aspirin delayed-release 81 mg tablet Take 81 mg by mouth daily.  potassium chloride (K-DUR, KLOR-CON) 20 mEq tablet Take 20 mEq by mouth daily.  polyethylene glycol (MIRALAX) 17 gram packet Take 1 Packet by mouth daily. 30 Packet 0    HYDROcodone-acetaminophen (NORCO)  mg tablet Take 1 Tab by mouth every four (4) hours as needed. Max Daily Amount: 6 Tabs. 10 Tab 0    multivitamin (ONE A DAY) tablet Take 1 Tab by mouth daily.  trolamine salicylate (MYOFLEX) 10 % topical cream Apply 1 Part to affected area three (3) times daily as needed for Pain.  apply small amount externally to left knee       Review of Systems  ECOG PS is 1-2. Constitutional Admits to fatigue. No fevers, chills, night sweats, weight loss. Allergic/Immunologic No recent allergic reactions   Eyes No significant visual difficulties. No diplopia. ENMT No problems with hearing, no sore throat, no sinus drainage. Endocrine No hot flashes or night sweats. No cold intolerance, polyuria, or polydipsia   Hematologic/Lymphatic No easy bruising or bleeding. The patient denies any tender or palpable lymph nodes   Breasts No abnormal masses of breast, nipple discharge or pain. Respiratory No dyspnea on exertion, orthopnea, chest pain, cough or hemoptysis. Cardiovascular No anginal chest pain, irregular heart beat, tachycardia, palpitations or orthopnea. Gastrointestinal Complains of constipation. No nausea, vomiting, diarrhea, cramping, dysphagia, reflux, heartburn, GI bleeding, or early satiety. No change in bowel habits. Genitourinary (M) No hematuria, dysuria, increased frequency, urgency, hesitancy or incontinence. Musculoskeletal No joint pain, swelling or redness. No decreased range of motion. Integumentary No chronic rashes, inflammation, ulcerations, pruritus, petechiae, purpura, ecchymoses, or skin changes. Neurologic No headache, blurred vision, and no areas of focal weakness or numbness. Normal gait. No sensory problems. Psychiatric No insomnia, depression, hermelindo or mood swings. No psychotropic drugs. Objective:     Visit Vitals    /69    Pulse 76    Temp 97.9 °F (36.6 °C) (Oral)    Resp 16    Ht 5' 2\" (1.575 m)    Wt 222 lb (100.7 kg)    SpO2 94%    BMI 40.6 kg/m2     Physical Examination:  Constitutional Alert, cooperative, oriented. Mood and affect appropriate. Appears close to chronological age. Well nourished. Well developed. Head Normocephalic; no scars   Eyes Conjunctivae and sclerae are clear and without icterus. Pupils are reactive and equal.   ENMT Sinuses are nontender.   No oral exudates, ulcers, masses, thrush or mucositis. Oropharynx clear. Tongue normal.   Neck Supple without masses or thyromegaly. No jugular venous distension. Hematologic/Lymphatic No petechiae or purpura. No tender or palpable lymph nodes in the cervical, supraclavicular, axillary or inguinal area. Respiratory Lungs are clear to auscultation without rhonchi or wheezing. Cardiovascular Regular rate and rhythm of heart without murmurs, gallops or rubs. Chest / Line Site Chest is symmetric with no chest wall deformities. Abdomen Distended abdomen and palapable hernias in the upper abdomen   Musculoskeletal No tenderness or swelling, normal range of motion without obvious weakness. Extremities > 1+ pedal edema. Skin No rashes, scars, or lesions suggestive of malignancy. No petechiae, purpura, or ecchymoses. No excoriations. Neurologic No sensory or motor deficits, normal cerebellar function, normal gait, cranial nerves intact. Psychiatric Alert and oriented times three. Coherent speech. Verbalizes understanding of our discussions today.      LABS:  8/31/2018  8:39 AM - [place], [facility] Lab Results In   Component Results   Component Value Flag Ref Range Units Status   WBC 8.2  3.4 - 10.8 x10E3/uL Final   RBC 4.70  3.77 - 5.28 x10E6/uL Final   HGB 12.4  11.1 - 15.9 g/dL Final   HCT 38.4  34.0 - 46.6 % Final   MCV 82  79 - 97 fL Final   MCH 26.4 (L) 26.6 - 33.0 pg Final   MCHC 32.3  31.5 - 35.7 g/dL Final   RDW 14.6  12.3 - 15.4 % Final   PLATELET 875  506 - 768 x10E3/uL Final   NEUTROPHILS 69  Not Estab. % Final     Glucose 106 (H) 65 - 99 mg/dL Final   BUN 13  8 - 27 mg/dL Final   Creatinine 0.69  0.57 - 1.00 mg/dL Final   GFR est non-AA 81  >59 mL/min/1.73 Final   GFR est AA 94  >59 mL/min/1.73 Final   BUN/Creatinine ratio 19  12 - 28  Final   Sodium 141  134 - 144 mmol/L Final   Potassium 3.8  3.5 - 5.2 mmol/L Final   Chloride 95 (L) 96 - 106 mmol/L Final   CO2 31 (H) 20 - 29 mmol/L Final   Calcium 9.4  8.7 - 10.3 mg/dL Final   Protein, total 6.5  6.0 - 8.5 g/dL Final   Albumin 3.6  3.5 - 4.7 g/dL Final     CEA 64.5   ng/mL Final     AFP, Serum, Tumor Marker 1.3  0.0 - 8.3 ng/mL Final     Carbohydrate Antigen 19-9, (CA 19-9) 245 (H) 0 - 35 U/mL Final     Liver Biopsy (8/6/18)     Specimen Source   1: Liver, Fine Needle Aspiration   CYTOLOGIC INTERPRETATION:   Liver, imaging guided core needle biopsy with immediate interpretation of touch imprint smears:   Metastatic adenocarcinoma, see comment   General Categorization   Positive for malignancy. Specimen Adequacy   Satisfactory for evaluation   Comment   Touch imprint smears and sections of the core needle biopsies are examined showing metastatic adenocarcinoma. Although morphologic features can be nonspecific, this is compatible with colorectal primary. Confirmatory stains can be performed upon request. Adequate tissue is present in both blocks (A and B) for additional testing as clinically indicated     RADIOLOGY:      NUCLEAR MEDICINE WHOLE BODY BONE SCAN     INDICATION:  Colon cancer. Hip pain.     COMPARISON:  None.     RADIOPHARMACEUTICAL:  20.5 mCi Technetium 99m-MDP, IV.     FINDINGS:     There is no evidence of osseous metastatic disease.     Areas of degenerative uptake are noted at the glenohumeral joints, wrists, hands  and feet. Additional degenerative uptake at the left knee, left hip and within  the thoracal lumbar spine.     Photopenic defect at the right knee consistent with TKR. No abnormal soft tissue  uptake.     Symmetric activity is seen within both kidneys.     IMPRESSION:     No evidence of osseous metastatic disease. Multifocal degenerative uptake as  described above.     MRI ABDOMEN AND MRCP WITH AND WITHOUT CONTRAST (8/1/18).      INDICATION: Abnormal liver function tests; Cholelithiasis; Pancreatitis;   Pancreatitis - acute; evaluate for choledocholithiasis as well as further  evaluate hepatic lesion     COMPARISON: 4/17/2017, 4/30/2018     TECHNIQUE: Multisequence and multiplanar MRI of the abdomen was performed after  the administration of 7 mL of Gadavist (gadobutrol)  IV contrast. Images were  obtained without contrast; and in late arterial, portal venous, and delayed  phases after the administration of contrast. Subtraction images were  reconstructed.     Heavily T2-weighted thick slab and thin slice images were obtained in the  oblique coronal plane through the biliary tree (MRCP).     FINDINGS:      MRCP: Suboptimal MRCP. In particular, evaluation for choledocholithiasis is  significantly limited. The intrahepatic biliary tree demonstrates mild  dilatation, which was not present on the 4/17/2017 CT the common bile duct is  not dilated. The common bile duct can be followed all the way to the ampulla on  the postcontrast sequences. The pancreatic duct is normal in course and caliber. LIVER: No steatosis. There is a mildly T2 hyperintense, markedly heterogeneous  mass in the inferior aspect of the right lobe in segment 5 measuring 4.5 x 8.9 x  4.7 cm. There are areas of nonenhancement within this lesion that could  represent necrosis. GALLBLADDER: Numerous tiny layering stones and/or sludge are seen in the  gallbladder. No wall thickening. Theadore Ramona PANCREAS: Fairly extensive edema is seen in the uncinate process, head, and neck  region of the pancreas with minimal surrounding peripancreatic edema. No fluid  collection. There is a vague area soft tissue in the mesentery inferior to the  pancreas that demonstrates enhancement and measures approximately 3.3 x 5.1 cm  series 1402 image 87. SPLEEN: Normal.  ADRENALS: Normal.  KIDNEYS: Numerous parapelvic and exophytic cysts are seen arising from both  kidneys. No hydronephrosis or solid mass. Theadore Ramona DISTAL ESOPHAGUS, STOMACH, AND VISUALIZED BOWEL: Diastasis of the rectus muscles  and a ventral hernia redemonstrated. No obstruction.    PERITONEUM: No free fluid and no abdominal lymphadenopathy. VISUALIZED LUNG BASES: Grossly clear within the sensitivity of MRI. BONES: No suspicious lesions.      IMPRESSION:   1. Acute pancreatitis is noted.     2. Nonspecific soft tissue mass versus extensive enhancing inflammatory change in the mesentery inferior to the pancreas described above. Soft tissue mass is favored, which is concerning for metastatic disease.     3. Aggressive appearing, heterogeneous mass in the inferior aspect of the right  lobe of the liver. Given history of colon cancer, findings are highly concerning  for metastatic disease. This lesion would be amenable to imaging guided biopsy.     4. Other findings described above.     CT ABDOMEN AND PELVIS WITHOUT IV CONTRAST (7/30/18)     INDICATION: Abdominal pain, RLQ; bloating.     COMPARISON: 4/17/2017.     TECHNIQUE:       Axial images were obtained through the abdomen and pelvis without IV contrast.   Oral contrast was not administered. Coronal and sagittal reformations were  generated. CT dose reduction was achieved through use of a standardized  protocol tailored for this examination and automatic exposure control for dose  modulation.       FINDINGS:     The included portions of the lung bases are clear. No pleural of pericardial  fluid.       The spleen and adrenal glands are unremarkable. No intrahepatic or extrahepatic  bile duct dilatation. Bilateral renal cysts are unchanged. Distended gallbladder  containing multiple tiny gravel-like stones versus dense sludge.     Nonspecific area of decreased density in the right hepatic lobe measuring 7.6  cm.     Ill-defined pancreatic head with subtle stranding of the fat adjacent to the  pancreatic head.     No dilated bowel loops. Surgical staple line in the right colon consistent with  right hemicolectomy. Diverticulosis. No pathologically enlarged lymph nodes,  free fluid or free air.     Previous hysterectomy. No adnexal region pathology.   Atherosclerotic  calcification of the abdominal aorta and branch vasculature without evidence of  aneurysm. Diastasis of the rectus musculature with protrusion of stomach,  liver, bowel and bowel mesentery through the defect. There is a ventral hernia  containing nondilated, non thickened small bowel.     No acute osseous abnormalities.     IMPRESSION:     1. Ill-defined pancreatic head and adjacent fat stranding. Pancreatitis is not  excluded. 2. Rectus muscle diastasis as described above. Ventral hernia. 3. Bilateral renal cysts, unchanged. 4. Diverticulosis. 5. Large area of decreased density in the right hepatic lobe of uncertain  etiology. Consider contrast-enhanced MRI for further evaluation. 6. Distended gallbladder with gravel-like stones versus dense sludge.         Assessment:     1. Metastatic colon cancer in female [facility])          Plan:     - She had CT and MRI abdomen and a bone scan that shows evidence of only intrabdominal disease. CT chest not done. - CEA elevated at 64.5 on 7/31/18. - We will repeat CBC, CMP, CEA level today. - KRas, NRas, BRaf and MSI testing ordered by Dr. Danielle Patel, results pending.  - Discussed various treatment options that include Capeox+Avastin vs Avastin + Xeloda vs Xeloda alone. - She is agreeable to Avastin+ Xeloda. - I will refer her to Dr. Angela Gomez for chemo port placement. - Will check for 24 hour urine protein. - Discussed side effects of Xeloda and Avastin in detail and literature provided. - We also discussed the prognosis and all questions were appropiately answered. - She will return to the clinic in 1 week after the port placement to start hercycle I chemo with Xeloda + Avastin every 3 weeks.     Elis Lewis MD  10/10/2018

## 2018-10-15 NOTE — IP AVS SNAPSHOT
[address] [facility] 
[phone] Patient: Quang Harrison MRN: [id number] XSW:4/20/1995 A check shaquille indicates which time of day the medication should be taken. My Medications ASK your doctor about these medications Instructions Each Dose to Equal  
 Morning Noon Evening Bedtime  
 acetaminophen 325 mg tablet Commonly known as:  TYLENOL Your last dose was: Your next dose is: Take 325 mg by mouth every four (4) hours as needed for Pain. 325 mg  
    
   
   
   
  
 aspirin delayed-release 81 mg tablet Your last dose was: Your next dose is: Take 81 mg by mouth daily. 81 mg  
    
   
   
   
  
 atorvastatin 20 mg tablet Commonly known as:  LIPITOR Your last dose was: Your next dose is: Take 1 Tab by mouth daily. 20 mg  
    
   
   
   
  
 capecitabine 500 mg tablet Commonly known as:  XELODA Your last dose was: Your next dose is: Take 4 Tabs by mouth two (2) times a day for 14 days. Indications: metastatic colorectal cancer 1000 mg/m2 cholecalciferol 1,000 unit Cap Commonly known as:  VITAMIN D3 Your last dose was: Your next dose is: Take 1 Cap by mouth daily. 1000 Units CO Q-10 200 mg capsule Generic drug:  coenzyme Q-10 Your last dose was: Your next dose is: Take 200 mg by mouth daily. 200 mg  
    
   
   
   
  
 dilTIAZem 120 mg SR capsule Commonly known as:  [facility] Your last dose was: Your next dose is: Take 1 Cap by mouth daily. 120 mg  
    
   
   
   
  
 furosemide 20 mg tablet Commonly known as:  LASIX Your last dose was: Your next dose is: Take 1 Tab by mouth two (2) times a day.   
 20 mg  
    
   
   
   
  
 * HYDROcodone-acetaminophen  mg tablet Commonly known as:  Horacio Haile Your last dose was: Your next dose is: Take 1 Tab by mouth every four (4) hours as needed for Pain. 1 Tab  
    
   
   
   
  
 * HYDROcodone-acetaminophen  mg tablet Commonly known as:  Horacio Haile Your last dose was: Your next dose is: Take 1 Tab by mouth every four (4) hours as needed. Max Daily Amount: 6 Tabs. 1 Tab  
    
   
   
   
  
 magnesium oxide 500 mg Tab Your last dose was: Your next dose is: Take 1 Tab by mouth daily. 1 Tab  
    
   
   
   
  
 multivitamin tablet Commonly known as:  ONE A DAY Your last dose was: Your next dose is: Take 1 Tab by mouth daily. 1 Tab  
    
   
   
   
  
 omeprazole 20 mg capsule Commonly known as:  PRILOSEC Your last dose was: Your next dose is: Take 1 Cap by mouth daily. 20 mg  
    
   
   
   
  
 ondansetron hcl 8 mg tablet Commonly known as:  Cassius Demarco Your last dose was: Your next dose is: Take 1 Tab by mouth every eight (8) hours as needed for Nausea. Take every 8 hours only as needed. Indications: CANCER CHEMOTHERAPY-INDUCED NAUSEA AND VOMITING  
 8 mg  
    
   
   
   
  
 polyethylene glycol 17 gram packet Commonly known as:  Nolan Rodriguez Your last dose was: Your next dose is: Take 1 Packet by mouth daily. 17 g  
    
   
   
   
  
 potassium chloride 20 mEq tablet Commonly known as:  K-DUR, KLOR-CON Your last dose was: Your next dose is: Take 20 mEq by mouth daily. 20 mEq  
    
   
   
   
  
 trolamine salicylate 10 % topical cream  
Commonly known as:  MYOFLEX Your last dose was: Your next dose is:    
   
   
 Apply 1 Part to affected area three (3) times daily as needed for Pain. apply small amount externally to left knee 1 Part * Notice: This list has 2 medication(s) that are the same as other medications prescribed for you. Read the directions carefully, and ask your doctor or other care provider to review them with you.

## 2018-10-15 NOTE — DISCHARGE INSTRUCTIONS
[facility]  Angiography Department      Radiologist:   Dr. Franklyn Oropeza      Date:   10/15/2018      [facility] Discharge Instructions      Watch for signs of infection:    1. Redness,   2. Fever, chills,   3. Increased pain, and/or drainage from the site. If this occurs, call your physician at once. Return next week for a [employer] check:       Do not register. Proceed to the Radiology Waiting Area and let the  know you are here for a \"PORT site check\". If you have an appointment with a provider or at the infusion center in the upcoming week,  they may check your site and change the dressing for you. Keep your dressing clean and dry. Leave the dressing in place until seen here next week. Continue your previous diet and restart your regularly prescribed medications. You may take Tylenol, as directed on the label, for pain if needed. Avoid ibuprofen (Advil, Motrin) and aspirin as they may cause you to bleed. Because you received sedation, you are not to drive or sign any legal documents for the next 24 hours. Do not lift anything heavier than 5 pounds with the affected arm and avoid pushing and pulling movements for several days. If you have any questions or concerns, please call our radiology department at [phone].

## 2018-10-15 NOTE — H&P
Radiology History and Physical    Patient: Karlo Shankar 81 y.o. female       Chief Complaint: No chief complaint on file. History of Present Illness: chemotherapy     History:    Past Medical History:   Diagnosis Date    Allergic rhinitis     Cancer ([facility]     Stage 1 cancer intestines    Cervical disc disease     Colonic mass 3/20/2017    Eczema     Gastritis     GERD (gastroesophageal reflux disease)     mild    GI bleed     hx. of recurrent    Hiatal hernia     Hypercholesterolemia     Hypertension     [facility] 9/10/2018    Morbid obesity ([facility] 4/13/2017    Osteoarthritis (arthritis due to wear and tear of joints)     Pancreatitis, gallstone     8/18     Family History   Problem Relation Age of Onset    No Known Problems Mother     No Known Problems Father     Arthritis-osteo Sister     Alzheimer Sister     Parkinson's Disease Brother     Cancer Brother     No Known Problems Brother     Anesth Problems Neg Hx      Social History     Social History    Marital status:      Spouse name: N/A    Number of children: N/A    Years of education: N/A     Occupational History    Not on file. Social History Main Topics    Smoking status: Never Smoker    Smokeless tobacco: Never Used    Alcohol use No    Drug use: No    Sexual activity: No     Other Topics Concern    Not on file     Social History Narrative       Allergies: Allergies   Allergen Reactions    Lisinopril Angioedema    Niacin Itching     Felt hot also    Ultram [Tramadol] Nausea Only       Current Medications:  Current Outpatient Prescriptions   Medication Sig    omeprazole (PRILOSEC) 20 mg capsule Take 1 Cap by mouth daily.  dilTIAZem (TIAZAC) 120 mg SR capsule Take 1 Cap by mouth daily.  atorvastatin (LIPITOR) 20 mg tablet Take 1 Tab by mouth daily.  magnesium oxide 500 mg tab Take 1 Tab by mouth daily.     cholecalciferol (VITAMIN D3) 1,000 unit cap Take 1 Cap by mouth daily.  furosemide (LASIX) 20 mg tablet Take 1 Tab by mouth two (2) times a day.  acetaminophen (TYLENOL) 325 mg tablet Take 325 mg by mouth every four (4) hours as needed for Pain.  trolamine salicylate (MYOFLEX) 10 % topical cream Apply 1 Part to affected area three (3) times daily as needed for Pain. apply small amount externally to left knee    coenzyme Q-10 (CO Q-10) 200 mg capsule Take 200 mg by mouth daily.  HYDROcodone-acetaminophen (NORCO)  mg tablet Take 1 Tab by mouth every four (4) hours as needed for Pain.  aspirin delayed-release 81 mg tablet Take 81 mg by mouth daily.  potassium chloride (K-DUR, KLOR-CON) 20 mEq tablet Take 20 mEq by mouth daily.  polyethylene glycol (MIRALAX) 17 gram packet Take 1 Packet by mouth daily.  HYDROcodone-acetaminophen (NORCO)  mg tablet Take 1 Tab by mouth every four (4) hours as needed. Max Daily Amount: 6 Tabs.  multivitamin (ONE A DAY) tablet Take 1 Tab by mouth daily.  capecitabine (XELODA) 500 mg tablet Take 4 Tabs by mouth two (2) times a day for 14 days. Indications: metastatic colorectal cancer    ondansetron hcl (ZOFRAN) 8 mg tablet Take 1 Tab by mouth every eight (8) hours as needed for Nausea. Take every 8 hours only as needed. Indications: CANCER CHEMOTHERAPY-INDUCED NAUSEA AND VOMITING     Current Facility-Administered Medications   Medication Dose Route Frequency    lidocaine (XYLOCAINE) 20 mg/mL (2 %) injection 360 mg  18 mL SubCUTAneous ONCE    heparin (porcine) pf 300 Units  300 Units InterCATHeter ONCE    heparinized saline 2 units/mL infusion 800 Units  400 mL Irrigation ONCE    lidocaine-EPINEPHrine (XYLOCAINE) 1 %-1:100,000 injection 180 mg  18 mL IntraDERMal ONCE        Physical Exam:  Blood pressure 115/78, pulse 81, temperature 98.3 °F (36.8 °C), resp. rate 20, height 5' 2\" (1.575 m), weight 100.7 kg (222 lb), SpO2 97 %, not currently breastfeeding.   LUNG: clear to auscultation bilaterally, HEART: regular rate and rhythm, S1, S2 normal, no murmur, click, rub or gallop      Alerts:    Hospital Problems  Date Reviewed: 9/10/2018    None          Laboratory:    No results for input(s): HGB, HCT, WBC, PLT, INR, BUN, CREA, K, CRCLT, HGBEXT, HCTEXT, PLTEXT in the last 72 hours. No lab exists for component: PTT, PT, INREXT      Plan of Care/Planned Procedure:  Risks, benefits, and alternatives reviewed with patient and she agrees to proceed with the procedure.      Plan is for chest port placement       Evita Phillips MD

## 2018-10-15 NOTE — ROUTINE PROCESS
Dr. Blake Crowell into assess pt and procedure explained along with risks and benefits; consent obtained at this time for Kayy Clifford Placement.

## 2018-10-15 NOTE — IP AVS SNAPSHOT
[address] [facility] 
[phone] Patient: Karlo Shankar MRN: [id number] ASV:[id number] About your hospitalization You were admitted on:  October 15, 2018 You last received care in the:  [facility] RAD ANGIO IR You were discharged on:  October 15, 2018 Why you were hospitalized Your primary diagnosis was:  Not on File Follow-up Information None Your Scheduled Appointments Wednesday October 17, 2018  9:00 AM EDT Follow Up with Omar Teixeira MD  
[facility] [place] [address] [id number] [phone] Discharge Orders None A check shaquille indicates which time of day the medication should be taken. My Medications ASK your doctor about these medications Instructions Each Dose to Equal  
 Morning Noon Evening Bedtime  
 acetaminophen 325 mg tablet Commonly known as:  TYLENOL Your last dose was: Your next dose is: Take 325 mg by mouth every four (4) hours as needed for Pain. 325 mg  
    
   
   
   
  
 aspirin delayed-release 81 mg tablet Your last dose was: Your next dose is: Take 81 mg by mouth daily. 81 mg  
    
   
   
   
  
 atorvastatin 20 mg tablet Commonly known as:  LIPITOR Your last dose was: Your next dose is: Take 1 Tab by mouth daily. 20 mg  
    
   
   
   
  
 capecitabine 500 mg tablet Commonly known as:  XELODA Your last dose was: Your next dose is: Take 4 Tabs by mouth two (2) times a day for 14 days. Indications: metastatic colorectal cancer 1000 mg/m2 cholecalciferol 1,000 unit Cap Commonly known as:  VITAMIN D3 Your last dose was: Your next dose is: Take 1 Cap by mouth daily. 1000 Units CO Q-10 200 mg capsule Generic drug:  coenzyme Q-10 Your last dose was: Your next dose is: Take 200 mg by mouth daily. 200 mg  
    
   
   
   
  
 dilTIAZem 120 mg SR capsule Commonly known as:  [facility] Your last dose was: Your next dose is: Take 1 Cap by mouth daily. 120 mg  
    
   
   
   
  
 furosemide 20 mg tablet Commonly known as:  LASIX Your last dose was: Your next dose is: Take 1 Tab by mouth two (2) times a day. 20 mg  
    
   
   
   
  
 * HYDROcodone-acetaminophen  mg tablet Commonly known as:  Nicolasa Hector Your last dose was: Your next dose is: Take 1 Tab by mouth every four (4) hours as needed for Pain. 1 Tab  
    
   
   
   
  
 * HYDROcodone-acetaminophen  mg tablet Commonly known as:  Nicolasa Hector Your last dose was: Your next dose is: Take 1 Tab by mouth every four (4) hours as needed. Max Daily Amount: 6 Tabs. 1 Tab  
    
   
   
   
  
 magnesium oxide 500 mg Tab Your last dose was: Your next dose is: Take 1 Tab by mouth daily. 1 Tab  
    
   
   
   
  
 multivitamin tablet Commonly known as:  ONE A DAY Your last dose was: Your next dose is: Take 1 Tab by mouth daily. 1 Tab  
    
   
   
   
  
 omeprazole 20 mg capsule Commonly known as:  PRILOSEC Your last dose was: Your next dose is: Take 1 Cap by mouth daily. 20 mg  
    
   
   
   
  
 ondansetron hcl 8 mg tablet Commonly known as:  Lovette Nett Your last dose was: Your next dose is: Take 1 Tab by mouth every eight (8) hours as needed for Nausea. Take every 8 hours only as needed. Indications: CANCER CHEMOTHERAPY-INDUCED NAUSEA AND VOMITING  
 8 mg  
    
   
   
   
  
 polyethylene glycol 17 gram packet Commonly known as:  Vanna Valdez Your last dose was: Your next dose is: Take 1 Packet by mouth daily. 17 g  
    
   
   
   
  
 potassium chloride 20 mEq tablet Commonly known as:  K-DUR, KLOR-CON Your last dose was: Your next dose is: Take 20 mEq by mouth daily. 20 mEq  
    
   
   
   
  
 trolamine salicylate 10 % topical cream  
Commonly known as:  MYOFLEX Your last dose was: Your next dose is:    
   
   
 Apply 1 Part to affected area three (3) times daily as needed for Pain. apply small amount externally to left knee 1 Part * Notice: This list has 2 medication(s) that are the same as other medications prescribed for you. Read the directions carefully, and ask your doctor or other care provider to review them with you. Opioid Education Prescription Opioids: What You Need to Know: 
 
Prescription opioids can be used to help relieve moderate-to-severe pain and are often prescribed following a surgery or injury, or for certain health conditions. These medications can be an important part of treatment but also come with serious risks. Opioids are strong pain medicines. Examples include hydrocodone, oxycodone, fentanyl, and morphine. Heroin is an example of an illegal opioid. It is important to work with your health care provider to make sure you are getting the safest, most effective care. WHAT ARE THE RISKS AND SIDE EFFECTS OF OPIOID USE? Prescription opioids carry serious risks of addiction and overdose, especially with prolonged use. An opioid overdose, often marked by slow breathing, can cause sudden death. The use of prescription opioids can have a number of side effects as well, even when taken as directed. · Tolerance-meaning you might need to take more of a medication for the same pain relief · Physical dependence-meaning you have symptoms of withdrawal when the medication is stopped. Withdrawal symptoms can include nausea, sweating, chills, diarrhea, stomach cramps, and muscle aches. Withdrawal can last up to several weeks, depending on which drug you took and how long you took it. · Increased sensitivity to pain · Constipation · Nausea, vomiting, and dry mouth · Sleepiness and dizziness · Confusion · Depression · Low levels of testosterone that can result in lower sex drive, energy, and strength · Itching and sweating RISKS ARE GREATER WITH:      
· History of drug misuse, substance use disorder, or overdose · Mental health conditions (such as depression or anxiety) · Sleep apnea · Older age (72 years or older) · Pregnancy Avoid alcohol while taking prescription opioids. Also, unless specifically advised by your health care provider, medications to avoid include: · Benzodiazepines (such as Xanax or Valium) · Muscle relaxants (such as Soma or Flexeril) · Hypnotics (such as Ambien or Lunesta) · Other prescription opioids KNOW YOUR OPTIONS Talk to your health care provider about ways to manage your pain that don't involve prescription opioids. Some of these options may actually work better and have fewer risks and side effects. Consult your physician before adding or stopping any medications, treatments, or physical activity. Options may include: 
· Pain relievers such as acetaminophen, ibuprofen, and naproxen · Some medications that are also used for depression or seizures · Physical therapy and exercise · Counseling to help patients learn how to cope better with triggers of pain and stress. · Application of heat or cold compress · Massage therapy · Relaxation techniques Be Informed Make sure you know the name of your medication, how much and how often to take it, and its potential risks & side effects. IF YOU ARE PRESCRIBED OPIOIDS FOR PAIN: 
· Never take opioids in greater amounts or more often than prescribed. Remember the goal is not to be pain-free but to manage your pain at a tolerable level. · Follow up with your primary care provider to: · Work together to create a plan on how to manage your pain. · Talk about ways to help manage your pain that don't involve prescription opioids. · Talk about any and all concerns and side effects. · Help prevent misuse and abuse. · Never sell or share prescription opioids · Help prevent misuse and abuse. · Store prescription opioids in a secure place and out of reach of others (this may include visitors, children, friends, and family). · Safely dispose of unused/unwanted prescription opioids: Find your community drug take-back program or your pharmacy mail-back program, or flush them down the toilet, following guidance from the Food and Drug Administration (www.fda.gov/Drugs/ResourcesForYou). · Visit www.cdc.gov/drugoverdose to learn about the risks of opioid abuse and overdose. · If you believe you may be struggling with addiction, tell your health care provider and ask for guidance or call [address] [place] [address] at 2-983-195-BJER. Discharge Instructions Rosemarie Damon [facility] Angiography Department Radiologist:   Dr. Deidre Huerta Date:   10/15/2018 [facility] Discharge Instructions Watch for signs of infection: 1. Redness,  
2. Fever, chills,  
3. Increased pain, and/or drainage from the site. If this occurs, call your physician at once. Return next week for a [employer] check: Do not register. Proceed to the Radiology Waiting Area and let the  know you are here for a \"PORT site check\". If you have an appointment with a provider or at the infusion center in the upcoming week,  they may check your site and change the dressing for you. Keep your dressing clean and dry. Leave the dressing in place until seen here next week. Continue your previous diet and restart your regularly prescribed medications. You may take Tylenol, as directed on the label, for pain if needed. Avoid ibuprofen (Advil, Motrin) and aspirin as they may cause you to bleed. Because you received sedation, you are not to drive or sign any legal documents for the next 24 hours. Do not lift anything heavier than 5 pounds with the affected arm and avoid pushing and pulling movements for several days. If you have any questions or concerns, please call our radiology department at [phone]. ACO Transitions of Care Introducing [employer] [address] Bailey offers a voluntary care coordination program to provide high quality service and care to [facility] fee-for-service beneficiaries. Cornelia Strang was designed to help you enhance your health and well-being through the following services: ? Transitions of Care  support for individuals who are transitioning from one care setting to another (example: Hospital to home). ? Chronic and Complex Care Coordination  support for individuals and caregivers of those with serious or chronic illnesses or with more than one chronic (ongoing) condition and those who take a number of different medications. If you meet specific medical criteria, a [facility] [address] may call you directly to coordinate your care with your primary care physician and your other care providers. For questions about the [facility] programs, please, contact your physicians office. For general questions or additional information about Accountable Care Organizations: 
Please visit www.medicare.gov/acos. html or call 1-800-MEDICARE (7-371.752.1494) TTY users should call 4-545.665.8997. Introducing [facility] & HEALTH SERVICES!    
 Dear Jovanna Mahoney: 
 Thank you for requesting a [employer] account. Our records indicate that you already have an active [employer] account. You can access your account anytime at [email or web address] Did you know that you can access your hospital and ER discharge instructions at any time in [employer]? You can also review all of your test results from your hospital stay or ER visit. Additional Information If you have questions, please visit the Frequently Asked Questions section of the [employer] website at [email or web address] Remember, [employer] is NOT to be used for urgent needs. For medical emergencies, dial 911. Now available from your iPhone and Android! Introducing Ryan Kearney As a [facility] patient, I wanted to make you aware of our electronic visit tool called Ryan Kearney. [employer] allows you to connect within minutes with a medical provider 24 hours a day, seven days a week via a mobile device or tablet or logging into a secure website from your computer. You can access Ryan Kearney from anywhere in the [place]. A virtual visit might be right for you when you have a simple condition and feel like you just dont want to get out of bed, or cant get away from work for an appointment, when your regular [facility] provider is not available (evenings, weekends or holidays), or when youre out of town and need minor care. Electronic visits cost only $49 and if the [employer] provider determines a prescription is needed to treat your condition, one can be electronically transmitted to a nearby pharmacy*. Please take a moment to enroll today if you have not already done so. The enrollment process is free and takes just a few minutes. To enroll, please download the [employer] marilu to your tablet or phone, or visit [email or web address] to enroll on your computer. And, as an [address] [place] [address] patient with a [employer] account, the results of your visits will be scanned into your electronic medical record and your primary care provider will be able to view the scanned results. We urge you to continue to see your regular [employer] provider for your ongoing medical care. And while your primary care provider may not be the one available when you seek a Ryan Gainesfin virtual visit, the peace of mind you get from getting a real diagnosis real time can be priceless. For more information on Ryan Gainesfin, view our Frequently Asked Questions (FAQs) at [email or web address]. Sincerely, 
 
Arianna Gallo MD 
Chief Medical Officer [employer] *:  certain medications cannot be prescribed via [employer] Unresulted Labs-Please follow up with your PCP about these lab tests Order Current Status IR INSERT TUNL CVC W PORT OVER 5 YEARS In process Your Primary Care Physician (PCP) Primary Care Physician Office Phone Office Fax [facility], Susan Braun [phone] You are allergic to the following Allergen Reactions Lisinopril Angioedema Niacin Itching Felt hot also Ultram (Tramadol) Nausea Only Recent Documentation Height Weight Breastfeeding? BMI OB Status Smoking Status 1.575 m 100.7 kg No 40.6 kg/m2 Hysterectomy Never Smoker Emergency Contacts Name Discharge Info Relation Home Work Mobile MaricarmenEvette burgess DISCHARGE CAREGIVER [3] Child [2] [phone] Patient Belongings The following personal items are in your possession at time of discharge: 
     Visual Aid: Glasses, With patient Please provide this summary of care documentation to your next provider.  
  
  
 
  
Signatures-by signing, you are acknowledging that this After Visit Summary has been reviewed with you and you have received a copy. Patient Signature:  ____________________________________________________________ Date:  ____________________________________________________________  
  
Larey Clutter Provider Signature:  ____________________________________________________________ Date:  ____________________________________________________________

## 2018-10-17 NOTE — PROGRESS NOTES
Hematology/Oncology  Progress Note    Name: Jeni Good  Date: 10/17/2018  : 1935    Ms. Son Valles is a 81y.o. year old female with history of stage I T2N0M0 colon cancer s/p right hemicolectomy in 2017 now with diagnosis of recurrent metastatic colon cancer. She had an MRI abdomen done  suggestive of 2 intraabdominal masses in the inferior aspect of pancreas and liver. She had mediport placement last week that was uneventful and had 24 hour urine protein that was normal. She is now here to start her cycle 1 chemotherapy with Xeloda + Avastin today. Subjective:     She denies any abdominal pain, diarrhea, bloody or black stools, loss of weight or apetite but admits to constipation. Past Medical History:   Diagnosis Date    Allergic rhinitis     Cancer ([address]     Stage 1 cancer intestines    Cervical disc disease     Colonic mass 3/20/2017    Eczema     Gastritis     GERD (gastroesophageal reflux disease)     mild    GI bleed     hx. of recurrent    Hiatal hernia     Hypercholesterolemia     Hypertension     Metastatic adenocarcinoma to liver ([facility] 9/10/2018    Morbid obesity ([address] 2017    Osteoarthritis (arthritis due to wear and tear of joints)     Pancreatitis, gallstone          Current Outpatient Medications   Medication Sig Dispense Refill    capecitabine (XELODA) 500 mg tablet Take 4 Tabs by mouth two (2) times a day for 14 days. Indications: metastatic colorectal cancer 112 Tab 6    ondansetron hcl (ZOFRAN) 8 mg tablet Take 1 Tab by mouth every eight (8) hours as needed for Nausea. Take every 8 hours only as needed. Indications: CANCER CHEMOTHERAPY-INDUCED NAUSEA AND VOMITING 30 Tab 1    omeprazole (PRILOSEC) 20 mg capsule Take 1 Cap by mouth daily. 90 Cap 1    dilTIAZem (TIAZAC) 120 mg SR capsule Take 1 Cap by mouth daily. 90 Cap 1    atorvastatin (LIPITOR) 20 mg tablet Take 1 Tab by mouth daily.  90 Tab 1    magnesium oxide 500 mg tab Take 1 Tab by mouth daily. 90 Tab 1    cholecalciferol (VITAMIN D3) 1,000 unit cap Take 1 Cap by mouth daily. 90 Cap 1    furosemide (LASIX) 20 mg tablet Take 1 Tab by mouth two (2) times a day. 180 Tab 1    acetaminophen (TYLENOL) 325 mg tablet Take 325 mg by mouth every four (4) hours as needed for Pain.  trolamine salicylate (MYOFLEX) 10 % topical cream Apply 1 Part to affected area three (3) times daily as needed for Pain. apply small amount externally to left knee      coenzyme Q-10 (CO Q-10) 200 mg capsule Take 200 mg by mouth daily.  HYDROcodone-acetaminophen (NORCO)  mg tablet Take 1 Tab by mouth every four (4) hours as needed for Pain.  aspirin delayed-release 81 mg tablet Take 81 mg by mouth daily.  potassium chloride (K-DUR, KLOR-CON) 20 mEq tablet Take 20 mEq by mouth daily.  polyethylene glycol (MIRALAX) 17 gram packet Take 1 Packet by mouth daily. 30 Packet 0    HYDROcodone-acetaminophen (NORCO)  mg tablet Take 1 Tab by mouth every four (4) hours as needed. Max Daily Amount: 6 Tabs. 10 Tab 0    multivitamin (ONE A DAY) tablet Take 1 Tab by mouth daily. Facility-Administered Medications Ordered in Other Visits   Medication Dose Route Frequency Provider Last Rate Last Dose    0.9% sodium chloride infusion  25 mL/hr IntraVENous CONTINUOUS Gamaliel Lara MD        bevacizumab (AVASTIN) 750 mg in 0.9% sodium chloride 100 mL, overfill volume 10 mL IVPB  7.5 mg/kg (Order-Specific) IntraVENous ONCE Gamaliel Lara MD        0.9% sodium chloride infusion  25 mL/hr IntraVENous CONTINUOUS Gamaliel Lara MD        palonosetron HCl (ALOXI) injection 0.25 mg  0.25 mg IntraVENous ONCE Edmundo Browne MD        dexamethasone (DECADRON) 12 mg in 0.9% sodium chloride 50 mL IVPB  12 mg IntraVENous ONCE Gamaliel Lara MD         Review of Systems  ECOG PS is 1-2. Constitutional Admits to fatigue. No fevers, chills, night sweats, weight loss.    Allergic/Immunologic No recent allergic reactions   Eyes No significant visual difficulties. No diplopia. ENMT No problems with hearing, no sore throat, no sinus drainage. Endocrine No hot flashes or night sweats. No cold intolerance, polyuria, or polydipsia   Hematologic/Lymphatic No easy bruising or bleeding. The patient denies any tender or palpable lymph nodes   Breasts No abnormal masses of breast, nipple discharge or pain. Respiratory No dyspnea on exertion, orthopnea, chest pain, cough or hemoptysis. Cardiovascular No anginal chest pain, irregular heart beat, tachycardia, palpitations or orthopnea. Gastrointestinal Complains of constipation. No nausea, vomiting, diarrhea, cramping, dysphagia, reflux, heartburn, GI bleeding, or early satiety. No change in bowel habits. Genitourinary (M) No hematuria, dysuria, increased frequency, urgency, hesitancy or incontinence. Musculoskeletal No joint pain, swelling or redness. No decreased range of motion. Integumentary No chronic rashes, inflammation, ulcerations, pruritus, petechiae, purpura, ecchymoses, or skin changes. Neurologic No headache, blurred vision, and no areas of focal weakness or numbness. Normal gait. No sensory problems. Psychiatric No insomnia, depression, hermelindo or mood swings. No psychotropic drugs. Objective:     Visit Vitals  /72   Pulse 83   Temp 98 °F (36.7 °C) (Oral)   Resp 18   Ht 5' 2\" (1.575 m)   Wt 222 lb (100.7 kg)   SpO2 95%   BMI 40.60 kg/m²     Physical Examination:  Constitutional Alert, cooperative, oriented. Mood and affect appropriate. Appears close to chronological age. Well nourished. Well developed. Head Normocephalic; no scars   Eyes Conjunctivae and sclerae are clear and without icterus. Pupils are reactive and equal.   ENMT Sinuses are nontender. No oral exudates, ulcers, masses, thrush or mucositis. Oropharynx clear. Tongue normal.   Neck Supple without masses or thyromegaly. No jugular venous distension. Hematologic/Lymphatic No petechiae or purpura. No tender or palpable lymph nodes in the cervical, supraclavicular, axillary or inguinal area. Respiratory Lungs are clear to auscultation without rhonchi or wheezing. Cardiovascular Regular rate and rhythm of heart without murmurs, gallops or rubs. Chest / Line Site Chest is symmetric with no chest wall deformities. Abdomen Distended abdomen and palapable hernias in the upper abdomen   Musculoskeletal No tenderness or swelling, normal range of motion without obvious weakness. Extremities > 1+ pedal edema. Skin No rashes, scars, or lesions suggestive of malignancy. No petechiae, purpura, or ecchymoses. No excoriations. Neurologic No sensory or motor deficits, normal cerebellar function, normal gait, cranial nerves intact. Psychiatric Alert and oriented times three. Coherent speech. Verbalizes understanding of our discussions today. LABS:  Period of collection 24    hr Final   Volume 1,330    mL Final   Protein,urine 24 hr 80   <149 mg/24hr Final     .2    ng/mL Final     Sodium 141   136 - 145 mmol/L Final   Potassium 3.5   3.5 - 5.1 mmol/L Final   Chloride 101   97 - 108 mmol/L Final   CO2 35 Abnormally high   H  21 - 32 mmol/L Final   Anion gap 5   5 - 15 mmol/L Final   Glucose 117 Abnormally high   H  65 - 100 mg/dL Final   BUN 14   6 - 20 MG/DL Final   Creatinine 0.80   0.55 - 1.02 MG/DL Final   BUN/Creatinine ratio 18   12 - 20   Final   GFR est AA >60   >60 ml/min/1.73m2 Final   GFR est non-AA >60   >60 ml/min/1.73m2 Final     Calcium 9.3   8.5 - 10.1 MG/DL Final   Bilirubin, total 0.4   0.2 - 1.0 MG/DL Final   ALT (SGPT) 13   12 - 78 U/L Final   AST (SGOT) 21   15 - 37 U/L Final   Alk.  phosphatase 74   45 - 117 U/L Final   Protein, total 7.0   6.4 - 8.2 g/dL Final   Albumin 2.7 Abnormally low   L  3.5 - 5.0 g/dL Final   Globulin 4.3 Abnormally high   H  2.0 - 4.0 g/dL Final   A-G Ratio 0.6 Abnormally low   L  1.1 - 2.2       WBC 8.4   3.6 - 11.0 K/uL Final         RBC 4.41   3.80 - 5.20 M/uL Final   HGB 11.6   11.5 - 16.0 g/dL Final   HCT 38.1   35.0 - 47.0 % Final   MCV 86.4   80.0 - 99.0 FL Final   MCH 26.3   26.0 - 34.0 PG Final   MCHC 30.4   30.0 - 36.5 g/dL Final   RDW 14.2   11.5 - 14.5 % Final   PLATELET 251   411 - 400 K/uL Final   MPV 9.7   8.9 - 12.9 FL Final   NRBC 0.0   0  WBC Final   ABSOLUTE NRBC 0.00   0.00 - 0.01 K/uL Final   NEUTROPHILS 68   32 - 75 % Final   LYMPHOCYTES 14   12 - 49 % Final   MONOCYTES 14 Abnormally high   H  5 - 13 % Final   EOSINOPHILS 3   0 - 7 % Final   BASOPHILS 1   0 - 1 % Final   IMMATURE GRANULOCYTES 0   0.0 - 0.5 % Final   ABS. NEUTROPHILS 5.7   1.8 - 8.0 K/UL Final   ABS. LYMPHOCYTES 1.2   0.8 - 3.5 K/UL Final   ABS. MONOCYTES 1.1 Abnormally high   H  0.0 - 1.0 K/UL Final   ABS. EOSINOPHILS 0.3   0.0 - 0.4 K/UL Final   ABS. BASOPHILS 0.1   0.0 - 0.1 K/UL Final               Liver Biopsy (8/6/18)     Specimen Source   1: Liver, Fine Needle Aspiration   CYTOLOGIC INTERPRETATION:   Liver, imaging guided core needle biopsy with immediate interpretation of touch imprint smears:   Metastatic adenocarcinoma, see comment   General Categorization   Positive for malignancy. Specimen Adequacy   Satisfactory for evaluation   Comment   Touch imprint smears and sections of the core needle biopsies are examined showing metastatic adenocarcinoma. Although morphologic features can be nonspecific, this is compatible with colorectal primary. Confirmatory stains can be performed upon request. Adequate tissue is present in both blocks (A and B) for additional testing as clinically indicated     RADIOLOGY:      NUCLEAR MEDICINE WHOLE BODY BONE SCAN     INDICATION:  Colon cancer.  Hip pain.     COMPARISON:  None.     RADIOPHARMACEUTICAL:  20.5 mCi Technetium 99m-MDP, IV.     FINDINGS:     There is no evidence of osseous metastatic disease.     Areas of degenerative uptake are noted at the glenohumeral joints, wrists, hands  and feet. Additional degenerative uptake at the left knee, left hip and within  the thoracal lumbar spine.     Photopenic defect at the right knee consistent with TKR. No abnormal soft tissue  uptake.     Symmetric activity is seen within both kidneys.     IMPRESSION:     No evidence of osseous metastatic disease. Multifocal degenerative uptake as  described above.     MRI ABDOMEN AND MRCP WITH AND WITHOUT CONTRAST (8/1/18).      INDICATION: Abnormal liver function tests; Cholelithiasis; Pancreatitis; Pancreatitis - acute; evaluate for choledocholithiasis as well as further  evaluate hepatic lesion     COMPARISON: 4/17/2017, 4/30/2018     TECHNIQUE: Multisequence and multiplanar MRI of the abdomen was performed after  the administration of 7 mL of Gadavist (gadobutrol)  IV contrast. Images were  obtained without contrast; and in late arterial, portal venous, and delayed  phases after the administration of contrast. Subtraction images were  reconstructed.     Heavily T2-weighted thick slab and thin slice images were obtained in the  oblique coronal plane through the biliary tree (MRCP).     FINDINGS:      MRCP: Suboptimal MRCP. In particular, evaluation for choledocholithiasis is  significantly limited. The intrahepatic biliary tree demonstrates mild  dilatation, which was not present on the 4/17/2017 CT the common bile duct is  not dilated. The common bile duct can be followed all the way to the ampulla on  the postcontrast sequences. The pancreatic duct is normal in course and caliber. LIVER: No steatosis. There is a mildly T2 hyperintense, markedly heterogeneous  mass in the inferior aspect of the right lobe in segment 5 measuring 4.5 x 8.9 x  4.7 cm. There are areas of nonenhancement within this lesion that could  represent necrosis. GALLBLADDER: Numerous tiny layering stones and/or sludge are seen in the  gallbladder. No wall thickening. Wayne Hong   PANCREAS: Fairly extensive edema is seen in the uncinate process, head, and neck  region of the pancreas with minimal surrounding peripancreatic edema. No fluid  collection. There is a vague area soft tissue in the mesentery inferior to the  pancreas that demonstrates enhancement and measures approximately 3.3 x 5.1 cm  series 1402 image 87. SPLEEN: Normal.  ADRENALS: Normal.  KIDNEYS: Numerous parapelvic and exophytic cysts are seen arising from both  kidneys. No hydronephrosis or solid mass. Theadore Ramona DISTAL ESOPHAGUS, STOMACH, AND VISUALIZED BOWEL: Diastasis of the rectus muscles  and a ventral hernia redemonstrated. No obstruction. PERITONEUM: No free fluid and no abdominal lymphadenopathy. VISUALIZED LUNG BASES: Grossly clear within the sensitivity of MRI. BONES: No suspicious lesions.      IMPRESSION:   1. Acute pancreatitis is noted.     2. Nonspecific soft tissue mass versus extensive enhancing inflammatory change in the mesentery inferior to the pancreas described above. Soft tissue mass is favored, which is concerning for metastatic disease.     3. Aggressive appearing, heterogeneous mass in the inferior aspect of the right  lobe of the liver. Given history of colon cancer, findings are highly concerning  for metastatic disease. This lesion would be amenable to imaging guided biopsy.     4. Other findings described above.     CT ABDOMEN AND PELVIS WITHOUT IV CONTRAST (7/30/18)     INDICATION: Abdominal pain, RLQ; bloating.     COMPARISON: 4/17/2017.     TECHNIQUE:       Axial images were obtained through the abdomen and pelvis without IV contrast.   Oral contrast was not administered. Coronal and sagittal reformations were  generated. CT dose reduction was achieved through use of a standardized  protocol tailored for this examination and automatic exposure control for dose  modulation.       FINDINGS:     The included portions of the lung bases are clear. No pleural of pericardial  fluid.       The spleen and adrenal glands are unremarkable.   No intrahepatic or extrahepatic  bile duct dilatation. Bilateral renal cysts are unchanged. Distended gallbladder  containing multiple tiny gravel-like stones versus dense sludge.     Nonspecific area of decreased density in the right hepatic lobe measuring 7.6  cm.     Ill-defined pancreatic head with subtle stranding of the fat adjacent to the  pancreatic head.     No dilated bowel loops. Surgical staple line in the right colon consistent with  right hemicolectomy. Diverticulosis. No pathologically enlarged lymph nodes,  free fluid or free air.     Previous hysterectomy. No adnexal region pathology. Atherosclerotic  calcification of the abdominal aorta and branch vasculature without evidence of  aneurysm. Diastasis of the rectus musculature with protrusion of stomach,  liver, bowel and bowel mesentery through the defect. There is a ventral hernia  containing nondilated, non thickened small bowel.     No acute osseous abnormalities.     IMPRESSION:     1. Ill-defined pancreatic head and adjacent fat stranding. Pancreatitis is not  excluded. 2. Rectus muscle diastasis as described above. Ventral hernia. 3. Bilateral renal cysts, unchanged. 4. Diverticulosis. 5. Large area of decreased density in the right hepatic lobe of uncertain  etiology. Consider contrast-enhanced MRI for further evaluation. 6. Distended gallbladder with gravel-like stones versus dense sludge.         Assessment:     Recurrent Metastatic Adenocarcinoma of the right colon    Plan:     - She had CT and MRI abdomen and a bone scan that shows evidence of only intrabdominal disease. CT chest not done. - CEA elevated at 185.2 on 10/10/18. Monitor CEA with every alternate cycle. - Her CBC & CMP are within normal limits.  - KRas negative, BRaf negative and MSI stable. She will not be a candidate for EGFR inhibitor therapy or immunotherapy. - Discussed various treatment options that include Capeox+Avastin vs Avastin + Xeloda vs Xeloda alone.  She is agreeable to Avastin+ Xeloda. - Her 24 hour urine protein is normal.  - Discussed side effects of Xeloda and Avastin and prognosis of the disease with the patient and son. - She will start her cycle 1 chemo with Xeloda + Avastin today. - She will return to the clinic in 3 weeks prior to cycle 2 chemo. She understands to call us back sooner if needed.     Jose White MD  10/17/2018

## 2018-10-17 NOTE — PROGRESS NOTES
Alexander Jansen is a 81 y.o. female c/o pain today in L hip, this is chronic. She has not been taking her pain medication due to tylenol she is worried about her liver. Education provided on safe use of tylenol and daily max amount.     Patient is scheduled for her first dose of Avastin today, she started her Xeloda this morning after breakfast.

## 2018-10-26 NOTE — TELEPHONE ENCOUNTER
Requested Prescriptions     Pending Prescriptions Disp Refills    potassium chloride (K-DUR, KLOR-CON) 20 mEq tablet       Sig: Take 1 Tab by mouth daily.

## 2018-11-06 NOTE — PROGRESS NOTES
Lorena Locke is a 81 y.o. female here today for OV and C2 of chemo. Denies complaint after last chemo. Chemotherapy Flowsheet 10/17/2018   Cycle C1D1   Date 10/17/2018   Drug / Regimen Avastin   Dosage 750mg   Pre Meds Aloxi, Decadron   Notes Started Xeloda       [facility] Med[facility]             ondansetron hcl (ZOFRAN) 8 mg tablet Take 1 Tab by mouth every eight (8) hours as needed for Nausea. Take every 8 hours only as needed. Indications: CANCER CHEMOTHERAPY-INDUCED NAUSEA AND VOMITING        Patient taking xeloda 2000mg BID 14 days on and 7 days off, She has been off for the 7 days, restarted this morning. Patient stated she is not taking her ASA or multivitamin since beginning chemo. Will relay to Dr Galo Pham to address.

## 2018-11-07 NOTE — PROGRESS NOTES
Hematology/Oncology  Progress Note    Name: Paola Nation  Date: 2018  : 1935    Ms. Lashon Palumbo is a 81y.o. year old female with history of stage I T2N0M0 colon cancer s/p right hemicolectomy in 2017 now with diagnosis of recurrent metastatic colon cancer. She had an MRI abdomen done 2018 suggestive of 2 intraabdominal masses in the inferior aspect of pancreas and liver. She had biopsy done 18 that was confirmatory for adenocarcinoma colon, KRas & BRaf negative and MSI stable. She had mediport placed and had 24 hour urine protein that was normal. She was started on cycle I chemo with Xeloda + Avastin on 10/17 and is now here for her cycle II chemotherapy today. Subjective:     She complains of mild fatigue and on & off gas pains in the abdomen, denies any mouth sores, diarrhea, bloody or black stools, loss of weight or apetite. Past Medical History:   Diagnosis Date    Allergic rhinitis     Cancer ([address]     Stage 1 cancer intestines    Cervical disc disease     Colonic mass 3/20/2017    Eczema     Gastritis     GERD (gastroesophageal reflux disease)     mild    GI bleed     hx. of recurrent    Hiatal hernia     Hypercholesterolemia     Hypertension     Metastatic adenocarcinoma to liver ([facility] [address] 9/10/2018    Morbid obesity ([address] 2017    Osteoarthritis (arthritis due to wear and tear of joints)     Pancreatitis, gallstone          Current Outpatient Medications   Medication Sig Dispense Refill    lidocaine (ASPERCREME, LIDOCAINE,) 4 % topical cream Apply 1 Each to affected area four (4) times daily as needed for Pain (as needed for knee pain).  potassium chloride (K-DUR, KLOR-CON) 20 mEq tablet Take 1 Tab by mouth daily. 90 Tab 1    ondansetron hcl (ZOFRAN) 8 mg tablet Take 1 Tab by mouth every eight (8) hours as needed for Nausea. Take every 8 hours only as needed.   Indications: CANCER CHEMOTHERAPY-INDUCED NAUSEA AND VOMITING 30 Tab 1    omeprazole (PRILOSEC) 20 mg capsule Take 1 Cap by mouth daily. 90 Cap 1    dilTIAZem (TIAZAC) 120 mg SR capsule Take 1 Cap by mouth daily. 90 Cap 1    atorvastatin (LIPITOR) 20 mg tablet Take 1 Tab by mouth daily. 90 Tab 1    magnesium oxide 500 mg tab Take 1 Tab by mouth daily. 90 Tab 1    cholecalciferol (VITAMIN D3) 1,000 unit cap Take 1 Cap by mouth daily. 90 Cap 1    furosemide (LASIX) 20 mg tablet Take 1 Tab by mouth two (2) times a day. 180 Tab 1    coenzyme Q-10 (CO Q-10) 200 mg capsule Take 200 mg by mouth daily.  polyethylene glycol (MIRALAX) 17 gram packet Take 1 Packet by mouth daily. 30 Packet 0    HYDROcodone-acetaminophen (NORCO)  mg tablet Take 1 Tab by mouth every four (4) hours as needed. Max Daily Amount: 6 Tabs. 10 Tab 0    acetaminophen (TYLENOL) 325 mg tablet Take 325 mg by mouth every four (4) hours as needed for Pain.  trolamine salicylate (MYOFLEX) 10 % topical cream Apply 1 Part to affected area three (3) times daily as needed for Pain. apply small amount externally to left knee      aspirin delayed-release 81 mg tablet Take 81 mg by mouth daily.  multivitamin (ONE A DAY) tablet Take 1 Tab by mouth daily. Facility-Administered Medications Ordered in Other Visits   Medication Dose Route Frequency Provider Last Rate Last Dose    sodium chloride (NS) flush 5-10 mL  5-10 mL IntraVENous PRN Karlo Keen MD        heparin (porcine) pf 500 Units  500 Units IntraVENous PRN Karlo Keen MD         Review of Systems  ECOG PS is 1-2. Constitutional Admits to fatigue. No fevers, chills, night sweats, weight loss. Allergic/Immunologic No recent allergic reactions   Eyes No significant visual difficulties. No diplopia. ENMT No problems with hearing, no sore throat, no sinus drainage. Endocrine No hot flashes or night sweats. No cold intolerance, polyuria, or polydipsia   Hematologic/Lymphatic No easy bruising or bleeding.   The patient denies any tender or palpable lymph nodes   Breasts No abnormal masses of breast, nipple discharge or pain. Respiratory No dyspnea on exertion, orthopnea, chest pain, cough or hemoptysis. Cardiovascular No anginal chest pain, irregular heart beat, tachycardia, palpitations or orthopnea. Gastrointestinal Complains of constipation. No nausea, vomiting, diarrhea, cramping, dysphagia, reflux, heartburn, GI bleeding, or early satiety. No change in bowel habits. Genitourinary (M) No hematuria, dysuria, increased frequency, urgency, hesitancy or incontinence. Musculoskeletal No joint pain, swelling or redness. No decreased range of motion. Integumentary No chronic rashes, inflammation, ulcerations, pruritus, petechiae, purpura, ecchymoses, or skin changes. Neurologic No headache, blurred vision, and no areas of focal weakness or numbness. Normal gait. No sensory problems. Psychiatric No insomnia, depression, hermelindo or mood swings. No psychotropic drugs. Objective:     Visit Vitals  /70   Pulse 72   Temp 98.3 °F (36.8 °C) (Oral)   Resp 20   Ht 5' 2\" (1.575 m)   Wt 219 lb 3.2 oz (99.4 kg)   SpO2 96%   BMI 40.09 kg/m²     Physical Examination:  Constitutional Alert, cooperative, oriented. Mood and affect appropriate. Appears close to chronological age. Well nourished. Well developed. Head Normocephalic; no scars   Eyes Conjunctivae and sclerae are clear and without icterus. Pupils are reactive and equal.   ENMT Sinuses are nontender. No oral exudates, ulcers, masses, thrush or mucositis. Oropharynx clear. Tongue normal.   Neck Supple without masses or thyromegaly. No jugular venous distension. Hematologic/Lymphatic No petechiae or purpura. No tender or palpable lymph nodes in the cervical, supraclavicular, axillary or inguinal area. Respiratory Lungs are clear to auscultation without rhonchi or wheezing. Cardiovascular Regular rate and rhythm of heart without murmurs, gallops or rubs.    Chest / Line Site Chest is symmetric with no chest wall deformities. Abdomen Distended abdomen and palapable hernias in the upper abdomen   Musculoskeletal No tenderness or swelling, normal range of motion without obvious weakness. Extremities > 1+ pedal edema. Skin No rashes, scars, or lesions suggestive of malignancy. No petechiae, purpura, or ecchymoses. No excoriations. Neurologic No sensory or motor deficits, normal cerebellar function, normal gait, cranial nerves intact. Psychiatric Alert and oriented times three. Coherent speech. Verbalizes understanding of our discussions today. LABS:  Period of collection 24    hr Final   Volume 1,330    mL Final   Protein,urine 24 hr 80   <149 mg/24hr Final     .2    ng/mL Final     Sodium 141   136 - 145 mmol/L Final   Potassium 3.5   3.5 - 5.1 mmol/L Final   Chloride 101   97 - 108 mmol/L Final   CO2 35 Abnormally high   H  21 - 32 mmol/L Final   Anion gap 5   5 - 15 mmol/L Final   Glucose 117 Abnormally high   H  65 - 100 mg/dL Final   BUN 14   6 - 20 MG/DL Final   Creatinine 0.80   0.55 - 1.02 MG/DL Final   BUN/Creatinine ratio 18   12 - 20   Final   GFR est AA >60   >60 ml/min/1.73m2 Final   GFR est non-AA >60   >60 ml/min/1.73m2 Final     Calcium 9.3   8.5 - 10.1 MG/DL Final   Bilirubin, total 0.4   0.2 - 1.0 MG/DL Final   ALT (SGPT) 13   12 - 78 U/L Final   AST (SGOT) 21   15 - 37 U/L Final   Alk.  phosphatase 74   45 - 117 U/L Final   Protein, total 7.0   6.4 - 8.2 g/dL Final   Albumin 2.7 Abnormally low   L  3.5 - 5.0 g/dL Final   Globulin 4.3 Abnormally high   H  2.0 - 4.0 g/dL Final   A-G Ratio 0.6 Abnormally low   L  1.1 - 2.2       WBC 8.4   3.6 - 11.0 K/uL Final         RBC 4.41   3.80 - 5.20 M/uL Final   HGB 11.6   11.5 - 16.0 g/dL Final   HCT 38.1   35.0 - 47.0 % Final   MCV 86.4   80.0 - 99.0 FL Final   MCH 26.3   26.0 - 34.0 PG Final   MCHC 30.4   30.0 - 36.5 g/dL Final   RDW 14.2   11.5 - 14.5 % Final   PLATELET 852   416 - 400 K/uL Final   MPV 9.7   8.9 - 12.9 FL Final   NRBC 0.0   0  WBC Final   ABSOLUTE NRBC 0.00   0.00 - 0.01 K/uL Final   NEUTROPHILS 68   32 - 75 % Final   LYMPHOCYTES 14   12 - 49 % Final   MONOCYTES 14 Abnormally high   H  5 - 13 % Final   EOSINOPHILS 3   0 - 7 % Final   BASOPHILS 1   0 - 1 % Final   IMMATURE GRANULOCYTES 0   0.0 - 0.5 % Final   ABS. NEUTROPHILS 5.7   1.8 - 8.0 K/UL Final   ABS. LYMPHOCYTES 1.2   0.8 - 3.5 K/UL Final   ABS. MONOCYTES 1.1 Abnormally high   H  0.0 - 1.0 K/UL Final   ABS. EOSINOPHILS 0.3   0.0 - 0.4 K/UL Final   ABS. BASOPHILS 0.1   0.0 - 0.1 K/UL Final               Liver Biopsy (8/6/18)     Specimen Source   1: Liver, Fine Needle Aspiration   CYTOLOGIC INTERPRETATION:   Liver, imaging guided core needle biopsy with immediate interpretation of touch imprint smears:   Metastatic adenocarcinoma, see comment   General Categorization   Positive for malignancy. Specimen Adequacy   Satisfactory for evaluation   Comment   Touch imprint smears and sections of the core needle biopsies are examined showing metastatic adenocarcinoma. Although morphologic features can be nonspecific, this is compatible with colorectal primary. Confirmatory stains can be performed upon request. Adequate tissue is present in both blocks (A and B) for additional testing as clinically indicated     RADIOLOGY:      NUCLEAR MEDICINE WHOLE BODY BONE SCAN     INDICATION:  Colon cancer. Hip pain.     COMPARISON:  None.     RADIOPHARMACEUTICAL:  20.5 mCi Technetium 99m-MDP, IV.     FINDINGS:     There is no evidence of osseous metastatic disease.     Areas of degenerative uptake are noted at the glenohumeral joints, wrists, hands  and feet. Additional degenerative uptake at the left knee, left hip and within  the thoracal lumbar spine.     Photopenic defect at the right knee consistent with TKR. No abnormal soft tissue  uptake.     Symmetric activity is seen within both kidneys.     IMPRESSION:     No evidence of osseous metastatic disease. Multifocal degenerative uptake as  described above.     MRI ABDOMEN AND MRCP WITH AND WITHOUT CONTRAST (8/1/18).      INDICATION: Abnormal liver function tests; Cholelithiasis; Pancreatitis; Pancreatitis - acute; evaluate for choledocholithiasis as well as further  evaluate hepatic lesion     COMPARISON: 4/17/2017, 4/30/2018     TECHNIQUE: Multisequence and multiplanar MRI of the abdomen was performed after  the administration of 7 mL of Gadavist (gadobutrol)  IV contrast. Images were  obtained without contrast; and in late arterial, portal venous, and delayed  phases after the administration of contrast. Subtraction images were  reconstructed.     Heavily T2-weighted thick slab and thin slice images were obtained in the  oblique coronal plane through the biliary tree (MRCP).     FINDINGS:      MRCP: Suboptimal MRCP. In particular, evaluation for choledocholithiasis is  significantly limited. The intrahepatic biliary tree demonstrates mild  dilatation, which was not present on the 4/17/2017 CT the common bile duct is  not dilated. The common bile duct can be followed all the way to the ampulla on  the postcontrast sequences. The pancreatic duct is normal in course and caliber. LIVER: No steatosis. There is a mildly T2 hyperintense, markedly heterogeneous  mass in the inferior aspect of the right lobe in segment 5 measuring 4.5 x 8.9 x  4.7 cm. There are areas of nonenhancement within this lesion that could  represent necrosis. GALLBLADDER: Numerous tiny layering stones and/or sludge are seen in the  gallbladder. No wall thickening. Shaq Micheline PANCREAS: Fairly extensive edema is seen in the uncinate process, head, and neck  region of the pancreas with minimal surrounding peripancreatic edema. No fluid  collection. There is a vague area soft tissue in the mesentery inferior to the  pancreas that demonstrates enhancement and measures approximately 3.3 x 5.1 cm  series 1402 image 87.     SPLEEN: Normal.  ADRENALS: Normal.  KIDNEYS: Numerous parapelvic and exophytic cysts are seen arising from both  kidneys. No hydronephrosis or solid mass. Luis A Greenlee DISTAL ESOPHAGUS, STOMACH, AND VISUALIZED BOWEL: Diastasis of the rectus muscles  and a ventral hernia redemonstrated. No obstruction. PERITONEUM: No free fluid and no abdominal lymphadenopathy. VISUALIZED LUNG BASES: Grossly clear within the sensitivity of MRI. BONES: No suspicious lesions.      IMPRESSION:   1. Acute pancreatitis is noted.     2. Nonspecific soft tissue mass versus extensive enhancing inflammatory change in the mesentery inferior to the pancreas described above. Soft tissue mass is favored, which is concerning for metastatic disease.     3. Aggressive appearing, heterogeneous mass in the inferior aspect of the right  lobe of the liver. Given history of colon cancer, findings are highly concerning  for metastatic disease. This lesion would be amenable to imaging guided biopsy.     4. Other findings described above.     CT ABDOMEN AND PELVIS WITHOUT IV CONTRAST (7/30/18)     INDICATION: Abdominal pain, RLQ; bloating.     COMPARISON: 4/17/2017.     TECHNIQUE:       Axial images were obtained through the abdomen and pelvis without IV contrast.   Oral contrast was not administered. Coronal and sagittal reformations were  generated. CT dose reduction was achieved through use of a standardized  protocol tailored for this examination and automatic exposure control for dose  modulation.       FINDINGS:     The included portions of the lung bases are clear. No pleural of pericardial  fluid.       The spleen and adrenal glands are unremarkable. No intrahepatic or extrahepatic  bile duct dilatation. Bilateral renal cysts are unchanged.  Distended gallbladder  containing multiple tiny gravel-like stones versus dense sludge.     Nonspecific area of decreased density in the right hepatic lobe measuring 7.6  cm.     Ill-defined pancreatic head with subtle stranding of the fat adjacent to the  pancreatic head.     No dilated bowel loops. Surgical staple line in the right colon consistent with  right hemicolectomy. Diverticulosis. No pathologically enlarged lymph nodes,  free fluid or free air.     Previous hysterectomy. No adnexal region pathology. Atherosclerotic  calcification of the abdominal aorta and branch vasculature without evidence of  aneurysm. Diastasis of the rectus musculature with protrusion of stomach,  liver, bowel and bowel mesentery through the defect. There is a ventral hernia  containing nondilated, non thickened small bowel.     No acute osseous abnormalities.     IMPRESSION:     1. Ill-defined pancreatic head and adjacent fat stranding. Pancreatitis is not  excluded. 2. Rectus muscle diastasis as described above. Ventral hernia. 3. Bilateral renal cysts, unchanged. 4. Diverticulosis. 5. Large area of decreased density in the right hepatic lobe of uncertain  etiology. Consider contrast-enhanced MRI for further evaluation. 6. Distended gallbladder with gravel-like stones versus dense sludge.         Assessment:     1. Recurrent Metastatic Adenocarcinoma of the right colon, KRas & BRaf negative, MSI stable  2. Mild normocytic normochromic anemia    Plan:     - She had CT and MRI abdomen and a bone scan that shows evidence of only intrabdominal disease. CT chest not done. - CEA elevated at 185.2 on 10/10/18. KRas & BRaf negative, MSI stable. She will not be a candidate for EGFR inhibitor therapy or immunotherapy. - Discussed various treatment options that include Capeox+Avastin vs Avastin + Xeloda vs Xeloda alone. She was agreeable to Avastin+ Xeloda. - Her 24 hour urine protein is normal. She was started on cycle 1 chemo with Xeloda + Avastin on 10/17 and seems to be tolerating well. - Can consider restaging CT abdomen after completing 4-6 cycles of chemo and continue monitoring CEA level with every alternating cycle.   - Her CBC, CMP is stable, we will hence proceed with cycle II chemo today. - She will return to the clinic in 3 weeks prior to cycle 3 chemo. She understands to call us back sooner if needed.     Elridge Osler, MD  11/7/2018

## 2018-11-28 NOTE — PROGRESS NOTES
Chief Complaint   Patient presents with    Follow-up     3 week follow up       1. Have you been to the ER, urgent care clinic since your last visit? Hospitalized since your last visit? No    2. Have you seen or consulted any other health care providers outside of the [address] [place] [address] since your last visit? Include any pap smears or colon screening.  No

## 2018-11-28 NOTE — PROGRESS NOTES
Sanket Stacy is a 81 y.o. female who presents to the office today for the following:  Chief Complaint   Patient presents with    Follow-up    Colon Cancer       Referring Provider:  Marvel Clay MD    HPI Ms. Mark Luna is doing well after her 2nd cycle of Capecitabine 500 mg/m2 BID and Avastin and had no difficulty with diarrhea , mouth sore ,palmar erythema or epistaxis BP is ok and other than some chronic left  Hip and back pain and some increase thickened saliva has had no serious issues. Hx of Stage I (T2N0M0 colon Ca), right hemicolectomy, recurrent recurrent disease 8/1/18 2 intra abdominal masses in the inferior aspect of the pancreas and liver. MRI suggests this is an intra-hepatic met. CURRENT TREATMENT: Xeloda 500 mg/m2 BID X 2 weeks and Avastin   750 mg Q 3 weeks    Past Medical History:   Diagnosis Date    Allergic rhinitis     Cancer ([address]     Stage 1 cancer intestines    Cervical disc disease     Colonic mass 3/20/2017    Eczema     Gastritis     GERD (gastroesophageal reflux disease)     mild    GI bleed     hx.  of recurrent    Hiatal hernia     Hypercholesterolemia     Hypertension     Metastatic adenocarcinoma to liver ([address] 9/10/2018    Morbid obesity ([address] 4/13/2017    Osteoarthritis (arthritis due to wear and tear of joints)     Pancreatitis, gallstone     8/18     Past Surgical History:   Procedure Laterality Date    ABDOMEN SURGERY PROC UNLISTED      Colon Resection (Cancer)    HX COLONOSCOPY  3/05    HX COLONOSCOPY  2017    HX CYST REMOVAL      left side    HX ENDOSCOPY  1/09    HX ENDOSCOPY  2015    HX GYN      hysterectomy    HX ORTHOPAEDIC      rt knee replacement       Oncology Flowsheet 10/17/2018 11/7/2018 11/28/2018   Day, Cycle Day 1, Cycle 1 Day 1, Cycle 2 Day 1, Cycle 3   bevacizumab (AVASTIN) IV 7.5 mg/kg = 750 mg 7.5 mg/kg = 750 mg 7.5 mg/kg = 750 mg   ]          Key Oncology Meds             ondansetron hcl (ZOFRAN) 8 mg tablet (Taking) Take 1 Tab by mouth every eight (8) hours as needed for Nausea. Take every 8 hours only as needed. Indications: CANCER CHEMOTHERAPY-INDUCED NAUSEA AND VOMITING        Current Day Forward Treatment Plan Days   Treatment Plan: OP CAPOX 1000 mg/m2 (2weeks on/1 week off)+ BEVACIZUMAB   7.5mg/kg every 3 weeks             Day 1, Cycle 4  (Planned for 12/19/2018)    Chemotherapy: bevacizumab (AVASTIN) 750 mg in 0.9% sodium chloride 100 mL   IVPB, REGIMEN NOTES/COMMUNICATIONS, capecitabine (XELODA) 500 mg tablet   Day 1, Cycle 5  (Planned for 1/9/2019)    Chemotherapy: bevacizumab (AVASTIN) 750 mg in 0.9% sodium chloride 100 mL   IVPB, REGIMEN NOTES/COMMUNICATIONS, capecitabine (XELODA) 500 mg tablet   Day 1, Cycle 6  (Planned for 1/30/2019)    Chemotherapy: bevacizumab (AVASTIN) 750 mg in 0.9% sodium chloride 100 mL   IVPB, REGIMEN NOTES/COMMUNICATIONS, capecitabine (XELODA) 500 mg tablet          Allergies   Allergen Reactions    Lisinopril Angioedema    Niacin Itching     Felt hot also    Ultram [Tramadol] Nausea Only       TRANSFUSIONS: None     Social History     Socioeconomic History    Marital status:      Spouse name: Not on file    Number of children: Not on file    Years of education: Not on file    Highest education level: Not on file   Tobacco Use    Smoking status: Never Smoker    Smokeless tobacco: Never Used   Substance and Sexual Activity    Alcohol use: No    Drug use: No    Sexual activity: No   .    Family History   Problem Relation Age of Onset    No Known Problems Mother     No Known Problems Father     Arthritis-osteo Sister     Alzheimer Sister     Parkinson's Disease Brother     Cancer Brother     No Known Problems Brother     Anesth Problems Neg Hx        Review of Systems   Constitutional: Positive for malaise/fatigue. Negative for chills, diaphoresis and fever. HENT: Negative for nosebleeds and sore throat. Eyes: Positive for blurred vision.    Respiratory: Negative for cough, hemoptysis, sputum production, shortness of breath and wheezing. Cardiovascular: Negative for chest pain and palpitations. Gastrointestinal: Negative for abdominal pain, blood in stool, diarrhea, melena, nausea and vomiting. Genitourinary: Negative for dysuria and hematuria. Musculoskeletal: Positive for back pain. L low back and hip pain which is chronic   Skin: Negative for itching and rash. Neurological: Negative for tingling and focal weakness. Endo/Heme/Allergies: Negative for polydipsia. Psychiatric/Behavioral: The patient is not nervous/anxious. Physical Exam   Constitutional: No distress. obese   HENT:   Head: Normocephalic and atraumatic. Mouth/Throat: Oropharynx is clear and moist. No oropharyngeal exudate. Eyes: Conjunctivae and EOM are normal. Pupils are equal, round, and reactive to light. Right eye exhibits no discharge. Left eye exhibits no discharge. No scleral icterus. Neck: No tracheal deviation present. No thyromegaly present. Cardiovascular: Normal rate, regular rhythm and normal heart sounds. Exam reveals no gallop and no friction rub. No murmur heard. No calf tenderness   Pulmonary/Chest: Effort normal and breath sounds normal. No stridor. No respiratory distress. She has no wheezes. She has no rales. Abdominal: Soft. Bowel sounds are normal. She exhibits no distension. There is no tenderness. There is no rebound and no guarding. Examined seated   Musculoskeletal: She exhibits edema. 1+ tibial edema   Lymphadenopathy:     She has no cervical adenopathy. She has no axillary adenopathy. Neurological: She is alert. No cranial nerve deficit. Skin: Skin is warm and dry. She is not diaphoretic. No erythema. No palmar erythema   Psychiatric: Mood and affect normal.   Nursing note and vitals reviewed.     Visit Vitals  /55   Pulse 78   Temp 97.6 °F (36.4 °C) (Oral)   Resp 18   Ht 5' 2\" (1.575 m)   Wt 219 lb 2.2 oz (99.4 kg)   SpO2 96%   BMI 40.08 kg/m²         ASSESSMENT: Recurrent Ca of the right colon. Intra adominal mets as described. MSI  Stable, BRAF, KRAS negative . Need to see if extended BARBARA was done. As far as I can see only KRAS done. And Path report says it was a LIVER BX. The mass is in the inferior aspect of the right lobe of the liver. 1. Metastasis from colon cancer ([address]        XR Results (maximum last 3): Results from [place] encounter on 10/01/18   XR HIP RT W OR WO PELV 2-3 VWS    Narrative RIGHT HIP AND PELVIS, 2 VIEWS    INDICATION:  Pain. COMPARISON:  None. FINDINGS:    There is no acute fracture or dislocation. Mild to moderate bilateral hip joint  space narrowing. Bilateral iliac crest and greater trochanteric enthesopathy. No  acute findings of the SI joints or pubic symphysis. Bone mineralization is  normal.      Impression IMPRESSION:    Degenerative changes as described above. Results from [place] encounter on 07/30/18   XR CHEST PORT    Narrative INDICATION: Admission. Abdominal pain. COMPARISON: 5/1/2017    FINDINGS: AP portable imaging of the chest performed at 7:13 PM demonstrates a  stable cardiomediastinal silhouette. There is mild bibasilar atelectasis. The  lungs are otherwise clear. No significant osseous abnormalities are seen. Impression IMPRESSION: Mild bibasilar atelectasis. Results from [place] encounter on 07/19/18   XR KNEE LT 3 V    Narrative LEFT KNEE, 3 VIEWS    INDICATION:  Knee pain. COMPARISON:  None. FINDINGS:    There is no acute fracture, dislocation or effusion. There is moderate to severe  tricompartmental joint space narrowing and marginal spurring. Prepatellar soft  tissue swelling. Patellar enthesophytes noted. There is normal bone  mineralization. Impression IMPRESSION:    Tricompartmental DJD. Prepatellar soft tissue swelling. CT Results (maximum last 3):   Results from [place] encounter on 07/30/18   CT BX LIVER NDL PERC    Narrative INDICATION: History of colon cancer. PROCEDURE:     The risks, benefits, and alternative procedures were explained and discussed  with the patient. Informed consent was obtained. CT dose reduction was achieved through the use of a standardized protocol  tailored for this examination and automatic exposure control for dose  modulation. Following sterile prep and local anesthesia, percutaneous CT-guided biopsy of  the right lobe of liver was performed via right lateral approach using coaxial  core biopsy needle devices. Multiple core samples were obtained and submitted  for pathologic analysis. Tract was embolized with Gelfoam particles. The needle  was removed and a dressing placed. No complications are evident. Intravenous conscious sedation was employed including 2 mg of Versed and 50 mcg  of fentanyl. FINDINGS: Successful CT-guided biopsy yielding multiple core samples for  pathologic analysis. No hemorrhage is noted on postbiopsy CAT scan images. Blood loss:  0cc. Impression IMPRESSION:    Successful CT guided biopsy of mass in right lobe of liver. Results from [place] encounter on 07/30/18   CT ABD PELV WO CONT    Narrative CT ABDOMEN AND PELVIS WITHOUT IV CONTRAST    INDICATION: Abdominal pain, RLQ; bloating. COMPARISON: 4/17/2017. TECHNIQUE:      Axial images were obtained through the abdomen and pelvis without IV contrast.   Oral contrast was not administered. Coronal and sagittal reformations were  generated. CT dose reduction was achieved through use of a standardized  protocol tailored for this examination and automatic exposure control for dose  modulation. FINDINGS:    The included portions of the lung bases are clear. No pleural of pericardial  fluid. The spleen and adrenal glands are unremarkable. No intrahepatic or extrahepatic  bile duct dilatation. Bilateral renal cysts are unchanged.  Distended gallbladder  containing multiple tiny gravel-like stones versus dense sludge. Nonspecific area of decreased density in the right hepatic lobe measuring 7.6  cm. Ill-defined pancreatic head with subtle stranding of the fat adjacent to the  pancreatic head. No dilated bowel loops. Surgical staple line in the right colon consistent with  right hemicolectomy. Diverticulosis. No pathologically enlarged lymph nodes,  free fluid or free air. Previous hysterectomy. No adnexal region pathology. Atherosclerotic  calcification of the abdominal aorta and branch vasculature without evidence of  aneurysm. Diastasis of the rectus musculature with protrusion of stomach,  liver, bowel and bowel mesentery through the defect. There is a ventral hernia  containing nondilated, non thickened small bowel. No acute osseous abnormalities. Impression IMPRESSION:    1. Ill-defined pancreatic head and adjacent fat stranding. Pancreatitis is not  excluded. 2. Rectus muscle diastasis as described above. Ventral hernia. 3. Bilateral renal cysts, unchanged. 4. Diverticulosis. 5. Large area of decreased density in the right hepatic lobe of uncertain  etiology. Consider contrast-enhanced MRI for further evaluation. 6. Distended gallbladder with gravel-like stones versus dense sludge. *Limited evaluation of the solid organs and bowel without IV and oral contrast.                  Results from Hospital Encounter encounter on 04/11/17   CT ABD PELV W CONT    Narrative INDICATION: Abd pain, fever, post-op, no recent CT,  status post  laparoscopic-assisted right colectomy 4/11/2017. COMPARISON: CT 3/13/2017. TECHNIQUE:   Following the uneventful intravenous administration of 100 cc Isovue-370, thin  axial images were obtained through the abdomen and pelvis. Excretory phase  delayed imaging is performed from above the diaphragms to the iliac crest.  Coronal and sagittal reconstructions were generated.  Oral contrast administered. CT dose reduction was achieved through use of a standardized protocol tailored  for this examination and automatic exposure control for dose modulation. Adaptive statistical iterative reconstruction (ASIR) was utilized. FINDINGS:   LUNG BASES: Small pleural effusions and dependent atelectasis. Aerated lungs  appear clear. INCIDENTALLY IMAGED HEART AND MEDIASTINUM: Normal in size without pericardial  effusion. Coronary artery calcifications noted. LIVER: No mass or biliary dilatation. GALLBLADDER: Dependently layering small gallstones. Otherwise unremarkable. SPLEEN: No mass. PANCREAS: No mass or ductal dilatation. ADRENALS: Unremarkable. KIDNEYS: Bilateral parapelvic cysts and partially exophytic posterior lateral  interpolar 4.2 cm left and 3.6 cm posterior lateral lower pole left renal cysts. No stones, hydronephrosis, or mass. Posterolateral lower pole left:  Unremarkable. SMALL BOWEL: Proximal small bowel loops are dilated to 4.5 cm with no clear  focal transition identified and the compression distally with expected  postoperative appearance to right mid abdominal ileocolic anastomosis. COLON: Status post partial right colectomy with left and sigmoid colon  diverticula and no inflammatory change, wall thickening, or dilation. APPENDIX: Vertically absent. PERITONEUM: No ascites or pneumoperitoneum. RETROPERITONEUM: No lymphadenopathy or aortic aneurysm. REPRODUCTIVE ORGANS: Uterus and ovaries are surgically absent. URINARY BLADDER: No mass or calculus. BONES: Diffuse osteopenia, degenerative spine change, and no identified acute  fracture or aggressive lesion. ADDITIONAL COMMENTS: Gas within the anterolateral right abdominal wall without  clear fluid collection or air-fluid level likely reflects post operative  incision site.  Markedly lax anterior abdominal wall with diffuse edema in the  abdominal pannus, a small fat-containing umbilical hernia, and a small  fat-containing infraumbilical hernia. Impression IMPRESSION:    1. Status post partial right colectomy with no CT evidence of residual mass or  metastatic disease. 2. Gas within anterolateral right abdominal wall incision may reflect normal  postoperative gas, however infection is not excluded. 3. Small bilateral pleural effusions and atelectasis. 4. Additional incidental findings as above. MRI Results (maximum last 3): Results from [place] encounter on 07/30/18   MRI ABD W MRCP W WO CONT    Narrative MRI ABDOMEN AND MRCP WITH AND WITHOUT CONTRAST. INDICATION: Abnormal liver function tests; Cholelithiasis; Pancreatitis; Pancreatitis - acute; evaluate for choledocholithiasis as well as further  evaluate hepatic lesion    COMPARISON: 4/17/2017, 4/30/2018    TECHNIQUE: Multisequence and multiplanar MRI of the abdomen was performed after  the administration of 7 mL of Gadavist (gadobutrol)  IV contrast. Images were  obtained without contrast; and in late arterial, portal venous, and delayed  phases after the administration of contrast. Subtraction images were  reconstructed. Heavily T2-weighted thick slab and thin slice images were obtained in the  oblique coronal plane through the biliary tree (MRCP). FINDINGS:     MRCP: Suboptimal MRCP. In particular, evaluation for choledocholithiasis is  significantly limited. The intrahepatic biliary tree demonstrates mild  dilatation, which was not present on the 4/17/2017 CT the common bile duct is  not dilated. The common bile duct can be followed all the way to the ampulla on  the postcontrast sequences. The pancreatic duct is normal in course and caliber. LIVER: No steatosis. There is a mildly T2 hyperintense, markedly heterogeneous  mass in the inferior aspect of the right lobe in segment 5 measuring 4.5 x 8.9 x  4.7 cm. There are areas of nonenhancement within this lesion that could  represent necrosis.   GALLBLADDER: Numerous tiny layering stones and/or sludge are seen in the  gallbladder. No wall thickening. Theadore Ramona PANCREAS: Fairly extensive edema is seen in the uncinate process, head, and neck  region of the pancreas with minimal surrounding peripancreatic edema. No fluid  collection. There is a vague area soft tissue in the mesentery inferior to the  pancreas that demonstrates enhancement and measures approximately 3.3 x 5.1 cm  series 1402 image 87. SPLEEN: Normal.  ADRENALS: Normal.  KIDNEYS: Numerous parapelvic and exophytic cysts are seen arising from both  kidneys. No hydronephrosis or solid mass. Theadore Ramona DISTAL ESOPHAGUS, STOMACH, AND VISUALIZED BOWEL: Diastasis of the rectus muscles  and a ventral hernia redemonstrated. No obstruction. PERITONEUM: No free fluid and no abdominal lymphadenopathy. VISUALIZED LUNG BASES: Grossly clear within the sensitivity of MRI. BONES: No suspicious lesions. Impression IMPRESSION:   1. Acute pancreatitis is noted. 2. Nonspecific soft tissue mass versus extensive enhancing inflammatory change  in the mesentery inferior to the pancreas described above. Soft tissue mass is  favored, which is concerning for metastatic disease. 3. Aggressive appearing, heterogeneous mass in the inferior aspect of the right  lobe of the liver. Given history of colon cancer, findings are highly concerning  for metastatic disease. This lesion would be amenable to imaging guided biopsy. 4. Other findings described above. Nuclear Medicine Results (maximum last 3): Results from Hospital Encounter encounter on 10/01/18   NM BONE SCAN [facility] BODY    Narrative NUCLEAR MEDICINE WHOLE BODY BONE SCAN    INDICATION:  Colon cancer. Hip pain. COMPARISON:  None. RADIOPHARMACEUTICAL:  20.5 mCi Technetium 99m-MDP, IV. FINDINGS:    There is no evidence of osseous metastatic disease. Areas of degenerative uptake are noted at the glenohumeral joints, wrists, hands  and feet.  Additional degenerative uptake at the left knee, left hip and within  the thoracal lumbar spine. Photopenic defect at the right knee consistent with TKR. No abnormal soft tissue  uptake. Symmetric activity is seen within both kidneys. Impression IMPRESSION:    No evidence of osseous metastatic disease. Multifocal degenerative uptake as  described above. US Results (maximum last 3): No results found for this or any previous visit. YU Results (most recent):  No results found for this or any previous visit. VAS/US Results (maximum last 3): Results from [place] encounter on 04/25/17   DUPLEX LOWER EXT VENOUS BILAT    Narrative BILATERAL LOWER EXTREMITY VENOUS ULTRASOUND    INDICATION:  Leg swelling. COMPARISON:  None. TECHNIQUE: Grayscale, compression and Doppler ultrasound imaging of the  bilateral lower extremities. FINDINGS:    The visualized deep veins of the bilateral lower extremities are compressible. Nonvisualization of the distal right femoral vein, right peroneal vein and left  peroneal vein. Normal flow is seen including normal response to respiratory variation and calf  augmentation. No evidence of Baker's cyst.      Impression IMPRESSION:    No evidence of deep venous thrombus in the bilateral lower extremities. PET Results (maximum last 3): No results found for this or any previous visit.     Results for orders placed or performed during the hospital encounter of 11/28/18   CBC WITH AUTOMATED DIFF   Result Value Ref Range    WBC 6.6 3.6 - 11.0 K/uL    RBC 3.92 3.80 - 5.20 M/uL    HGB 11.0 (L) 11.5 - 16.0 g/dL    HCT 35.1 35.0 - 47.0 %    MCV 89.5 80.0 - 99.0 FL    MCH 28.1 26.0 - 34.0 PG    MCHC 31.3 30.0 - 36.5 g/dL    RDW 21.0 (H) 11.5 - 14.5 %    PLATELET 576 (L) 966 - 400 K/uL    MPV 9.6 8.9 - 12.9 FL    NRBC 0.0 0  WBC    ABSOLUTE NRBC 0.00 0.00 - 0.01 K/uL    NEUTROPHILS 49 32 - 75 %    LYMPHOCYTES 24 12 - 49 %    MONOCYTES 23 (H) 5 - 13 %    EOSINOPHILS 3 0 - 7 % BASOPHILS 1 0 - 1 %    IMMATURE GRANULOCYTES 0 0.0 - 0.5 %    ABS. NEUTROPHILS 3.2 1.8 - 8.0 K/UL    ABS. LYMPHOCYTES 1.6 0.8 - 3.5 K/UL    ABS. MONOCYTES 1.5 (H) 0.0 - 1.0 K/UL    ABS. EOSINOPHILS 0.2 0.0 - 0.4 K/UL    ABS. BASOPHILS 0.1 0.0 - 0.1 K/UL    ABS. IMM. GRANS. 0.0 0.00 - 0.04 K/UL    DF SMEAR SCANNED      RBC COMMENTS ANISOCYTOSIS  2+       METABOLIC PANEL, COMPREHENSIVE   Result Value Ref Range    Sodium 142 136 - 145 mmol/L    Potassium 3.6 3.5 - 5.1 mmol/L    Chloride 102 97 - 108 mmol/L    CO2 33 (H) 21 - 32 mmol/L    Anion gap 7 5 - 15 mmol/L    Glucose 95 65 - 100 mg/dL    BUN 12 6 - 20 MG/DL    Creatinine 0.83 0.55 - 1.02 MG/DL    BUN/Creatinine ratio 14 12 - 20      GFR est AA >60 >60 ml/min/1.73m2    GFR est non-AA >60 >60 ml/min/1.73m2    Calcium 8.7 8.5 - 10.1 MG/DL    Bilirubin, total 0.9 0.2 - 1.0 MG/DL    ALT (SGPT) 12 12 - 78 U/L    AST (SGOT) 17 15 - 37 U/L    Alk. phosphatase 71 45 - 117 U/L    Protein, total 6.1 (L) 6.4 - 8.2 g/dL    Albumin 2.9 (L) 3.5 - 5.0 g/dL    Globulin 3.2 2.0 - 4.0 g/dL    A-G Ratio 0.9 (L) 1.1 - 2.2     URINALYSIS W/ REFLEX CULTURE   Result Value Ref Range    Color YELLOW/STRAW      Appearance CLEAR CLEAR      Specific gravity 1.015 1.003 - 1.030      pH (UA) 6.5 5.0 - 8.0      Protein TRACE (A) NEG mg/dL    Glucose NEGATIVE  NEG mg/dL    Ketone NEGATIVE  NEG mg/dL    Bilirubin NEGATIVE  NEG      Blood NEGATIVE  NEG      Urobilinogen 1.0 0.2 - 1.0 EU/dL    Nitrites NEGATIVE  NEG      Leukocyte Esterase NEGATIVE  NEG      WBC 0-4 0 - 4 /hpf    RBC 0-5 0 - 5 /hpf    Epithelial cells MANY (A) FEW /lpf    Bacteria 1+ (A) NEG /hpf    UA:UC IF INDICATED URINE CULTURE ORDERED (A) CNI       No orders of the defined types were placed in this encounter. Plan (Comment): For now continue with present regimen. I stressed to her that she let us know if there is watery diarrhea and stop the Xeloda immediately.  She is receiving Aloxi and dexamethsone for the Avastin. Will stop both of those meds as the former can cause ileus which might obscure watery diarrhea. The patient is elderly and frail. If she weren't, could an Oncologic Surgeon resect these mets. Dr Courtney Brown might give that a thought but it is unlikely it can be done. If so Avastin would have to be discontinued. PET may have been not done to get on with urgent treatment. Will get CEA next visit      Follow-up Disposition:  Return in about 3 weeks (around 12/19/2018) for Office visit, Labs.     Jeremie Duque MD

## 2018-12-06 NOTE — TELEPHONE ENCOUNTER
Pt called, stated that both hands hurt & that they are red from where she holds the walker. I suggested that she sees her PCP. Patient would like a return call.

## 2018-12-07 NOTE — TELEPHONE ENCOUNTER
Dr Elza Taylor called into office, he was asked concerning this patient problems with hands. He recommends she hold the Xeloda today and he wants to see her in the office on Monday 12/10/18.     Thank you Dr Harmony Chirinos

## 2018-12-07 NOTE — TELEPHONE ENCOUNTER
Returned patients call;    HIPAA verified. Patient stated her fingers are hurting, her palms are red, no peeling or cracking, patient thinks this is from using the walker. She denies any problems with her feet. I spoke with he [facility] nurse who administered her last chemo (avastin) on 11/28/18, she stated the palms were red at this time. Patient is taking Xeloda 2000mg BID 14 days on 7 days off. Please Advise    Dr Yanira Merritt last Note from 11/28/18:     Teagan Lantigua is a 81 y.o. female who presents to the office today for the following:      Chief Complaint   Patient presents with    Follow-up    Colon Cancer         Referring Provider:  Iva De Guzman MD     HPI Ms. Deepa Wright is doing well after her 2nd cycle of Capecitabine 500 mg/m2 BID and Avastin and had no difficulty with diarrhea , mouth sore ,palmar erythema or epistaxis BP is ok and other than some chronic left  Hip and back pain and some increase thickened saliva has had no serious issues. Hx of Stage I (T2N0M0 colon Ca), right hemicolectomy, recurrent recurrent disease 8/1/18 2 intra abdominal masses in the inferior aspect of the pancreas and liver. MRI suggests this is an intra-hepatic met. CURRENT TREATMENT: Xeloda 500 mg/m2 BID X 2 weeks and Avastin   750 mg Q 3 weeks       Plan (Comment): For now continue with present regimen. I stressed to her that she let us know if there is watery diarrhea and stop the Xeloda immediately. She is receiving Aloxi and dexamethsone for the Avastin. Will stop both of those meds as the former can cause ileus which might obscure watery diarrhea. The patient is elderly and frail. If she weren't, could an Oncologic Surgeon resect these mets. Dr Nelly Delgado might give that a thought but it is unlikely it can be done. If so Avastin would have to be discontinued. PET may have been not done to get on with urgent treatment.  Will get CEA next visit        Follow-up Disposition:  Return in about 3 weeks (around 12/19/2018) for Office visit, Labs.

## 2018-12-11 NOTE — PROGRESS NOTES
Ana Powell is a 81 y.o. female who presents to the office today for the following:  Chief Complaint   Patient presents with    Colon Cancer       Referring Provider:  Lora Reese MD    HPI The patient called to say her hands were becoming very painful. No problem with her feet, no diarrhea. Up until now she has been tolerating Xeloda quite well. After tking 10 days of treatment of 2000 mg BID, we held her treatment. For the first time she also not minimal epistaxsis. Otherwise she has no new problems. Interesting is that the CEA was 64.5 on 7/31/18 and I found a Ca19-9 of 245. AFP negative. Path suggests that this colon. Per the nurse , the patient seems to have improved but note that the CEA is up to 185 on the 10th of October. The patient started treatment on 10/17. Mets  Kras detected. Braf not detected. See no special studies on the original primary in the right colon      CURRENT TREATMENT: Xeloda & Avastin    Past Medical History:   Diagnosis Date    Allergic rhinitis     Cancer ([address]     Stage 1 cancer intestines    Cervical disc disease     Colonic mass 3/20/2017    Eczema     Gastritis     GERD (gastroesophageal reflux disease)     mild    GI bleed     hx.  of recurrent    Hiatal hernia     Hypercholesterolemia     Hypertension     Metastatic adenocarcinoma to liver ([address] 9/10/2018    Morbid obesity ([address] 4/13/2017    Osteoarthritis (arthritis due to wear and tear of joints)     Pancreatitis, gallstone     8/18     Past Surgical History:   Procedure Laterality Date    ABDOMEN SURGERY PROC UNLISTED      Colon Resection (Cancer)    HX COLONOSCOPY  3/05    HX COLONOSCOPY  2017    HX CYST REMOVAL      left side    HX ENDOSCOPY  1/09    HX ENDOSCOPY  2015    HX GYN      hysterectomy    HX ORTHOPAEDIC      rt knee replacement       Oncology Flowsheet 10/17/2018 11/7/2018 11/28/2018   Day, Cycle Day 1, Cycle 1 Day 1, Cycle 2 Day 1, Cycle 3   bevacizumab (AVASTIN) IV 7.5 mg/kg = 750 mg 7.5 mg/kg = 750 mg 7.5 mg/kg = 750 mg   ]          Key Oncology Meds             capecitabine (XELODA) 500 mg tablet 2,000 mg two (2) times a day. ondansetron hcl (ZOFRAN) 8 mg tablet Take 1 Tab by mouth every eight (8) hours as needed for Nausea. Take every 8 hours only as needed.   Indications: CANCER CHEMOTHERAPY-INDUCED NAUSEA AND VOMITING        Current Day Forward Treatment Plan Days   Treatment Plan: OP CAPOX 1000 mg/m2 (2weeks on/1 week off)+ BEVACIZUMAB   7.5mg/kg every 3 weeks             Day 1, Cycle 4  (Planned for 12/19/2018)    Chemotherapy: bevacizumab (AVASTIN) 750 mg in 0.9% sodium chloride 100 mL   IVPB, REGIMEN NOTES/COMMUNICATIONS, capecitabine (XELODA) 500 mg tablet   Day 1, Cycle 5  (Planned for 1/9/2019)    Chemotherapy: bevacizumab (AVASTIN) 750 mg in 0.9% sodium chloride 100 mL   IVPB, REGIMEN NOTES/COMMUNICATIONS, capecitabine (XELODA) 500 mg tablet   Day 1, Cycle 6  (Planned for 1/30/2019)    Chemotherapy: bevacizumab (AVASTIN) 750 mg in 0.9% sodium chloride 100 mL   IVPB, REGIMEN NOTES/COMMUNICATIONS, capecitabine (XELODA) 500 mg tablet          Allergies   Allergen Reactions    Lisinopril Angioedema    Niacin Itching     Felt hot also    Ultram [Tramadol] Nausea Only       TRANSFUSIONS: None     Social History     Socioeconomic History    Marital status:      Spouse name: Not on file    Number of children: Not on file    Years of education: Not on file    Highest education level: Not on file   Tobacco Use    Smoking status: Never Smoker    Smokeless tobacco: Never Used   Substance and Sexual Activity    Alcohol use: No    Drug use: No    Sexual activity: No   .    Family History   Problem Relation Age of Onset    No Known Problems Mother     No Known Problems Father     Arthritis-osteo Sister     Alzheimer Sister     Parkinson's Disease Brother     Cancer Brother     No Known Problems Brother     Anesth Problems Neg Hx        Review of Systems Constitutional: Negative. HENT: Positive for nosebleeds. Negative for sinus pain. Respiratory: Negative for cough, hemoptysis and sputum production. Cardiovascular: Negative for chest pain. Gastrointestinal: Negative for abdominal pain, blood in stool, constipation, diarrhea, melena, nausea and vomiting. Genitourinary: Negative for hematuria. Skin: Positive for rash. Neurological: Negative for tingling, seizures and loss of consciousness. Psychiatric/Behavioral: Negative for depression. The patient is not nervous/anxious. Physical Exam   Constitutional: She is well-developed, well-nourished, and in no distress. No distress. HENT:   Head: Normocephalic and atraumatic. Mouth/Throat: No oropharyngeal exudate. Eyes: Conjunctivae and EOM are normal. Right eye exhibits no discharge. Left eye exhibits no discharge. No scleral icterus. Neck: No tracheal deviation present. No thyromegaly present. Cardiovascular: Normal rate, regular rhythm and normal heart sounds. Exam reveals no gallop and no friction rub. No murmur heard. Pulmonary/Chest: Effort normal and breath sounds normal. No respiratory distress. She has no wheezes. She has no rales. Abdominal: Soft. Bowel sounds are normal. She exhibits no distension and no mass. There is no tenderness. There is no rebound and no guarding. Musculoskeletal: She exhibits no edema, tenderness or deformity. Lymphadenopathy:     She has no cervical adenopathy. She has no axillary adenopathy. Neurological: She is alert. No cranial nerve deficit. Skin: She is not diaphoretic. There is erythema. Increased pigmentation and erthema on hands   Psychiatric: Mood, affect and judgment normal.     Visit Vitals  /63   Pulse 70   Temp 97.2 °F (36.2 °C) (Oral)   Resp 16   Ht 5' 2\" (1.575 m)   Wt 199 lb (90.3 kg)   PF 97 L/min   BMI 36.40 kg/m²         ASSESSMENT:  1. Hand foot syndrome    2. Initial stage T2, N0,M0 colon Ca.  Recurrent disease note on 8/1/18. Intraabdominal masses in the inferior aspect of the pancreas and liver. Possible intra hepatic met. Kras positive in the Biopsy. BRAF negative            XR Results (maximum last 3): Results from [place] encounter on 10/01/18   XR HIP RT W OR WO PELV 2-3 VWS    Narrative RIGHT HIP AND PELVIS, 2 VIEWS    INDICATION:  Pain. COMPARISON:  None. FINDINGS:    There is no acute fracture or dislocation. Mild to moderate bilateral hip joint  space narrowing. Bilateral iliac crest and greater trochanteric enthesopathy. No  acute findings of the SI joints or pubic symphysis. Bone mineralization is  normal.      Impression IMPRESSION:    Degenerative changes as described above. Results from [place] encounter on 07/30/18   XR CHEST PORT    Narrative INDICATION: Admission. Abdominal pain. COMPARISON: 5/1/2017    FINDINGS: AP portable imaging of the chest performed at 7:13 PM demonstrates a  stable cardiomediastinal silhouette. There is mild bibasilar atelectasis. The  lungs are otherwise clear. No significant osseous abnormalities are seen. Impression IMPRESSION: Mild bibasilar atelectasis. Results from [place] encounter on 07/19/18   XR KNEE LT 3 V    Narrative LEFT KNEE, 3 VIEWS    INDICATION:  Knee pain. COMPARISON:  None. FINDINGS:    There is no acute fracture, dislocation or effusion. There is moderate to severe  tricompartmental joint space narrowing and marginal spurring. Prepatellar soft  tissue swelling. Patellar enthesophytes noted. There is normal bone  mineralization. Impression IMPRESSION:    Tricompartmental DJD. Prepatellar soft tissue swelling. CT Results (maximum last 3): Results from [place] encounter on 07/30/18   CT BX LIVER NDL PERC    Narrative INDICATION: History of colon cancer. PROCEDURE:     The risks, benefits, and alternative procedures were explained and discussed  with the patient.  Informed consent was obtained. CT dose reduction was achieved through the use of a standardized protocol  tailored for this examination and automatic exposure control for dose  modulation. Following sterile prep and local anesthesia, percutaneous CT-guided biopsy of  the right lobe of liver was performed via right lateral approach using coaxial  core biopsy needle devices. Multiple core samples were obtained and submitted  for pathologic analysis. Tract was embolized with Gelfoam particles. The needle  was removed and a dressing placed. No complications are evident. Intravenous conscious sedation was employed including 2 mg of Versed and 50 mcg  of fentanyl. FINDINGS: Successful CT-guided biopsy yielding multiple core samples for  pathologic analysis. No hemorrhage is noted on postbiopsy CAT scan images. Blood loss:  0cc. Impression IMPRESSION:    Successful CT guided biopsy of mass in right lobe of liver. Results from [place] encounter on 07/30/18   CT ABD PELV WO CONT    Narrative CT ABDOMEN AND PELVIS WITHOUT IV CONTRAST    INDICATION: Abdominal pain, RLQ; bloating. COMPARISON: 4/17/2017. TECHNIQUE:      Axial images were obtained through the abdomen and pelvis without IV contrast.   Oral contrast was not administered. Coronal and sagittal reformations were  generated. CT dose reduction was achieved through use of a standardized  protocol tailored for this examination and automatic exposure control for dose  modulation. FINDINGS:    The included portions of the lung bases are clear. No pleural of pericardial  fluid. The spleen and adrenal glands are unremarkable. No intrahepatic or extrahepatic  bile duct dilatation. Bilateral renal cysts are unchanged. Distended gallbladder  containing multiple tiny gravel-like stones versus dense sludge. Nonspecific area of decreased density in the right hepatic lobe measuring 7.6  cm.     Ill-defined pancreatic head with subtle stranding of the fat adjacent to the  pancreatic head. No dilated bowel loops. Surgical staple line in the right colon consistent with  right hemicolectomy. Diverticulosis. No pathologically enlarged lymph nodes,  free fluid or free air. Previous hysterectomy. No adnexal region pathology. Atherosclerotic  calcification of the abdominal aorta and branch vasculature without evidence of  aneurysm. Diastasis of the rectus musculature with protrusion of stomach,  liver, bowel and bowel mesentery through the defect. There is a ventral hernia  containing nondilated, non thickened small bowel. No acute osseous abnormalities. Impression IMPRESSION:    1. Ill-defined pancreatic head and adjacent fat stranding. Pancreatitis is not  excluded. 2. Rectus muscle diastasis as described above. Ventral hernia. 3. Bilateral renal cysts, unchanged. 4. Diverticulosis. 5. Large area of decreased density in the right hepatic lobe of uncertain  etiology. Consider contrast-enhanced MRI for further evaluation. 6. Distended gallbladder with gravel-like stones versus dense sludge. *Limited evaluation of the solid organs and bowel without IV and oral contrast.                  Results from Hospital Encounter encounter on 04/11/17   CT ABD PELV W CONT    Narrative INDICATION: Abd pain, fever, post-op, no recent CT,  status post  laparoscopic-assisted right colectomy 4/11/2017. COMPARISON: CT 3/13/2017. TECHNIQUE:   Following the uneventful intravenous administration of 100 cc Isovue-370, thin  axial images were obtained through the abdomen and pelvis. Excretory phase  delayed imaging is performed from above the diaphragms to the iliac crest.  Coronal and sagittal reconstructions were generated. Oral contrast administered. CT dose reduction was achieved through use of a standardized protocol tailored  for this examination and automatic exposure control for dose modulation.   Adaptive statistical iterative reconstruction (ASIR) was utilized. FINDINGS:   LUNG BASES: Small pleural effusions and dependent atelectasis. Aerated lungs  appear clear. INCIDENTALLY IMAGED HEART AND MEDIASTINUM: Normal in size without pericardial  effusion. Coronary artery calcifications noted. LIVER: No mass or biliary dilatation. GALLBLADDER: Dependently layering small gallstones. Otherwise unremarkable. SPLEEN: No mass. PANCREAS: No mass or ductal dilatation. ADRENALS: Unremarkable. KIDNEYS: Bilateral parapelvic cysts and partially exophytic posterior lateral  interpolar 4.2 cm left and 3.6 cm posterior lateral lower pole left renal cysts. No stones, hydronephrosis, or mass. Posterolateral lower pole left:  Unremarkable. SMALL BOWEL: Proximal small bowel loops are dilated to 4.5 cm with no clear  focal transition identified and the compression distally with expected  postoperative appearance to right mid abdominal ileocolic anastomosis. COLON: Status post partial right colectomy with left and sigmoid colon  diverticula and no inflammatory change, wall thickening, or dilation. APPENDIX: Vertically absent. PERITONEUM: No ascites or pneumoperitoneum. RETROPERITONEUM: No lymphadenopathy or aortic aneurysm. REPRODUCTIVE ORGANS: Uterus and ovaries are surgically absent. URINARY BLADDER: No mass or calculus. BONES: Diffuse osteopenia, degenerative spine change, and no identified acute  fracture or aggressive lesion. ADDITIONAL COMMENTS: Gas within the anterolateral right abdominal wall without  clear fluid collection or air-fluid level likely reflects post operative  incision site. Markedly lax anterior abdominal wall with diffuse edema in the  abdominal pannus, a small fat-containing umbilical hernia, and a small  fat-containing infraumbilical hernia. Impression IMPRESSION:    1. Status post partial right colectomy with no CT evidence of residual mass or  metastatic disease.     2. Gas within anterolateral right abdominal wall incision may reflect normal  postoperative gas, however infection is not excluded. 3. Small bilateral pleural effusions and atelectasis. 4. Additional incidental findings as above. MRI Results (maximum last 3): Results from [place] encounter on 07/30/18   MRI ABD W MRCP W WO CONT    Narrative MRI ABDOMEN AND MRCP WITH AND WITHOUT CONTRAST. INDICATION: Abnormal liver function tests; Cholelithiasis; Pancreatitis; Pancreatitis - acute; evaluate for choledocholithiasis as well as further  evaluate hepatic lesion    COMPARISON: 4/17/2017, 4/30/2018    TECHNIQUE: Multisequence and multiplanar MRI of the abdomen was performed after  the administration of 7 mL of Gadavist (gadobutrol)  IV contrast. Images were  obtained without contrast; and in late arterial, portal venous, and delayed  phases after the administration of contrast. Subtraction images were  reconstructed. Heavily T2-weighted thick slab and thin slice images were obtained in the  oblique coronal plane through the biliary tree (MRCP). FINDINGS:     MRCP: Suboptimal MRCP. In particular, evaluation for choledocholithiasis is  significantly limited. The intrahepatic biliary tree demonstrates mild  dilatation, which was not present on the 4/17/2017 CT the common bile duct is  not dilated. The common bile duct can be followed all the way to the ampulla on  the postcontrast sequences. The pancreatic duct is normal in course and caliber. LIVER: No steatosis. There is a mildly T2 hyperintense, markedly heterogeneous  mass in the inferior aspect of the right lobe in segment 5 measuring 4.5 x 8.9 x  4.7 cm. There are areas of nonenhancement within this lesion that could  represent necrosis. GALLBLADDER: Numerous tiny layering stones and/or sludge are seen in the  gallbladder. No wall thickening. Macrina Canseco   PANCREAS: Fairly extensive edema is seen in the uncinate process, head, and neck  region of the pancreas with minimal surrounding peripancreatic edema. No fluid  collection. There is a vague area soft tissue in the mesentery inferior to the  pancreas that demonstrates enhancement and measures approximately 3.3 x 5.1 cm  series 1402 image 87. SPLEEN: Normal.  ADRENALS: Normal.  KIDNEYS: Numerous parapelvic and exophytic cysts are seen arising from both  kidneys. No hydronephrosis or solid mass. Jenet Lanius DISTAL ESOPHAGUS, STOMACH, AND VISUALIZED BOWEL: Diastasis of the rectus muscles  and a ventral hernia redemonstrated. No obstruction. PERITONEUM: No free fluid and no abdominal lymphadenopathy. VISUALIZED LUNG BASES: Grossly clear within the sensitivity of MRI. BONES: No suspicious lesions. Impression IMPRESSION:   1. Acute pancreatitis is noted. 2. Nonspecific soft tissue mass versus extensive enhancing inflammatory change  in the mesentery inferior to the pancreas described above. Soft tissue mass is  favored, which is concerning for metastatic disease. 3. Aggressive appearing, heterogeneous mass in the inferior aspect of the right  lobe of the liver. Given history of colon cancer, findings are highly concerning  for metastatic disease. This lesion would be amenable to imaging guided biopsy. 4. Other findings described above. Nuclear Medicine Results (maximum last 3): Results from Hospital Encounter encounter on 10/01/18   NM BONE SCAN [facility] BODY    Narrative NUCLEAR MEDICINE WHOLE BODY BONE SCAN    INDICATION:  Colon cancer. Hip pain. COMPARISON:  None. RADIOPHARMACEUTICAL:  20.5 mCi Technetium 99m-MDP, IV. FINDINGS:    There is no evidence of osseous metastatic disease. Areas of degenerative uptake are noted at the glenohumeral joints, wrists, hands  and feet. Additional degenerative uptake at the left knee, left hip and within  the thoracal lumbar spine. Photopenic defect at the right knee consistent with TKR. No abnormal soft tissue  uptake. Symmetric activity is seen within both kidneys. Impression IMPRESSION:    No evidence of osseous metastatic disease. Multifocal degenerative uptake as  described above. US Results (maximum last 3): No results found for this or any previous visit. YU Results (most recent):  No results found for this or any previous visit. VAS/US Results (maximum last 3): Results from [place] encounter on 04/25/17   DUPLEX LOWER EXT VENOUS BILAT    Narrative BILATERAL LOWER EXTREMITY VENOUS ULTRASOUND    INDICATION:  Leg swelling. COMPARISON:  None. TECHNIQUE: Grayscale, compression and Doppler ultrasound imaging of the  bilateral lower extremities. FINDINGS:    The visualized deep veins of the bilateral lower extremities are compressible. Nonvisualization of the distal right femoral vein, right peroneal vein and left  peroneal vein. Normal flow is seen including normal response to respiratory variation and calf  augmentation. No evidence of Baker's cyst.      Impression IMPRESSION:    No evidence of deep venous thrombus in the bilateral lower extremities. PET Results (maximum last 3): No results found for this or any previous visit. Results for orders placed or performed during the hospital encounter of 93/46/19   METABOLIC PANEL, COMPREHENSIVE   Result Value Ref Range    Sodium 143 136 - 145 mmol/L    Potassium 3.8 3.5 - 5.1 mmol/L    Chloride 103 97 - 108 mmol/L    CO2 33 (H) 21 - 32 mmol/L    Anion gap 7 5 - 15 mmol/L    Glucose 129 (H) 65 - 100 mg/dL    BUN 15 6 - 20 MG/DL    Creatinine 0.82 0.55 - 1.02 MG/DL    BUN/Creatinine ratio 18 12 - 20      GFR est AA >60 >60 ml/min/1.73m2    GFR est non-AA >60 >60 ml/min/1.73m2    Calcium 8.8 8.5 - 10.1 MG/DL    Bilirubin, total 0.6 0.2 - 1.0 MG/DL    ALT (SGPT) 12 12 - 78 U/L    AST (SGOT) 13 (L) 15 - 37 U/L    Alk.  phosphatase 61 45 - 117 U/L    Protein, total 5.9 (L) 6.4 - 8.2 g/dL    Albumin 2.8 (L) 3.5 - 5.0 g/dL    Globulin 3.1 2.0 - 4.0 g/dL    A-G Ratio 0.9 (L) 1.1 - 2.2     CBC WITH AUTOMATED DIFF   Result Value Ref Range    WBC 9.0 3.6 - 11.0 K/uL    RBC 3.83 3.80 - 5.20 M/uL    HGB 11.0 (L) 11.5 - 16.0 g/dL    HCT 35.4 35.0 - 47.0 %    MCV 92.4 80.0 - 99.0 FL    MCH 28.7 26.0 - 34.0 PG    MCHC 31.1 30.0 - 36.5 g/dL    RDW 22.6 (H) 11.5 - 14.5 %    PLATELET 575 370 - 390 K/uL    MPV 9.9 8.9 - 12.9 FL    NRBC 0.0 0  WBC    ABSOLUTE NRBC 0.00 0.00 - 0.01 K/uL    NEUTROPHILS 62 32 - 75 %    LYMPHOCYTES 21 12 - 49 %    MONOCYTES 15 (H) 5 - 13 %    EOSINOPHILS 1 0 - 7 %    BASOPHILS 0 0 - 1 %    IMMATURE GRANULOCYTES 1 (H) 0.0 - 0.5 %    ABS. NEUTROPHILS 5.5 1.8 - 8.0 K/UL    ABS. LYMPHOCYTES 1.9 0.8 - 3.5 K/UL    ABS. MONOCYTES 1.4 (H) 0.0 - 1.0 K/UL    ABS. EOSINOPHILS 0.1 0.0 - 0.4 K/UL    ABS. BASOPHILS 0.0 0.0 - 0.1 K/UL    ABS. IMM. GRANS. 0.1 (H) 0.00 - 0.04 K/UL    DF SMEAR SCANNED      RBC COMMENTS ANISOCYTOSIS  3+       CEA   Result Value Ref Range    .8 ng/mL     Orders Placed This Encounter    cetirizine (ZYRTEC) 10 mg tablet     Sig: Take 10 mg by mouth daily as needed.  capecitabine (XELODA) 500 mg tablet     Si,000 mg two (2) times a day. Plan (Comment): Hold Xeloda and limit exposure and see if there is some heeling and try same dose or slight reduction in future     Follow-up Disposition:  Return in about 8 days (around 2018) for Office visit, Labs.     Aarti Sargent MD

## 2018-12-11 NOTE — PROGRESS NOTES
Jeni Good is a 81 y.o. female      Patient taking Xeloda 1000mg BID, she began C3D1 on November 28th . She has held the Xeloda since 12/7/18 due to pain in her fingers and redness in her palms. She is experiencing mucus from nose with trace amount of blood noted. C/o weak and tired. She is scheduled for her next chemo cycle on 12/19/18.

## 2018-12-12 NOTE — TELEPHONE ENCOUNTER
HIPAA verified. Notified patient Dr Taylor has ordered a CT for patient, he would like to see her a few days after Ct is completed. Patient is to call after she  Schedules Ct with Scheduling to obtain an appointment a few days Ct is scheduled. She verbalized understanding.

## 2018-12-13 NOTE — TELEPHONE ENCOUNTER
Patient called stated her CT is scheduled for Monday 12/17/18. She is scheduled to see Dr in Medical Oncology on 12/19/18, which she is able to keep. She also stated she has received a call from the speciality pharmacy asking if they can ship the next round of Xeloda? Per Dr Alveta Lundborg she is to tell them not to ship, after CT and office visit, they will let her know if chemo needs to be changed. Patient verbalized understanding.

## 2018-12-19 NOTE — PROGRESS NOTES
Lorena Locke is a 81 y.o. female who presents to the office today for the following:  Chief Complaint   Patient presents with    Colon Cancer       Referring Provider:  Fransisco Chance MD    HPI  Ms Dequan Shahid returns today for follow up of her chemotherapy and review of her most recent CT scan results. Unfortunately her metastatic disease in her liver has worsened--growth of original lesion and appearance of a new met. She has not been tolerating the xeloda well and is having palmar erythema and desquamation. She is also having pain in her fingertips that is effecting her ability to perform some tasks. Her functional status is limited due to her comorbidities. She has a PS of 3. She is would not be able to tolerate more aggressive chemotherapy. CURRENT TREATMENT:    Past Medical History:   Diagnosis Date    Allergic rhinitis     Cancer ([address]     Stage 1 cancer intestines    Cervical disc disease     Colonic mass 3/20/2017    Eczema     Gastritis     GERD (gastroesophageal reflux disease)     mild    GI bleed     hx.  of recurrent    Hiatal hernia     Hypercholesterolemia     Hypertension     Metastatic adenocarcinoma to liver ([address] 9/10/2018    Morbid obesity ([address] 4/13/2017    Osteoarthritis (arthritis due to wear and tear of joints)     Pancreatitis, gallstone     8/18     Past Surgical History:   Procedure Laterality Date    ABDOMEN SURGERY PROC UNLISTED      Colon Resection (Cancer)    HX COLONOSCOPY  3/05    HX COLONOSCOPY  2017    HX CYST REMOVAL      left side    HX ENDOSCOPY  1/09    HX ENDOSCOPY  2015    HX GYN      hysterectomy    HX ORTHOPAEDIC      rt knee replacement     Family History   Problem Relation Age of Onset    No Known Problems Mother     No Known Problems Father     Arthritis-osteo Sister     Alzheimer Sister     Parkinson's Disease Brother     Cancer Brother     No Known Problems Brother     Anesth Problems Neg Hx      Social History Socioeconomic History    Marital status:      Spouse name: Not on file    Number of children: Not on file    Years of education: Not on file    Highest education level: Not on file   Tobacco Use    Smoking status: Never Smoker    Smokeless tobacco: Never Used   Substance and Sexual Activity    Alcohol use: No    Drug use: No    Sexual activity: No       Oncology Flowsheet 10/17/2018 11/7/2018 11/28/2018   Day, Cycle Day 1, Cycle 1 Day 1, Cycle 2 Day 1, Cycle 3   bevacizumab (AVASTIN) IV 7.5 mg/kg = 750 mg 7.5 mg/kg = 750 mg 7.5 mg/kg = 750 mg   ]          Key Oncology Meds             ondansetron hcl (ZOFRAN) 8 mg tablet  (Taking) Take 1 Tab by mouth every eight (8) hours as needed for Nausea. Take every 8 hours only as needed. Indications: CANCER CHEMOTHERAPY-INDUCED NAUSEA AND VOMITING    capecitabine (XELODA) 500 mg tablet 2,000 mg two (2) times a day. Current Day Forward Treatment Plan Days   Treatment Plan: OP CAPOX 1000 mg/m2 (2weeks on/1 week off)+ BEVACIZUMAB   7.5mg/kg every 3 weeks             Day 1, Cycle 4  (Planned for 12/19/2018)    Chemotherapy: bevacizumab (AVASTIN) 750 mg in 0.9% sodium chloride 100   mL, overfill volume 10 mL IVPB, REGIMEN NOTES/COMMUNICATIONS, capecitabine   (XELODA) 500 mg tablet   Day 1, Cycle 5  (Planned for 1/9/2019)    Chemotherapy: bevacizumab (AVASTIN) 750 mg in 0.9% sodium chloride 100 mL   IVPB, REGIMEN NOTES/COMMUNICATIONS, capecitabine (XELODA) 500 mg tablet   Day 1, Cycle 6  (Planned for 1/30/2019)    Chemotherapy: bevacizumab (AVASTIN) 750 mg in 0.9% sodium chloride 100 mL   IVPB, REGIMEN NOTES/COMMUNICATIONS, capecitabine (XELODA) 500 mg tablet        Allergies   Allergen Reactions    Lisinopril Angioedema    Niacin Itching     Felt hot also    Ultram [Tramadol] Nausea Only     Current Outpatient Medications   Medication Sig    cetirizine (ZYRTEC) 10 mg tablet Take 10 mg by mouth daily as needed.     lidocaine (ASPERCREME, LIDOCAINE,) 4 % topical cream Apply 1 Each to affected area four (4) times daily as needed for Pain (as needed for knee pain).  potassium chloride (K-DUR, KLOR-CON) 20 mEq tablet Take 1 Tab by mouth daily.  ondansetron hcl (ZOFRAN) 8 mg tablet Take 1 Tab by mouth every eight (8) hours as needed for Nausea. Take every 8 hours only as needed. Indications: CANCER CHEMOTHERAPY-INDUCED NAUSEA AND VOMITING    omeprazole (PRILOSEC) 20 mg capsule Take 1 Cap by mouth daily.  dilTIAZem (TIAZAC) 120 mg SR capsule Take 1 Cap by mouth daily.  atorvastatin (LIPITOR) 20 mg tablet Take 1 Tab by mouth daily.  magnesium oxide 500 mg tab Take 1 Tab by mouth daily.  cholecalciferol (VITAMIN D3) 1,000 unit cap Take 1 Cap by mouth daily.  furosemide (LASIX) 20 mg tablet Take 1 Tab by mouth two (2) times a day.  trolamine salicylate (MYOFLEX) 10 % topical cream Apply 1 Part to affected area three (3) times daily as needed for Pain. apply small amount externally to left knee    coenzyme Q-10 (CO Q-10) 200 mg capsule Take 200 mg by mouth daily.  aspirin delayed-release 81 mg tablet Take 81 mg by mouth daily.  polyethylene glycol (MIRALAX) 17 gram packet Take 1 Packet by mouth daily.  HYDROcodone-acetaminophen (NORCO)  mg tablet Take 1 Tab by mouth every four (4) hours as needed. Max Daily Amount: 6 Tabs.  multivitamin (ONE A DAY) tablet Take 1 Tab by mouth daily.  capecitabine (XELODA) 500 mg tablet 2,000 mg two (2) times a day.  acetaminophen (TYLENOL) 325 mg tablet Take 325 mg by mouth every four (4) hours as needed for Pain. No current facility-administered medications for this visit. ROS    Physical Exam  Visit Vitals  /65   Pulse 81   Temp 97.6 °F (36.4 °C) (Oral)   Resp 19   Ht 5' 2\" (1.575 m)   Wt 221 lb 12.8 oz (100.6 kg)   SpO2 97%   BMI 40.57 kg/m²         Imaging Results:    XR Results (maximum last 3):   Results from Hospital Encounter encounter on 10/01/18   XR HIP RT W OR WO PELV 2-3 VWS    Narrative RIGHT HIP AND PELVIS, 2 VIEWS    INDICATION:  Pain. COMPARISON:  None. FINDINGS:    There is no acute fracture or dislocation. Mild to moderate bilateral hip joint  space narrowing. Bilateral iliac crest and greater trochanteric enthesopathy. No  acute findings of the SI joints or pubic symphysis. Bone mineralization is  normal.      Impression IMPRESSION:    Degenerative changes as described above. Results from [place] encounter on 07/30/18   XR CHEST PORT    Narrative INDICATION: Admission. Abdominal pain. COMPARISON: 5/1/2017    FINDINGS: AP portable imaging of the chest performed at 7:13 PM demonstrates a  stable cardiomediastinal silhouette. There is mild bibasilar atelectasis. The  lungs are otherwise clear. No significant osseous abnormalities are seen. Impression IMPRESSION: Mild bibasilar atelectasis. Results from [place] encounter on 07/19/18   XR KNEE LT 3 V    Narrative LEFT KNEE, 3 VIEWS    INDICATION:  Knee pain. COMPARISON:  None. FINDINGS:    There is no acute fracture, dislocation or effusion. There is moderate to severe  tricompartmental joint space narrowing and marginal spurring. Prepatellar soft  tissue swelling. Patellar enthesophytes noted. There is normal bone  mineralization. Impression IMPRESSION:    Tricompartmental DJD. Prepatellar soft tissue swelling. CT Results (maximum last 3): Results from [place] encounter on 12/17/18   CT ABD PELV W CONT    Narrative INDICATION: colon Ca Abdominal mets, rising CEA despite chemotherapy, see last  CT report    COMPARISON: July 30, 2018    TECHNIQUE:   Following the uneventful intravenous administration of 100 cc Isovue-370, thin  axial images were obtained through the abdomen and pelvis. Coronal and sagittal  reconstructions were generated. Oral contrast was administered.  CT dose  reduction was achieved through use of a standardized protocol tailored for this  examination and automatic exposure control for dose modulation. FINDINGS:   LUNG BASES: No abnormality. LIVER: New 2.7 x 2.4 cm hypodense mass segment 6. This is separate from the  large heterogeneous mass inferior right hepatic lobe, inseparable from the  adjacent gallbladder, previously measured 8.6 x 5.7 cm now a proximal and 9.4 x  5.8 cm. Mild intrahepatic biliary dilatation may have increased slightly. Proximal common bile duct measures 1 cm without definite change. GALLBLADDER: Small gallstones or sludge in the dependent portion of the  gallbladder. Possible small high density focus distal common bile duct. Wall  thickening of the lateral gallbladder inseparable from the adjacent liver mass. SPLEEN: No enlargement or lesion. PANCREAS: Resolved peripancreatic inflammation. Previous masslike density  inferior to the pancreatic head is also resolved and may have been related to  acute pancreatitis. ADRENALS: No mass. KIDNEYS: Bilateral parapelvic and exophytic renal cysts similar to prior study. No hydronephrosis. GI TRACT:  Colonic diverticulosis without diverticulitis. Focal supraumbilical  ventral hernia contains a portion of transverse colon  PERITONEUM: No free air or free fluid. APPENDIX: Not well visualized. RETROPERITONEUM: No aortic aneurysm. LYMPH NODES:  None enlarged. ADDITIONAL COMMENTS: N/A.    URINARY BLADDER: Unremarkable. REPRODUCTIVE ORGANS: Uterus is surgically absent. LYMPH NODES:  None enlarged. FREE FLUID:  None. BONES: No destructive bone lesion. ADDITIONAL COMMENTS: N/A. Impression IMPRESSION:    1. Large mass inferior right hepatic lobe, slightly larger than prior study and  now inseparable from the lateral wall of the gallbladder. Cannot exclude  gallbladder wall invasion. 2. New metastasis segment 6 of the liver. 3. Small amount of gallbladder sludge or stones, with slight increase in biliary  dilatation.  Cannot exclude small distal common bile duct stone, correlate with  clinical parameters. 4. Resolved pancreatitis. Previous masslike density inferior to the pancreatic  head not clearly evident and may have been related to the acute inflammatory  process. 5. Bilateral renal cysts without hydronephrosis. Results from [place] encounter on 07/30/18   CT BX LIVER NDL PERC    Narrative INDICATION: History of colon cancer. PROCEDURE:     The risks, benefits, and alternative procedures were explained and discussed  with the patient. Informed consent was obtained. CT dose reduction was achieved through the use of a standardized protocol  tailored for this examination and automatic exposure control for dose  modulation. Following sterile prep and local anesthesia, percutaneous CT-guided biopsy of  the right lobe of liver was performed via right lateral approach using coaxial  core biopsy needle devices. Multiple core samples were obtained and submitted  for pathologic analysis. Tract was embolized with Gelfoam particles. The needle  was removed and a dressing placed. No complications are evident. Intravenous conscious sedation was employed including 2 mg of Versed and 50 mcg  of fentanyl. FINDINGS: Successful CT-guided biopsy yielding multiple core samples for  pathologic analysis. No hemorrhage is noted on postbiopsy CAT scan images. Blood loss:  0cc. Impression IMPRESSION:    Successful CT guided biopsy of mass in right lobe of liver. Results from [place] encounter on 07/30/18   CT ABD PELV WO CONT    Narrative CT ABDOMEN AND PELVIS WITHOUT IV CONTRAST    INDICATION: Abdominal pain, RLQ; bloating. COMPARISON: 4/17/2017. TECHNIQUE:      Axial images were obtained through the abdomen and pelvis without IV contrast.   Oral contrast was not administered. Coronal and sagittal reformations were  generated.   CT dose reduction was achieved through use of a standardized  protocol tailored for this examination and automatic exposure control for dose  modulation. FINDINGS:    The included portions of the lung bases are clear. No pleural of pericardial  fluid. The spleen and adrenal glands are unremarkable. No intrahepatic or extrahepatic  bile duct dilatation. Bilateral renal cysts are unchanged. Distended gallbladder  containing multiple tiny gravel-like stones versus dense sludge. Nonspecific area of decreased density in the right hepatic lobe measuring 7.6  cm. Ill-defined pancreatic head with subtle stranding of the fat adjacent to the  pancreatic head. No dilated bowel loops. Surgical staple line in the right colon consistent with  right hemicolectomy. Diverticulosis. No pathologically enlarged lymph nodes,  free fluid or free air. Previous hysterectomy. No adnexal region pathology. Atherosclerotic  calcification of the abdominal aorta and branch vasculature without evidence of  aneurysm. Diastasis of the rectus musculature with protrusion of stomach,  liver, bowel and bowel mesentery through the defect. There is a ventral hernia  containing nondilated, non thickened small bowel. No acute osseous abnormalities. Impression IMPRESSION:    1. Ill-defined pancreatic head and adjacent fat stranding. Pancreatitis is not  excluded. 2. Rectus muscle diastasis as described above. Ventral hernia. 3. Bilateral renal cysts, unchanged. 4. Diverticulosis. 5. Large area of decreased density in the right hepatic lobe of uncertain  etiology. Consider contrast-enhanced MRI for further evaluation. 6. Distended gallbladder with gravel-like stones versus dense sludge. *Limited evaluation of the solid organs and bowel without IV and oral contrast.                      MRI Results (maximum last 3): Results from [place] encounter on 07/30/18   MRI ABD W MRCP W WO CONT    Narrative MRI ABDOMEN AND MRCP WITH AND WITHOUT CONTRAST.      INDICATION: Abnormal liver function tests; Cholelithiasis; Pancreatitis; Pancreatitis - acute; evaluate for choledocholithiasis as well as further  evaluate hepatic lesion    COMPARISON: 4/17/2017, 4/30/2018    TECHNIQUE: Multisequence and multiplanar MRI of the abdomen was performed after  the administration of 7 mL of Gadavist (gadobutrol)  IV contrast. Images were  obtained without contrast; and in late arterial, portal venous, and delayed  phases after the administration of contrast. Subtraction images were  reconstructed. Heavily T2-weighted thick slab and thin slice images were obtained in the  oblique coronal plane through the biliary tree (MRCP). FINDINGS:     MRCP: Suboptimal MRCP. In particular, evaluation for choledocholithiasis is  significantly limited. The intrahepatic biliary tree demonstrates mild  dilatation, which was not present on the 4/17/2017 CT the common bile duct is  not dilated. The common bile duct can be followed all the way to the ampulla on  the postcontrast sequences. The pancreatic duct is normal in course and caliber. LIVER: No steatosis. There is a mildly T2 hyperintense, markedly heterogeneous  mass in the inferior aspect of the right lobe in segment 5 measuring 4.5 x 8.9 x  4.7 cm. There are areas of nonenhancement within this lesion that could  represent necrosis. GALLBLADDER: Numerous tiny layering stones and/or sludge are seen in the  gallbladder. No wall thickening. [place] PANCREAS: Fairly extensive edema is seen in the uncinate process, head, and neck  region of the pancreas with minimal surrounding peripancreatic edema. No fluid  collection. There is a vague area soft tissue in the mesentery inferior to the  pancreas that demonstrates enhancement and measures approximately 3.3 x 5.1 cm  series 1402 image 87. SPLEEN: Normal.  ADRENALS: Normal.  KIDNEYS: Numerous parapelvic and exophytic cysts are seen arising from both  kidneys. No hydronephrosis or solid mass. Janace [place]   DISTAL ESOPHAGUS, STOMACH, AND VISUALIZED BOWEL: Diastasis of the rectus muscles  and a ventral hernia redemonstrated. No obstruction. PERITONEUM: No free fluid and no abdominal lymphadenopathy. VISUALIZED LUNG BASES: Grossly clear within the sensitivity of MRI. BONES: No suspicious lesions. Impression IMPRESSION:   1. Acute pancreatitis is noted. 2. Nonspecific soft tissue mass versus extensive enhancing inflammatory change  in the mesentery inferior to the pancreas described above. Soft tissue mass is  favored, which is concerning for metastatic disease. 3. Aggressive appearing, heterogeneous mass in the inferior aspect of the right  lobe of the liver. Given history of colon cancer, findings are highly concerning  for metastatic disease. This lesion would be amenable to imaging guided biopsy. 4. Other findings described above. Nuclear Medicine Results (maximum last 3): Results from Hospital Encounter encounter on 10/01/18   NM BONE SCAN [facility] BODY    [facility] NUCLEAR MEDICINE WHOLE BODY BONE SCAN    INDICATION:  Colon cancer. Hip pain. COMPARISON:  None. RADIOPHARMACEUTICAL:  20.5 mCi Technetium 99m-MDP, IV. FINDINGS:    There is no evidence of osseous metastatic disease. Areas of degenerative uptake are noted at the glenohumeral joints, wrists, hands  and feet. Additional degenerative uptake at the left knee, left hip and within  the thoracal lumbar spine. Photopenic defect at the right knee consistent with TKR. No abnormal soft tissue  uptake. Symmetric activity is seen within both kidneys. Impression IMPRESSION:    No evidence of osseous metastatic disease. Multifocal degenerative uptake as  described above. US Results (maximum last 3): No results found for this or any previous visit. [facility]U Results (most recent):  No results found for this or any previous visit. VAS/US Results (maximum last 3):   Results from [place] encounter on 04/25/17   DUPLEX LOWER EXT VENOUS BILAT Narrative BILATERAL LOWER EXTREMITY VENOUS ULTRASOUND    INDICATION:  Leg swelling. COMPARISON:  None. TECHNIQUE: Grayscale, compression and Doppler ultrasound imaging of the  bilateral lower extremities. FINDINGS:    The visualized deep veins of the bilateral lower extremities are compressible. Nonvisualization of the distal right femoral vein, right peroneal vein and left  peroneal vein. Normal flow is seen including normal response to respiratory variation and calf  augmentation. No evidence of Baker's cyst.      Impression IMPRESSION:    No evidence of deep venous thrombus in the bilateral lower extremities. PET Results (maximum last 3): No results found for this or any previous visit. No results found for any visits on 12/19/18. No orders of the defined types were placed in this encounter. ASSESSMENT and PLAN:  Colon cancer with liver metastasis: progressive disease while on Cap/avastin--will stop chemotherapy and switch to best supportive care/hospice. I have left a message with Dr Saurabh Arellano to inform her of the results and see if she would like to be her Hospice Provider. The CT scans showed the liver mets are adjacent to and possibly invading the gallbladder--this would put her at risk for Choleycystitis. I would suggest treating with oral antibiotics if symptoms are suggestive of infection. We also discussed transition to DNR status and she was aggreable to this transition. Neoplasm Pain: her pain is very well controlled on current as needed medications--no changes for now    There are no diagnoses linked to this encounter.     Follow-up Disposition: Not on Gilbert Kasper MD

## 2018-12-19 NOTE — PROGRESS NOTES
Karlo Shankar is a 81 y.o. female Here today to begin C4D1 of chemo. She has held the Xeloda since November 28th due to redness in fingers and palms. Today her hands are discolored (darkened) peeling, she C/O pain in her fingertips when she is try to do anything. This pain does not go beyond her first nuckle and is not worsened by cold or heat. She had her CT with contrast on 12/17/18, the same night after her CT she had upper L and R quadrant pain, chills, and she felt as if she was shaking. She had a nose bleed last week before her scan, she stated she had 3-4 drops of blood. She sees occasional streaks of blood in the mucus from her nose. Key Oncology Meds             capecitabine (XELODA) 500 mg tablet 2,000 mg two (2) times a day. ondansetron hcl (ZOFRAN) 8 mg tablet Take 1 Tab by mouth every eight (8) hours as needed for Nausea. Take every 8 hours only as needed.   Indications: CANCER CHEMOTHERAPY-INDUCED NAUSEA AND VOMITING        Chemotherapy Flowsheet 11/28/2018   Cycle C3D1   Date 11/28/2018   Drug / Regimen Avastin   Dosage 750mg   Pre Meds Alox[place] Decadron   Notes D1 Xeloda 1000mg BID

## 2019-01-01 ENCOUNTER — TELEPHONE (OUTPATIENT)
Dept: FAMILY MEDICINE CLINIC | Age: 84
End: 2019-01-01

## 2019-03-14 NOTE — TELEPHONE ENCOUNTER
Calvin Diggs from [facility] states they are having some difficulty managing patient's pain. Would like Dr. Jordin Soria to switch the Morphine ER to Methadone 5 mg 2x/day for leg pain.  She can be reached at [phone]

## 2023-02-17 NOTE — TELEPHONE ENCOUNTER
Makenzie of [address] [facility] is still waiting for a call back Patient tolerated procedure well. Patient tolerated procedure well. Patient tolerated procedure well. Patient tolerated procedure well. Patient tolerated procedure well. Patient tolerated procedure well. Patient tolerated procedure well. Patient tolerated procedure well. Patient tolerated procedure well. Patient tolerated procedure well.

## 2024-01-24 NOTE — PROGRESS NOTES
Daily Progress Note  Dante Estrada [facility] at [address] Date: 2017  Post-Operative Day: 1 from Procedure(s):  LAPAROSCOPIC HAND ASSISTED RIGHT COLECTOMY     Subjective:     Last 24 hrs: Abdominal pain adequately controlled with PCA, however arthritic pain in her hip is acting up. She usually takes tylenol for this. Got OOB to chair last night and christie d/c'd this am.  Taking clears. Objective:     Blood pressure 101/62, pulse 63, temperature 97.9 °F (36.6 °C), resp. rate 16, height 5' 1\" (1.549 m), weight 105.7 kg (233 lb), SpO2 95 %, not currently breastfeeding. Temp (24hrs), Av.3 °F (36.8 °C), Min:97.9 °F (36.6 °C), Max:98.7 °F (37.1 °C)      _____________________  Physical Exam:     Alert and Oriented, sitting up in bed, in no acute distress. Cardiovascular: RRR, no peripheral edema  Lungs:CTAB   Abdomen: + BS, soft, appropriately tender. Incisions healing well. Assessment:   Principal Problem:    Colonic mass (3/20/2017)    hypokalemia        Plan:     Replete potassium  Add IV tylenol x 2 doses, then PO  Await return of bowel function  DVT prophylaxis  Labs in am          Bianca Scott, [address] [facility] at [facility],   [facility], [address] [place] [address]  [phone]    Data Review:    Recent Labs      17   0436  17   1351   WBC  16.2*   --    HGB  11.1*  12.4   HCT  36.2  40.5   PLT  177   --      Recent Labs      17   0436   NA  137   K  3.3*   CL  99   CO2  29   GLU  176*   BUN  12   CREA  0.85   CA  8.6     No results for input(s): AML, LPSE in the last 72 hours.         ______________________  Medications:    Current Facility-Administered Medications   Medication Dose Route Frequency    dilTIAZem CD (CARDIZEM CD) capsule 120 mg  120 mg Oral DAILY    potassium chloride SR (KLOR-CON 10) tablet 40 mEq  40 mEq Oral NOW    hydroCHLOROthiazide (HYDRODIURIL) tablet 25 mg  25 mg Oral DAILY    lisinopril (PRINIVIL, ZESTRIL) tablet 10 mg  10 mg Oral DAILY    sodium chloride (NS) flush 5-10 mL  5-10 mL IntraVENous Q8H    sodium chloride (NS) flush 5-10 mL  5-10 mL IntraVENous PRN    dextrose 5% - 0.45% NaCl with KCl 20 mEq/L infusion  100 mL/hr IntraVENous CONTINUOUS    ondansetron (ZOFRAN) injection 4 mg  4 mg IntraVENous Q4H PRN    diphenhydrAMINE (BENADRYL) injection 12.5 mg  12.5 mg IntraVENous Q4H PRN    enoxaparin (LOVENOX) injection 40 mg  40 mg SubCUTAneous Q24H    HYDROmorphone (PF) 15 mg/30 ml (DILAUDID) PCA   IntraVENous TITRATE                                                                                               ATTENDING ADDENDUM  I supervised the APC and reviewed the note. We discussed the plan of care  Passing some flatus. Left hip is killing her. Trying high dose tylenol. Quality 226: Preventive Care And Screening: Tobacco Use: Screening And Cessation Intervention: Patient screened for tobacco use and is an ex/non-smoker Quality 130: Documentation Of Current Medications In The Medical Record: Current Medications Documented Detail Level: Detailed Quality 431: Preventive Care And Screening: Unhealthy Alcohol Use - Screening: Patient identified as an unhealthy alcohol user when screened for unhealthy alcohol use using a systematic screening method and received brief counseling

## 2024-12-20 NOTE — PROGRESS NOTES
CM completed chart review. Noted that evaluation was completed yesterday. Referral was sent to [facility]. They have accepted referral.  CM added to AVS and completed milestone. Family will be able to transport her home in car when stable. Pt lives in [facility] so it will take some time for them to come get her possibly once deemed medically stable. CM will continue to monitor discharge plan.      Abel, [address]   Ext [phone] no loss of consciousness, no gait abnormality, no headache, no sensory deficits, and no weakness.

## (undated) DEVICE — TOWEL SURG W17XL27IN STD BLU COT NONFENESTRATED PREWASHED

## (undated) DEVICE — 3000CC GUARDIAN II: Brand: GUARDIAN

## (undated) DEVICE — BLADELESS OPTICAL TROCAR WITH FIXATION CANNULA: Brand: VERSAPORT

## (undated) DEVICE — DERMABOND SKIN ADH 0.7ML -- DERMABOND ADVANCED 12/BX

## (undated) DEVICE — UNIVERSAL FIXATION CANNULA: Brand: VERSAONE

## (undated) DEVICE — STERILE-Z MAYO STAND COVERS CLEAR POLYETHYLENE STERILE UNIVERSAL FIT 20 PER CASE: Brand: STERILE-Z

## (undated) DEVICE — HANDLE LT SNAP ON ULT DURABLE LENS FOR TRUMPF ALC DISPOSABLE

## (undated) DEVICE — INSUFFLATION NEEDLE: Brand: SURGINEEDLE

## (undated) DEVICE — BLADELESS OPTICAL TROCAR WITH FIXATION CANNULA: Brand: VERSAONE

## (undated) DEVICE — (D)PREP SKN CHLRAPRP APPL 26ML -- CONVERT TO ITEM 371833

## (undated) DEVICE — SUTURE MCRYL SZ 4-0 L27IN ABSRB UD L19MM PS-2 1/2 CIR PRIM Y426H

## (undated) DEVICE — TUBING INSUFLTN 10FT LUER -- CONVERT TO ITEM 368568

## (undated) DEVICE — MEDI-VAC YANK SUCT HNDL W/TPRD BULBOUS TIP: Brand: CARDINAL HEALTH

## (undated) DEVICE — Device

## (undated) DEVICE — REM POLYHESIVE ADULT PATIENT RETURN ELECTRODE: Brand: VALLEYLAB

## (undated) DEVICE — DRAPE,UTILTY,TAPE,15X26, 4EA/PK: Brand: MEDLINE

## (undated) DEVICE — SURGICAL PROCEDURE KIT GEN LAPAROSCOPY LF

## (undated) DEVICE — STAPLER INT L60MM THK38MM BLU TI RELD DISPOSABLE DST SER GIA

## (undated) DEVICE — SPONGE LAP 18X18IN STRL -- 5/PK

## (undated) DEVICE — STAPLER INT L90MM DIA35MM TI STPL RELD DISPOSABLE DST SER TA

## (undated) DEVICE — INFECTION CONTROL KIT SYS

## (undated) DEVICE — TRAY CATH OD16FR SIL URIN M STATLOK STBL DEV SURSTP

## (undated) DEVICE — KENDALL SCD EXPRESS SLEEVES, KNEE LENGTH, MEDIUM: Brand: KENDALL SCD

## (undated) DEVICE — COLON CLOSING PACK: Brand: MEDLINE INDUSTRIES, INC.

## (undated) DEVICE — STERILE POLYISOPRENE POWDER-FREE SURGICAL GLOVES WITH EMOLLIENT COATING: Brand: PROTEXIS

## (undated) DEVICE — DEVON™ KNEE AND BODY STRAP 60" X 3" (1.5 M X 7.6 CM): Brand: DEVON

## (undated) DEVICE — SUTURE PERMAHAND SZ 3-0 L18IN NONABSORBABLE BLK L26MM SH C013D

## (undated) DEVICE — WRAP SURG W1.31XL1.34M CARD FOR PT 165-172CM THERMOWRP